# Patient Record
Sex: MALE | Race: WHITE | NOT HISPANIC OR LATINO | Employment: OTHER | URBAN - NONMETROPOLITAN AREA
[De-identification: names, ages, dates, MRNs, and addresses within clinical notes are randomized per-mention and may not be internally consistent; named-entity substitution may affect disease eponyms.]

---

## 2017-01-11 ENCOUNTER — COMMUNICATION - GICH (OUTPATIENT)
Dept: INTERNAL MEDICINE | Facility: OTHER | Age: 82
End: 2017-01-11

## 2017-01-11 DIAGNOSIS — M48.061 SPINAL STENOSIS OF LUMBAR REGION: ICD-10-CM

## 2017-03-14 ENCOUNTER — COMMUNICATION - GICH (OUTPATIENT)
Dept: INTERNAL MEDICINE | Facility: OTHER | Age: 82
End: 2017-03-14

## 2017-03-14 DIAGNOSIS — M48.061 SPINAL STENOSIS OF LUMBAR REGION: ICD-10-CM

## 2017-06-28 ENCOUNTER — OFFICE VISIT - GICH (OUTPATIENT)
Dept: INTERNAL MEDICINE | Facility: OTHER | Age: 82
End: 2017-06-28

## 2017-06-28 ENCOUNTER — HISTORY (OUTPATIENT)
Dept: INTERNAL MEDICINE | Facility: OTHER | Age: 82
End: 2017-06-28

## 2017-06-28 DIAGNOSIS — E78.5 HYPERLIPIDEMIA: ICD-10-CM

## 2017-06-28 DIAGNOSIS — M54.2 CERVICALGIA: ICD-10-CM

## 2017-06-28 DIAGNOSIS — M19.90 OSTEOARTHRITIS: ICD-10-CM

## 2017-06-28 DIAGNOSIS — N40.2 NODULAR PROSTATE WITHOUT LOWER URINARY TRACT SYMPTOMS: ICD-10-CM

## 2017-06-28 DIAGNOSIS — I10 ESSENTIAL (PRIMARY) HYPERTENSION: ICD-10-CM

## 2017-06-28 DIAGNOSIS — N18.30 CHRONIC KIDNEY DISEASE, STAGE III (MODERATE) (H): ICD-10-CM

## 2017-06-28 LAB
A/G RATIO - HISTORICAL: 2 (ref 1–2)
ALBUMIN SERPL-MCNC: 4.3 G/DL (ref 3.5–5.7)
ALP SERPL-CCNC: 49 IU/L (ref 34–104)
ALT (SGPT) - HISTORICAL: 12 IU/L (ref 7–52)
ANION GAP - HISTORICAL: 9 (ref 5–18)
AST SERPL-CCNC: 15 IU/L (ref 13–39)
BILIRUB SERPL-MCNC: 0.5 MG/DL (ref 0.3–1)
BUN SERPL-MCNC: 28 MG/DL (ref 7–25)
BUN/CREAT RATIO - HISTORICAL: 23
CALCIUM SERPL-MCNC: 9.3 MG/DL (ref 8.6–10.3)
CHLORIDE SERPLBLD-SCNC: 105 MMOL/L (ref 98–107)
CHOL/HDL RATIO - HISTORICAL: 4.58
CHOLESTEROL TOTAL: 197 MG/DL
CO2 SERPL-SCNC: 25 MMOL/L (ref 21–31)
CREAT SERPL-MCNC: 1.24 MG/DL (ref 0.7–1.3)
GFR IF NOT AFRICAN AMERICAN - HISTORICAL: 55 ML/MIN/1.73M2
GLOBULIN - HISTORICAL: 2.1 G/DL (ref 2–3.7)
GLUCOSE SERPL-MCNC: 93 MG/DL (ref 70–105)
HDLC SERPL-MCNC: 43 MG/DL (ref 23–92)
LDLC SERPL CALC-MCNC: 131 MG/DL
NON-HDL CHOLESTEROL - HISTORICAL: 154 MG/DL
PATIENT STATUS - HISTORICAL: ABNORMAL
POTASSIUM SERPL-SCNC: 4.4 MMOL/L (ref 3.5–5.1)
PROT SERPL-MCNC: 6.4 G/DL (ref 6.4–8.9)
SODIUM SERPL-SCNC: 139 MMOL/L (ref 133–143)
TRIGL SERPL-MCNC: 116 MG/DL

## 2017-07-17 ENCOUNTER — COMMUNICATION - GICH (OUTPATIENT)
Dept: INTERNAL MEDICINE | Facility: OTHER | Age: 82
End: 2017-07-17

## 2017-07-17 DIAGNOSIS — M48.061 SPINAL STENOSIS OF LUMBAR REGION: ICD-10-CM

## 2017-07-17 DIAGNOSIS — I10 ESSENTIAL (PRIMARY) HYPERTENSION: ICD-10-CM

## 2017-08-08 ENCOUNTER — OFFICE VISIT - GICH (OUTPATIENT)
Dept: INTERNAL MEDICINE | Facility: OTHER | Age: 82
End: 2017-08-08

## 2017-08-08 ENCOUNTER — HISTORY (OUTPATIENT)
Dept: INTERNAL MEDICINE | Facility: OTHER | Age: 82
End: 2017-08-08

## 2017-08-08 DIAGNOSIS — G89.29 OTHER CHRONIC PAIN: ICD-10-CM

## 2017-08-08 DIAGNOSIS — M53.3 SACROCOCCYGEAL DISORDERS, NOT ELSEWHERE CLASSIFIED: ICD-10-CM

## 2017-08-08 DIAGNOSIS — M65.331 TRIGGER MIDDLE FINGER OF RIGHT HAND: ICD-10-CM

## 2017-08-08 DIAGNOSIS — F51.04 PSYCHOPHYSIOLOGIC INSOMNIA: ICD-10-CM

## 2017-08-08 ASSESSMENT — PATIENT HEALTH QUESTIONNAIRE - PHQ9: SUM OF ALL RESPONSES TO PHQ QUESTIONS 1-9: 0

## 2017-09-17 ENCOUNTER — COMMUNICATION - GICH (OUTPATIENT)
Dept: INTERNAL MEDICINE | Facility: OTHER | Age: 82
End: 2017-09-17

## 2017-09-17 DIAGNOSIS — M48.061 SPINAL STENOSIS OF LUMBAR REGION: ICD-10-CM

## 2017-10-19 ENCOUNTER — OFFICE VISIT - GICH (OUTPATIENT)
Dept: INTERNAL MEDICINE | Facility: OTHER | Age: 82
End: 2017-10-19

## 2017-10-19 ENCOUNTER — HOSPITAL ENCOUNTER (OUTPATIENT)
Dept: RADIOLOGY | Facility: OTHER | Age: 82
End: 2017-10-19
Attending: INTERNAL MEDICINE

## 2017-10-19 ENCOUNTER — HISTORY (OUTPATIENT)
Dept: INTERNAL MEDICINE | Facility: OTHER | Age: 82
End: 2017-10-19

## 2017-10-19 DIAGNOSIS — M53.3 SACROCOCCYGEAL DISORDERS, NOT ELSEWHERE CLASSIFIED: ICD-10-CM

## 2017-10-19 DIAGNOSIS — L98.9 DISORDER OF SKIN OR SUBCUTANEOUS TISSUE: ICD-10-CM

## 2017-10-19 DIAGNOSIS — G89.29 OTHER CHRONIC PAIN: ICD-10-CM

## 2017-10-19 ASSESSMENT — PATIENT HEALTH QUESTIONNAIRE - PHQ9: SUM OF ALL RESPONSES TO PHQ QUESTIONS 1-9: 0

## 2017-11-18 ENCOUNTER — COMMUNICATION - GICH (OUTPATIENT)
Dept: INTERNAL MEDICINE | Facility: OTHER | Age: 82
End: 2017-11-18

## 2017-11-18 DIAGNOSIS — M48.061 SPINAL STENOSIS OF LUMBAR REGION WITHOUT NEUROGENIC CLAUDICATION: ICD-10-CM

## 2017-12-27 NOTE — PROGRESS NOTES
Patient Information     Patient Name MRN Sex Juan Miguel Fam [id number] Male 1932      Progress Notes by Grover Gonzalez MD at 10/19/2017  8:40 AM     Author:  Grover Gonzalez MD Service:  (none) Author Type:  Physician     Filed:  10/19/2017  9:11 AM Encounter Date:  10/19/2017 Status:  Signed     :  Grover Gonzalez MD (Physician)            SUBJECTIVE:    Juan Miguel Delaney is a 85 y.o. male who presents for back pain and skin concerns.    HPI Comments: He comes in today for follow-up. He is concerned about a couple of skin lesions on his face. 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 I reviewed his chart, 2-1/2 years ago he had a squamous cell cancer removed, not a melanoma. The other problem that he is having his pain in his back on the left side. I have previously ordered a left SI joint injection for him, for some reason he never had this done. It should be noted that the patient does seem to have some mild confusion today which seems to be a little bit above and beyond his usual. He has no other complaints or concerns.      Allergies      Allergen   Reactions     Ibuprofen  *Unknown     Lisinopril  *Unknown     Dry throat      Penicillins  Hives   ,   Current Outpatient Prescriptions     Medication  Sig     acetaminophen (TYLENOL EXTRA STRENGTH) 500 mg tablet Take 2 tablets by mouth 3 times daily.     amLODIPine (NORVASC) 5 mg tablet TAKE ONE TABLET BY MOUTH ONCE DAILY     fluorouracil 5% topical (EFUDEX) 5 % cream Apply  topically to affected area(s) 2 times daily. Use for 7-10 days, repeat as needed     gabapentin (NEURONTIN) 100 mg capsule Take 1 capsule by mouth 3 times daily.     traMADol (ULTRAM) 50 mg tablet TAKE TWO TABLETS BY MOUTH THREE TIMES DAILY     No current facility-administered medications for this visit.      Medications have been reviewed by me and are current to the best of my knowledge and ability. ,   Past Medical History:     Diagnosis  Date      "Actinic keratosis 2004    5-FU treatment on forehead      Chronic kidney disease (CKD), stage III (moderate)      Elevated sed rate 2005    weight loss, night sweats due to episode of acute inflammatory illness, etiology unclear      Family history of prostate cancer      Hyperlipidemia      Hypertension      Osteoarthritis      Pneumonia 2002    and severe NSAID allergic reaction, hospitalized      Sciatica    ,   Patient Active Problem List       Diagnosis  Date Noted     Chronic left sacroiliac pain  10/19/2017     Chronic insomnia  08/08/2017     CKD (chronic kidney disease) stage 3, GFR 30-59 ml/min  05/25/2015     Lumbar stenosis  04/24/2015     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          OSTEOARTHRITIS       NECK PAIN, CHRONIC      and   Past Surgical History:      Procedure  Laterality Date     CARPAL TUNNEL RELEASE      bilateral       COLONOSCOPY SCREENING  2008    normal       KNEE REPLACEMENT Right 2006     KNEE REPLACEMENT Left 2008     LUMBAR LAMINECTOMY  2015    [place], spinal stenosis       SCAN-STRESS TEST  2004    negative to heart rate 138       SHOULDER ARTHROSCOPY  1996    left       SHOULDER ARTHROSCOPY  2001    AC arthrosis & distal clavicle resection       SHOULDER ARTHROSCOPY Right 2011     TRIGGER FINGER RELEASE  2013    Right 5th digit       TURP         REVIEW OF SYSTEMS:  Review of Systems   All other systems reviewed and are negative.      OBJECTIVE:  /84  Pulse 68  Ht 1.702 m (5' 7\")  Wt 81.4 kg (179 lb 6.4 oz)  BMI 28.1 kg/m2    EXAM:   Physical Exam   Constitutional: He is well-developed, well-nourished, and in no distress. No distress.   HENT:   Head:       Musculoskeletal:        Back:    Skin: He is not diaphoretic.   Nursing note and vitals reviewed.      ASSESSMENT/PLAN:    " ICD-10-CM    1. Chronic left sacroiliac pain M53.3 XR INJ SACROILIAC JOINT LEFT     G89.29    2. Skin lesion L98.9 NH DESTROY PREMALIGNANT 1ST LESION        Plan:  The 2 skin lesions were treated with liquid nitrogen. He will follow up on these as needed. For his back pain, I do want him to try an injection in the left SI joint. This is ordered. He will follow-up with me depending on the results of the above treatments. No medication changes are made today.

## 2017-12-27 NOTE — PROGRESS NOTES
"Patient Information     Patient Name MRN Sex Juan Miguel Brown [id number] Male 1932      Progress Notes by Grover Gonzalez MD at 2017  4:00 PM     Author:  Grover Gonzalez MD Service:  (none) Author Type:  Physician     Filed:  2017  4:28 PM Encounter Date:  2017 Status:  Signed     :  Grover Gonzalez MD (Physician)            SUBJECTIVE:    Juan Miguel Delaney is a 85 y.o. male who presents for a few different issues.    HPI Comments: He comes in today for a few things. First of all he has a trigger finger on his right hand. He wakes up in the morning and it will not straighten out, he has to do it manually. It is somewhat uncomfortable. We talked about whether he would like to pursue this further and he would. He has had a previous trigger finger procedure done.    He is also having some pain in his low back. He has a previous history of lumbar spinal stenosis with surgery. He's had injections in the past as well. This pain seems different and is more on the left side. Does not really radiate down the leg at all and is not associated with any bowel or bladder changes. It is bothersome enough that he would certainly consider having something done if so indicated. He is already on Tylenol, tramadol and gabapentin for pain so I'm not inclined to add more medications on top of that and he seems to agree.    He also has complaints of poor sleep. He says that he doesn't sleep like \"normal\". He only sleeps a couple of hours every night. He is able to function mostly during the day. This has been lifelong. I explained to him that his sleep pattern does not follow a usual pattern but that does not mean that it's necessarily abnormal or should be interfered with her treated.      Allergies      Allergen   Reactions     Ibuprofen  *Unknown     Lisinopril  *Unknown     Dry throat      Penicillins  Hives   ,   Current Outpatient Prescriptions     Medication  Sig     acetaminophen (TYLENOL EXTRA STRENGTH) " 500 mg tablet Take 2 tablets by mouth 3 times daily.     amLODIPine (NORVASC) 5 mg tablet TAKE ONE TABLET BY MOUTH ONCE DAILY     fluorouracil 5% topical (EFUDEX) 5 % cream Apply  topically to affected area(s) 2 times daily. Use for 7-10 days, repeat as needed     gabapentin (NEURONTIN) 100 mg capsule Take 1 capsule by mouth 3 times daily.     traMADol (ULTRAM) 50 mg tablet TAKE TWO TABLETS BY MOUTH THREE TIMES DAILY     No current facility-administered medications for this visit.      Medications have been reviewed by me and are current to the best of my knowledge and ability. ,   Past Medical History:     Diagnosis  Date     Actinic keratosis 2004    5-FU treatment on forehead      Chronic kidney disease (CKD), stage III (moderate)      Elevated sed rate 2005    weight loss, night sweats due to episode of acute inflammatory illness, etiology unclear      Family history of prostate cancer      Hyperlipidemia      Hypertension      Osteoarthritis      Pneumonia 2002    and severe NSAID allergic reaction, hospitalized      Sciatica    ,   Patient Active Problem List       Diagnosis  Date Noted     Chronic insomnia  08/08/2017     CKD (chronic kidney disease) stage 3, GFR 30-59 ml/min  05/25/2015     Lumbar stenosis  04/24/2015     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          OSTEOARTHRITIS       NECK PAIN, CHRONIC      and   Past Surgical History:      Procedure  Laterality Date     CARPAL TUNNEL RELEASE      bilateral       COLONOSCOPY SCREENING  2008    normal       KNEE REPLACEMENT Right 2006     KNEE REPLACEMENT Left 2008     LUMBAR LAMINECTOMY  2015    [place], spinal stenosis       SCAN-STRESS TEST  2004    negative to heart rate 138       SHOULDER ARTHROSCOPY  1996    left       SHOULDER ARTHROSCOPY  2001    [facility]  "arthrosis & distal clavicle resection       SHOULDER ARTHROSCOPY Right 2011     TRIGGER FINGER RELEASE  2013    Right 5th digit       TURP         REVIEW OF SYSTEMS:  Review of Systems   All other systems reviewed and are negative.      OBJECTIVE:  /78  Pulse 56  Ht 1.702 m (5' 7\")  Wt 80.6 kg (177 lb 9.6 oz)  BMI 27.82 kg/m2    EXAM:   Physical Exam   Constitutional: He is well-developed, well-nourished, and in no distress. No distress.   Musculoskeletal:        Back:    His right index finger appeared slightly swollen but otherwise benign. Range of motion was full.   Skin: He is not diaphoretic.   Nursing note and vitals reviewed.      ASSESSMENT/PLAN:    ICD-10-CM    1. Trigger middle finger of right hand M65.331 AMB CONSULT TO ORTHOPEDICS (NON-SPINE)   2. Chronic left sacroiliac pain M53.3 XR INJ SACROCOCCYX JOINT LEFT     G89.29    3. Chronic insomnia F51.04         Plan:  For the trigger finger, I discussed options with him. He would like to pursue this. Orthopedic consultation is requested. He may get by with just an injection in that.    For his left presumed sacroiliac discomfort, I discussed options for treatment of that as well. I recommended that he try an injection to see if that provides any relief. I don't think that this current situation is related to his previous known lumbar disease. He is amenable to trying the injection so that is ordered.    Discussion regarding his insomnia. This has been a lifelong sleep pattern. I don't think that we are going to be to make any difference with any reasonable treatment and I recommended doing nothing further. He is in agreement.        "

## 2017-12-27 NOTE — PROGRESS NOTES
Patient Information     Patient Name MRN Sex Juan Miguel Brown [id number] Male 1932      Progress Notes by Grover Gonzalez MD at 2017  4:00 PM     Author:  Grover Gonzalez MD Service:  (none) Author Type:  Physician     Filed:  2017  4:26 PM Encounter Date:  2017 Status:  Signed     :  Grover Gonzalez MD (Physician)            SUBJECTIVE:    Juan Miguel Delaney is a 85 y.o. male who presents for comprehensive review of their multiple medical problems and review of medications, renewal of medications and update on necessary health maintenance issues.      HPI Comments: This patient comes in today for complete evaluation and a review of his chronic medical problems. He has a history of hypertension. He is on single drug therapy for this. He does have some chronic kidney disease as a result of this. He did not take his medication today. He doesn't know if it's always doing well or not.    His biggest problem is related to his chronic pain. He is currently on tramadol and Tylenol. He takes 2 of each 3 times daily. He is not convinced that they're doing much anymore and he would like to be considered for something different or additional.    There is a strong family history of prostate cancer in his father as well as in her brother. He is due for recheck on this despite his age. He has a history of hyperlipidemia and needs a recheck on that. He has a history of low back problems, an MRI a year ago is fairly stable showing some lumbar disc disease.    Other than that he feels well. Immunizations are up-to-date. Medications are reconciled. I spent time today reviewing his past medical history, past surgical history and past family history and social histories.      Allergies      Allergen   Reactions     Ibuprofen  *Unknown     Lisinopril  *Unknown     Dry throat      Penicillins  Hives   ,   Current Outpatient Prescriptions     Medication  Sig     acetaminophen (TYLENOL EXTRA STRENGTH) 500 mg  tablet Take 2 tablets by mouth 3 times daily.     amLODIPine (NORVASC) 5 mg tablet TAKE ONE TABLET BY MOUTH ONCE DAILY     fluorouracil 5% topical (EFUDEX) 5 % cream Apply  topically to affected area(s) 2 times daily. Use for 7-10 days, repeat as needed     traMADol (ULTRAM) 50 mg tablet TAKE TWO TABLETS BY MOUTH THREE TIMES DAILY     No current facility-administered medications for this visit.      Medications have been reviewed by me and are current to the best of my knowledge and ability. ,   Past Medical History:     Diagnosis  Date     Actinic keratosis 2004    5-FU treatment on forehead      Chronic kidney disease (CKD), stage III (moderate)      Elevated sed rate 2005    weight loss, night sweats due to episode of acute inflammatory illness, etiology unclear      Family history of prostate cancer      Hyperlipidemia      Hypertension      Osteoarthritis      Pneumonia 2002    and severe NSAID allergic reaction, hospitalized      Sciatica    ,   Patient Active Problem List       Diagnosis  Date Noted     CKD (chronic kidney disease) stage 3, GFR 30-59 ml/min  05/25/2015     Lumbar stenosis  04/24/2015     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          OSTEOARTHRITIS       NECK PAIN, CHRONIC     ,   Past Surgical History:      Procedure  Laterality Date     CARPAL TUNNEL RELEASE      bilateral       COLONOSCOPY SCREENING  2008    normal       KNEE REPLACEMENT Right 2006     KNEE REPLACEMENT Left 2008     LUMBAR LAMINECTOMY  2015    [place], spinal stenosis       SCAN-STRESS TEST  2004    negative to heart rate 138       SHOULDER ARTHROSCOPY  1996    left       SHOULDER ARTHROSCOPY  2001    [facility] arthrosis & distal clavicle resection       SHOULDER ARTHROSCOPY Right 2011     TRIGGER FINGER RELEASE  2013    Right 5th digit        TURP      and   Social History     Substance Use Topics       Smoking status: Never Smoker     Smokeless tobacco: Never Used     Alcohol use No     Family Status     Relation  Status     Brother Alive     Father  at age 82     Mother  at age 99     Brother Alive     Social History     Social History        Marital status:       Spouse name: N/A     Number of children:  N/A     Years of education:  N/A     Social History Main Topics       Smoking status: Never Smoker     Smokeless tobacco: Never Used     Alcohol use No     Drug use: No     Sexual activity: Not Asked     Other Topics  Concern     None      Social History Narrative     Patient lives in town.  He is , retired from [employer].  3 children, 1 .                                                               REVIEW OF SYSTEMS:  Review of Systems   Constitutional: Negative for chills, diaphoresis, fever, malaise/fatigue and weight loss.   HENT: Negative for congestion, ear pain, nosebleeds, sore throat and tinnitus.    Eyes: Negative for blurred vision, double vision, photophobia, pain, discharge and redness.   Respiratory: Negative for cough, hemoptysis, sputum production, shortness of breath and wheezing.    Cardiovascular: Negative for chest pain, palpitations, orthopnea, claudication, leg swelling and PND.   Gastrointestinal: Negative for abdominal pain, blood in stool, constipation, diarrhea, heartburn, nausea and vomiting.   Genitourinary: Negative for dysuria, flank pain and hematuria.   Musculoskeletal: Negative for back pain, joint pain, myalgias and neck pain.   Skin: Negative for itching and rash.   Neurological: Negative for dizziness, tingling, tremors, speech change, loss of consciousness, weakness and headaches.   Psychiatric/Behavioral: Negative for depression, hallucinations, memory loss, substance abuse and suicidal ideas. The patient is not nervous/anxious.        OBJECTIVE:  /86  Pulse 64  Ht  "1.715 m (5' 7.5\")  Wt 80.5 kg (177 lb 6.4 oz)  BMI 27.37 kg/m2    EXAM:   Physical Exam   Constitutional: He is oriented to person, place, and time and well-developed, well-nourished, and in no distress. No distress.   HENT:   Head: Normocephalic and atraumatic.   Right Ear: Tympanic membrane and external ear normal.   Left Ear: Tympanic membrane and external ear normal.   Nose: Nose normal.   Mouth/Throat: Oropharynx is clear and moist and mucous membranes are normal. No oropharyngeal exudate.   Eyes: Conjunctivae are normal. Pupils are equal, round, and reactive to light. No scleral icterus.   Neck: Normal range of motion. Neck supple. Normal carotid pulses, no hepatojugular reflux and no JVD present. Carotid bruit is not present. No tracheal deviation and no edema present. No thyroid mass and no thyromegaly present.   Cardiovascular: Normal rate, regular rhythm, normal heart sounds and intact distal pulses.  Exam reveals no gallop and no friction rub.    No murmur heard.  Pulmonary/Chest: Effort normal and breath sounds normal. No respiratory distress. He has no decreased breath sounds. He has no wheezes. He has no rhonchi. He has no rales. He exhibits no tenderness.   Abdominal: Soft. Bowel sounds are normal. He exhibits no distension and no mass. There is no hepatosplenomegaly. There is no tenderness. There is no rebound and no guarding. Hernia confirmed negative in the right inguinal area and confirmed negative in the left inguinal area.   Genitourinary: Rectum normal, prostate normal, testes/scrotum normal and penis normal.   Musculoskeletal: Normal range of motion. He exhibits no edema or tenderness.   Lymphadenopathy:     He has no cervical adenopathy.   Neurological: He is alert and oriented to person, place, and time. He has normal motor skills. He displays normal reflexes. No cranial nerve deficit. He exhibits normal muscle tone. Gait normal. Coordination normal.   Skin: Skin is warm and dry. No rash " noted. He is not diaphoretic. No cyanosis or erythema. No pallor. Nails show no clubbing.   Psychiatric: Mood, memory, affect and judgment normal.   Nursing note and vitals reviewed.      ASSESSMENT/PLAN:    ICD-10-CM    1. CKD (chronic kidney disease) stage 3, GFR 30-59 ml/min N18.3    2. 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 N40.2    3. HYPERTENSION I10 COMPLETE METABOLIC PANEL      COMPLETE METABOLIC PANEL   4. Hyperlipidemia, unspecified hyperlipidemia type E78.5 LIPID PANEL      LIPID PANEL   5. Osteoarthritis, unspecified osteoarthritis type, unspecified site M19.90 gabapentin (NEURONTIN) 100 mg capsule   6. NECK PAIN, CHRONIC M54.2         Plan:  His exam today is fairly unremarkable other than his blood pressure being slightly high. Of note is that he did not take his blood pressure medication today. He will have his blood pressure checked at [facility] and he will let me know if it is running high. In regards to his chronic pain, I am going to have him continue the tramadol and acetaminophen and I'm going to start him on gabapentin 100 mg 3 times a day. Warned of possible side effects. If not better in 2 weeks he will call and I will increase the dose to 300 mg 3 times daily. Everything else is up-to-date. Complete lab drawn and pending, I will send him a letter with the results and any recommendations. Otherwise appears to be healthy, his biggest problem being the pain associated with his arthritis in his low back disease.

## 2017-12-27 NOTE — PROGRESS NOTES
Patient Information     Patient Name MRN Sex Juan Miguel Brown [id number] Male 1932      Progress Notes by Millie Dunaway at 10/19/2017  2:49 PM     Author:  Millie Dunaway Service:  (none) Author Type:  (none)     Filed:  10/19/2017  2:49 PM Date of Service:  10/19/2017  2:49 PM Status:  Signed     :  Millie Dunaway            Falls Risk Criteria:    Age 65 and older or under age 4        Sensory deficits    Poor vision    Use of ambulatory aides    Impaired judgment    Unable to walk independently    Meets High Risk criteria for falls:  Yes             1.  Do you have dizziness or vertigo?    no                    2.  Do you need help standing or walking?   no                 3.  Have you fallen within the last 6 months?    no           4.  Has the patient been fasting?      no       If any risks are marked Yes, the following interventions are utilized:    Do not leave patient unattended     Assist patient in the dressing room and bathroom    Have ambulatory aides available throughout procedure    Involve patient s family if available

## 2017-12-28 NOTE — PROGRESS NOTES
Patient Information     Patient Name MRN Sex Juan Miguel Brown [id number] Male 1932      Progress Notes by Millie Dunaway at 10/19/2017  2:29 PM     Author:  Millie Dunaway Service:  (none) Author Type:  (none)     Filed:  10/19/2017  2:50 PM Date of Service:  10/19/2017  2:29 PM Status:  Signed     :  Millie Dunaway            [place] Protocol    A. Pre-procedure verification complete yes  1-relevant information / documentation available, reviewed and properly matched to the patient; 2-consent accurate and complete, 3-equipment and supplies available    B. Site marking complete Yes  Site marked if not in continuous attendance with patient    C. TIME OUT completed yes  Time Out was conducted just prior to starting procedure to verify the eight required elements: 1-patient identity, 2-consent accurate and complete, 3-position, 4-correct side/site marked (if applicable), 5-procedure, 6-relevant images / results properly labeled and displayed (if applicable), 7-antibiotics / irrigation fluids (if applicable), 8-safety precautions.

## 2017-12-28 NOTE — TELEPHONE ENCOUNTER
Patient Information     Patient Name MRN Sex Juan Miguel Brown [id number] Male 1932      Telephone Encounter by Sary Rogers RN at 2017  9:17 AM     Author:  Sary Rogers RN Service:  (none) Author Type:  NURS- Registered Nurse     Filed:  2017  9:19 AM Encounter Date:  2017 Status:  Signed     :  Sary Rogers RN (NURS- Registered Nurse)            Calcium Channel Blockers    Office visit in the past 12 months or per provider note.    Last visit with JANICE LUCIO was on: 2017 in [facility]  Next visit with JANICE LUCIO is on: No future appointment listed with this provider  Next visit with Internal Medicine is on: No future appointment listed in this department  BP Readings from Last 4 Encounters:    17 142/86   16 132/78   16 142/88   16 (!) 161/109       Review last provider visit note.  If BP reviewed and plan is noted, can refill.  Max refill for 12 months from last office visit or per provider note.  Prescription refilled per RN Medication Refill Policy.................... SARY ROGERS RN ....................  2017   9:18 AM

## 2017-12-28 NOTE — PROGRESS NOTES
Patient Information     Patient Name MRN Sex Juan Miguel Brown [id number] Male 1932      Progress Notes by Millie Dunaway at 10/19/2017  2:49 PM     Author:  Millie Dunaway Service:  (none) Author Type:  (none)     Filed:  10/19/2017  2:49 PM Date of Service:  10/19/2017  2:49 PM Status:  Signed     :  Millie Dunaway            RECOVERY TIME  You may experience numbness and/or relief of your pain for up to 4-6 hours after the injection.  Your usual symptoms may return the night of the procedure and may possible be more severe than usual a day or two following.  Please keep track of your pain over the next several days and report how long the relief lasts to the doctor who referred you for this procedure.    The beneficial effects of the steroids usually require 2 to 3 days to take effect, buy may take as long as 5 to 7 days.  If there is no change in the pain, then investigation can be focused on other possible sources of your pain.  In either case, the information is useful to the doctor who referred you for this procedure.    POSSIBLE SIDE EFFECTS  Facial flushing (redness), occasional low grade fevers of 99.5F or less, hiccups, insomnia, headaches, increased heart rate, abdominal cramping, and/or a bloating feeling are side effects of the steroid medications and will go away 3 to 4 days after the injection.    Diabetic Patients  The steroids you have received may significantly increase your blood sugar levels.  Monitor your blood sugar level closely (4-6 times per day) for a period of 4 days or until your blood sugar level normalizes.  If your blood sugar level elevates significantly or you experience confusion, dizziness, sweating, please notify our primary physician and make him/her aware that you have received steroids.

## 2017-12-28 NOTE — TELEPHONE ENCOUNTER
Patient Information     Patient Name MRN Sex Juan Miguel Brown [id number] Male 1932      Telephone Encounter by Opal Rios RN at 2017 12:52 PM     Author:  Opal Rios RN Service:  (none) Author Type:  NURS- Registered Nurse     Filed:  2017 12:53 PM Encounter Date:  2017 Status:  Signed     :  Opal Rios RN (NURS- Registered Nurse)            This is a Refill request from: [facility]  Name of Medication: traMADol (ULTRAM) 50 mg tablet  TAKE TWO TABLETS BY MOUTH THREE TIMES DAILY  Quantity requested: 180  Last fill date: 10/18/17  Due for refill: yes  Last visit with JANICE LUCIO was on: 10/19/2017 in [facility]  PCP:  Janice Lucio MD  Controlled Substance Agreement:  none   Diagnosis r/t this medication request: Lumbar stenosis     Unable to complete prescription refill per RN Medication Refill Policy.................... OPAL RIOS RN ....................  2017   12:52 PM

## 2017-12-28 NOTE — TELEPHONE ENCOUNTER
Patient Information     Patient Name MRN Sex Juan Miguel Brown [id number] Male 1932      Telephone Encounter by Sary Castro RN at 2017  9:15 AM     Author:  Sary Castro RN Service:  (none) Author Type:  NURS- Registered Nurse     Filed:  2017  9:19 AM Encounter Date:  2017 Status:  Signed     :  Sary Castro RN (NURS- Registered Nurse)            traMADol (ULTRAM) 50 mg tablet  TAKE TWO TABLETS BY MOUTH THREE TIMES DAILY       Disp: 180 tablet Refills: 0    Class: eRx Start: 2017    For: Lumbar stenosis  Documented:4 years ago  Last refill:2017  To be filled at: [facility] [address] [place], [place] [address]Phone: [phone]    Last visit with JANICE LUCIO was on: 2017 in [facility]  PCP:  Janice Lucio MD  No Narcotic contract noted on file    Unable to complete prescription refill per RN Medication Refill Policy.................... SARY CASTRO RN ....................  2017   9:16 AM

## 2017-12-30 NOTE — NURSING NOTE
Patient Information     Patient Name MRN Sex Juan Miguel Brown [id number] Male 1932      Nursing Note by Obdulia Apodaca LPN at 2017  4:00 PM     Author:  Obdulia Apodaca LPN Service:  (none) Author Type:  NURS- Licensed Practical Nurse     Filed:  2017  3:56 PM Encounter Date:  2017 Status:  Signed     :  Obdulia Apodaca LPN (NURS- Licensed Practical Nurse)            The patient is here today to have a annual check up. The patient states he has not been to the eye doctor in the last year.  Obdulia Apodaca LPN......2017  3:53 PM

## 2017-12-30 NOTE — NURSING NOTE
Patient Information     Patient Name MRN Sex Juan Miguel Brown [id number] Male 1932      Nursing Note by Obdulia Apodaca LPN at 10/19/2017  8:40 AM     Author:  Obdulia Apodaca LPN Service:  (none) Author Type:  NURS- Licensed Practical Nurse     Filed:  10/19/2017  8:52 AM Encounter Date:  10/19/2017 Status:  Signed     :  Obdulia Apodaca LPN (NURS- Licensed Practical Nurse)            The patient is here today to discuss his lower left back pain. He would also like a spot on the left side of his face looked at.  Obdulia Apodaca LPN......10/19/2017  8:39 AM

## 2017-12-30 NOTE — NURSING NOTE
Patient Information     Patient Name MRN Sex Juan Miguel Brown [id number] Male 1932      Nursing Note by Obdulia Apodaca LPN at 2017  4:00 PM     Author:  Obdulia Apodaca LPN Service:  (none) Author Type:  NURS- Licensed Practical Nurse     Filed:  2017  4:10 PM Encounter Date:  2017 Status:  Signed     :  Obdulia Apodaca LPN (NURS- Licensed Practical Nurse)            The patient is here today to discuss his lower back pain, along with his middle finger on his right hand and lack of sleep.  Obdulia Apodaca LPN......2017  3:55 PM

## 2018-01-02 NOTE — TELEPHONE ENCOUNTER
Patient Information     Patient Name MRN Sex Juan Miguel Brown [id number] Male 1932      Telephone Encounter by Sary Castro RN at 2017  9:15 AM     Author:  Sary Castro RN Service:  (none) Author Type:  NURS- Registered Nurse     Filed:  2017  9:17 AM Encounter Date:  2017 Status:  Signed     :  Sary Castro RN (NURS- Registered Nurse)            traMADol (ULTRAM) 50 mg tablet  TAKE TWO TABLETS BY MOUTH THREE TIMES DAILY       Disp: 180 tablet Refills: 0    Class: eRx Start: 2017    For: Lumbar stenosis  Documented:4 years ago  Last refill: 2016  To be filled at: [facility] [address] [place], [place] [address]Phone: [phone]    Last visit with JANICE LUCIO was on: 2016 in [facility] INTER[facility]  PCP:  Janice Lucio MD  Controlled Substance Agreement:  None noted      Unable to complete prescription refill per RN Medication Refill Policy.................... SARY CASTRO RN ....................  2017   9:15 AM

## 2018-01-03 NOTE — TELEPHONE ENCOUNTER
Patient Information     Patient Name MRN Sex Juan Miguel Brown [id number] Male 1932      Telephone Encounter by Sary Castro RN at 3/14/2017  4:29 PM     Author:  Sary Castro RN Service:  (none) Author Type:  NURS- Registered Nurse     Filed:  3/14/2017  4:30 PM Encounter Date:  3/14/2017 Status:  Signed     :  Sary Castro RN (NURS- Registered Nurse)            traMADol (ULTRAM) 50 mg tablet  TAKE TWO TABLETS BY MOUTH THREE TIMES DAILY       Disp: 180 tablet Refills: 0    Class: eRx Start: 3/14/2017    For: Lumbar stenosis  Documented:4 years ago  Last refill: 2017  To be filled at: [facility] [address] [place], [place] [address]Phone: [phone]    Last visit with JANICE LUCIO was on: 2016 in [facility]  PCP:  Janice Lucio MD  Controlled Substance Agreement:  None noted     Unable to complete prescription refill per RN Medication Refill Policy.................... SARY CASTRO RN ....................  3/14/2017   4:29 PM

## 2018-01-20 ENCOUNTER — COMMUNICATION - GICH (OUTPATIENT)
Dept: INTERNAL MEDICINE | Facility: OTHER | Age: 83
End: 2018-01-20

## 2018-01-20 DIAGNOSIS — M48.061 SPINAL STENOSIS OF LUMBAR REGION WITHOUT NEUROGENIC CLAUDICATION: ICD-10-CM

## 2018-01-26 VITALS
SYSTOLIC BLOOD PRESSURE: 138 MMHG | HEART RATE: 68 BPM | WEIGHT: 179.4 LBS | DIASTOLIC BLOOD PRESSURE: 84 MMHG | BODY MASS INDEX: 28.16 KG/M2 | HEIGHT: 67 IN

## 2018-01-26 VITALS
HEART RATE: 64 BPM | WEIGHT: 177.4 LBS | HEIGHT: 68 IN | BODY MASS INDEX: 26.89 KG/M2 | DIASTOLIC BLOOD PRESSURE: 86 MMHG | SYSTOLIC BLOOD PRESSURE: 142 MMHG

## 2018-01-26 VITALS
BODY MASS INDEX: 27.88 KG/M2 | DIASTOLIC BLOOD PRESSURE: 78 MMHG | WEIGHT: 177.6 LBS | HEART RATE: 56 BPM | SYSTOLIC BLOOD PRESSURE: 128 MMHG | HEIGHT: 67 IN

## 2018-02-01 ASSESSMENT — PATIENT HEALTH QUESTIONNAIRE - PHQ9
SUM OF ALL RESPONSES TO PHQ QUESTIONS 1-9: 0
SUM OF ALL RESPONSES TO PHQ QUESTIONS 1-9: 0

## 2018-02-08 ENCOUNTER — DOCUMENTATION ONLY (OUTPATIENT)
Dept: FAMILY MEDICINE | Facility: OTHER | Age: 83
End: 2018-02-08

## 2018-02-08 PROBLEM — M53.3 CHRONIC LEFT SACROILIAC PAIN: Status: ACTIVE | Noted: 2017-10-19

## 2018-02-08 PROBLEM — E78.5 HYPERLIPIDEMIA: Status: ACTIVE | Noted: 2018-02-08

## 2018-02-08 PROBLEM — G89.29 CHRONIC LEFT SACROILIAC PAIN: Status: ACTIVE | Noted: 2017-10-19

## 2018-02-08 PROBLEM — F51.04 CHRONIC INSOMNIA: Status: ACTIVE | Noted: 2017-08-08

## 2018-02-08 PROBLEM — M54.2 NECK PAIN, CHRONIC: Status: ACTIVE | Noted: 2018-02-08

## 2018-02-08 PROBLEM — M54.30 SCIATICA: Status: ACTIVE | Noted: 2018-02-08

## 2018-02-08 PROBLEM — G89.29 NECK PAIN, CHRONIC: Status: ACTIVE | Noted: 2018-02-08

## 2018-02-08 PROBLEM — M19.90 OSTEOARTHROSIS: Status: ACTIVE | Noted: 2018-02-08

## 2018-02-08 RX ORDER — ACETAMINOPHEN 500 MG
1000 TABLET ORAL 3 TIMES DAILY
Status: ON HOLD | COMMUNITY
Start: 2013-11-25 | End: 2019-01-22

## 2018-02-08 RX ORDER — AMLODIPINE BESYLATE 5 MG/1
5 TABLET ORAL DAILY
COMMUNITY
Start: 2017-07-19 | End: 2018-02-23

## 2018-02-08 RX ORDER — FLUOROURACIL 50 MG/G
CREAM TOPICAL 2 TIMES DAILY PRN
COMMUNITY
Start: 2016-01-14 | End: 2018-02-23

## 2018-02-08 RX ORDER — GABAPENTIN 100 MG/1
100 CAPSULE ORAL 3 TIMES DAILY
COMMUNITY
Start: 2017-06-28 | End: 2018-02-23

## 2018-02-08 RX ORDER — TRAMADOL HYDROCHLORIDE 50 MG/1
100 TABLET ORAL 3 TIMES DAILY
COMMUNITY
Start: 2017-11-20 | End: 2018-02-23

## 2018-02-13 NOTE — TELEPHONE ENCOUNTER
Patient Information     Patient Name MRN Sex Juan Miguel Brown [id number] Male 1932      Telephone Encounter by Ema Spencer RN at 2018  9:28 AM     Author:  Ema Spencer RN Service:  (none) Author Type:  NURS- Registered Nurse     Filed:  2018  9:31 AM Encounter Date:  2018 Status:  Signed     :  Ema Spencer RN (NURS- Registered Nurse)            PLEASE REVIEW, SIGN AND SEND AS APPROPRIATE: THANK YOU.    This is a Refill request from: [facility]  Name of Medication: traMADol (ULTRAM) 50 mg tablet  Quantity requested: 180 tablets  Last fill date: 2017 (#180, R-1)  Due for refill: 2018  Last visit with GROVER GONZALEZ was on: 10/19/2017 in [facility]  PCP:  Grover Gonzalez MD  Controlled Substance Agreement:  none   Diagnosis r/t this medication request: Lumbar stenosis     Unable to complete prescription refill per RN Medication Refill Policy.................... Ema Spencer RN ....................  2018   9:31 AM

## 2018-02-21 ENCOUNTER — DOCUMENTATION ONLY (OUTPATIENT)
Dept: FAMILY MEDICINE | Facility: OTHER | Age: 83
End: 2018-02-21

## 2018-02-23 ENCOUNTER — OFFICE VISIT (OUTPATIENT)
Dept: INTERNAL MEDICINE | Facility: OTHER | Age: 83
End: 2018-02-23
Attending: INTERNAL MEDICINE
Payer: COMMERCIAL

## 2018-02-23 VITALS
HEART RATE: 84 BPM | HEIGHT: 66 IN | DIASTOLIC BLOOD PRESSURE: 86 MMHG | SYSTOLIC BLOOD PRESSURE: 138 MMHG | BODY MASS INDEX: 28.21 KG/M2 | WEIGHT: 175.5 LBS

## 2018-02-23 DIAGNOSIS — M48.062 SPINAL STENOSIS OF LUMBAR REGION WITH NEUROGENIC CLAUDICATION: ICD-10-CM

## 2018-02-23 DIAGNOSIS — E78.2 MIXED HYPERLIPIDEMIA: ICD-10-CM

## 2018-02-23 DIAGNOSIS — M53.3 CHRONIC LEFT SACROILIAC PAIN: ICD-10-CM

## 2018-02-23 DIAGNOSIS — G89.29 CHRONIC LEFT SACROILIAC PAIN: ICD-10-CM

## 2018-02-23 DIAGNOSIS — M54.2 NECK PAIN, CHRONIC: ICD-10-CM

## 2018-02-23 DIAGNOSIS — I10 ESSENTIAL HYPERTENSION: Primary | ICD-10-CM

## 2018-02-23 DIAGNOSIS — M79.641 BILATERAL HAND PAIN: ICD-10-CM

## 2018-02-23 DIAGNOSIS — N18.30 CKD (CHRONIC KIDNEY DISEASE) STAGE 3, GFR 30-59 ML/MIN (H): ICD-10-CM

## 2018-02-23 DIAGNOSIS — M79.642 BILATERAL HAND PAIN: ICD-10-CM

## 2018-02-23 DIAGNOSIS — G89.29 NECK PAIN, CHRONIC: ICD-10-CM

## 2018-02-23 DIAGNOSIS — M65.342 TRIGGER FINGER, LEFT RING FINGER: ICD-10-CM

## 2018-02-23 DIAGNOSIS — F51.04 CHRONIC INSOMNIA: ICD-10-CM

## 2018-02-23 PROBLEM — M54.30 SCIATICA: Status: RESOLVED | Noted: 2018-02-08 | Resolved: 2018-02-23

## 2018-02-23 PROCEDURE — 99214 OFFICE O/P EST MOD 30 MIN: CPT | Performed by: INTERNAL MEDICINE

## 2018-02-23 PROCEDURE — G0463 HOSPITAL OUTPT CLINIC VISIT: HCPCS

## 2018-02-23 RX ORDER — AMLODIPINE BESYLATE 5 MG/1
5 TABLET ORAL DAILY
Qty: 90 TABLET | Refills: 3 | Status: SHIPPED | OUTPATIENT
Start: 2018-02-23 | End: 2018-04-19

## 2018-02-23 RX ORDER — TRAMADOL HYDROCHLORIDE 50 MG/1
50-100 TABLET ORAL 3 TIMES DAILY
Qty: 180 TABLET | Refills: 5 | Status: SHIPPED | OUTPATIENT
Start: 2018-02-23 | End: 2018-04-19

## 2018-02-23 ASSESSMENT — ENCOUNTER SYMPTOMS
COUGH: 0
CHILLS: 0
MYALGIAS: 0
EYE PAIN: 0
LIGHT-HEADEDNESS: 0
DIARRHEA: 0
WHEEZING: 0
AGITATION: 0
CONFUSION: 0
NAUSEA: 0
VOMITING: 0
ABDOMINAL PAIN: 0
BACK PAIN: 1
PALPITATIONS: 0
JOINT SWELLING: 1
DIZZINESS: 0
ARTHRALGIAS: 1
BRUISES/BLEEDS EASILY: 0
DYSURIA: 0
SHORTNESS OF BREATH: 0
HEMATURIA: 0
FATIGUE: 0
FEVER: 0

## 2018-02-23 ASSESSMENT — PATIENT HEALTH QUESTIONNAIRE - PHQ9: 5. POOR APPETITE OR OVEREATING: NOT AT ALL

## 2018-02-23 ASSESSMENT — ANXIETY QUESTIONNAIRES
GAD7 TOTAL SCORE: 0
3. WORRYING TOO MUCH ABOUT DIFFERENT THINGS: NOT AT ALL
5. BEING SO RESTLESS THAT IT IS HARD TO SIT STILL: NOT AT ALL
6. BECOMING EASILY ANNOYED OR IRRITABLE: NOT AT ALL
IF YOU CHECKED OFF ANY PROBLEMS ON THIS QUESTIONNAIRE, HOW DIFFICULT HAVE THESE PROBLEMS MADE IT FOR YOU TO DO YOUR WORK, TAKE CARE OF THINGS AT HOME, OR GET ALONG WITH OTHER PEOPLE: NOT DIFFICULT AT ALL
2. NOT BEING ABLE TO STOP OR CONTROL WORRYING: NOT AT ALL
7. FEELING AFRAID AS IF SOMETHING AWFUL MIGHT HAPPEN: NOT AT ALL
1. FEELING NERVOUS, ANXIOUS, OR ON EDGE: NOT AT ALL

## 2018-02-23 ASSESSMENT — PAIN SCALES - GENERAL: PAINLEVEL: WORST PAIN (10)

## 2018-02-23 NOTE — PATIENT INSTRUCTIONS
Sleep Difficulties:  -- Nocaffeine after 3pm   -- No screens the hour before bed (including TV, cell phone, tablets, E-readers, laptops, etc)   -- No TVs in bedroom   -- Keep the same bed time and wake time 7 days a week   -- No naps   -- Relaxing routine before bed   -- Lay down in bed when tired, and go to sleep   -- Exercise daily    -- Start Melatonin 5 mg tablet -- take 2 or 3 hours prior to bedtime for about 3 to 4 weeks, if still having sleep problems, increase to 10 mg -- for another 3 to 4 weeks.       - maintain a regular bedtime, routine and wake up time  - include a warm beverage and/or hot bath 1-2 hours before bedtime  - avoid naps  - do not watchthe clock  - avoid caffeine, alcohol and nicotine for at least 4-6 hours prior to bed  - exercise daily but avoid exercising right before bed  - no screen time within hour of bed  - keep your bedroom quiet, dark,comfortable and cool  - If you haven t been able to get to sleep after about 20 minutes or more, get upand do something calming or boring until you feel sleepy, then return to bed and try again. Avoid doing anything that is too stimulating or interesting, as this will wake you up even more.  - use bed only for sleep andsex, do not use it as a place to watch TV, eat, read, work on your laptop, pay bills, and other things    For arthritis pain....  Consider trying the Two [employer] Goats - Essential Oil Lotion (from Online or from [employer]).     Left sacroiliac joint injection and Ultrasound assisted left 4th finger - trigger finger injection ordered  - they will call with date/time of appointment.      Return in approximately 6 month(s), or sooner as needed for follow-up with Dr. Fernando.  - Med refills    Clinic : [phone]  Appointment line: [phone]

## 2018-02-23 NOTE — MR AVS SNAPSHOT
After Visit Summary   2/23/2018    Juan Miguel Delaney    MRN: [id number]           Patient Information     Date Of Birth          6/21/1932        Visit Information        Provider Department      2/23/2018 10:40 AM Jason Fernando MD [facility]        Today's Diagnoses     Essential hypertension    -  1    Spinal stenosis of lumbar region with neurogenic claudication        Neck pain, chronic        Bilateral hand pain        Chronic left sacroiliac pain        Mixed hyperlipidemia        CKD (chronic kidney disease) stage 3, GFR 30-59 ml/min        Chronic insomnia        Trigger finger, left ring finger          Care Instructions    Sleep Difficulties:  -- Nocaffeine after 3pm   -- No screens the hour before bed (including TV, cell phone, tablets, E-readers, laptops, etc)   -- No TVs in bedroom   -- Keep the same bed time and wake time 7 days a week   -- No naps   -- Relaxing routine before bed   -- Lay down in bed when tired, and go to sleep   -- Exercise daily    -- Start Melatonin 5 mg tablet -- take 2 or 3 hours prior to bedtime for about 3 to 4 weeks, if still having sleep problems, increase to 10 mg -- for another 3 to 4 weeks.       - maintain a regular bedtime, routine and wake up time  - include a warm beverage and/or hot bath 1-2 hours before bedtime  - avoid naps  - do not watchthe clock  - avoid caffeine, alcohol and nicotine for at least 4-6 hours prior to bed  - exercise daily but avoid exercising right before bed  - no screen time within hour of bed  - keep your bedroom quiet, dark,comfortable and cool  - If you haven t been able to get to sleep after about 20 minutes or more, get upand do something calming or boring until you feel sleepy, then return to bed and try again. Avoid doing anything that is too stimulating or interesting, as this will wake you up even more.  - use bed only for sleep andsex, do not use it as a place to watch TV, eat, read, work on your  "laptop, pay bills, and other things    For arthritis pain....  Consider trying the Two [employer] Goats - Essential Oil Lotion (from Online or from [employer]).     Left sacroiliac joint injection and Ultrasound assisted left 4th finger - trigger finger injection ordered  - they will call with date/time of appointment.      Return in approximately 6 month(s), or sooner as needed for follow-up with Dr. Fernando.  - Med refills    Clinic : [phone]  Appointment line: [phone]          Follow-ups after your visit        Follow-up notes from your care team     Return in about 6 months (around 8/23/2018) for - Med refills.      Future tests that were ordered for you today     Open Future Orders        Priority Expected Expires Ordered    US Injection of Tendon Sheath Routine  2/23/2019 2/23/2018    XR Sacroiliac Diagnostic Injection Left Routine 2/23/2018 2/23/2019 2/23/2018            Who to contact     If you have questions or need follow up information about today's clinic visit or your schedule please contact [facility] directly at [phone].  Normal or non-critical lab and imaging results will be communicated to you by MyChart, letter or phone within 4 business days after the clinic has received the results. If you do not hear from us within 7 days, please contact the clinic through MyChart or phone. If you have a critical or abnormal lab result, we will notify you by phone as soon as possible.  Submit refill requests through [employer] or call your pharmacy and they will forward the refill request to us. Please allow 3 business days for your refill to be completed.          Additional Information About Your Visit        Care EveryWhere ID     This is your Care EveryWhere ID. This could be used by other organizations to access your [facility] medical records  [id number]        Your Vitals Were     Pulse Height BMI (Body Mass Index)             84 1.67 m (5' 5.75\") 28.54 kg/m2          " Blood Pressure from Last 3 Encounters:   02/23/18 138/86   10/19/17 138/84   08/08/17 128/78    Weight from Last 3 Encounters:   02/23/18 79.6 kg (175 lb 8 oz)   10/19/17 81.4 kg (179 lb 6.4 oz)   08/08/17 80.6 kg (177 lb 9.6 oz)                 Today's Medication Changes          These changes are accurate as of 2/23/18 11:41 AM.  If you have any questions, ask your nurse or doctor.               These medicines have changed or have updated prescriptions.        Dose/Directions    traMADol 50 MG tablet   Commonly known as:  ULTRAM   This may have changed:    - how much to take  - additional instructions   Used for:  Spinal stenosis of lumbar region with neurogenic claudication, Neck pain, chronic, Bilateral hand pain   Changed by:  Jason Fernando MD        Dose:   mg   Take 1-2 tablets ( mg) by mouth 3 times daily AS NEEDED for Severe Pain   Quantity:  180 tablet   Refills:  5            Where to get your medicines      These medications were sent to [facility] [address] [place], [place] [address]     Phone:  [phone]     amLODIPine 5 MG tablet         Some of these will need a paper prescription and others can be bought over the counter.  Ask your nurse if you have questions.     Bring a paper prescription for each of these medications     traMADol 50 MG tablet                Primary Care Provider Office Phone # Fax #    Jason Fernando [phone]       [address] [facility] [address]        Equal Access to Services     [facility] HospitalCHINYERE AH: Hadii kaiden hayeso Soradha, waaxda luqadaha, qaybta kaalmada adeegyada, angeles collins. So [facility] [phone].    ATENCIÓN: Si habla español, tiene a soliz disposición servicios gratuitos de asistencia lingüística. Llame al [phone].    We comply with applicable federal civil rights laws and [place] laws. We do not discriminate on the basis  of race, color, national origin, age, disability, sex, sexual orientation, or gender identity.            Thank you!     Thank you for choosing [facility]  for your care. Our goal is always to provide you with excellent care. Hearing back from our patients is one way we can continue to improve our services. Please take a few minutes to complete the written survey that you may receive in the mail after your visit with us. Thank you!             Your Updated Medication List - Protect others around you: Learn how to safely use, store and throw away your medicines at www.disposemymeds.org.          This list is accurate as of 2/23/18 11:41 AM.  Always use your most recent med list.                   Brand Name Dispense Instructions for use Diagnosis    acetaminophen 500 MG tablet    TYLENOL     Take 1,000 mg by mouth 3 times daily        amLODIPine 5 MG tablet    NORVASC    90 tablet    Take 1 tablet (5 mg) by mouth daily    Essential hypertension       traMADol 50 MG tablet    ULTRAM    180 tablet    Take 1-2 tablets ( mg) by mouth 3 times daily AS NEEDED for Severe Pain    Spinal stenosis of lumbar region with neurogenic claudication, Neck pain, chronic, Bilateral hand pain

## 2018-02-23 NOTE — PROGRESS NOTES
Nursing Notes:   Selam Gallardo LPN  2/23/2018 11:13 AM  Signed  Patient presents to the clinic for establish care.  Last administration of Tramadol was about 0300 today.      Selam Gallardo LPN 2/23/2018 10:50 AM      Nursing note reviewed with patient.  Accurracy and completeness verified.   Mr. Delaney is a 85 year old male who:  Patient presents with:  Establish Care    HPI     ICD-10-CM    1. Essential hypertension I10 amLODIPine (NORVASC) 5 MG tablet   2. Spinal stenosis of lumbar region with neurogenic claudication M48.062 traMADol (ULTRAM) 50 MG tablet   3. Neck pain, chronic M54.2 traMADol (ULTRAM) 50 MG tablet    G89.29    4. Bilateral hand pain M79.641 traMADol (ULTRAM) 50 MG tablet    M79.642    5. Chronic left sacroiliac pain M53.3 XR Sacroiliac Diagnostic Injection Left    G89.29    6. Mixed hyperlipidemia E78.2    7. CKD (chronic kidney disease) stage 3, GFR 30-59 ml/min N18.3    8. Chronic insomnia F51.04    9. Trigger finger, left ring finger M65.342 US Injection of Tendon Sheath     Patient presents to establish care.    Hypertension, currently controlled with amlodipine.  Needs refills.    Lumbar stenosis noted previously.  Reports some chronic back pain.  More recently having bilateral hand pain.  He has some arthritis in his hands.  Osteoarthritis.  Discussed some OTC treatment options.  He has been using tramadol scheduled 100 mg 3 times a day for a long time.  He thinks that it helps but is not 100% certain he is not really ever gone without using them.  He does take Tylenol 1000 mg 3 times daily as well.  Advised that he start cutting down on the tramadol and see how much he actually needs.    Sacroiliac joint pain, left side --had a steroid injection back in October 2017.  Found it quite helpful.  Had 100% relief of pain for a while afterward.  He would like to get another one.  Orders placed.  He is very painful and tender in his left sacroiliac joint on exam today.  Treatment options  discussed, he would like to proceed with injection.  Orders placed.    Hyperlipidemia, currently diet controlled.    Chronic kidney disease, advised to avoid NSAIDs.  Review of his chart shows kidney function has been stable.    Chronic insomnia advised to consider trial of melatonin.    Left finger, fourth, trigger finger -has been worsening recently.  More painful.  He has to use his other hand to open the finger at times.  He would like to get an injection.  Advised that this may only be a temporary fix and he may need surgery.  He understands and would like to proceed.  Order sent.    Functional Capacity: > or about 4 METS.  Was out shoveling snow this morning early and using his snowblower.  Reports that he can climb a flight of stairs without any chest pain/heaviness or shortness of breath.   No orthopnea/paroxysmal nocturnal dyspnea  Review of Systems   Constitutional: Negative for chills, fatigue and fever.        Reports chronic insomnia.   HENT: Negative for congestion and hearing loss.    Eyes: Negative for pain and visual disturbance.   Respiratory: Negative for cough, shortness of breath and wheezing.    Cardiovascular: Negative for chest pain and palpitations.   Gastrointestinal: Negative for abdominal pain, diarrhea, nausea and vomiting.   Endocrine: Negative for cold intolerance and heat intolerance.   Genitourinary: Negative for dysuria and hematuria.   Musculoskeletal: Positive for arthralgias, back pain and joint swelling. Negative for gait problem and myalgias.        Left ring finger has been triggering and is becoming more painful.   Skin: Negative for pallor.   Allergic/Immunologic: Negative for immunocompromised state.   Neurological: Negative for dizziness and light-headedness.   Hematological: Does not bruise/bleed easily.   Psychiatric/Behavioral: Negative for agitation and confusion.      ELIZABETH:   ELIZABETH-7 SCORE 2/23/2018   Total Score 0     PHQ9:  PHQ-9 SCORE 8/8/2017 10/19/2017 2/23/2018    Total Score 0 0 0       I have personally reviewed the past medical history, past surgical history, medications, allergies, family and social history as listed below, on 2/23/2018.    Allergies   Allergen Reactions     Lisinopril Other (See Comments)     Dry throat  Dry throat     Penicillins Hives     Rofecoxib Hives     Vioxx     Celecoxib Rash     Ibuprofen Rash     All NSAIDS cause rash       Current Outpatient Prescriptions   Medication Sig Dispense Refill     traMADol (ULTRAM) 50 MG tablet Take 1-2 tablets ( mg) by mouth 3 times daily AS NEEDED for Severe Pain 180 tablet 5     amLODIPine (NORVASC) 5 MG tablet Take 1 tablet (5 mg) by mouth daily 90 tablet 3     acetaminophen (TYLENOL) 500 MG tablet Take 1,000 mg by mouth 3 times daily       [DISCONTINUED] amLODIPine (NORVASC) 5 MG tablet Take 5 mg by mouth daily          Patient Active Problem List    Diagnosis Date Noted     Trigger finger, left ring finger 02/23/2018     Priority: Medium     Mixed hyperlipidemia 02/08/2018     Priority: Medium     Neck pain, chronic 02/08/2018     Priority: Medium     Osteoarthrosis 02/08/2018     Priority: Medium     Chronic left sacroiliac pain 10/19/2017     Priority: Medium     Chronic insomnia 08/08/2017     Priority: Medium     S/P lumbar laminectomy 05/29/2015     Priority: Medium     CKD (chronic kidney disease) stage 3, GFR 30-59 ml/min 05/25/2015     Priority: Medium     Spinal stenosis of lumbar region with neurogenic claudication 04/24/2015     Priority: Medium     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 11/09/2012     Priority: Medium     Rosacea 04/03/2012     Priority: Medium     Actinic keratosis 10/24/2011     Priority: Medium     Hypertension 10/24/2011     Priority: Medium     Past Medical History:   Diagnosis Date     Actinic keratosis     2004,5-FU treatment on forehead     Chronic kidney disease, stage III (moderate)     No Comments Provided     Elevated erythrocyte sedimentation rate      2005,weight loss, night sweats due to episode of acute inflammatory illness, etiology unclear     Essential (primary) hypertension     No Comments Provided     Family history of malignant neoplasm of prostate     No Comments Provided     Hyperlipidemia     No Comments Provided     Osteoarthritis     No Comments Provided     Pneumonia     ,and severe NSAID allergic reaction, hospitalized     Sciatica     No Comments Provided     Past Surgical History:   Procedure Laterality Date     ARTHROPLASTY KNEE      2006     ARTHROPLASTY KNEE      2008     ARTHROSCOPY SHOULDER      ,left     ARTHROSCOPY SHOULDER      ,AC arthrosis & distal clavicle resection     ARTHROSCOPY SHOULDER           COLONOSCOPY      ,normal     CYSTOSCOPY, TRANSURETHRAL RESECTION (TUR) PROSTATE, COMBINED      No Comments Provided     LAMINECTOMY LUMBAR ONE LEVEL      2015,[place], spinal stenosis     OTHER SURGICAL HISTORY      ,2073,SCAN-STRESS TEST,negative to heart rate 138     OTHER SURGICAL HISTORY      ,340301,OTHER,Right 5th digit     RELEASE CARPAL TUNNEL      bilateral     Social History     Social History     Marital status:      Spouse name: N/A     Number of children: N/A     Years of education: N/A     Social History Main Topics     Smoking status: Never Smoker     Smokeless tobacco: Never Used     Alcohol use No     Drug use: No     Sexual activity: Not Asked     Other Topics Concern     None     Social History Narrative    Patient lives in town.  He is , retired from [employer].  3 children, 1 .     Family History   Problem Relation Age of Onset     Prostate Cancer Father      Cancer-prostate     Hypertension Mother      Hypertension     Other - See Comments Mother      Old age, 99     Prostate Cancer Brother      Cancer-prostate     Unknown/Adopted Brother      Unknown       EXAM:   Vitals:    18 1053   BP: 138/86   BP Location: Right arm   Patient Position: Sitting   Cuff Size:  "Adult Regular   Pulse: 84   Weight: 175 lb 8 oz (79.6 kg)   Height: 5' 5.75\" (1.67 m)       Current Pain Score: Worst Pain (10)     BP Readings from Last 3 Encounters:   02/23/18 138/86   10/19/17 138/84   08/08/17 128/78    Wt Readings from Last 3 Encounters:   02/23/18 175 lb 8 oz (79.6 kg)   10/19/17 179 lb 6.4 oz (81.4 kg)   08/08/17 177 lb 9.6 oz (80.6 kg)      Estimated body mass index is 28.54 kg/(m^2) as calculated from the following:    Height as of this encounter: 5' 5.75\" (1.67 m).    Weight as of this encounter: 175 lb 8 oz (79.6 kg).     Physical Exam   Constitutional: He appears well-developed and well-nourished. No distress.   HENT:   Head: Normocephalic and atraumatic.   Mouth/Throat: Oropharyngeal exudate present.   Eyes: Conjunctivae are normal. No scleral icterus.   Neck: No thyromegaly present.   Cardiovascular: Normal rate.    Pulmonary/Chest: Effort normal. He has no wheezes.   Abdominal: Soft. There is no tenderness.   Musculoskeletal: He exhibits tenderness. He exhibits no deformity.   Trigger finger - left 4th finger.   Left sacroiliac joint - very tender to palpation.    Lymphadenopathy:     He has no cervical adenopathy.   Neurological: He is alert. No cranial nerve deficit.   Skin: Skin is warm. No rash noted.   Psychiatric: He has a normal mood and affect.      INVESTIGATIONS:  Results for orders placed or performed during the hospital encounter of 10/19/17   XR Sacroiliac Diagnostic Injection Left    Narrative    PROCEDURE: XR INJ SACROILIAC JOINT LEFT    INDICATION: Left-sided buttock pain    COMPARISON: None.    TECHNIQUE: Informed consent was obtained and a timeout procedure was performed.    The left low back was prepped and draped in the usual sterile fashion. 5 mL1% lidocaine was used for local anesthesia.  Under fluoroscopic guidance, a 22-gauge spinal needle was advanced into the sacroiliac joint.   1 mL of Omnipaque 240 contrast was   injected to confirm location.  Then,  40 mg " Kenalog and 3 mL of 0.5 % bupivacaine was injected. The patient tolerated the procedure well.    The patient reported 100% pain relief following the procedure.    Fluoroscopy time: 6 seconds      Impression    Fluoroscopically guided left sacroiliac joint injection.    Electronically Signed By: Ema Moya M.D. on 10/19/2017 2:52 PM       ASSESSMENT AND PLAN:  Problem List Items Addressed This Visit        Nervous and Auditory    Chronic left sacroiliac pain    Relevant Medications    traMADol (ULTRAM) 50 MG tablet    Other Relevant Orders    XR Sacroiliac Diagnostic Injection Left    Neck pain, chronic    Relevant Medications    traMADol (ULTRAM) 50 MG tablet       Endocrine    Mixed hyperlipidemia       Circulatory    Hypertension - Primary    Relevant Medications    amLODIPine (NORVASC) 5 MG tablet       Musculoskeletal and Integumentary    Trigger finger, left ring finger    Relevant Medications    traMADol (ULTRAM) 50 MG tablet    Other Relevant Orders    US Injection of Tendon Sheath       Urinary    CKD (chronic kidney disease) stage 3, GFR 30-59 ml/min       Other    Chronic insomnia    Spinal stenosis of lumbar region with neurogenic claudication    Relevant Medications    traMADol (ULTRAM) 50 MG tablet      Other Visit Diagnoses     Bilateral hand pain        Relevant Medications    traMADol (ULTRAM) 50 MG tablet        reviewed diet, exercise and weight control, recommended sodium restriction  -- Expected clinical course discussed   -- Medications and their side effects discussed    Patient Instructions   Sleep Difficulties:  -- Nocaffeine after 3pm   -- No screens the hour before bed (including TV, cell phone, tablets, E-readers, laptops, etc)   -- No TVs in bedroom   -- Keep the same bed time and wake time 7 days a week   -- No naps   -- Relaxing routine before bed   -- Lay down in bed when tired, and go to sleep   -- Exercise daily    -- Start Melatonin 5 mg tablet -- take 2 or 3 hours prior to  bedtime for about 3 to 4 weeks, if still having sleep problems, increase to 10 mg -- for another 3 to 4 weeks.       - maintain a regular bedtime, routine and wake up time  - include a warm beverage and/or hot bath 1-2 hours before bedtime  - avoid naps  - do not watchthe clock  - avoid caffeine, alcohol and nicotine for at least 4-6 hours prior to bed  - exercise daily but avoid exercising right before bed  - no screen time within hour of bed  - keep your bedroom quiet, dark,comfortable and cool  - If you haven t been able to get to sleep after about 20 minutes or more, get upand do something calming or boring until you feel sleepy, then return to bed and try again. Avoid doing anything that is too stimulating or interesting, as this will wake you up even more.  - use bed only for sleep andsex, do not use it as a place to watch TV, eat, read, work on your laptop, pay bills, and other things    For arthritis pain....  Consider trying the [employer] - Essential Oil Lotion (from Online or from [employer]).     Left sacroiliac joint injection and Ultrasound assisted left 4th finger - trigger finger injection ordered  - they will call with date/time of appointment.      Return in approximately 6 month(s), or sooner as needed for follow-up with Dr. Fernando.  - Med refills    Clinic : [phone]  Appointment line: [phone]    Jason Fernando MD  Internal Medicine  [facility]

## 2018-02-23 NOTE — NURSING NOTE
Patient presents to the clinic for establish care.  Last administration of Tramadol was about 0300 today.      Selam Gallardo LPN 2/23/2018 10:50 AM

## 2018-02-24 ASSESSMENT — PATIENT HEALTH QUESTIONNAIRE - PHQ9: SUM OF ALL RESPONSES TO PHQ QUESTIONS 1-9: 0

## 2018-02-24 ASSESSMENT — ANXIETY QUESTIONNAIRES: GAD7 TOTAL SCORE: 0

## 2018-02-28 ENCOUNTER — HOSPITAL ENCOUNTER (OUTPATIENT)
Dept: ULTRASOUND IMAGING | Facility: OTHER | Age: 83
Discharge: HOME | End: 2018-02-28
Attending: INTERNAL MEDICINE | Admitting: INTERNAL MEDICINE
Payer: MEDICARE

## 2018-02-28 DIAGNOSIS — M65.342 TRIGGER FINGER, LEFT RING FINGER: ICD-10-CM

## 2018-02-28 PROCEDURE — 25000128 H RX IP 250 OP 636: Performed by: RADIOLOGY

## 2018-02-28 PROCEDURE — 25000125 ZZHC RX 250: Performed by: RADIOLOGY

## 2018-02-28 PROCEDURE — 20604 DRAIN/INJ JOINT/BURSA W/US: CPT | Mod: LT

## 2018-02-28 RX ORDER — DEXAMETHASONE SODIUM PHOSPHATE 10 MG/ML
10 INJECTION, SOLUTION INTRAMUSCULAR; INTRAVENOUS ONCE
Status: COMPLETED | OUTPATIENT
Start: 2018-02-28 | End: 2018-02-28

## 2018-02-28 RX ADMIN — DEXAMETHASONE SODIUM PHOSPHATE 10 MG: 10 INJECTION, SOLUTION INTRAMUSCULAR; INTRAVENOUS at 11:41

## 2018-02-28 RX ADMIN — LIDOCAINE HYDROCHLORIDE 5 ML: 10 INJECTION, SOLUTION INFILTRATION; PERINEURAL at 11:42

## 2018-03-07 ENCOUNTER — HOSPITAL ENCOUNTER (OUTPATIENT)
Dept: GENERAL RADIOLOGY | Facility: OTHER | Age: 83
Discharge: HOME | End: 2018-03-07
Attending: INTERNAL MEDICINE | Admitting: INTERNAL MEDICINE
Payer: MEDICARE

## 2018-03-07 ENCOUNTER — OFFICE VISIT (OUTPATIENT)
Dept: INTERNAL MEDICINE | Facility: OTHER | Age: 83
End: 2018-03-07
Attending: INTERNAL MEDICINE
Payer: MEDICARE

## 2018-03-07 VITALS
WEIGHT: 178.5 LBS | SYSTOLIC BLOOD PRESSURE: 132 MMHG | DIASTOLIC BLOOD PRESSURE: 76 MMHG | HEART RATE: 60 BPM | BODY MASS INDEX: 29.03 KG/M2

## 2018-03-07 DIAGNOSIS — G89.29 CHRONIC LEFT SI JOINT PAIN: Primary | ICD-10-CM

## 2018-03-07 DIAGNOSIS — G89.29 CHRONIC LEFT SI JOINT PAIN: ICD-10-CM

## 2018-03-07 DIAGNOSIS — M53.3 CHRONIC LEFT SI JOINT PAIN: ICD-10-CM

## 2018-03-07 DIAGNOSIS — M53.3 CHRONIC LEFT SI JOINT PAIN: Primary | ICD-10-CM

## 2018-03-07 PROCEDURE — G0463 HOSPITAL OUTPT CLINIC VISIT: HCPCS

## 2018-03-07 PROCEDURE — 99213 OFFICE O/P EST LOW 20 MIN: CPT | Performed by: INTERNAL MEDICINE

## 2018-03-07 PROCEDURE — 25500064 ZZH RX 255 OP 636: Performed by: RADIOLOGY

## 2018-03-07 PROCEDURE — 27096 INJECT SACROILIAC JOINT: CPT | Mod: LT

## 2018-03-07 PROCEDURE — 25000128 H RX IP 250 OP 636: Performed by: RADIOLOGY

## 2018-03-07 PROCEDURE — 25000125 ZZHC RX 250: Performed by: RADIOLOGY

## 2018-03-07 PROCEDURE — G0463 HOSPITAL OUTPT CLINIC VISIT: HCPCS | Mod: 25

## 2018-03-07 RX ORDER — BUPIVACAINE HYDROCHLORIDE 5 MG/ML
2 INJECTION, SOLUTION PERINEURAL ONCE
Status: COMPLETED | OUTPATIENT
Start: 2018-03-07 | End: 2018-03-07

## 2018-03-07 RX ORDER — TRIAMCINOLONE ACETONIDE 40 MG/ML
80 INJECTION, SUSPENSION INTRA-ARTICULAR; INTRAMUSCULAR ONCE
Status: COMPLETED | OUTPATIENT
Start: 2018-03-07 | End: 2018-03-07

## 2018-03-07 RX ADMIN — BUPIVACAINE HYDROCHLORIDE 10 MG: 5 INJECTION, SOLUTION EPIDURAL; INTRACAUDAL; PERINEURAL at 12:16

## 2018-03-07 RX ADMIN — LIDOCAINE HYDROCHLORIDE 2 ML: 10 INJECTION, SOLUTION INFILTRATION; PERINEURAL at 12:16

## 2018-03-07 RX ADMIN — IOHEXOL 2 ML: 240 INJECTION, SOLUTION INTRATHECAL; INTRAVASCULAR; INTRAVENOUS; ORAL at 12:15

## 2018-03-07 RX ADMIN — TRIAMCINOLONE ACETONIDE 40 MG: 40 INJECTION, SUSPENSION INTRA-ARTICULAR; INTRAMUSCULAR at 12:16

## 2018-03-07 ASSESSMENT — ENCOUNTER SYMPTOMS
HEMATURIA: 0
PALPITATIONS: 0
ABDOMINAL PAIN: 0
BACK PAIN: 1
FATIGUE: 0
DIZZINESS: 0
ARTHRALGIAS: 1
CONFUSION: 0
COUGH: 0
DYSURIA: 0
CHILLS: 0
FEVER: 0
AGITATION: 0
DIARRHEA: 0
WHEEZING: 0
SHORTNESS OF BREATH: 0
NAUSEA: 0
VOMITING: 0
MYALGIAS: 0
BRUISES/BLEEDS EASILY: 0
EYE PAIN: 0
LIGHT-HEADEDNESS: 0

## 2018-03-07 ASSESSMENT — ANXIETY QUESTIONNAIRES
2. NOT BEING ABLE TO STOP OR CONTROL WORRYING: NOT AT ALL
GAD7 TOTAL SCORE: 0
6. BECOMING EASILY ANNOYED OR IRRITABLE: NOT AT ALL
5. BEING SO RESTLESS THAT IT IS HARD TO SIT STILL: NOT AT ALL
1. FEELING NERVOUS, ANXIOUS, OR ON EDGE: NOT AT ALL
7. FEELING AFRAID AS IF SOMETHING AWFUL MIGHT HAPPEN: NOT AT ALL
IF YOU CHECKED OFF ANY PROBLEMS ON THIS QUESTIONNAIRE, HOW DIFFICULT HAVE THESE PROBLEMS MADE IT FOR YOU TO DO YOUR WORK, TAKE CARE OF THINGS AT HOME, OR GET ALONG WITH OTHER PEOPLE: NOT DIFFICULT AT ALL
3. WORRYING TOO MUCH ABOUT DIFFERENT THINGS: NOT AT ALL

## 2018-03-07 ASSESSMENT — PAIN SCALES - GENERAL: PAINLEVEL: EXTREME PAIN (8)

## 2018-03-07 ASSESSMENT — PATIENT HEALTH QUESTIONNAIRE - PHQ9: 5. POOR APPETITE OR OVEREATING: NOT AT ALL

## 2018-03-07 NOTE — MR AVS SNAPSHOT
After Visit Summary   3/7/2018    Juan Miguel Delaney    MRN: [id number]           Patient Information     Date Of Birth          6/21/1932        Visit Information        Provider Department      3/7/2018 10:40 AM Jason Fernando MD [facility]        Today's Diagnoses     Chronic left SI joint pain    -  1      Care Instructions    Chronic left sacroiliac joint pain.  Unfortunately acetaminophen/Tylenol and tramadol/Ultram combination have not made any improvement in pain.    Sacroiliac joint injection recommended.  Orders placed.  - they will call with date/time of appointment.      Return as needed for follow-up with Dr. Fernando.    Clinic : [phone]  Appointment line: [phone]            Follow-ups after your visit        Follow-up notes from your care team     Return if symptoms worsen or fail to improve.      Future tests that were ordered for you today     Open Future Orders        Priority Expected Expires Ordered    XR Sacroiliac Diagnostic Injection Left Routine 3/7/2018 3/7/2019 3/7/2018            Who to contact     If you have questions or need follow up information about today's clinic visit or your schedule please contact [facility] directly at [phone].  Normal or non-critical lab and imaging results will be communicated to you by MyChart, letter or phone within 4 business days after the clinic has received the results. If you do not hear from us within 7 days, please contact the clinic through MyChart or phone. If you have a critical or abnormal lab result, we will notify you by phone as soon as possible.  Submit refill requests through [employer] or call your pharmacy and they will forward the refill request to us. Please allow 3 business days for your refill to be completed.          Additional Information About Your Visit        Care EveryWhere ID     This is your Care EveryWhere ID. This could be used by other organizations to  access your [facility] medical records  [id number]        Your Vitals Were     Pulse BMI (Body Mass Index)                60 29.03 kg/m2           Blood Pressure from Last 3 Encounters:   03/07/18 132/76   02/23/18 138/86   10/19/17 138/84    Weight from Last 3 Encounters:   03/07/18 178 lb 8 oz (81 kg)   02/23/18 175 lb 8 oz (79.6 kg)   10/19/17 179 lb 6.4 oz (81.4 kg)               Primary Care Provider Office Phone # Fax #    Jason Fernando [phone] [address] [place] [address]        Equal Access to Services     NICKOLAS HOLLOWAY : Susan Mcgarry, oliva mon, savanah pan, [place]. So [facility] [phone].    ATENCIÓN: Si habla español, tiene a soliz disposición servicios gratuitos de asistencia lingüística. Llame al [phone].    We comply with applicable federal civil rights laws and [place] laws. We do not discriminate on the basis of race, color, national origin, age, disability, sex, sexual orientation, or gender identity.            Thank you!     Thank you for choosing [facility] AND \[facility]\""  for your care. Our goal is always to provide you with excellent care. Hearing back from our patients is one way we can continue to improve our services. Please take a few minutes to complete the written survey that you may receive in the mail after your visit with us. Thank you!             Your Updated Medication List - Protect others around you: Learn how to safely use, store and throw away your medicines at www.disposemymeds.org.          This list is accurate as of 3/7/18 10:56 AM.  Always use your most recent med list.                   Brand Name Dispense Instructions for use Diagnosis    acetaminophen 500 MG tablet    TYLENOL     Take 1,000 mg by mouth 3 times daily        amLODIPine 5 MG tablet    NORVASC    90 tablet    Take 1 tablet (5 mg) by mouth daily    Essential hypertension       traMADol  50 MG tablet    ULTRAM    180 tablet    Take 1-2 tablets ( mg) by mouth 3 times daily AS NEEDED for Severe Pain    Spinal stenosis of lumbar region with neurogenic claudication, Neck pain, chronic, Bilateral hand pain

## 2018-03-07 NOTE — NURSING NOTE
Patient presents to the clinic for left lower back pian over the past couple of months, patient denies trauma at this time.        Selam Gallardo LPN 3/7/2018 10:35 AM

## 2018-03-07 NOTE — PROGRESS NOTES
Nursing Notes:   Selam Gallardo LPN  3/7/2018 10:52 AM  Signed  Patient presents to the clinic for left lower back pian over the past couple of months, patient denies trauma at this time.        Selam Gallardo LPN 3/7/2018 10:35 AM      Nursing note reviewed with patient.  Accurracy and completeness verified.   Mr. Delaney is a 85 year old male who:  Patient presents with:  Back Pain    HPI     ICD-10-CM    1. Chronic left SI joint pain M53.3 CANCELED: XR Sacroiliac Diagnostic Injection Left    G89.29      Patient presents for follow-up of his left sacroiliac pain.  Left low back pain.  Trial of Tylenol and tramadol has done very little to help with his pain.  We tried ordering a sacroiliac joint injection previously but somehow it never got scheduled.  Patient never got notified about scheduling.  Orders were updated today and reordered.  He actually was able to get in today and will be going to get his injection shortly.    Since tramadol has not been helping at all, advised to discontinue use.    Functional Capacity: > or about 4 METS. + limited due to left sacroiliac joint pain.  Review of Systems   Constitutional: Negative for chills, fatigue and fever.   HENT: Negative for congestion and hearing loss.    Eyes: Negative for pain and visual disturbance.   Respiratory: Negative for cough, shortness of breath and wheezing.    Cardiovascular: Negative for chest pain and palpitations.   Gastrointestinal: Negative for abdominal pain, diarrhea, nausea and vomiting.   Endocrine: Negative for cold intolerance and heat intolerance.   Genitourinary: Negative for dysuria and hematuria.   Musculoskeletal: Positive for arthralgias, back pain and gait problem. Negative for myalgias.   Skin: Negative for pallor.   Allergic/Immunologic: Negative for immunocompromised state.   Neurological: Negative for dizziness and light-headedness.   Hematological: Does not bruise/bleed easily.   Psychiatric/Behavioral: Negative for agitation  and confusion.        ELIZABETH:   ELIZABETH-7 SCORE 2/23/2018 3/7/2018   Total Score 0 0     PHQ9:  PHQ-9 SCORE 10/19/2017 2/23/2018 3/7/2018   Total Score 0 0 0       I have personally reviewed the past medical history, past surgical history, medications, allergies, family and social history as listed below, on 3/7/2018.    Allergies   Allergen Reactions     Lisinopril Other (See Comments)     Dry throat  Dry throat     Penicillins Hives     Rofecoxib Hives     Vioxx     Celecoxib Rash     Ibuprofen Rash     All NSAIDS cause rash       Current Outpatient Prescriptions   Medication Sig Dispense Refill     traMADol (ULTRAM) 50 MG tablet Take 1-2 tablets ( mg) by mouth 3 times daily AS NEEDED for Severe Pain 180 tablet 5     amLODIPine (NORVASC) 5 MG tablet Take 1 tablet (5 mg) by mouth daily 90 tablet 3     acetaminophen (TYLENOL) 500 MG tablet Take 1,000 mg by mouth 3 times daily          Patient Active Problem List    Diagnosis Date Noted     Trigger finger, left ring finger 02/23/2018     Priority: Medium     Mixed hyperlipidemia 02/08/2018     Priority: Medium     Neck pain, chronic 02/08/2018     Priority: Medium     Osteoarthrosis 02/08/2018     Priority: Medium     Chronic left sacroiliac pain 10/19/2017     Priority: Medium     Chronic insomnia 08/08/2017     Priority: Medium     S/P lumbar laminectomy 05/29/2015     Priority: Medium     CKD (chronic kidney disease) stage 3, GFR 30-59 ml/min 05/25/2015     Priority: Medium     Spinal stenosis of lumbar region with neurogenic claudication 04/24/2015     Priority: Medium     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 11/09/2012     Priority: Medium     Rosacea 04/03/2012     Priority: Medium     Actinic keratosis 10/24/2011     Priority: Medium     Hypertension 10/24/2011     Priority: Medium     Past Medical History:   Diagnosis Date     Actinic keratosis     2004,5-FU treatment on forehead     Chronic kidney disease, stage III (moderate)     No Comments Provided      Elevated erythrocyte sedimentation rate     ,weight loss, night sweats due to episode of acute inflammatory illness, etiology unclear     Essential (primary) hypertension     No Comments Provided     Family history of malignant neoplasm of prostate     No Comments Provided     Hyperlipidemia     No Comments Provided     Osteoarthritis     No Comments Provided     Pneumonia     ,and severe NSAID allergic reaction, hospitalized     Sciatica     No Comments Provided     Past Surgical History:   Procedure Laterality Date     ARTHROPLASTY KNEE      2006     ARTHROPLASTY KNEE      2008     ARTHROSCOPY SHOULDER      ,left     ARTHROSCOPY SHOULDER      ,AC arthrosis & distal clavicle resection     ARTHROSCOPY SHOULDER           COLONOSCOPY      ,normal     CYSTOSCOPY, TRANSURETHRAL RESECTION (TUR) PROSTATE, COMBINED      No Comments Provided     LAMINECTOMY LUMBAR ONE LEVEL      ,[place], spinal stenosis     OTHER SURGICAL HISTORY      ,2073,SCAN-STRESS TEST,negative to heart rate 138     OTHER SURGICAL HISTORY      ,016288,OTHER,Right 5th digit     RELEASE CARPAL TUNNEL      bilateral     Social History     Social History     Marital status:      Spouse name: N/A     Number of children: N/A     Years of education: N/A     Social History Main Topics     Smoking status: Never Smoker     Smokeless tobacco: Never Used     Alcohol use No     Drug use: No     Sexual activity: Not Asked     Other Topics Concern     None     Social History Narrative    Patient lives in town.  He is , retired from [employer].  3 children, 1 .     Family History   Problem Relation Age of Onset     Prostate Cancer Father      Cancer-prostate     Hypertension Mother      Hypertension     Other - See Comments Mother      Old age, 99     Prostate Cancer Brother      Cancer-prostate     Unknown/Adopted Brother      Unknown       EXAM:   Vitals:    18 1036   BP: 132/76   BP Location: Right  "arm   Patient Position: Sitting   Cuff Size: Adult Regular   Pulse: 60   Weight: 178 lb 8 oz (81 kg)       Current Pain Score: Extreme Pain (8)     BP Readings from Last 3 Encounters:   03/07/18 132/76   02/23/18 138/86   10/19/17 138/84    Wt Readings from Last 3 Encounters:   03/07/18 178 lb 8 oz (81 kg)   02/23/18 175 lb 8 oz (79.6 kg)   10/19/17 179 lb 6.4 oz (81.4 kg)      Estimated body mass index is 29.03 kg/(m^2) as calculated from the following:    Height as of 2/23/18: 5' 5.75\" (1.67 m).    Weight as of this encounter: 178 lb 8 oz (81 kg).     Physical Exam   Constitutional: He appears well-developed and well-nourished. No distress.   Eyes: Conjunctivae are normal. No scleral icterus.   Neck: No thyromegaly present.   Cardiovascular: Normal rate and regular rhythm.    Pulmonary/Chest: Effort normal. No respiratory distress. He has no wheezes.   Abdominal: Soft. There is no tenderness.   Musculoskeletal: He exhibits tenderness. He exhibits no deformity.   + left sacroiliac joint tenderness and sacroiliac ligament tenderness laterally.    Lymphadenopathy:     He has no cervical adenopathy.   Neurological: He is alert. No cranial nerve deficit.   Skin: Skin is warm and dry.   Psychiatric: He has a normal mood and affect.       INVESTIGATIONS:  Results for orders placed or performed during the hospital encounter of 02/28/18   US Joint Injection Aspiration Small Left    Narrative    US JOINT INJECTION ASPIRATION SMALL LEFT    HISTORY: 85 yearsMale ; Trigger finger, left ring finger , third and  fourth digits    TECHNIQUE:Informed consent was obtained. A timeout was performed. The  patient was sterilely prepped and draped. Local injections of  lidocaine reason for anesthesia.     Ultrasound guidance was used to perform therapeutic injection at the  A1 pulley of the left third digit, at the level of the  metacarpophalangeal articulation. Process was repeated for injection  of the A1 pulley of the fourth digit at " the metacarpophalangeal  articulation.    A total of 20 mg of Kenalog and 0.5 cc of 1% lidocaine were injected.      Impression    IMPRESSION: Therapeutic ultrasound-guided trigger finger injections of  the left third and fourth digits.    SEAN REDDY MD       ASSESSMENT AND PLAN:  Problem List Items Addressed This Visit     None      Visit Diagnoses     Chronic left SI joint pain    -  Primary        reviewed diet, exercise and weight control  -- Expected clinical course discussed    -- Medications and their side effects discussed    15 minutes spent in face-to-face interaction with patient (separate from separately billed procedures) with greater than 50% spent in counseling and care coordination of listed medical problems.      Patient Instructions   Chronic left sacroiliac joint pain.  Unfortunately acetaminophen/Tylenol and tramadol/Ultram combination have not made any improvement in pain.    Sacroiliac joint injection recommended.  Orders placed.  - they will call with date/time of appointment.      Return as needed for follow-up with Dr. Fernando.    Clinic : [phone]  Appointment line: [phone]      Jason Fernando MD  Internal Medicine  [facility]

## 2018-03-07 NOTE — PATIENT INSTRUCTIONS
Chronic left sacroiliac joint pain.  Unfortunately acetaminophen/Tylenol and tramadol/Ultram combination have not made any improvement in pain.    Sacroiliac joint injection recommended.  Orders placed.  - they will call with date/time of appointment.      Return as needed for follow-up with Dr. Fernando.    Clinic : [phone]  Appointment line: [phone]

## 2018-03-08 ASSESSMENT — PATIENT HEALTH QUESTIONNAIRE - PHQ9: SUM OF ALL RESPONSES TO PHQ QUESTIONS 1-9: 0

## 2018-03-08 ASSESSMENT — ANXIETY QUESTIONNAIRES: GAD7 TOTAL SCORE: 0

## 2018-03-30 ENCOUNTER — TELEPHONE (OUTPATIENT)
Dept: INTERNAL MEDICINE | Facility: OTHER | Age: 83
End: 2018-03-30

## 2018-03-30 NOTE — TELEPHONE ENCOUNTER
[facility] confirming patient received SI injection on 3-7-18, order regenerated in their in basket.      Selam Gallardo LPN 3/30/2018 3:40 PM

## 2018-04-05 ENCOUNTER — HOSPITAL ENCOUNTER (OUTPATIENT)
Dept: GENERAL RADIOLOGY | Facility: OTHER | Age: 83
Discharge: HOME | End: 2018-04-05
Attending: INTERNAL MEDICINE | Admitting: INTERNAL MEDICINE
Payer: MEDICARE

## 2018-04-05 ENCOUNTER — OFFICE VISIT (OUTPATIENT)
Dept: INTERNAL MEDICINE | Facility: OTHER | Age: 83
End: 2018-04-05
Attending: INTERNAL MEDICINE
Payer: MEDICARE

## 2018-04-05 VITALS
SYSTOLIC BLOOD PRESSURE: 140 MMHG | DIASTOLIC BLOOD PRESSURE: 86 MMHG | WEIGHT: 176.25 LBS | HEART RATE: 60 BPM | BODY MASS INDEX: 28.66 KG/M2

## 2018-04-05 DIAGNOSIS — G89.29 CHRONIC LEFT FLANK PAIN: ICD-10-CM

## 2018-04-05 DIAGNOSIS — M48.062 SPINAL STENOSIS OF LUMBAR REGION WITH NEUROGENIC CLAUDICATION: ICD-10-CM

## 2018-04-05 DIAGNOSIS — R10.9 CHRONIC LEFT FLANK PAIN: ICD-10-CM

## 2018-04-05 DIAGNOSIS — R07.81 RIB PAIN ON LEFT SIDE: Primary | ICD-10-CM

## 2018-04-05 PROCEDURE — G0463 HOSPITAL OUTPT CLINIC VISIT: HCPCS

## 2018-04-05 PROCEDURE — 72114 X-RAY EXAM L-S SPINE BENDING: CPT

## 2018-04-05 PROCEDURE — 99214 OFFICE O/P EST MOD 30 MIN: CPT | Performed by: INTERNAL MEDICINE

## 2018-04-05 PROCEDURE — G0463 HOSPITAL OUTPT CLINIC VISIT: HCPCS | Mod: 25

## 2018-04-05 RX ORDER — GABAPENTIN 100 MG/1
100-300 CAPSULE ORAL 3 TIMES DAILY PRN
Qty: 90 CAPSULE | Refills: 3 | Status: SHIPPED | OUTPATIENT
Start: 2018-04-05 | End: 2018-04-19

## 2018-04-05 ASSESSMENT — ENCOUNTER SYMPTOMS
EYE PAIN: 0
MYALGIAS: 0
CONFUSION: 0
PALPITATIONS: 0
SHORTNESS OF BREATH: 0
CHILLS: 0
NAUSEA: 0
ARTHRALGIAS: 1
FEVER: 0
AGITATION: 0
DYSURIA: 0
BRUISES/BLEEDS EASILY: 0
WHEEZING: 0
VOMITING: 0
ABDOMINAL PAIN: 0
FATIGUE: 0
HEMATURIA: 0
LIGHT-HEADEDNESS: 0
DIARRHEA: 0
BACK PAIN: 1
DIZZINESS: 0
COUGH: 0

## 2018-04-05 ASSESSMENT — PATIENT HEALTH QUESTIONNAIRE - PHQ9: 5. POOR APPETITE OR OVEREATING: NOT AT ALL

## 2018-04-05 ASSESSMENT — ANXIETY QUESTIONNAIRES
3. WORRYING TOO MUCH ABOUT DIFFERENT THINGS: NOT AT ALL
7. FEELING AFRAID AS IF SOMETHING AWFUL MIGHT HAPPEN: NOT AT ALL
2. NOT BEING ABLE TO STOP OR CONTROL WORRYING: NOT AT ALL
5. BEING SO RESTLESS THAT IT IS HARD TO SIT STILL: NOT AT ALL
GAD7 TOTAL SCORE: 0
IF YOU CHECKED OFF ANY PROBLEMS ON THIS QUESTIONNAIRE, HOW DIFFICULT HAVE THESE PROBLEMS MADE IT FOR YOU TO DO YOUR WORK, TAKE CARE OF THINGS AT HOME, OR GET ALONG WITH OTHER PEOPLE: NOT DIFFICULT AT ALL
6. BECOMING EASILY ANNOYED OR IRRITABLE: NOT AT ALL
1. FEELING NERVOUS, ANXIOUS, OR ON EDGE: NOT AT ALL

## 2018-04-05 ASSESSMENT — PAIN SCALES - GENERAL: PAINLEVEL: MODERATE PAIN (5)

## 2018-04-05 NOTE — MR AVS SNAPSHOT
After Visit Summary   4/5/2018    Juan Miguel Delaney    MRN: [id number]           Patient Information     Date Of Birth          6/21/1932        Visit Information        Provider Department      4/5/2018 3:00 PM Jason Fernando MD [facility]        Today's Diagnoses     Rib pain on left side    -  1    Chronic left flank pain        Spinal stenosis of lumbar region with neurogenic claudication          Care Instructions    6/27/2016 -- MR SPINE LUMBAR WWO     HISTORY: 84 years Male     COMPARISON: None     TECHNIQUE: Multisequence and multiplanar MRI imaging of the lumbar spine  was performed with and without fat saturated sequences. In addition post alfredo sagittal T1 fat-sat and post alfredo axial T1 imaging of the lumbar spine was performed.     FINDINGS     . Signal intensity and caliber of the thoracic spinal cord is normal. The conus medullaris is at the level of T12-L1.     L5-S1: There is severe loss of disc height and signal. There is a posterior broad-based disc protrusion without significant central canal narrowing. The neuroforamen are mildly narrowed. There is no significant change.     L4-L5: There is degenerative disc space narrowing of moderate severity. A mild broad-based disc bulge is present. The central canal and neuroforamen are patent. There's been no significant interval change.     L3-L4: There is mild loss of disc height and signal. A mild broad-based disc bulge is present. The central canal and her family are patent. There is been no significant interval change.     L2-L3: There is moderate loss of disc height and signal. There is discogenic endplate edema. There is facet osteoarthritis. There is a posterior broad-based disc protrusion and mild stenosis of the central canal. This has improved over the prior study.   There is narrowing of the right lateral recess. There is right neuroforaminal narrowing of moderate severity. The left neuroforamen is mildly  narrowed.     L1-L2: There is mild loss of disc height and signal. There is a mild broad-based disc bulge. The central canal and neuroforamen are patent.     IMPRESSION:  Interval surgical change at the L2-L3 level. There is a persistent versus recurrent broad-based disc protrusion at this level which narrows the right lateral recess but central spinal canal stenosis overall has improved. There is no abnormal   enhancement to suggest an epidural abscess or osteomyelitis.     Multilevel degenerative disc disease as detailed above. There is been no significant interval change.     Electronically Signed By: Martínez Stroud M.D. on 6/27/2016 4:40 PM    1. Rib pain on left side  2. Chronic left flank pain  3. Spinal stenosis of lumbar region with neurogenic claudication  START:    - gabapentin (NEURONTIN) 100 MG capsule; Take 1-3 capsules (100-300 mg) by mouth 3 times daily as needed - for burning/shooting nerve pain  Dispense: 90 capsule; Refill: 3  - XR Lumbar Spine w Bend Comp G/E 6 Views; Standing  - XR Lumbar Spine w Bend Comp G/E 6 Views    Return in approximately 3 week(s), or sooner as needed for follow-up with Dr. Fernando.  - follow-up left rib / flank pain    Clinic : [phone]  Appointment line: [phone]            Follow-ups after your visit        Follow-up notes from your care team     Return in about 3 weeks (around 4/26/2018) for - follow-up left rib / flank pain.      Who to contact     If you have questions or need follow up information about today's clinic visit or your schedule please contact [facility] directly at [phone].  Normal or non-critical lab and imaging results will be communicated to you by MyChart, letter or phone within 4 business days after the clinic has received the results. If you do not hear from us within 7 days, please contact the clinic through MyChart or phone. If you have a critical or abnormal lab result, we will notify you by phone  as soon as possible.  Submit refill requests through [employer] or call your pharmacy and they will forward the refill request to us. Please allow 3 business days for your refill to be completed.          Additional Information About Your Visit        Care EveryWhere ID     This is your Care EveryWhere ID. This could be used by other organizations to access your [facility] medical records  [id number]        Your Vitals Were     Pulse BMI (Body Mass Index)                60 28.66 kg/m2           Blood Pressure from Last 3 Encounters:   04/05/18 140/86   03/07/18 132/76   02/23/18 138/86    Weight from Last 3 Encounters:   04/05/18 176 lb 4 oz (79.9 kg)   03/07/18 178 lb 8 oz (81 kg)   02/23/18 175 lb 8 oz (79.6 kg)              Today, you had the following     No orders found for display         Today's Medication Changes          These changes are accurate as of 4/5/18  3:34 PM.  If you have any questions, ask your nurse or doctor.               Start taking these medicines.        Dose/Directions    gabapentin 100 MG capsule   Commonly known as:  NEURONTIN   Used for:  Rib pain on left side, Chronic left flank pain, Spinal stenosis of lumbar region with neurogenic claudication   Started by:  Jason Fernando MD        Dose:  100-300 mg   Take 1-3 capsules (100-300 mg) by mouth 3 times daily as needed - for burning/shooting nerve pain   Quantity:  90 capsule   Refills:  3            Where to get your medicines      These medications were sent to [facility] [address] [address]     Phone:  [phone]     gabapentin 100 MG capsule                Primary Care Provider Office Phone # Fax #    Jason Fernando [phone]       [address] [facility] [address]        Equal Access to Services     NICKOLAS HOLLOWAY AH: Susan Mcgarry, oliva mon, ang[place] bailey  lamariaa collins. So [facility] [phone].    ATENCIÓN: Si robinla obinna, tiene a soliz disposición servicios gratuitos de asistencia lingüística. Jacqui al [phone].    We comply with applicable federal civil rights laws and [place] laws. We do not discriminate on the basis of race, color, national origin, age, disability, sex, sexual orientation, or gender identity.            Thank you!     Thank you for choosing [facility] AND [facility]  for your care. Our goal is always to provide you with excellent care. Hearing back from our patients is one way we can continue to improve our services. Please take a few minutes to complete the written survey that you may receive in the mail after your visit with us. Thank you!             Your Updated Medication List - Protect others around you: Learn how to safely use, store and throw away your medicines at www.disposemymeds.org.          This list is accurate as of 4/5/18  3:34 PM.  Always use your most recent med list.                   Brand Name Dispense Instructions for use Diagnosis    acetaminophen 500 MG tablet    TYLENOL     Take 1,000 mg by mouth 3 times daily        amLODIPine 5 MG tablet    NORVASC    90 tablet    Take 1 tablet (5 mg) by mouth daily    Essential hypertension       gabapentin 100 MG capsule    NEURONTIN    90 capsule    Take 1-3 capsules (100-300 mg) by mouth 3 times daily as needed - for burning/shooting nerve pain    Rib pain on left side, Chronic left flank pain, Spinal stenosis of lumbar region with neurogenic claudication       traMADol 50 MG tablet    ULTRAM    180 tablet    Take 1-2 tablets ( mg) by mouth 3 times daily AS NEEDED for Severe Pain    Spinal stenosis of lumbar region with neurogenic claudication, Neck pain, chronic, Bilateral hand pain

## 2018-04-05 NOTE — PROGRESS NOTES
Nursing Notes:   Selam Gallardo, LPN  4/5/2018  3:25 PM  Signed  Patient presents to the clinic for ongoing left lower back pain, no improvement since last office visit on 3-7-18.      Selam Gallardo LPN 4/5/2018 3:05 PM      Nursing note reviewed with patient.  Accurracy and completeness verified.   Mr. Delaney is a 85 year old male who:  Patient presents with:  Back Pain    HPI     ICD-10-CM    1. Rib pain on left side R07.81 gabapentin (NEURONTIN) 100 MG capsule     XR Lumbar Spine w Bend Comp G/E 6 Views     XR Lumbar Spine w Bend Comp G/E 6 Views   2. Chronic left flank pain R10.9 gabapentin (NEURONTIN) 100 MG capsule    G89.29 XR Lumbar Spine w Bend Comp G/E 6 Views     XR Lumbar Spine w Bend Comp G/E 6 Views   3. Spinal stenosis of lumbar region with neurogenic claudication M48.062 gabapentin (NEURONTIN) 100 MG capsule     XR Lumbar Spine w Bend Comp G/E 6 Views     XR Lumbar Spine w Bend Comp G/E 6 Views     Patient states he has been having left-sided low back/flank pain for a number of weeks if not months now.  Thinks a lot of this has to do with the fact that he was pretty hard on his body growing up.  He used to do a lot of heavy lifting of things.  Examination today actually shows pretty significant tenderness of the left lower ribs and iliac crest area.  No palpable fractures were appreciated but he did have a lot of tenderness.  Recommend low back x-rays today.  We will get flexion-extension views.    He had MRI of the low back in the recent past which did show some considerable arthritis.  Report noted below.    States that he has not been able to get very comfortable.  He is having a lot of pain.  Tramadol works a little bit.  Tylenol works some.  Discussed other treatment options.  He would like to try gabapentin.  He was on this in the past and found it helpful.  Prescription sent to pharmacy.    Functional Capacity: about 4 METS.   Review of Systems   Constitutional: Negative for chills, fatigue and  fever.   HENT: Negative for congestion and hearing loss.    Eyes: Negative for pain and visual disturbance.   Respiratory: Negative for cough, shortness of breath and wheezing.    Cardiovascular: Negative for chest pain and palpitations.   Gastrointestinal: Negative for abdominal pain, diarrhea, nausea and vomiting.   Endocrine: Negative for cold intolerance and heat intolerance.   Genitourinary: Negative for dysuria and hematuria.   Musculoskeletal: Positive for arthralgias and back pain. Negative for myalgias.   Skin: Negative for pallor.   Allergic/Immunologic: Negative for immunocompromised state.   Neurological: Negative for dizziness and light-headedness.   Hematological: Does not bruise/bleed easily.   Psychiatric/Behavioral: Negative for agitation and confusion.        ELIZABETH:   ELIZABETH-7 SCORE 2/23/2018 3/7/2018 4/5/2018   Total Score 0 0 0     PHQ9:  PHQ-9 SCORE 2/23/2018 3/7/2018 4/5/2018   Total Score 0 0 0       I have personally reviewed the past medical history, past surgical history, medications, allergies, family and social history as listed below, on 4/5/2018.    Allergies   Allergen Reactions     Lisinopril Other (See Comments)     Dry throat  Dry throat     Penicillins Hives     Rofecoxib Hives     Vioxx     Celecoxib Rash     Ibuprofen Rash     All NSAIDS cause rash       Current Outpatient Prescriptions   Medication Sig Dispense Refill     gabapentin (NEURONTIN) 100 MG capsule Take 1-3 capsules (100-300 mg) by mouth 3 times daily as needed - for burning/shooting nerve pain 90 capsule 3     traMADol (ULTRAM) 50 MG tablet Take 1-2 tablets ( mg) by mouth 3 times daily AS NEEDED for Severe Pain 180 tablet 5     amLODIPine (NORVASC) 5 MG tablet Take 1 tablet (5 mg) by mouth daily 90 tablet 3     acetaminophen (TYLENOL) 500 MG tablet Take 1,000 mg by mouth 3 times daily          Patient Active Problem List    Diagnosis Date Noted     Trigger finger, left ring finger 02/23/2018     Priority: Medium      Mixed hyperlipidemia 02/08/2018     Priority: Medium     Neck pain, chronic 02/08/2018     Priority: Medium     Osteoarthrosis 02/08/2018     Priority: Medium     Chronic left sacroiliac pain 10/19/2017     Priority: Medium     Chronic insomnia 08/08/2017     Priority: Medium     S/P lumbar laminectomy 05/29/2015     Priority: Medium     CKD (chronic kidney disease) stage 3, GFR 30-59 ml/min 05/25/2015     Priority: Medium     Spinal stenosis of lumbar region with neurogenic claudication 04/24/2015     Priority: Medium     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 11/09/2012     Priority: Medium     Rosacea 04/03/2012     Priority: Medium     Actinic keratosis 10/24/2011     Priority: Medium     Hypertension 10/24/2011     Priority: Medium     Past Medical History:   Diagnosis Date     Actinic keratosis     2004,5-FU treatment on forehead     Chronic kidney disease, stage III (moderate)     No Comments Provided     Elevated erythrocyte sedimentation rate     2005,weight loss, night sweats due to episode of acute inflammatory illness, etiology unclear     Essential (primary) hypertension     No Comments Provided     Family history of malignant neoplasm of prostate     No Comments Provided     Hyperlipidemia     No Comments Provided     Osteoarthritis     No Comments Provided     Pneumonia     2002,and severe NSAID allergic reaction, hospitalized     Sciatica     No Comments Provided     Past Surgical History:   Procedure Laterality Date     ARTHROPLASTY KNEE      2006     ARTHROPLASTY KNEE      2008     ARTHROSCOPY SHOULDER      1996,left     ARTHROSCOPY SHOULDER      2001,AC arthrosis & distal clavicle resection     ARTHROSCOPY SHOULDER      2011     COLONOSCOPY      2008,normal     CYSTOSCOPY, TRANSURETHRAL RESECTION (TUR) PROSTATE, COMBINED      No Comments Provided     LAMINECTOMY LUMBAR ONE LEVEL      2015,[place], spinal stenosis     OTHER SURGICAL HISTORY      2004,207384,SCAN-STRESS TEST,negative to heart  "rate 138     OTHER SURGICAL HISTORY      2013,360294,OTHER,Right 5th digit     RELEASE CARPAL TUNNEL      bilateral     Social History     Social History     Marital status:      Spouse name: N/A     Number of children: N/A     Years of education: N/A     Social History Main Topics     Smoking status: Never Smoker     Smokeless tobacco: Never Used     Alcohol use No     Drug use: No     Sexual activity: Not Asked     Other Topics Concern     None     Social History Narrative    Patient lives in town.  He is , retired from [employer].  3 children, 1 .     Family History   Problem Relation Age of Onset     Prostate Cancer Father      Cancer-prostate     Hypertension Mother      Hypertension     Other - See Comments Mother      Old age, 99     Prostate Cancer Brother      Cancer-prostate     Unknown/Adopted Brother      Unknown       EXAM:   Vitals:    18 1506   BP: 140/86   BP Location: Right arm   Patient Position: Sitting   Cuff Size: Adult Regular   Pulse: 60   Weight: 176 lb 4 oz (79.9 kg)       Current Pain Score: Moderate Pain (5)     BP Readings from Last 3 Encounters:   18 140/86   18 132/76   18 138/86    Wt Readings from Last 3 Encounters:   18 176 lb 4 oz (79.9 kg)   18 178 lb 8 oz (81 kg)   18 175 lb 8 oz (79.6 kg)      Estimated body mass index is 28.66 kg/(m^2) as calculated from the following:    Height as of 18: 5' 5.75\" (1.67 m).    Weight as of this encounter: 176 lb 4 oz (79.9 kg).     Physical Exam   Constitutional: He appears well-developed and well-nourished.   Eyes: Conjunctivae are normal. No scleral icterus.   Cardiovascular: Normal rate and regular rhythm.    Pulmonary/Chest: Effort normal.   Abdominal: Soft. There is no tenderness.   Musculoskeletal: He exhibits tenderness. He exhibits no deformity.   Left flank / lower ribs -lower ribs are very tender to palpation.  He has some tenderness of the musculature between the ribs " and his iliac crest as well.  Marginal on the upper outer left iliac crest is also quite tender.   Skin: Skin is warm and dry.   Psychiatric: He has a normal mood and affect.       INVESTIGATIONS:     4/5/2018 -lumbar x-ray -final read is pending.  Looks he has some degenerative disc disease.  May have some spondylolisthesis.  Mild scoliosis.  Did not note any obvious fracture but this computer screen is not a diagnostic machine for that purpose.  Final read pending.    Results for orders placed or performed during the hospital encounter of 03/07/18   XR Sacroiliac Therapeutic Injection Left    Narrative    XR SACROILIAC THERAPEUTIC INJECTION LEFT    HISTORY: , ; Chronic left SI joint pain; Chronic left SI joint pain.    COMPARISON: 10/19/2017.    TECHNIQUE: The risks and benefits of fluoroscopic guided sacroiliac  joint injection were discussed the patient, including infection,  bleeding and reaction to contrast agents. The patient expressed  understanding and willingness to proceed. Written informed consent was  obtained.    The patient was placed prone on the fluoroscopy table. The skin  overlying the SI joint(s) was marked prepped and draped in standard  sterile fashion. Lidocaine was used to anesthetize the skin entry  site. A 22-gauge spinal needle was advanced under fluoroscopic  guidance into the left sacroiliac joint. Injection of iodinated  contrast demonstrated mixed extra- and intra-articular flow of  contrast. A mixture of 40 mg Kenalog and 2 cc of 0.5 percent  bupivacaine were then administered into and along the targeted  joint(s). The patient tolerated the procedure well. .1 minutes of  fluoro time were used.    Prior to the procedure, the patient had pain 4-10/10 in severity.  Shortly after, 80% improved in severity.       Impression    IMPRESSION:     Successful fluoroscopic guided left sacroiliac joint steroid  injection. R1    SUE LEE MD       ASSESSMENT AND PLAN:  Problem List Items  Addressed This Visit        Other    Spinal stenosis of lumbar region with neurogenic claudication    Relevant Medications    gabapentin (NEURONTIN) 100 MG capsule    Other Relevant Orders    XR Lumbar Spine w Bend Comp G/E 6 Views      Other Visit Diagnoses     Rib pain on left side    -  Primary    Relevant Medications    gabapentin (NEURONTIN) 100 MG capsule    Other Relevant Orders    XR Lumbar Spine w Bend Comp G/E 6 Views    Chronic left flank pain        Relevant Medications    gabapentin (NEURONTIN) 100 MG capsule    Other Relevant Orders    XR Lumbar Spine w Bend Comp G/E 6 Views        reviewed diet, exercise and weight control  -- Expected clinical course discussed    -- Medications and their side effects discussed    Patient Instructions   6/27/2016 -- MR SPINE LUMBAR WWO     HISTORY: 84 years Male     COMPARISON: None     TECHNIQUE: Multisequence and multiplanar MRI imaging of the lumbar spine  was performed with and without fat saturated sequences. In addition post alfredo sagittal T1 fat-sat and post alfredo axial T1 imaging of the lumbar spine was performed.     FINDINGS     . Signal intensity and caliber of the thoracic spinal cord is normal. The conus medullaris is at the level of T12-L1.     L5-S1: There is severe loss of disc height and signal. There is a posterior broad-based disc protrusion without significant central canal narrowing. The neuroforamen are mildly narrowed. There is no significant change.     L4-L5: There is degenerative disc space narrowing of moderate severity. A mild broad-based disc bulge is present. The central canal and neuroforamen are patent. There's been no significant interval change.     L3-L4: There is mild loss of disc height and signal. A mild broad-based disc bulge is present. The central canal and her family are patent. There is been no significant interval change.     L2-L3: There is moderate loss of disc height and signal. There is discogenic endplate edema. There is  facet osteoarthritis. There is a posterior broad-based disc protrusion and mild stenosis of the central canal. This has improved over the prior study.   There is narrowing of the right lateral recess. There is right neuroforaminal narrowing of moderate severity. The left neuroforamen is mildly narrowed.     L1-L2: There is mild loss of disc height and signal. There is a mild broad-based disc bulge. The central canal and neuroforamen are patent.     IMPRESSION:  Interval surgical change at the L2-L3 level. There is a persistent versus recurrent broad-based disc protrusion at this level which narrows the right lateral recess but central spinal canal stenosis overall has improved. There is no abnormal   enhancement to suggest an epidural abscess or osteomyelitis.     Multilevel degenerative disc disease as detailed above. There is been no significant interval change.     Electronically Signed By: Martínez Stroud M.D. on 6/27/2016 4:40 PM    1. Rib pain on left side  2. Chronic left flank pain  3. Spinal stenosis of lumbar region with neurogenic claudication  START:    - gabapentin (NEURONTIN) 100 MG capsule; Take 1-3 capsules (100-300 mg) by mouth 3 times daily as needed - for burning/shooting nerve pain  Dispense: 90 capsule; Refill: 3  - XR Lumbar Spine w Bend Comp G/E 6 Views; Standing  - XR Lumbar Spine w Bend Comp G/E 6 Views    Return in approximately 3 week(s), or sooner as needed for follow-up with Dr. Fernando.  - follow-up left rib / flank pain    Clinic : [phone]  Appointment line: [phone]      Jason Fernando MD  Internal Medicine  [facility]

## 2018-04-05 NOTE — LETTER
Juan Miguel Delaney  [address]  [place] [address]    4/10/2018      Dear Juan Miguel Delaney,    The result of your recent tests are included below:    Multiple levels of back arthritis noted.  No obvious fractures noted.    Results for orders placed or performed in visit on 04/05/18   XR Lumbar Spine w Bend Comp G/E 6 Views    Narrative    PROCEDURE: XR LUMBAR SPINE W BENDING COMP G/E 6 VW 4/5/2018 4:00 PM    HISTORY: left flank / lower ribs -- onto left iliac crest area  posteriorly  -- very painful, worse with certain movements / bending.;  Rib pain on left side; Chronic left flank pain; Chronic left flank  pain; Spinal stenosis of lumbar region with neurogenic claudication    COMPARISONS: MRI lumbar spine 6/27/2016    TECHNIQUE: Lumbar spine 7 views including flexion and extension    FINDINGS: There is moderate dextroscoliosis. There is mild  anterolisthesis of L2 relative to L3, without change with flexion or  extension views. Vertebral body heights are maintained. Disc spaces  are narrowed at all levels with degenerative endplate changes and  osteophytes. There is also multilevel facet arthrosis. Bilateral  sacroiliac joint degenerative changes are also noted.         Impression    IMPRESSION: Multilevel degenerative disease. Mild anterolisthesis of  L2 relative to L3, without change with flexion or extension. Moderate  scoliosis.    LISA POZO MD       If you have any further questions or problems, please contact my office at [phone] and schedule an appointment.    Clinic : [phone]  Appointment line: [phone]     Thank you,    Jason Fernando MD    Internal Medicine  [facility]     Reviewed and electronically signed by provider.

## 2018-04-05 NOTE — NURSING NOTE
Patient presents to the clinic for ongoing left lower back pain, no improvement since last office visit on 3-7-18.      Selam Gallardo LPN 4/5/2018 3:05 PM

## 2018-04-05 NOTE — PATIENT INSTRUCTIONS
6/27/2016 -- MR SPINE LUMBAR WWO     HISTORY: 84 years Male     COMPARISON: None     TECHNIQUE: Multisequence and multiplanar MRI imaging of the lumbar spine  was performed with and without fat saturated sequences. In addition post alfredo sagittal T1 fat-sat and post alfredo axial T1 imaging of the lumbar spine was performed.     FINDINGS     . Signal intensity and caliber of the thoracic spinal cord is normal. The conus medullaris is at the level of T12-L1.     L5-S1: There is severe loss of disc height and signal. There is a posterior broad-based disc protrusion without significant central canal narrowing. The neuroforamen are mildly narrowed. There is no significant change.     L4-L5: There is degenerative disc space narrowing of moderate severity. A mild broad-based disc bulge is present. The central canal and neuroforamen are patent. There's been no significant interval change.     L3-L4: There is mild loss of disc height and signal. A mild broad-based disc bulge is present. The central canal and her family are patent. There is been no significant interval change.     L2-L3: There is moderate loss of disc height and signal. There is discogenic endplate edema. There is facet osteoarthritis. There is a posterior broad-based disc protrusion and mild stenosis of the central canal. This has improved over the prior study.   There is narrowing of the right lateral recess. There is right neuroforaminal narrowing of moderate severity. The left neuroforamen is mildly narrowed.     L1-L2: There is mild loss of disc height and signal. There is a mild broad-based disc bulge. The central canal and neuroforamen are patent.     IMPRESSION:  Interval surgical change at the L2-L3 level. There is a persistent versus recurrent broad-based disc protrusion at this level which narrows the right lateral recess but central spinal canal stenosis overall has improved. There is no abnormal   enhancement to suggest an epidural abscess or  osteomyelitis.     Multilevel degenerative disc disease as detailed above. There is been no significant interval change.     Electronically Signed By: Martínez Stroud M.D. on 6/27/2016 4:40 PM    1. Rib pain on left side  2. Chronic left flank pain  3. Spinal stenosis of lumbar region with neurogenic claudication  START:    - gabapentin (NEURONTIN) 100 MG capsule; Take 1-3 capsules (100-300 mg) by mouth 3 times daily as needed - for burning/shooting nerve pain  Dispense: 90 capsule; Refill: 3  - XR Lumbar Spine w Bend Comp G/E 6 Views; Standing  - XR Lumbar Spine w Bend Comp G/E 6 Views    Return in approximately 3 week(s), or sooner as needed for follow-up with Dr. Fernando.  - follow-up left rib / flank pain    Clinic : [phone]  Appointment line: [phone]

## 2018-04-06 ASSESSMENT — ANXIETY QUESTIONNAIRES: GAD7 TOTAL SCORE: 0

## 2018-04-06 ASSESSMENT — PATIENT HEALTH QUESTIONNAIRE - PHQ9: SUM OF ALL RESPONSES TO PHQ QUESTIONS 1-9: 0

## 2018-04-18 ENCOUNTER — TELEPHONE (OUTPATIENT)
Dept: INTERNAL MEDICINE | Facility: OTHER | Age: 83
End: 2018-04-18

## 2018-04-18 ENCOUNTER — APPOINTMENT (OUTPATIENT)
Dept: CT IMAGING | Facility: OTHER | Age: 83
End: 2018-04-18
Attending: FAMILY MEDICINE
Payer: MEDICARE

## 2018-04-18 ENCOUNTER — HOSPITAL ENCOUNTER (EMERGENCY)
Facility: OTHER | Age: 83
Discharge: HOME | End: 2018-04-18
Attending: FAMILY MEDICINE | Admitting: FAMILY MEDICINE
Payer: MEDICARE

## 2018-04-18 VITALS
HEIGHT: 69 IN | OXYGEN SATURATION: 97 % | TEMPERATURE: 97.1 F | SYSTOLIC BLOOD PRESSURE: 168 MMHG | DIASTOLIC BLOOD PRESSURE: 87 MMHG | RESPIRATION RATE: 16 BRPM

## 2018-04-18 DIAGNOSIS — R41.82 ALTERED MENTAL STATUS, UNSPECIFIED ALTERED MENTAL STATUS TYPE: ICD-10-CM

## 2018-04-18 LAB
ALBUMIN SERPL-MCNC: 4.5 G/DL (ref 3.5–5.7)
ALBUMIN UR-MCNC: ABNORMAL MG/DL
ALP SERPL-CCNC: 44 U/L (ref 34–104)
ALT SERPL W P-5'-P-CCNC: 19 U/L (ref 7–52)
AMPHETAMINES UR QL SCN: NOT DETECTED
ANION GAP SERPL CALCULATED.3IONS-SCNC: 11 MMOL/L (ref 3–14)
APPEARANCE UR: CLEAR
AST SERPL W P-5'-P-CCNC: 23 U/L (ref 13–39)
BACTERIA #/AREA URNS HPF: ABNORMAL /HPF
BARBITURATES UR QL: NOT DETECTED
BASOPHILS # BLD AUTO: 0 10E9/L (ref 0–0.2)
BASOPHILS NFR BLD AUTO: 0.5 %
BENZODIAZ UR QL: NOT DETECTED
BILIRUB SERPL-MCNC: 1 MG/DL (ref 0.3–1)
BILIRUB UR QL STRIP: ABNORMAL
BUN SERPL-MCNC: 25 MG/DL (ref 7–25)
BUPRENORPHINE UR QL: NOT DETECTED NG/ML
CALCIUM SERPL-MCNC: 10 MG/DL (ref 8.6–10.3)
CANNABINOIDS UR QL: NOT DETECTED NG/ML
CHLORIDE SERPL-SCNC: 102 MMOL/L (ref 98–107)
CO2 SERPL-SCNC: 27 MMOL/L (ref 21–31)
COCAINE UR QL: NOT DETECTED
COLOR UR AUTO: YELLOW
CREAT SERPL-MCNC: 1.4 MG/DL (ref 0.7–1.3)
D-METHAMPHET UR QL: NOT DETECTED NG/ML
DIFFERENTIAL METHOD BLD: NORMAL
EOSINOPHIL # BLD AUTO: 0.1 10E9/L (ref 0–0.7)
EOSINOPHIL NFR BLD AUTO: 0.9 %
ERYTHROCYTE [DISTWIDTH] IN BLOOD BY AUTOMATED COUNT: 12.8 % (ref 10–15)
ERYTHROCYTE [SEDIMENTATION RATE] IN BLOOD BY WESTERGREN METHOD: 3 MM/H (ref 1–10)
ETHANOL SERPL-MCNC: <0.01 %
GFR SERPL CREATININE-BSD FRML MDRD: 48 ML/MIN/1.7M2
GLUCOSE SERPL-MCNC: 109 MG/DL (ref 70–105)
GLUCOSE UR STRIP-MCNC: NEGATIVE MG/DL
GRAN CASTS #/AREA URNS LPF: ABNORMAL /LPF
HCT VFR BLD AUTO: 49.1 % (ref 40–53)
HGB BLD-MCNC: 16.8 G/DL (ref 13.3–17.7)
HGB UR QL STRIP: NEGATIVE
HYALINE CASTS #/AREA URNS LPF: ABNORMAL /LPF
IMM GRANULOCYTES # BLD: 0 10E9/L (ref 0–0.4)
IMM GRANULOCYTES NFR BLD: 0.3 %
INR PPP: 1.02 (ref 0–1.3)
KETONES UR STRIP-MCNC: 15 MG/DL
LEUKOCYTE ESTERASE UR QL STRIP: NEGATIVE
LYMPHOCYTES # BLD AUTO: 1.1 10E9/L (ref 0.8–5.3)
LYMPHOCYTES NFR BLD AUTO: 14.6 %
MCH RBC QN AUTO: 30.3 PG (ref 26.5–33)
MCHC RBC AUTO-ENTMCNC: 34.2 G/DL (ref 31.5–36.5)
MCV RBC AUTO: 89 FL (ref 78–100)
METHADONE UR QL SCN: NOT DETECTED
MONOCYTES # BLD AUTO: 0.9 10E9/L (ref 0–1.3)
MONOCYTES NFR BLD AUTO: 11.5 %
MUCOUS THREADS #/AREA URNS LPF: PRESENT /LPF
NEUTROPHILS # BLD AUTO: 5.3 10E9/L (ref 1.6–8.3)
NEUTROPHILS NFR BLD AUTO: 72.2 %
NITRATE UR QL: NEGATIVE
NON-SQ EPI CELLS #/AREA URNS LPF: ABNORMAL /LPF
OPIATES UR QL SCN: NOT DETECTED
OXYCODONE UR QL: NOT DETECTED NG/ML
PCP UR QL SCN: NOT DETECTED
PH UR STRIP: 6 PH (ref 5–7)
PLATELET # BLD AUTO: 205 10E9/L (ref 150–450)
POTASSIUM SERPL-SCNC: 4.1 MMOL/L (ref 3.5–5.1)
PROPOXYPH UR QL: NOT DETECTED NG/ML
PROT SERPL-MCNC: 6.9 G/DL (ref 6.4–8.9)
RBC # BLD AUTO: 5.54 10E12/L (ref 4.4–5.9)
RBC #/AREA URNS AUTO: ABNORMAL /HPF
SODIUM SERPL-SCNC: 140 MMOL/L (ref 134–144)
SOURCE: ABNORMAL
SP GR UR STRIP: 1.02 (ref 1–1.03)
TRICYCLICS UR QL SCN: NOT DETECTED NG/ML
UROBILINOGEN UR STRIP-ACNC: 0.2 EU/DL (ref 0.2–1)
WBC # BLD AUTO: 7.4 10E9/L (ref 4–11)
WBC #/AREA URNS AUTO: ABNORMAL /HPF

## 2018-04-18 PROCEDURE — 83605 ASSAY OF LACTIC ACID: CPT | Performed by: FAMILY MEDICINE

## 2018-04-18 PROCEDURE — 81001 URINALYSIS AUTO W/SCOPE: CPT | Mod: XU | Performed by: FAMILY MEDICINE

## 2018-04-18 PROCEDURE — 70450 CT HEAD/BRAIN W/O DYE: CPT | Mod: TC

## 2018-04-18 PROCEDURE — 99283 EMERGENCY DEPT VISIT LOW MDM: CPT | Mod: Z6 | Performed by: FAMILY MEDICINE

## 2018-04-18 PROCEDURE — 80307 DRUG TEST PRSMV CHEM ANLYZR: CPT | Performed by: FAMILY MEDICINE

## 2018-04-18 PROCEDURE — 85610 PROTHROMBIN TIME: CPT | Mod: GZ | Performed by: FAMILY MEDICINE

## 2018-04-18 PROCEDURE — 80053 COMPREHEN METABOLIC PANEL: CPT | Performed by: FAMILY MEDICINE

## 2018-04-18 PROCEDURE — 99284 EMERGENCY DEPT VISIT MOD MDM: CPT | Mod: 25 | Performed by: FAMILY MEDICINE

## 2018-04-18 PROCEDURE — 85652 RBC SED RATE AUTOMATED: CPT | Performed by: FAMILY MEDICINE

## 2018-04-18 PROCEDURE — 36415 COLL VENOUS BLD VENIPUNCTURE: CPT | Performed by: FAMILY MEDICINE

## 2018-04-18 PROCEDURE — 85025 COMPLETE CBC W/AUTO DIFF WBC: CPT | Performed by: FAMILY MEDICINE

## 2018-04-18 PROCEDURE — 80320 DRUG SCREEN QUANTALCOHOLS: CPT | Performed by: FAMILY MEDICINE

## 2018-04-18 PROCEDURE — 25000132 ZZH RX MED GY IP 250 OP 250 PS 637: Mod: GY | Performed by: FAMILY MEDICINE

## 2018-04-18 RX ORDER — LANOLIN ALCOHOL/MO/W.PET/CERES
100 CREAM (GRAM) TOPICAL ONCE
Status: COMPLETED | OUTPATIENT
Start: 2018-04-18 | End: 2018-04-18

## 2018-04-18 RX ADMIN — Medication 100 MG: at 01:15

## 2018-04-18 ASSESSMENT — ENCOUNTER SYMPTOMS
BACK PAIN: 1
RESPIRATORY NEGATIVE: 1
HEADACHES: 0
FEVER: 0
CHILLS: 0
WEAKNESS: 0
DIAPHORESIS: 0
CARDIOVASCULAR NEGATIVE: 1
GASTROINTESTINAL NEGATIVE: 1
CONFUSION: 1
EYES NEGATIVE: 1
NUMBNESS: 0

## 2018-04-18 NOTE — ED NOTES
Son and Daughter here with patient. Provider at bedside. Patient more clear, still unsure of events that got him here.

## 2018-04-18 NOTE — TELEPHONE ENCOUNTER
Son confirmed new appointment date and time for tomorrow at 3 PM.  Family was okay with this change.        Selam Gallardo LPN 4/18/2018 1:55 PM

## 2018-04-18 NOTE — ED AVS SNAPSHOT
[facility]    [address]    [place] [address]    Phone:  [phone]    Fax:  [phone]                                       Juan Miguel Delaney   MRN: [id number]    Department:  [facility]   Date of Visit:  4/18/2018           Patient Information     Date Of Birth          6/21/1932        Your diagnoses for this visit were:     Altered mental status, unspecified altered mental status type suspect Alzheimer's type dementia.       You were seen by Breezy Blount MD.      Follow-up Information     Follow up with Jason Fernando MD. Schedule an appointment as soon as possible for a visit in 1 week.    Specialty:  Internal Medicine    Contact information:    [address]  [place] [address]  [phone]          Discharge Instructions       Dear Rhett ,  It was nice to meet Juan Miguel.  As we talked, I'm not finding a specific cause for his confusion.  If this is a long standing slowly progressive problem it might be that he is suffering from Alzheimer's dementia.  If this is a more recent faster progressing problem it could be caused by something else.  I don't find obvious infection or trauma to account for confusion on his exam or labs today.  It would be good to have clinic follow up with Dr. Fernando to check on other causes.      Until his diagnosis is made I would like Juan Miguel to stop driving.    It would also be a good time for a family meeting to talk about safety at home and any caregiving needs.    Dr. Rogers Blount        Discharge References/Attachments     CONFUSION (ENGLISH)    ALZHEIMER DISEASE (ENGLISH)      24 Hour Appointment Hotline       To make an appointment at any [facility], call [phone] ([phone]). If you don't have a family doctor or clinic, we will help you find one. [facility] clinics are conveniently located to serve the needs of you and your family.             Review of your medicines      Our  records show that you are taking the medicines listed below. If these are incorrect, please call your family doctor or clinic.        Dose / Directions Last dose taken    acetaminophen 500 MG tablet   Commonly known as:  TYLENOL   Dose:  1000 mg        Take 1,000 mg by mouth 3 times daily   Refills:  0        amLODIPine 5 MG tablet   Commonly known as:  NORVASC   Dose:  5 mg   Quantity:  90 tablet        Take 1 tablet (5 mg) by mouth daily   Refills:  3        gabapentin 100 MG capsule   Commonly known as:  NEURONTIN   Dose:  100-300 mg   Quantity:  90 capsule        Take 1-3 capsules (100-300 mg) by mouth 3 times daily as needed - for burning/shooting nerve pain   Refills:  3        traMADol 50 MG tablet   Commonly known as:  ULTRAM   Dose:   mg   Quantity:  180 tablet        Take 1-2 tablets ( mg) by mouth 3 times daily AS NEEDED for Severe Pain   Refills:  5                Procedures and tests performed during your visit     *UA reflex to Microscopic    CBC with platelets differential    CT Head w/o Contrast    Comprehensive metabolic panel    Drug of Abuse Screen Urine GH    Erythrocyte sedimentation rate auto    Ethanol GH    INR    Urine Microscopic      Orders Needing Specimen Collection     None      Pending Results     No orders found from 4/16/2018 to 4/19/2018.            Pending Culture Results     No orders found from 4/16/2018 to 4/19/2018.            Pending Results Instructions     If you had any lab results that were not finalized at the time of your Discharge, you can call the ED Lab Result RN at [phone]. You will be contacted by this team for any positive Lab results or changes in treatment. The nurses are available 7 days a week from 10A to 6:30P.  You can leave a message 24 hours per day and they will return your call.        Thank you for choosing [facility]       Thank you for choosing [facility] for your care. Our goal is always to provide you with excellent care. Hearing back  from our patients is one way we can continue to improve our services. Please take a few minutes to complete the written survey that you may receive in the mail after you visit with us. Thank you!        Care EveryWhere ID     This is your Care EveryWhere ID. This could be used by other organizations to access your [place] medical records  [id number]        Equal Access to Services     NICKOLAS HOLLOWAY : Susan Mcgarry, oliva mon, [place] bailey . So [facility] [phone].    ATENCIÓN: Si habla español, tiene a soliz disposición servicios gratuitos de asistencia lingüística. Jacqui al [phone].    We comply with applicable federal civil rights laws and [place] laws. We do not discriminate on the basis of race, color, national origin, age, disability, sex, sexual orientation, or gender identity.            After Visit Summary       This is your record. Keep this with you and show to your community pharmacist(s) and doctor(s) at your next visit.

## 2018-04-18 NOTE — DISCHARGE INSTRUCTIONS
Dear [facility],  It was nice to meet Juan Miguel.  As we talked, I'm not finding a specific cause for his confusion.  If this is a long standing slowly progressive problem it might be that he is suffering from Alzheimer's dementia.  If this is a more recent faster progressing problem it could be caused by something else.  I don't find obvious infection or trauma to account for confusion on his exam or labs today.  It would be good to have clinic follow up with Dr. Fernando to check on other causes.      Until his diagnosis is made I would like Juan Miguel to stop driving.    It would also be a good time for a family meeting to talk about safety at home and any caregiving needs.    Dr. Rogers Blount

## 2018-04-18 NOTE — ED PROVIDER NOTES
History     Chief Complaint   Patient presents with     Altered Mental Status     The history is provided by the patient, the police and a relative (Initial history by police and later additional history by son and daughter.).     Juan Miguel Delaney is a 85 year old male who was found outside his house after knocking on the neighbor's door and confused as to his whereabouts.  Police were called and he had been trying to get back into his home without success.  He stated his 85-year-old wife was in the home but he was not able to wake her to get back in.  Please were not able to get anyone to answer at the home and brought him into the emergency department for confusion.  There contacting his children on his emergency contact list.  He denies fevers chills sweats or recent falls or trauma.    Problem List:    Patient Active Problem List    Diagnosis Date Noted     Trigger finger, left ring finger 02/23/2018     Priority: Medium     Mixed hyperlipidemia 02/08/2018     Priority: Medium     Neck pain, chronic 02/08/2018     Priority: Medium     Osteoarthrosis 02/08/2018     Priority: Medium     Chronic left sacroiliac pain 10/19/2017     Priority: Medium     Chronic insomnia 08/08/2017     Priority: Medium     S/P lumbar laminectomy 05/29/2015     Priority: Medium     CKD (chronic kidney disease) stage 3, GFR 30-59 ml/min 05/25/2015     Priority: Medium     Spinal stenosis of lumbar region with neurogenic claudication 04/24/2015     Priority: Medium     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 11/09/2012     Priority: Medium     Rosacea 04/03/2012     Priority: Medium     Actinic keratosis 10/24/2011     Priority: Medium     Hypertension 10/24/2011     Priority: Medium        Past Medical History:    Past Medical History:   Diagnosis Date     Actinic keratosis      Chronic kidney disease, stage III (moderate)      Elevated erythrocyte sedimentation rate      Essential (primary) hypertension      Family history of  malignant neoplasm of prostate      Hyperlipidemia      Osteoarthritis      Pneumonia      Sciatica        Past Surgical History:    Past Surgical History:   Procedure Laterality Date     ARTHROPLASTY KNEE      2006     ARTHROPLASTY KNEE      2008     ARTHROSCOPY SHOULDER      1996,left     ARTHROSCOPY SHOULDER      2001,AC arthrosis & distal clavicle resection     ARTHROSCOPY SHOULDER      2011     COLONOSCOPY      2008,normal     CYSTOSCOPY, TRANSURETHRAL RESECTION (TUR) PROSTATE, COMBINED      No Comments Provided     LAMINECTOMY LUMBAR ONE LEVEL      2015,[facility], spinal stenosis     OTHER SURGICAL HISTORY      2004,[id number],SCAN-STRESS TEST,negative to heart rate 138     OTHER SURGICAL HISTORY      2013,[id number],OTHER,Right 5th digit     RELEASE CARPAL TUNNEL      bilateral       Family History:    Family History   Problem Relation Age of Onset     Prostate Cancer Father      Cancer-prostate     Hypertension Mother      Hypertension     Other - See Comments Mother      Old age, 99     Prostate Cancer Brother      Cancer-prostate     Unknown/Adopted Brother      Unknown       Social History:  Marital Status:   [2]  Social History   Substance Use Topics     Smoking status: Never Smoker     Smokeless tobacco: Never Used     Alcohol use No        Medications:      gabapentin (NEURONTIN) 100 MG capsule   traMADol (ULTRAM) 50 MG tablet   amLODIPine (NORVASC) 5 MG tablet   acetaminophen (TYLENOL) 500 MG tablet         Review of Systems   Unable to perform ROS: Dementia   Constitutional: Negative for chills, diaphoresis and fever.   HENT: Negative.    Eyes: Negative.    Respiratory: Negative.    Cardiovascular: Negative.    Gastrointestinal: Negative.    Genitourinary: Negative.    Musculoskeletal: Positive for back pain.   Skin: Negative.    Neurological: Negative for weakness, numbness and headaches.   Psychiatric/Behavioral: Positive for confusion.       Physical Exam   BP: 169/88  Heart Rate: 118  Temp: 97.3  F  "(36.3  C)  Resp: 16  Height: 175.3 cm (5' 9\")  SpO2: 97 %      Physical Exam   Constitutional: He appears well-developed and well-nourished. No distress.   85-year-old male who cannot remember the date or year or president and also cannot remember why he was locked out of his house or what was going on prior to these events.   HENT:   Head: Normocephalic and atraumatic.   Right Ear: External ear normal.   Left Ear: External ear normal.   Nose: Nose normal.   Mouth/Throat: Oropharynx is clear and moist.   Eyes: Conjunctivae and EOM are normal. Pupils are equal, round, and reactive to light. Right eye exhibits no discharge. Left eye exhibits no discharge. No scleral icterus.   Neck: Normal range of motion. Neck supple. No tracheal deviation present. No thyromegaly present.   Cardiovascular: Normal rate, regular rhythm and normal heart sounds.    No murmur heard.  Pulmonary/Chest: Effort normal and breath sounds normal. No respiratory distress.   Abdominal: Soft. Bowel sounds are normal. He exhibits no distension. There is no tenderness.   Musculoskeletal: Normal range of motion. He exhibits no edema or tenderness.   Lymphadenopathy:     He has no cervical adenopathy.   Neurological: He is alert. He exhibits normal muscle tone. Coordination normal.   Patient is confused with poor memory of recent events, remembering past events well.   Skin: Skin is warm and dry. No rash noted. He is not diaphoretic.   Psychiatric: He has a normal mood and affect.   Nursing note and vitals reviewed.      ED Course     ED Course     Procedures               Critical Care time:  none               Results for orders placed or performed during the hospital encounter of 04/18/18 (from the past 24 hour(s))   CBC with platelets differential   Result Value Ref Range    WBC 7.4 4.0 - 11.0 10e9/L    RBC Count 5.54 4.4 - 5.9 10e12/L    Hemoglobin 16.8 13.3 - 17.7 g/dL    Hematocrit 49.1 40.0 - 53.0 %    MCV 89 78 - 100 fl    MCH 30.3 26.5 - 33.0 " pg    MCHC 34.2 31.5 - 36.5 g/dL    RDW 12.8 10.0 - 15.0 %    Platelet Count 205 150 - 450 10e9/L    Diff Method Automated Method     % Neutrophils 72.2 %    % Lymphocytes 14.6 %    % Monocytes 11.5 %    % Eosinophils 0.9 %    % Basophils 0.5 %    % Immature Granulocytes 0.3 %    Absolute Neutrophil 5.3 1.6 - 8.3 10e9/L    Absolute Lymphocytes 1.1 0.8 - 5.3 10e9/L    Absolute Monocytes 0.9 0.0 - 1.3 10e9/L    Absolute Eosinophils 0.1 0.0 - 0.7 10e9/L    Absolute Basophils 0.0 0.0 - 0.2 10e9/L    Abs Immature Granulocytes 0.0 0 - 0.4 10e9/L   Ethanol GH   Result Value Ref Range    Ethanol g/dL <0.01 <0.01 %   INR   Result Value Ref Range    INR 1.02 0 - 1.3   Erythrocyte sedimentation rate auto   Result Value Ref Range    Sed Rate 3 1 - 10 mm/h   Comprehensive metabolic panel   Result Value Ref Range    Sodium 140 134 - 144 mmol/L    Potassium 4.1 3.5 - 5.1 mmol/L    Chloride 102 98 - 107 mmol/L    Carbon Dioxide 27 21 - 31 mmol/L    Anion Gap 11 3 - 14 mmol/L    Glucose 109 (H) 70 - 105 mg/dL    Urea Nitrogen 25 7 - 25 mg/dL    Creatinine 1.40 (H) 0.70 - 1.30 mg/dL    GFR Estimate 48 (L) >60 mL/min/1.7m2    GFR Estimate If Black 58 (L) >60 mL/min/1.7m2    Calcium 10.0 8.6 - 10.3 mg/dL    Bilirubin Total 1.0 0.3 - 1.0 mg/dL    Albumin 4.5 3.5 - 5.7 g/dL    Protein Total 6.9 6.4 - 8.9 g/dL    Alkaline Phosphatase 44 34 - 104 U/L    ALT 19 7 - 52 U/L    AST 23 13 - 39 U/L   *UA reflex to Microscopic   Result Value Ref Range    Color Urine Yellow     Appearance Urine Clear     Glucose Urine Negative NEG^Negative mg/dL    Bilirubin Urine Small (A) NEG^Negative    Ketones Urine 15 (A) NEG^Negative mg/dL    Specific Gravity Urine 1.025 1.003 - 1.035    Blood Urine Negative NEG^Negative    pH Urine 6.0 5.0 - 7.0 pH    Protein Albumin Urine Trace (A) NEG^Negative mg/dL    Urobilinogen Urine 0.2 0.2 - 1.0 EU/dL    Nitrite Urine Negative NEG^Negative    Leukocyte Esterase Urine Negative NEG^Negative    Source Midstream Urine     Drug of Abuse Screen Urine GH   Result Value Ref Range    Amphetamine Qual Urine Not Detected NDET^Not Detected    Benzodiazepine Qual Urine Not Detected NDET^Not Detected    Cocaine Qual Urine Not Detected NDET^Not Detected    Methadone Qual Urine Not Detected NDET^Not Detected    PCP Qual Urine Not Detected NDET^Not Detected    Opiates Qualitative Urine Not Detected NDET^Not Detected    Oxycodone Qualitative Urine Not Detected NDET^Not Detected ng/mL    Propoxyphene Qualitative Urine Not Detected NDET^Not Detected ng/mL    Tricyclic Antidepressants Qual Urine Not Detected NDET^Not Detected ng/mL    Methamphetamine Qualitative Urine Not Detected NDET^Not Detected ng/mL    Barbiturates Qual Urine Not Detected NDET^Not Detected    Cannabinoids Qualitative Urine Not Detected NDET^Not Detected ng/mL    Buprenorphine Qualitative Urine Not Detected NDET^Not Detected ng/mL   Urine Microscopic   Result Value Ref Range    WBC Urine 0 - 5 OTO5^0 - 5 /HPF    RBC Urine O - 2 OTO2^O - 2 /HPF    Hyaline Casts 5-10 (A) OTO2^O - 2 /LPF    Granular Casts 0-2 (A) NEG^Negative /LPF    Squamous Epithelial /LPF Urine Few FEW^Few /LPF    Bacteria Urine Many (A) NEG^Negative /HPF    Mucous Urine Present (A) NEG^Negative /LPF   CT Head w/o Contrast    Narrative    EXAM:    CT Head Without Intravenous  Contrast    CLINICAL HISTORY:    85 years old, male; Signs and symptoms; Altered mental status/memory loss;   Confusion or disorientation; Additional info: Confusion, appears acute; With   o/w non-focal neuro exam.    TECHNIQUE:    Axial computed tomography images of the head/brain without intravenous   contrast.  All CT scans at this facility use one or more dose reduction   techniques, viz.: automated exposure control; ma/kV adjustment per patient size   (including targeted exams where dose is matched to indication; i.e. head); or   iterative reconstruction technique.    Coronal and sagittal reformatted images were created and  reviewed.    COMPARISON:    No relevant prior studies available.    FINDINGS:    Mild bilateral periventricular lucencies without intraparenchymal hemorrhage   and no obvious acute ischemic stroke. No intra-or extra-axial fluid collection,   no supra-or infratentorial mass, no mass effect or midline shift.       Mild symmetric hyperdensity of the cerebellar tentorium is likely within   normal limits.  Incidental note of a small RIGHT paramedian anterior falx   lipoma measuring 7 x 5 mm.  Mildly prominent ventricles, sulci and basal   cisterns without evidence of hydrocephalus. Skull bones are normal.        No significant mucoperiosteal thickening in the visualized paranasal sinuses.   No mastoid effusion.       Impression    IMPRESSION:       Generalized age appropriate atrophy without evidence of an acute intracranial   hemorrhage, mass lesion and no obvious acute ischemic infarction.      THIS DOCUMENT HAS BEEN ELECTRONICALLY SIGNED BY GOLDIE MACEDO MD       Medications   thiamine tablet 100 mg (100 mg Oral Given 4/18/18 0115)     2:07 AM son and daughter here and report that their Dad has been having some intermittent trouble in the past but they weren't aware of wandering.  Although, they are now hearing from Police/neighbors of some behaviors like driving around the block and right back c/w confusion.  Discussed awaiting remainder of labs and scans but right now it doesn't appear other cause present.    Assessments & Plan (with Medical Decision Making)   85 year old male found outside confused by neighbors who called Police.  Wife home sleeping inside and patient was unable to get in or wake her.  Brought to the ED. evaluation here shows no obvious traumatic injury or infectious or metabolic process prompting his symptoms.  Once his son and daughter are here to provide more hx it appears that he has been having a more gradual progression of symptoms consistent with dementia.  There have been some new  medications while these may contribute to his symptoms it seems that his cognitive decline preceded these medications.  Reviewed importance of continued follow-up and evaluation to establish correct diagnosis.  At this time recommending no driving and recommending care conference to evaluate safety concerns as well as need for additional help at home versus change in living venue.    I have reviewed the nursing notes.    I have reviewed the findings, diagnosis, plan and need for follow up with the patient.       New Prescriptions    No medications on file       Final diagnoses:   Altered mental status, unspecified altered mental status type - suspect Alzheimer's type dementia.       4/18/2018   [facility]     Breezy Blount MD  04/18/18 0663

## 2018-04-18 NOTE — ED AVS SNAPSHOT
[facility]    [address]    [place] [address]    Phone:  [phone]    Fax:  [phone]                                       Juan Miguel Delaney   MRN: [id number]    Department:  [facility]   Date of Visit:  4/18/2018           After Visit Summary Signature Page     I have received my discharge instructions, and my questions have been answered. I have discussed any challenges I see with this plan with the nurse or doctor.    ..........................................................................................................................................  Patient/Patient Representative Signature      ..........................................................................................................................................  Patient Representative Print Name and Relationship to Patient    ..................................................               ................................................  Date                                            Time    ..........................................................................................................................................  Reviewed by Signature/Title    ...................................................              ..............................................  Date                                                            Time

## 2018-04-18 NOTE — ED NOTES
Patient arrived via [employer]. Neighbors called police on patient as he showed up at their house. Patient was unable to get into his house, was confused. Patient cooperative but confused on arrival. After talking with patient he was seen recently and started on some new medications. When reviewing chart patient has recently been started on gabapentin and tramadol.

## 2018-04-19 ENCOUNTER — OFFICE VISIT (OUTPATIENT)
Dept: INTERNAL MEDICINE | Facility: OTHER | Age: 83
End: 2018-04-19
Attending: INTERNAL MEDICINE
Payer: COMMERCIAL

## 2018-04-19 VITALS
TEMPERATURE: 98.2 F | HEART RATE: 72 BPM | BODY MASS INDEX: 25.1 KG/M2 | SYSTOLIC BLOOD PRESSURE: 134 MMHG | DIASTOLIC BLOOD PRESSURE: 80 MMHG | WEIGHT: 170 LBS

## 2018-04-19 DIAGNOSIS — Z09 HOSPITAL DISCHARGE FOLLOW-UP: Primary | ICD-10-CM

## 2018-04-19 DIAGNOSIS — G30.1 LATE ONSET ALZHEIMER'S DISEASE WITHOUT BEHAVIORAL DISTURBANCE (H): ICD-10-CM

## 2018-04-19 DIAGNOSIS — F02.80 LATE ONSET ALZHEIMER'S DISEASE WITHOUT BEHAVIORAL DISTURBANCE (H): ICD-10-CM

## 2018-04-19 DIAGNOSIS — I10 ESSENTIAL (PRIMARY) HYPERTENSION: ICD-10-CM

## 2018-04-19 DIAGNOSIS — M48.062 SPINAL STENOSIS OF LUMBAR REGION WITH NEUROGENIC CLAUDICATION: ICD-10-CM

## 2018-04-19 DIAGNOSIS — R53.83 FATIGUE, UNSPECIFIED TYPE: ICD-10-CM

## 2018-04-19 PROCEDURE — 99215 OFFICE O/P EST HI 40 MIN: CPT | Performed by: INTERNAL MEDICINE

## 2018-04-19 PROCEDURE — 96372 THER/PROPH/DIAG INJ SC/IM: CPT

## 2018-04-19 PROCEDURE — G0463 HOSPITAL OUTPT CLINIC VISIT: HCPCS

## 2018-04-19 PROCEDURE — 25000128 H RX IP 250 OP 636: Performed by: INTERNAL MEDICINE

## 2018-04-19 PROCEDURE — G0463 HOSPITAL OUTPT CLINIC VISIT: HCPCS | Mod: 25

## 2018-04-19 RX ORDER — CYANOCOBALAMIN 1000 UG/ML
1000 INJECTION, SOLUTION INTRAMUSCULAR; SUBCUTANEOUS ONCE
Status: COMPLETED | OUTPATIENT
Start: 2018-04-19 | End: 2018-04-19

## 2018-04-19 RX ADMIN — CYANOCOBALAMIN 1000 MCG: 1000 INJECTION, SOLUTION INTRAMUSCULAR at 16:12

## 2018-04-19 ASSESSMENT — ENCOUNTER SYMPTOMS
BRUISES/BLEEDS EASILY: 0
ABDOMINAL PAIN: 0
JOINT SWELLING: 0
DIZZINESS: 0
BACK PAIN: 1
SHORTNESS OF BREATH: 0
FATIGUE: 1
CONFUSION: 0
FEVER: 0
HEMATURIA: 0
PALPITATIONS: 0
LIGHT-HEADEDNESS: 0
ADENOPATHY: 0
AGITATION: 0

## 2018-04-19 ASSESSMENT — ANXIETY QUESTIONNAIRES
1. FEELING NERVOUS, ANXIOUS, OR ON EDGE: SEVERAL DAYS
7. FEELING AFRAID AS IF SOMETHING AWFUL MIGHT HAPPEN: SEVERAL DAYS
3. WORRYING TOO MUCH ABOUT DIFFERENT THINGS: NOT AT ALL
GAD7 TOTAL SCORE: 4
2. NOT BEING ABLE TO STOP OR CONTROL WORRYING: NOT AT ALL
6. BECOMING EASILY ANNOYED OR IRRITABLE: NOT AT ALL
IF YOU CHECKED OFF ANY PROBLEMS ON THIS QUESTIONNAIRE, HOW DIFFICULT HAVE THESE PROBLEMS MADE IT FOR YOU TO DO YOUR WORK, TAKE CARE OF THINGS AT HOME, OR GET ALONG WITH OTHER PEOPLE: NOT DIFFICULT AT ALL
5. BEING SO RESTLESS THAT IT IS HARD TO SIT STILL: SEVERAL DAYS

## 2018-04-19 ASSESSMENT — PATIENT HEALTH QUESTIONNAIRE - PHQ9: 5. POOR APPETITE OR OVEREATING: SEVERAL DAYS

## 2018-04-19 ASSESSMENT — PAIN SCALES - GENERAL: PAINLEVEL: NO PAIN (0)

## 2018-04-19 NOTE — PROGRESS NOTES
Nursing Notes:   Chloe Mead, RN  4/19/2018  3:10 PM  Signed  Patient comes in for ER follow up after wondering outside without warm clothes on. Chloe Mead LPN ....................4/19/2018   3:05 PM      Selam Gallardo LPN  4/19/2018  3:50 PM  Signed        Selam Gallardo LPN  4/19/2018  3:50 PM  Signed  Mini-Mental Status Exam    Six Item Cognitive Impairment Test   (6CIT):      What year is it?                               Incorrect-4 points    What month is it?                               Correct - 0 points      Give the patient an address to remember with five components:   Reji Pretty ( first and last name - 2 components)   [address]  (number and name of street - 2 components)   [place] ( city - 1 component)      About what time is it (within the hour)? Incorrect - 3 points    Count backwards from 20 to 1:   Correct - 0 points    Say the months of the year in reverse: More than one error - 4 points    Repeat the address phrase:   All wrong - 10 points    Total 6CIT Score:      21/28    Interpretation: The 6CIT uses an inverse score and questions are weighted to produce a total out of 28. Scores of 0-7 are considered normal and 8 or more significant.    Advantages The test has high sensitivity without compromising specificity even in mild dementia. It is easy to translate linguistically and culturally.  Disadvantages The main disadvantage is in the scoring and weighting of the test, which is initially confusing, however computer models have simplified this greatly.    Probability Statistics: At the 7/8 cut off: Overall figures sensitivity 90% specificity 100%, in mild dementia sensitivity = 78% , specificity = 100%    Copyright 2000 [facility]. Courtesy of Dr. Tylor Canales          Nursing note reviewed with patient.  Accurracy and completeness verified.   Mr. Delaney is a 85 year old male who:  Patient presents with:  Follow Up For: ER    HPI      ICD-10-CM    1. Hospital discharge follow-up Z09    2. Late onset Alzheimer's disease without behavioral disturbance G30.1 cyanocobalamin (VITAMIN B12) injection 1,000 mcg    F02.80    3. Spinal stenosis of lumbar region with neurogenic claudication M48.062    4. Essential (primary) hypertension I10    5. Fatigue, unspecified type R53.83 cyanocobalamin (VITAMIN B12) injection 1,000 mcg     Patient comes in with his family for evaluation.  He was just in the emergency room on 4/18/2018.  He was found by the police wandering, knocking on neighbor's door and confused as to his whereabouts.  He was trying to get into his home or back to his home without success.  He is 85-year-old wife was at home but he was not able to wake her up to get back inside the home.  States that he locked up the house when he left.    Memory testing was performed today, he failed miserably.  Appears that he has Alzheimer's disease.  We did talk about potential treatable causes of memory loss and he would like to try B12 shot today.  He has had some fatigue issues.  Recommend trial of over-the-counter B complex tablets.    Has some chronic back issues and mobility problems with lumbar spinal stenosis.    Hypertension is controlled.    Family states that 1 of his sons has been staying with him the last couple days because of some of these issues.  He evidently he wakes up almost once every hour and wanders around the house.  Family states he is very restless.  Denies visual hallucinations.    We talked about ways to try improving his safety, he is willing to consider assisted living his lungs he can live with his wife, this way they could get rid of the responsibilities of living in his own house and still enjoy life.  He is actually quite open to this idea.    Trial of B12 shot today.    Functional Capacity: less than or about 4 METS.   Review of Systems   Constitutional: Positive for fatigue. Negative for fever.   HENT: Negative for  congestion.    Respiratory: Negative for shortness of breath.    Cardiovascular: Negative for chest pain, palpitations and leg swelling.   Gastrointestinal: Negative for abdominal pain.   Genitourinary: Negative for hematuria.   Musculoskeletal: Positive for back pain and gait problem. Negative for joint swelling.   Neurological: Negative for dizziness and light-headedness.   Hematological: Negative for adenopathy. Does not bruise/bleed easily.   Psychiatric/Behavioral: Negative for agitation and confusion.        + short term memory problems.         ELIZABETH:   ELIZABETH-7 SCORE 3/7/2018 4/5/2018 4/19/2018   Total Score 0 0 4     PHQ9:  PHQ-9 SCORE 3/7/2018 4/5/2018 4/19/2018   Total Score 0 0 13       I have personally reviewed the past medical history, past surgical history, medications, allergies, family and social history as listed below, on 4/19/2018.    Allergies   Allergen Reactions     Lisinopril Other (See Comments)     Dry throat  Dry throat     Penicillins Hives     Rofecoxib Hives     Vioxx     Celecoxib Rash     Ibuprofen Rash     All NSAIDS cause rash       Current Outpatient Prescriptions   Medication Sig Dispense Refill     acetaminophen (TYLENOL) 500 MG tablet Take 1,000 mg by mouth 3 times daily          Patient Active Problem List    Diagnosis Date Noted     Late onset Alzheimer's disease without behavioral disturbance 04/19/2018     Priority: Medium     Trigger finger, left ring finger 02/23/2018     Priority: Medium     Mixed hyperlipidemia 02/08/2018     Priority: Medium     Neck pain, chronic 02/08/2018     Priority: Medium     Osteoarthrosis 02/08/2018     Priority: Medium     Chronic left sacroiliac pain 10/19/2017     Priority: Medium     Chronic insomnia 08/08/2017     Priority: Medium     S/P lumbar laminectomy 05/29/2015     Priority: Medium     CKD (chronic kidney disease) stage 3, GFR 30-59 ml/min 05/25/2015     Priority: Medium     Spinal stenosis of lumbar region with neurogenic claudication  04/24/2015     Priority: Medium     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 11/09/2012     Priority: Medium     Rosacea 04/03/2012     Priority: Medium     Actinic keratosis 10/24/2011     Priority: Medium     Essential (primary) hypertension 10/24/2011     Priority: Medium     Past Medical History:   Diagnosis Date     Actinic keratosis     2004,5-FU treatment on forehead     Chronic kidney disease, stage III (moderate)     No Comments Provided     Elevated erythrocyte sedimentation rate     2005,weight loss, night sweats due to episode of acute inflammatory illness, etiology unclear     Essential (primary) hypertension     No Comments Provided     Family history of malignant neoplasm of prostate     No Comments Provided     Hyperlipidemia     No Comments Provided     Osteoarthritis     No Comments Provided     Pneumonia     2002,and severe NSAID allergic reaction, hospitalized     Sciatica     No Comments Provided     Past Surgical History:   Procedure Laterality Date     ARTHROPLASTY KNEE      2006     ARTHROPLASTY KNEE      2008     ARTHROSCOPY SHOULDER      1996,left     ARTHROSCOPY SHOULDER      2001,AC arthrosis & distal clavicle resection     ARTHROSCOPY SHOULDER      2011     COLONOSCOPY      2008,normal     CYSTOSCOPY, TRANSURETHRAL RESECTION (TUR) PROSTATE, COMBINED      No Comments Provided     LAMINECTOMY LUMBAR ONE LEVEL      2015,[place], spinal stenosis     OTHER SURGICAL HISTORY      2004,[id number],SCAN-STRESS TEST,negative to heart rate 138     OTHER SURGICAL HISTORY      2013,[id number],OTHER,Right 5th digit     RELEASE CARPAL TUNNEL      bilateral     Social History     Social History     Marital status:      Spouse name: N/A     Number of children: N/A     Years of education: N/A     Social History Main Topics     Smoking status: Never Smoker     Smokeless tobacco: Never Used     Alcohol use No     Drug use: No     Sexual activity: Not Asked     Other Topics Concern     None     Social  "History Narrative    Patient lives in town.  He is , retired from [employer].  3 children, 1 .     Family History   Problem Relation Age of Onset     Prostate Cancer Father      Cancer-prostate     Hypertension Mother      Hypertension     Other - See Comments Mother      Old age, 99     Prostate Cancer Brother      Cancer-prostate     Unknown/Adopted Brother      Unknown       EXAM:   Vitals:    18 1506   BP: 134/80   BP Location: Right arm   Patient Position: Sitting   Cuff Size: Adult Regular   Pulse: 72   Temp: 98.2  F (36.8  C)   TempSrc: Tympanic   Weight: 170 lb (77.1 kg)       Current Pain Score: No Pain (0)     BP Readings from Last 3 Encounters:   18 134/80   18 168/87   18 140/86    Wt Readings from Last 3 Encounters:   18 170 lb (77.1 kg)   18 176 lb 4 oz (79.9 kg)   18 178 lb 8 oz (81 kg)      Estimated body mass index is 25.1 kg/(m^2) as calculated from the following:    Height as of 18: 5' 9\" (1.753 m).    Weight as of this encounter: 170 lb (77.1 kg).     Physical Exam   Constitutional: He appears well-developed and well-nourished.   Eyes: Conjunctivae are normal. No scleral icterus.   Cardiovascular: Normal rate.    Pulmonary/Chest: Effort normal.   Abdominal: Soft.   Musculoskeletal: He exhibits deformity.   Walks with slight limp, favors low back. Walks somewhat hunched over.    Neurological: He is alert.   + short term memory recall is very poor.    Skin: Skin is warm and dry.   Psychiatric: He has a normal mood and affect.       INVESTIGATIONS:  Results for orders placed or performed during the hospital encounter of 18   CT Head w/o Contrast    Narrative    EXAM:    CT Head Without Intravenous  Contrast    CLINICAL HISTORY:    85 years old, male; Signs and symptoms; Altered mental status/memory loss;   Confusion or disorientation; Additional info: Confusion, appears acute; With   o/w non-focal neuro exam.    TECHNIQUE:    Axial " computed tomography images of the head/brain without intravenous   contrast.  All CT scans at this facility use one or more dose reduction   techniques, viz.: automated exposure control; ma/kV adjustment per patient size   (including targeted exams where dose is matched to indication; i.e. head); or   iterative reconstruction technique.    Coronal and sagittal reformatted images were created and reviewed.    COMPARISON:    No relevant prior studies available.    FINDINGS:    Mild bilateral periventricular lucencies without intraparenchymal hemorrhage   and no obvious acute ischemic stroke. No intra-or extra-axial fluid collection,   no supra-or infratentorial mass, no mass effect or midline shift.       Mild symmetric hyperdensity of the cerebellar tentorium is likely within   normal limits.  Incidental note of a small RIGHT paramedian anterior falx   lipoma measuring 7 x 5 mm.  Mildly prominent ventricles, sulci and basal   cisterns without evidence of hydrocephalus. Skull bones are normal.        No significant mucoperiosteal thickening in the visualized paranasal sinuses.   No mastoid effusion.       Impression    IMPRESSION:       Generalized age appropriate atrophy without evidence of an acute intracranial   hemorrhage, mass lesion and no obvious acute ischemic infarction.      THIS DOCUMENT HAS BEEN ELECTRONICALLY SIGNED BY GOLDIE MACEDO MD   CBC with platelets differential   Result Value Ref Range    WBC 7.4 4.0 - 11.0 10e9/L    RBC Count 5.54 4.4 - 5.9 10e12/L    Hemoglobin 16.8 13.3 - 17.7 g/dL    Hematocrit 49.1 40.0 - 53.0 %    MCV 89 78 - 100 fl    MCH 30.3 26.5 - 33.0 pg    MCHC 34.2 31.5 - 36.5 g/dL    RDW 12.8 10.0 - 15.0 %    Platelet Count 205 150 - 450 10e9/L    Diff Method Automated Method     % Neutrophils 72.2 %    % Lymphocytes 14.6 %    % Monocytes 11.5 %    % Eosinophils 0.9 %    % Basophils 0.5 %    % Immature Granulocytes 0.3 %    Absolute Neutrophil 5.3 1.6 - 8.3 10e9/L    Absolute  Lymphocytes 1.1 0.8 - 5.3 10e9/L    Absolute Monocytes 0.9 0.0 - 1.3 10e9/L    Absolute Eosinophils 0.1 0.0 - 0.7 10e9/L    Absolute Basophils 0.0 0.0 - 0.2 10e9/L    Abs Immature Granulocytes 0.0 0 - 0.4 10e9/L   Ethanol GH   Result Value Ref Range    Ethanol g/dL <0.01 <0.01 %   *UA reflex to Microscopic   Result Value Ref Range    Color Urine Yellow     Appearance Urine Clear     Glucose Urine Negative NEG^Negative mg/dL    Bilirubin Urine Small (A) NEG^Negative    Ketones Urine 15 (A) NEG^Negative mg/dL    Specific Gravity Urine 1.025 1.003 - 1.035    Blood Urine Negative NEG^Negative    pH Urine 6.0 5.0 - 7.0 pH    Protein Albumin Urine Trace (A) NEG^Negative mg/dL    Urobilinogen Urine 0.2 0.2 - 1.0 EU/dL    Nitrite Urine Negative NEG^Negative    Leukocyte Esterase Urine Negative NEG^Negative    Source Midstream Urine    Drug of Abuse Screen Urine GH   Result Value Ref Range    Amphetamine Qual Urine Not Detected NDET^Not Detected    Benzodiazepine Qual Urine Not Detected NDET^Not Detected    Cocaine Qual Urine Not Detected NDET^Not Detected    Methadone Qual Urine Not Detected NDET^Not Detected    PCP Qual Urine Not Detected NDET^Not Detected    Opiates Qualitative Urine Not Detected NDET^Not Detected    Oxycodone Qualitative Urine Not Detected NDET^Not Detected ng/mL    Propoxyphene Qualitative Urine Not Detected NDET^Not Detected ng/mL    Tricyclic Antidepressants Qual Urine Not Detected NDET^Not Detected ng/mL    Methamphetamine Qualitative Urine Not Detected NDET^Not Detected ng/mL    Barbiturates Qual Urine Not Detected NDET^Not Detected    Cannabinoids Qualitative Urine Not Detected NDET^Not Detected ng/mL    Buprenorphine Qualitative Urine Not Detected NDET^Not Detected ng/mL   INR   Result Value Ref Range    INR 1.02 0 - 1.3   Erythrocyte sedimentation rate auto   Result Value Ref Range    Sed Rate 3 1 - 10 mm/h   Comprehensive metabolic panel   Result Value Ref Range    Sodium 140 134 - 144 mmol/L     Potassium 4.1 3.5 - 5.1 mmol/L    Chloride 102 98 - 107 mmol/L    Carbon Dioxide 27 21 - 31 mmol/L    Anion Gap 11 3 - 14 mmol/L    Glucose 109 (H) 70 - 105 mg/dL    Urea Nitrogen 25 7 - 25 mg/dL    Creatinine 1.40 (H) 0.70 - 1.30 mg/dL    GFR Estimate 48 (L) >60 mL/min/1.7m2    GFR Estimate If Black 58 (L) >60 mL/min/1.7m2    Calcium 10.0 8.6 - 10.3 mg/dL    Bilirubin Total 1.0 0.3 - 1.0 mg/dL    Albumin 4.5 3.5 - 5.7 g/dL    Protein Total 6.9 6.4 - 8.9 g/dL    Alkaline Phosphatase 44 34 - 104 U/L    ALT 19 7 - 52 U/L    AST 23 13 - 39 U/L   Urine Microscopic   Result Value Ref Range    WBC Urine 0 - 5 OTO5^0 - 5 /HPF    RBC Urine O - 2 OTO2^O - 2 /HPF    Hyaline Casts 5-10 (A) OTO2^O - 2 /LPF    Granular Casts 0-2 (A) NEG^Negative /LPF    Squamous Epithelial /LPF Urine Few FEW^Few /LPF    Bacteria Urine Many (A) NEG^Negative /HPF    Mucous Urine Present (A) NEG^Negative /LPF       ASSESSMENT AND PLAN:  Problem List Items Addressed This Visit        Nervous and Auditory    Late onset Alzheimer's disease without behavioral disturbance    Relevant Medications    cyanocobalamin (VITAMIN B12) injection 1,000 mcg (Completed)       Circulatory    Essential (primary) hypertension       Other    Spinal stenosis of lumbar region with neurogenic claudication      Other Visit Diagnoses     Hospital discharge follow-up    -  Primary    Fatigue, unspecified type        Relevant Medications    cyanocobalamin (VITAMIN B12) injection 1,000 mcg (Completed)        recommended sodium restriction  -- Expected clinical course discussed    -- Medications and their side effects discussed    40 minutes spent in face-to-face interaction with patient and family -  with greater than 50% spent in counseling and care coordination of listed medical problems.      Patient Instructions   Dementia is recognized at this time. Probably of Moderate to Severe severity.     Would recommend looking into assisted living / memory care.       B12 shot  today.   Consider taking B-complex tablet once daily.     Blood pressures are fine today.   May not need the blood pressure medications at this time.       Six Item Cognitive Impairment Test   (6CIT):      What year is it?                               Incorrect-4 points    What month is it?                               Correct - 0 points      Give the patient an address to remember with five components:   Reji Pretty ( first and last name - 2 components)   [address]  (number and name of street - 2 components)   [place] ( city - 1 component)      About what time is it (within the hour)? Incorrect - 3 points    Count backwards from 20 to 1:   Correct - 0 points    Say the months of the year in reverse: More than one error - 4 points    Repeat the address phrase:   All wrong - 10 points    Total 6CIT Score:      21/28    Interpretation: The 6CIT uses an inverse score and questions are weighted to produce a total out of 28. Scores of 0-7 are considered normal and 8 or more significant.    Advantages The test has high sensitivity without compromising specificity even in mild dementia. It is easy to translate linguistically and culturally.  Disadvantages The main disadvantage is in the scoring and weighting of the test, which is initially confusing, however computer models have simplified this greatly.    Probability Statistics: At the 7/8 cut off: Overall figures sensitivity 90% specificity 100%, in mild dementia sensitivity = 78% , specificity = 100%    Copyright 2000 The [facility] [place]. Courtesy of Dr. Tylor Canales        Return as needed for follow-up with Dr. Fernando.    Clinic : [phone]  Appointment line: [phone]      Jason Fernando MD  Internal Medicine  [facility]

## 2018-04-19 NOTE — NURSING NOTE
Patient comes in for ER follow up after wondering outside without warm clothes on. Chloe Mead LPN ....................4/19/2018   3:05 PM

## 2018-04-19 NOTE — PATIENT INSTRUCTIONS
Dementia is recognized at this time. Probably of Moderate to Severe severity.     Would recommend looking into assisted living / memory care.       B12 shot today.   Consider taking B-complex tablet once daily.     Blood pressures are fine today.   May not need the blood pressure medications at this time.       Six Item Cognitive Impairment Test   (6CIT):      What year is it?                               Incorrect-4 points    What month is it?                               Correct - 0 points      Give the patient an address to remember with five components:   Reji Pretty ( first and last name - 2 components)   [address]  (number and name of street - 2 components)   [place] ( city - 1 component)      About what time is it (within the hour)? Incorrect - 3 points    Count backwards from 20 to 1:   Correct - 0 points    Say the months of the year in reverse: More than one error - 4 points    Repeat the address phrase:   All wrong - 10 points    Total 6CIT Score:      21/28    Interpretation: The 6CIT uses an inverse score and questions are weighted to produce a total out of 28. Scores of 0-7 are considered normal and 8 or more significant.    Advantages The test has high sensitivity without compromising specificity even in mild dementia. It is easy to translate linguistically and culturally.  Disadvantages The main disadvantage is in the scoring and weighting of the test, which is initially confusing, however computer models have simplified this greatly.    Probability Statistics: At the 7/8 cut off: Overall figures sensitivity 90% specificity 100%, in mild dementia sensitivity = 78% , specificity = 100%    Copyright 2000 The [facility], [facility]. Courtesy of Dr. Tylor Canales        Return as needed for follow-up with Dr. Fernando.    Clinic : [phone]  Appointment line: [phone]

## 2018-04-19 NOTE — MR AVS SNAPSHOT
After Visit Summary   4/19/2018    Juan Miguel Delaney    MRN: [id number]           Patient Information     Date Of Birth          6/21/1932        Visit Information        Provider Department      4/19/2018 3:00 PM Jason Fernando MD [facility]        Today's Diagnoses     Hospital discharge follow-up    -  1    Late onset Alzheimer's disease without behavioral disturbance        Spinal stenosis of lumbar region with neurogenic claudication        Essential (primary) hypertension          Care Instructions    Dementia is recognized at this time. Probably of Moderate to Severe severity.     Would recommend looking into assisted living / memory care.       B12 shot today.   Consider taking B-complex tablet once daily.     Blood pressures are fine today.   May not need the blood pressure medications at this time.       Six Item Cognitive Impairment Test   (6CIT):      What year is it?                               Incorrect-4 points    What month is it?                               Correct - 0 points      Give the patient an address to remember with five components:   Reji Pretty ( first and last name - 2 components)   [address]  (number and name of street - 2 components)   [place] ( city - 1 component)      About what time is it (within the hour)? Incorrect - 3 points    Count backwards from 20 to 1:   Correct - 0 points    Say the months of the year in reverse: More than one error - 4 points    Repeat the address phrase:   All wrong - 10 points    Total 6CIT Score:      21/28    Interpretation: The 6CIT uses an inverse score and questions are weighted to produce a total out of 28. Scores of 0-7 are considered normal and 8 or more significant.    Advantages The test has high sensitivity without compromising specificity even in mild dementia. It is easy to translate linguistically and culturally.  Disadvantages The main disadvantage is in the scoring and weighting of the test,  which is initially confusing, however computer models have simplified this greatly.    Probability Statistics: At the 7/8 cut off: Overall figures sensitivity 90% specificity 100%, in mild dementia sensitivity = 78% , specificity = 100%    Copyright 2000 [facility], [facility], [place]. Courtesy of Dr. Tylor Canales        Return as needed for follow-up with Dr. Fernando.    Clinic : [phone]  Appointment line: [phone]            Follow-ups after your visit        Follow-up notes from your care team     Return if symptoms worsen or fail to improve.      Who to contact     If you have questions or need follow up information about today's clinic visit or your schedule please contact [facility] directly at [phone].  Normal or non-critical lab and imaging results will be communicated to you by MyChart, letter or phone within 4 business days after the clinic has received the results. If you do not hear from us within 7 days, please contact the clinic through MyChart or phone. If you have a critical or abnormal lab result, we will notify you by phone as soon as possible.  Submit refill requests through [employer] or call your pharmacy and they will forward the refill request to us. Please allow 3 business days for your refill to be completed.          Additional Information About Your Visit        Care EveryWhere ID     This is your Care EveryWhere ID. This could be used by other organizations to access your [place] medical records  [id number]        Your Vitals Were     Pulse Temperature BMI (Body Mass Index)             72 98.2  F (36.8  C) (Tympanic) 25.1 kg/m2          Blood Pressure from Last 3 Encounters:   04/19/18 134/80   04/18/18 168/87   04/05/18 140/86    Weight from Last 3 Encounters:   04/19/18 170 lb (77.1 kg)   04/05/18 176 lb 4 oz (79.9 kg)   03/07/18 178 lb 8 oz (81 kg)              Today, you had the following     No orders found for display          Today's Medication Changes          These changes are accurate as of 4/19/18  4:19 PM.  If you have any questions, ask your nurse or doctor.               Stop taking these medicines if you haven't already. Please contact your care team if you have questions.     amLODIPine 5 MG tablet   Commonly known as:  NORVASC   Stopped by:  Jason Fernando MD           gabapentin 100 MG capsule   Commonly known as:  NEURONTIN   Stopped by:  Jason Fernando MD           traMADol 50 MG tablet   Commonly known as:  ULTRAM   Stopped by:  Jason Fernando MD                    Primary Care Provider Office Phone # Fax #    Jason Fernando [phone] [address] [facility] [address]        Equal Access to Services     [facility] : Susan hayeso Soradha, waaxda luqadaha, qaybta kaalmada ademargotyada, angeles kaba . So [facility] [phone].    ATENCIÓN: Si habla español, tiene a soliz disposición servicios gratuitos de asistencia lingüística. Llame al [phone].    We comply with applicable federal civil rights laws and [place] laws. We do not discriminate on the basis of race, color, national origin, age, disability, sex, sexual orientation, or gender identity.            Thank you!     Thank you for choosing [facility]  for your care. Our goal is always to provide you with excellent care. Hearing back from our patients is one way we can continue to improve our services. Please take a few minutes to complete the written survey that you may receive in the mail after your visit with us. Thank you!             Your Updated Medication List - Protect others around you: Learn how to safely use, store and throw away your medicines at www.disposemymeds.org.          This list is accurate as of 4/19/18  4:19 PM.  Always use your most recent med list.                   Brand Name Dispense Instructions for use Diagnosis    acetaminophen 500 MG tablet     TYLENOL     Take 1,000 mg by mouth 3 times daily

## 2018-04-19 NOTE — NURSING NOTE
Mini-Mental Status Exam    Six Item Cognitive Impairment Test   (6CIT):      What year is it?                               Incorrect-4 points    What month is it?                               Correct - 0 points      Give the patient an address to remember with five components:   eRji Pretty ( first and last name - 2 components)   [address]  (number and name of street - 2 components)   [place] ( city - 1 component)      About what time is it (within the hour)? Incorrect - 3 points    Count backwards from 20 to 1:   Correct - 0 points    Say the months of the year in reverse: More than one error - 4 points    Repeat the address phrase:   All wrong - 10 points    Total 6CIT Score:      21/28    Interpretation: The 6CIT uses an inverse score and questions are weighted to produce a total out of 28. Scores of 0-7 are considered normal and 8 or more significant.    Advantages The test has high sensitivity without compromising specificity even in mild dementia. It is easy to translate linguistically and culturally.  Disadvantages The main disadvantage is in the scoring and weighting of the test, which is initially confusing, however computer models have simplified this greatly.    Probability Statistics: At the 7/8 cut off: Overall figures sensitivity 90% specificity 100%, in mild dementia sensitivity = 78% , specificity = 100%    Copyright 2000 [facility], [facility], [place]. Courtesy of Dr. Tylor Canales

## 2018-04-20 ASSESSMENT — PATIENT HEALTH QUESTIONNAIRE - PHQ9: SUM OF ALL RESPONSES TO PHQ QUESTIONS 1-9: 13

## 2018-04-20 ASSESSMENT — ANXIETY QUESTIONNAIRES: GAD7 TOTAL SCORE: 4

## 2018-06-19 ENCOUNTER — OFFICE VISIT (OUTPATIENT)
Dept: FAMILY MEDICINE | Facility: OTHER | Age: 83
End: 2018-06-19
Attending: FAMILY MEDICINE
Payer: COMMERCIAL

## 2018-06-19 VITALS
SYSTOLIC BLOOD PRESSURE: 126 MMHG | RESPIRATION RATE: 16 BRPM | BODY MASS INDEX: 26.79 KG/M2 | WEIGHT: 181.4 LBS | DIASTOLIC BLOOD PRESSURE: 68 MMHG

## 2018-06-19 DIAGNOSIS — M54.89 LEFT PARASPINAL BACK PAIN: Primary | ICD-10-CM

## 2018-06-19 DIAGNOSIS — F02.80 LATE ONSET ALZHEIMER'S DISEASE WITHOUT BEHAVIORAL DISTURBANCE (H): ICD-10-CM

## 2018-06-19 DIAGNOSIS — G30.1 LATE ONSET ALZHEIMER'S DISEASE WITHOUT BEHAVIORAL DISTURBANCE (H): ICD-10-CM

## 2018-06-19 PROCEDURE — G0463 HOSPITAL OUTPT CLINIC VISIT: HCPCS

## 2018-06-19 PROCEDURE — 99213 OFFICE O/P EST LOW 20 MIN: CPT | Performed by: FAMILY MEDICINE

## 2018-06-19 RX ORDER — GABAPENTIN 100 MG/1
1 CAPSULE ORAL 3 TIMES DAILY
Refills: 0 | COMMUNITY
Start: 2018-06-06 | End: 2019-01-21

## 2018-06-19 ASSESSMENT — ENCOUNTER SYMPTOMS
PARESTHESIAS: 0
DIFFICULTY URINATING: 0
MYALGIAS: 1
WEAKNESS: 0
APPETITE CHANGE: 0
JOINT SWELLING: 0
FEVER: 0
ACTIVITY CHANGE: 0
NUMBNESS: 0
BACK PAIN: 1

## 2018-06-19 ASSESSMENT — ANXIETY QUESTIONNAIRES
3. WORRYING TOO MUCH ABOUT DIFFERENT THINGS: NOT AT ALL
1. FEELING NERVOUS, ANXIOUS, OR ON EDGE: NOT AT ALL
6. BECOMING EASILY ANNOYED OR IRRITABLE: NOT AT ALL
IF YOU CHECKED OFF ANY PROBLEMS ON THIS QUESTIONNAIRE, HOW DIFFICULT HAVE THESE PROBLEMS MADE IT FOR YOU TO DO YOUR WORK, TAKE CARE OF THINGS AT HOME, OR GET ALONG WITH OTHER PEOPLE: NOT DIFFICULT AT ALL
GAD7 TOTAL SCORE: 0
2. NOT BEING ABLE TO STOP OR CONTROL WORRYING: NOT AT ALL
7. FEELING AFRAID AS IF SOMETHING AWFUL MIGHT HAPPEN: NOT AT ALL
5. BEING SO RESTLESS THAT IT IS HARD TO SIT STILL: NOT AT ALL

## 2018-06-19 ASSESSMENT — PAIN SCALES - GENERAL: PAINLEVEL: EXTREME PAIN (8)

## 2018-06-19 ASSESSMENT — PATIENT HEALTH QUESTIONNAIRE - PHQ9: 5. POOR APPETITE OR OVEREATING: NOT AT ALL

## 2018-06-19 NOTE — MR AVS SNAPSHOT
"              After Visit Summary   6/19/2018    Juan Miguel Delaney    MRN: [id number]           Patient Information     Date Of Birth          6/21/1932        Visit Information        Provider Department      6/19/2018 9:30 AM Robert Triplett MD [facility]        Today's Diagnoses     Left paraspinal back pain    -  1    Late onset Alzheimer's disease without behavioral disturbance          Care Instructions    Try Colby Soto, Johnny Biswas, Robyn.          Follow-ups after your visit        Additional Services     PHYSICAL THERAPY REFERRAL       *This therapy referral will be filtered to a centralized scheduling office at [facility] and the patient will receive a call to schedule an appointment at a [facility] location most convenient for them. *     [facility] provides Physical Therapy evaluation and treatment and many specialty services across the [facility] system.  If requesting a specialty program, please choose from the list below.    If you have not heard from the scheduling office within 2 business days, please call [phone] for all locations, with the exception of [place], please call [phone] and [facility], please call [phone]  Treatment: Evaluation & Treatment  Special Instructions/Modalities:   Special Programs: None    Please be aware that coverage of these services is subject to the terms and limitations of your health insurance plan.  Call member services at your health plan with any benefit or coverage questions.      **Note to Provider:  If you are referring outside of [facility] for the therapy appointment, please list the name of the location in the \"special instructions\" above, print the referral and give to the patient to schedule the appointment.                  Follow-up notes from your care team     Return if symptoms worsen or fail to improve.      Your next 10 appointments already scheduled     Jul 03, 2018  1:20 PM CDT   SHORT " with Jason Fernando MD   [facility] ([facility])    [address]   [place] [address] [phone]              Who to contact     If you have questions or need follow up information about today's clinic visit or your schedule please contact [facility] directly at [phone].  Normal or non-critical lab and imaging results will be communicated to you by MyChart, letter or phone within 4 business days after the clinic has received the results. If you do not hear from us within 7 days, please contact the clinic through MyChart or phone. If you have a critical or abnormal lab result, we will notify you by phone as soon as possible.  Submit refill requests through [employer] or call your pharmacy and they will forward the refill request to us. Please allow 3 business days for your refill to be completed.          Additional Information About Your Visit        Care EveryWhere ID     This is your Care EveryWhere ID. This could be used by other organizations to access your [place] medical records  [id number]        Your Vitals Were     Respirations BMI (Body Mass Index)                16 26.79 kg/m2           Blood Pressure from Last 3 Encounters:   06/19/18 126/68   04/19/18 134/80   04/18/18 168/87    Weight from Last 3 Encounters:   06/19/18 181 lb 6.4 oz (82.3 kg)   04/19/18 170 lb (77.1 kg)   04/05/18 176 lb 4 oz (79.9 kg)              We Performed the Following     PHYSICAL THERAPY REFERRAL        Primary Care Provider Office Phone # Fax #    Jason Fernando [phone]       [address]   [place] [address]        Equal Access to Services     [facility] : Hadii kaiden Mcgarry, oliva mon, qaybta angeles boucher. So [facility] [phone].    ATENCIÓN: Si habla español, tiene a soliz disposición servicios gratuitos de asistencia lingüística. Llame al  [phone].    We comply with applicable federal civil rights laws and [place] laws. We do not discriminate on the basis of race, color, national origin, age, disability, sex, sexual orientation, or gender identity.            Thank you!     Thank you for choosing [facility]  for your care. Our goal is always to provide you with excellent care. Hearing back from our patients is one way we can continue to improve our services. Please take a few minutes to complete the written survey that you may receive in the mail after your visit with us. Thank you!             Your Updated Medication List - Protect others around you: Learn how to safely use, store and throw away your medicines at www.disposemymeds.org.          This list is accurate as of 6/19/18 10:10 AM.  Always use your most recent med list.                   Brand Name Dispense Instructions for use Diagnosis    acetaminophen 500 MG tablet    TYLENOL     Take 1,000 mg by mouth 3 times daily        gabapentin 100 MG capsule    NEURONTIN     Take 1 capsule by mouth 3 times daily

## 2018-06-19 NOTE — PROGRESS NOTES
SUBJECTIVE:   Juan Miguel Delaney is a 85 year old male who presents to clinic today for the following health issues: hip pain    HPI  Juan Miguel Delaney is a pleasant 85 year old male who presents today for left hip pain. Here with son. The pain has been present about 4 months and is worse when walking up and down stairs. The pain does not radiate and feels like a muscle problem. Has tried gabapentin and ibuprofen which have not helped with the pain. Has also tried seeing a chiropractor which he did not enjoy. No fevers, bowel or bladder changes. He is very active as a rule and does not like to sit for very long.     Patient Active Problem List    Diagnosis Date Noted     Late onset Alzheimer's disease without behavioral disturbance 04/19/2018     Priority: Medium     Trigger finger, left ring finger 02/23/2018     Priority: Medium     Mixed hyperlipidemia 02/08/2018     Priority: Medium     Neck pain, chronic 02/08/2018     Priority: Medium     Osteoarthrosis 02/08/2018     Priority: Medium     Chronic left sacroiliac pain 10/19/2017     Priority: Medium     Chronic insomnia 08/08/2017     Priority: Medium     S/P lumbar laminectomy 05/29/2015     Priority: Medium     CKD (chronic kidney disease) stage 3, GFR 30-59 ml/min 05/25/2015     Priority: Medium     Spinal stenosis of lumbar region with neurogenic claudication 04/24/2015     Priority: Medium     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 11/09/2012     Priority: Medium     Rosacea 04/03/2012     Priority: Medium     Actinic keratosis 10/24/2011     Priority: Medium     Essential (primary) hypertension 10/24/2011     Priority: Medium     Past Surgical History:   Procedure Laterality Date     ARTHROPLASTY KNEE      2006     ARTHROPLASTY KNEE      2008     ARTHROSCOPY SHOULDER      1996,left     ARTHROSCOPY SHOULDER      2001,AC arthrosis & distal clavicle resection     ARTHROSCOPY SHOULDER      2011     COLONOSCOPY      2008,normal     CYSTOSCOPY, TRANSURETHRAL  RESECTION (TUR) PROSTATE, COMBINED      No Comments Provided     LAMINECTOMY LUMBAR ONE LEVEL      2015,[place], spinal stenosis     OTHER SURGICAL HISTORY      2004,207384,SCAN-STRESS TEST,negative to heart rate 138     OTHER SURGICAL HISTORY      2013,600000,OTHER,Right 5th digit     RELEASE CARPAL TUNNEL      bilateral     Social History   Substance Use Topics     Smoking status: Never Smoker     Smokeless tobacco: Never Used     Alcohol use No     Current Outpatient Prescriptions   Medication Sig Dispense Refill     acetaminophen (TYLENOL) 500 MG tablet Take 1,000 mg by mouth 3 times daily       gabapentin (NEURONTIN) 100 MG capsule Take 1 capsule by mouth 3 times daily  0     Allergies   Allergen Reactions     Lisinopril Other (See Comments)     Dry throat  Dry throat     Penicillins Hives     Rofecoxib Hives     Vioxx     Celecoxib Rash     Ibuprofen Rash     All NSAIDS cause rash       Review of Systems   Constitutional: Negative for activity change, appetite change and fever.   Genitourinary: Negative for difficulty urinating.   Musculoskeletal: Positive for back pain and myalgias. Negative for gait problem and joint swelling.   Neurological: Negative for weakness, numbness and paresthesias.      OBJECTIVE:     /68 (BP Location: Right arm, Patient Position: Sitting, Cuff Size: Adult Regular)  Resp 16  Wt 181 lb 6.4 oz (82.3 kg)  BMI 26.79 kg/m2  Body mass index is 26.79 kg/(m^2).  Physical Exam   Constitutional: He is oriented to person, place, and time. He appears well-developed and well-nourished. No distress.   HENT:   Head: Normocephalic and atraumatic.   Musculoskeletal:   Gait is normal. Flexion without pain but extension causes pain along the left paraspinal muscles. No point tenderness along the spine or sciatic notch. Pain was obvious when stepping up to the exam table.    Neurological: He is alert and oriented to person, place, and time.   Skin: He is not diaphoretic.     Diagnostic Test  Results:  none     ASSESSMENT/PLAN:     (M54.89) Left paraspinal back pain  (primary encounter diagnosis)  Comment:   Plan: PHYSICAL THERAPY REFERRAL        This is most likely a muscular problem and will hopefully improve with some physical therapy. We also discussed using Icy Hot cream or similar creams to help with the pain as we would like to stay away from other pain medicines.     (G30.1,  F02.80) Late onset Alzheimer's disease without behavioral disturbance  Comment:   Plan: His son is with him today and he and his wife will be moving in to [facility] assisted living at the end of the month, which they are both excited for. They will have medications set up by nursing. This is a step in the right direction.     Forwarded to Dr. Triplett for review.   Tamanna Mayer on 6/19/2018 at 10:25 AM    Pt was seen and examined by me as well as Tamanna Mayer, MS 3.  See her note for details.    Robert Triplett MD on 6/19/2018 at 4:38 PM

## 2018-06-20 ENCOUNTER — HOSPITAL ENCOUNTER (OUTPATIENT)
Dept: PHYSICAL THERAPY | Facility: OTHER | Age: 83
Setting detail: THERAPIES SERIES
End: 2018-06-20
Attending: FAMILY MEDICINE
Payer: MEDICARE

## 2018-06-20 PROCEDURE — 97110 THERAPEUTIC EXERCISES: CPT | Mod: GP

## 2018-06-20 PROCEDURE — G8978 MOBILITY CURRENT STATUS: HCPCS | Mod: GP,CK

## 2018-06-20 PROCEDURE — 40000185 ZZHC STATISTIC PT OUTPT VISIT

## 2018-06-20 PROCEDURE — 97140 MANUAL THERAPY 1/> REGIONS: CPT | Mod: GP

## 2018-06-20 PROCEDURE — 97161 PT EVAL LOW COMPLEX 20 MIN: CPT | Mod: GP

## 2018-06-20 PROCEDURE — G8979 MOBILITY GOAL STATUS: HCPCS | Mod: GP,CI

## 2018-06-20 ASSESSMENT — ANXIETY QUESTIONNAIRES: GAD7 TOTAL SCORE: 0

## 2018-06-21 NOTE — PROGRESS NOTES
[facility]          OUTPATIENT PHYSICAL THERAPY ORTHOPEDIC EVALUATION  PLAN OF TREATMENT FOR OUTPATIENT REHABILITATION  (COMPLETE FOR INITIAL CLAIMS ONLY)  Patient's Last Name, First Name, M.I.  YOB: 1932  Juan Miguel Delaney    Provider s Name:  [facility]   Medical Record No.  [id number]   Start of Care Date:  06/20/18   Onset Date:  06/19/18   Type:     _X__PT   ___OT   ___SLP Medical Diagnosis:  Left paraspinal back pain M54.89      PT Diagnosis:  Impaired mobility, decreased strength and endurance, low back/hip pain   Visits from SOC:  1      _________________________________________________________________________________  Plan of Treatment/Functional Goals:  joint mobilization, manual therapy, neuromuscular re-education, ROM, strengthening, stretching     Cryotherapy, Electrical stimulation, Hot packs, Ultrasound     Goals  Goal Identifier: HEP  Goal Description: Patient will demonstrate compliance and independence with his HEP in order to promote return to prior level of function.   Target Date: 07/18/18    Goal Identifier: Mobility  Goal Description: Patient will be able to sit and reach to the left with minimal pain in order to return to prior level of function  Target Date: 07/18/18    Goal Identifier: Endurance  Goal Description: Patient will be able to ascend a flight of stairs with minimal to no pain in order to improve efficiency and safety around his home.   Target Date: 08/15/18    Goal Identifier: Strength/enduranc  Goal Description: Patient will be able to ambulate longer than 10 minutes with minimal to no pain in order to improve efficiency and safety with community mobility  Target Date: 08/15/18       Therapy Frequency:  other (see comments) (1-2 times per week)  Predicted Duration of Therapy Intervention:  8 weeks    Abiodun Mcintyre PT                 I CERTIFY THE NEED FOR THESE SERVICES FURNISHED UNDER        THIS PLAN OF TREATMENT AND  WHILE UNDER MY CARE     (Physician co-signature of this document indicates review and certification of the therapy plan).                       Certification Date From:  06/20/18   Certification Date To:  08/15/18    Referring Provider:  Dr. Triplett    Initial Assessment        See Epic Evaluation Start of Care Date: 06/20/18                                                                            Robert Triplett MD on 6/21/2018 at 9:27 AM

## 2018-06-21 NOTE — PROGRESS NOTES
06/20/18 1400   General Information   Type of Visit Initial OP Ortho PT Evaluation   Start of Care Date 06/20/18   Referring Physician Dr. Triplett   Patient/Family Goals Statement Patient would like to decrease his pain and walk longer   Orders Evaluate and Treat   Date of Order 06/19/18   Insurance Type Medicare;[employer]   Medical Diagnosis Left paraspinal back pain M54.89    Surgical/Medical history reviewed Yes   General Information Comments Patient is an 85 year old male referred to physical therapy with low back pain. States that he has been dealing with the pain for quite some time but cannot pin point an injury that caused it. Denies any falls. He states that the pain is mostly in his left low back and he believes it is in the muscle. Denies pain down the left LE and numbness and tingling. Has pain mostly when he sits and reaches to the left and walking for long periods of time. Is not taking any pain medication. Has used Icy hot Patches that help while they are on. Presents with his daughter and she remembers her father having a back surgery at one point but the patient does not remember.        Present No   Body Part(s)   Body Part(s) Lumbar Spine/SI   Presentation and Etiology   Pertinent history of current problem (include personal factors and/or comorbidities that impact the POC) Alzheimers, lumbar laminectomy   Impairments A. Pain;D. Decreased ROM;E. Decreased flexibility;H. Impaired gait;J. Burning   Functional Limitations perform activities of daily living;perform required work activities;perform desired leisure / sports activities   Symptom Location Left low back/hip   How/Where did it occur From insidious onset   Onset date of current episode/exacerbation 06/19/18   Chronicity Chronic   Pain rating (0-10 point scale) Best (/10);Worst (/10)   Best (/10) 0   Worst (/10) 10   Pain quality A. Sharp;E. Shooting;F. Stabbing   Frequency of pain/symptoms B. Intermittent   Pain/symptoms  are: Other   Pain symptoms comment Worse with activity   Pain/symptoms exacerbated by C. Lifting;G. Certain positions;H. Overhead reach;I. Bending   Pain/symptoms eased by E. Changing positions;F. Certain positions   Progression of symptoms since onset: Worsened   Prior Level of Function   Prior Level of Function-Mobility Independent   Prior Level of Function-ADLs Independent   Current Level of Function   Living environment House/townhome   Current equipment-Gait/Locomotion None   Current equipment-ADL None   Fall Risk Screen   Fall screen completed by PT   Have you fallen 2 or more times in the past year? No   Have you fallen and had an injury in the past year? No   Is patient a fall risk? No   Lumbar Spine/SI Objective Findings   Observation Antalgic gait pattern when initially ambulating, leans to right side when sitting   Integumentary No significant findngs   Posture Protracted shoulders   Gait/Locomotion Initial antalgic gait pattern after standing that decreased with movement   Flexion ROM WNL   Extension ROM Min limited with pain   Right Side Bending ROM WNL   Left Side Bending ROM Mod limited with pain   Lumbar ROM Comment Rotation: min limited and painful to left   Hip Screen Scour; negative, FADIR/ADDIS: negative   Hip Flexion (L2) Strength 4/5 on left   Hip Abduction Strength 5/5   Hip Adduction Strength 5/5   Knee Flexion Strength 5/5   Knee Extension (L3) Strength 5/5   Ankle Dorsiflexion (L4) Strength 5/5   Hamstring Flexibility Min limited bilaterally    Piriformis Flexibility Min limited bilaterally    Lumbar/SI Flexibility Comments Min limited with hip IR   Segmental Mobility unable to tolerate prone   Sensation Testing intact to light touch   Palpation Discomfort noted in left lumbar paraspinals, QL, piriformis, and gluteus medius muscle.    Planned Therapy Interventions   Planned Therapy Interventions joint mobilization;manual therapy;neuromuscular re-education;ROM;strengthening;stretching    Planned Modality Interventions   Planned Modality Interventions Cryotherapy;Electrical stimulation;Hot packs;Ultrasound   Clinical Impression   Criteria for Skilled Therapeutic Interventions Met yes, treatment indicated   PT Diagnosis Impaired mobility, decreased strength and endurance, low back/hip pain   Influenced by the following impairments pain, stiffness   Functional limitations due to impairments stairs, prolonged ambulation   Clinical Presentation Stable/Uncomplicated   Clinical Presentation Rationale minimal comorbidities effecting plan of care   Clinical Decision Making (Complexity) Low complexity   Therapy Frequency other (see comments)  (1-2 times per week)   Predicted Duration of Therapy Intervention (days/wks) 8 weeks   Risk & Benefits of therapy have been explained Yes   Patient, Family & other staff in agreement with plan of care Yes   Clinical Impression Comments Patient is an 86 year old male referred to physical therapy with low back pain. He has most discomfort with sidebending to the left and with palpation to left paraspinals, QL and gluteus medius. He would benefit from physical therapy in order to reduce his pain and improve his functioal mobility, strength, and endurance   ORTHO GOALS   PT Ortho Eval Goals 1;2;3;4   Ortho Goal 1   Goal Identifier HEP   Goal Description Patient will demonstrate compliance and independence with his HEP in order to promote return to prior level of function.    Target Date 07/18/18   Ortho Goal 2   Goal Identifier Mobility   Goal Description Patient will be able to sit and reach to the left with minimal pain in order to return to prior level of function   Target Date 07/18/18   Ortho Goal 3   Goal Identifier Endurance   Goal Description Patient will be able to ascend a flight of stairs with minimal to no pain in order to improve efficiency and safety around his home.    Target Date 08/15/18   Ortho Goal 4   Goal Identifier Strength/endurance   Goal  Description Patient will be able to ambulate longer than 10 minutes with minimal to no pain in order to improve efficiency and safety with community mobility   Total Evaluation Time   Total Evaluation Time 30   Therapy Certification   Certification date from 06/20/18   Certification date to 08/15/18   Medical Diagnosis Left paraspinal back pain M54.89

## 2018-07-03 ENCOUNTER — OFFICE VISIT (OUTPATIENT)
Dept: INTERNAL MEDICINE | Facility: OTHER | Age: 83
End: 2018-07-03
Attending: INTERNAL MEDICINE
Payer: COMMERCIAL

## 2018-07-03 VITALS
DIASTOLIC BLOOD PRESSURE: 94 MMHG | HEART RATE: 68 BPM | HEIGHT: 67 IN | BODY MASS INDEX: 27.78 KG/M2 | WEIGHT: 177 LBS | SYSTOLIC BLOOD PRESSURE: 154 MMHG

## 2018-07-03 DIAGNOSIS — M53.3 CHRONIC LEFT SACROILIAC PAIN: Primary | ICD-10-CM

## 2018-07-03 DIAGNOSIS — G89.29 CHRONIC LEFT SACROILIAC PAIN: Primary | ICD-10-CM

## 2018-07-03 PROCEDURE — 99213 OFFICE O/P EST LOW 20 MIN: CPT | Performed by: INTERNAL MEDICINE

## 2018-07-03 PROCEDURE — G0463 HOSPITAL OUTPT CLINIC VISIT: HCPCS

## 2018-07-03 ASSESSMENT — ENCOUNTER SYMPTOMS
ARTHRALGIAS: 1
BACK PAIN: 1

## 2018-07-03 ASSESSMENT — PAIN SCALES - GENERAL: PAINLEVEL: MODERATE PAIN (5)

## 2018-07-03 NOTE — MR AVS SNAPSHOT
"              After Visit Summary   7/3/2018    Juan Miguel Delaney    MRN: [id number]           Patient Information     Date Of Birth          6/21/1932        Visit Information        Provider Department      7/3/2018 1:20 PM Jason Fernando MD [facility]        Today's Diagnoses     Chronic left sacroiliac pain    -  1      Care Instructions    1. Chronic left sacroiliac pain  - XR Sacroiliac Therapeutic Injection Left  - they will call with date/time of appointment.      Return as needed for follow-up with Dr. Fernando.    Clinic : [phone]  Appointment line: [phone]              Follow-ups after your visit        Future tests that were ordered for you today     Open Future Orders        Priority Expected Expires Ordered    XR Sacroiliac Therapeutic Injection Left Routine 7/3/2018 7/3/2019 7/3/2018            Who to contact     If you have questions or need follow up information about today's clinic visit or your schedule please contact [facility] [place] [facility] directly at [phone].  Normal or non-critical lab and imaging results will be communicated to you by MyChart, letter or phone within 4 business days after the clinic has received the results. If you do not hear from us within 7 days, please contact the clinic through MyChart or phone. If you have a critical or abnormal lab result, we will notify you by phone as soon as possible.  Submit refill requests through [employer] [facility] [facility] or call your pharmacy and they will forward the refill request to us. Please allow 3 business days for your refill to be completed.          Additional Information About Your Visit        Care EveryWhere ID     This is your Care EveryWhere ID. This could be used by other organizations to access your [place] medical records  [id number]        Your Vitals Were     Pulse Height BMI (Body Mass Index)             68 5' 6.5\" (1.689 m) 28.14 kg/m2          Blood Pressure from Last 3 Encounters: "   07/03/18 (!) 154/94   06/19/18 126/68   04/19/18 134/80    Weight from Last 3 Encounters:   07/03/18 177 lb (80.3 kg)   06/19/18 181 lb 6.4 oz (82.3 kg)   04/19/18 170 lb (77.1 kg)               Primary Care Provider Office Phone # Fax #    Jason Fernando [phone]       [address] [facility] [address]        Equal Access to Services     [facility] Hospital[facility] : Hadii aad ku hadasho Soomaali, waaxda luqadaha, qaybta kaalmada adeegyada, angeles kaba . So [facility] [phone].    ATENCIÓN: Si habla español, tiene a soliz disposición servicios gratuitos de asistencia lingüística. Llame al [phone].    We comply with applicable federal civil rights laws and [place] laws. We do not discriminate on the basis of race, color, national origin, age, disability, sex, sexual orientation, or gender identity.            Thank you!     Thank you for choosing [facility]  for your care. Our goal is always to provide you with excellent care. Hearing back from our patients is one way we can continue to improve our services. Please take a few minutes to complete the written survey that you may receive in the mail after your visit with us. Thank you!             Your Updated Medication List - Protect others around you: Learn how to safely use, store and throw away your medicines at www.disposemymeds.org.          This list is accurate as of 7/3/18  1:51 PM.  Always use your most recent med list.                   Brand Name Dispense Instructions for use Diagnosis    acetaminophen 500 MG tablet    TYLENOL     Take 1,000 mg by mouth 3 times daily        gabapentin 100 MG capsule    NEURONTIN     Take 1 capsule by mouth 3 times daily

## 2018-07-03 NOTE — PATIENT INSTRUCTIONS
1. Chronic left sacroiliac pain  - XR Sacroiliac Therapeutic Injection Left  - they will call with date/time of appointment.      Return as needed for follow-up with Dr. Fernando.    Clinic : [phone]  Appointment line: [phone]

## 2018-07-03 NOTE — PROGRESS NOTES
Nursing Notes:   Gosselin, Norma J., LPN  7/3/2018  1:33 PM  Signed  Left hip pain.  Nursing note reviewed with patient.  Accurracy and completeness verified.   Mr. Delaney is a 86 year old male who:  Patient presents with:  Hip Pain: Left Hip    HPI     ICD-10-CM    1. Chronic left sacroiliac pain M53.3 XR Sacroiliac Therapeutic Injection Left    G89.29      Patient presents for follow-up of left-sided pelvic/low back pain.  States that this is identical to the symptoms he had a number of months ago.  He had a previous injection back in March 2018.  Did quite well with this.  It was almost 4 months ago.  States that the last month or so he has been having a lot more pain again.  Nothing really makes it better.  It is much worse when he is up and around and active, trying to mow his lawn or even just routine daily activities.  States that the pain will become so severe it keeps him up at night.  He has tried some topical arthritis rubs which only help a little bit.  Has taken a few OTC pain medications at times but tries to avoid them.  Pain is localized does not radiate.    Functional Capacity: about 4 METS. + Limited due to left sacroiliac joint / hip pain.     Review of Systems   Musculoskeletal: Positive for arthralgias, back pain and gait problem.        Left sacroiliac joint pain.       ELIZABETH:   ELIZABETH-7 SCORE 4/5/2018 4/19/2018 6/19/2018   Total Score 0 4 0     PHQ9:  PHQ-9 SCORE 4/5/2018 4/19/2018 7/3/2018   Total Score 0 13 0       I have personally reviewed the past medical history, past surgical history, medications, allergies, family and social history as listed below, on 7/3/2018.    Allergies   Allergen Reactions     Lisinopril Other (See Comments)     Dry throat  Dry throat     Penicillins Hives     Rofecoxib Hives     Vioxx     Celecoxib Rash     Ibuprofen Rash     All NSAIDS cause rash       Current Outpatient Prescriptions   Medication Sig Dispense Refill     acetaminophen (TYLENOL) 500 MG tablet Take  1,000 mg by mouth 3 times daily       gabapentin (NEURONTIN) 100 MG capsule Take 1 capsule by mouth 3 times daily  0        Patient Active Problem List    Diagnosis Date Noted     Late onset Alzheimer's disease without behavioral disturbance 04/19/2018     Priority: Medium     Trigger finger, left ring finger 02/23/2018     Priority: Medium     Mixed hyperlipidemia 02/08/2018     Priority: Medium     Neck pain, chronic 02/08/2018     Priority: Medium     Osteoarthrosis 02/08/2018     Priority: Medium     Chronic left sacroiliac pain 10/19/2017     Priority: Medium     Chronic insomnia 08/08/2017     Priority: Medium     S/P lumbar laminectomy 05/29/2015     Priority: Medium     CKD (chronic kidney disease) stage 3, GFR 30-59 ml/min 05/25/2015     Priority: Medium     Spinal stenosis of lumbar region with neurogenic claudication 04/24/2015     Priority: Medium     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 11/09/2012     Priority: Medium     Rosacea 04/03/2012     Priority: Medium     Actinic keratosis 10/24/2011     Priority: Medium     Essential (primary) hypertension 10/24/2011     Priority: Medium     Past Medical History:   Diagnosis Date     Actinic keratosis     2004,5-FU treatment on forehead     Chronic kidney disease, stage III (moderate)     No Comments Provided     Elevated erythrocyte sedimentation rate     2005,weight loss, night sweats due to episode of acute inflammatory illness, etiology unclear     Essential (primary) hypertension     No Comments Provided     Family history of malignant neoplasm of prostate     No Comments Provided     Hyperlipidemia     No Comments Provided     Osteoarthritis     No Comments Provided     Pneumonia     2002,and severe NSAID allergic reaction, hospitalized     Sciatica     No Comments Provided     Past Surgical History:   Procedure Laterality Date     ARTHROPLASTY KNEE      2006     ARTHROPLASTY KNEE      2008     ARTHROSCOPY SHOULDER      1996,left     ARTHROSCOPY  "SHOULDER      2001,AC arthrosis & distal clavicle resection     ARTHROSCOPY SHOULDER      2011     COLONOSCOPY      2008,normal     CYSTOSCOPY, TRANSURETHRAL RESECTION (TUR) PROSTATE, COMBINED      No Comments Provided     LAMINECTOMY LUMBAR ONE LEVEL      2015,[place], spinal stenosis     OTHER SURGICAL HISTORY      ,2073,SCAN-STRESS TEST,negative to heart rate 138     OTHER SURGICAL HISTORY      ,837802,OTHER,Right 5th digit     RELEASE CARPAL TUNNEL      bilateral     Social History     Social History     Marital status:      Spouse name: N/A     Number of children: N/A     Years of education: N/A     Social History Main Topics     Smoking status: Never Smoker     Smokeless tobacco: Never Used     Alcohol use No     Drug use: No     Sexual activity: Not Asked     Other Topics Concern     None     Social History Narrative    Patient lives in town.  He is , retired from [employer].  3 children, 1 .     Family History   Problem Relation Age of Onset     Prostate Cancer Father      Cancer-prostate     Hypertension Mother      Hypertension     Other - See Comments Mother      Old age, 99     Prostate Cancer Brother      Cancer-prostate     Unknown/Adopted Brother      Unknown       EXAM:   Vitals:    18 1326 18 1331   BP: 160/90 (!) 154/94   Pulse: 68    Weight: 177 lb (80.3 kg)    Height: 5' 6.5\" (1.689 m)        Current Pain Score: Moderate Pain (5)     BP Readings from Last 3 Encounters:   18 (!) 154/94   18 126/68   18 134/80    Wt Readings from Last 3 Encounters:   18 177 lb (80.3 kg)   18 181 lb 6.4 oz (82.3 kg)   18 170 lb (77.1 kg)      Estimated body mass index is 28.14 kg/(m^2) as calculated from the following:    Height as of this encounter: 5' 6.5\" (1.689 m).    Weight as of this encounter: 177 lb (80.3 kg).     Physical Exam   Constitutional: He appears well-developed and well-nourished.   Cardiovascular: Normal rate.  "   Pulmonary/Chest: Effort normal.   Musculoskeletal: Normal range of motion. He exhibits tenderness.   + very tender to palpation over left sacroiliac joint.    Neurological: He is alert.   Skin: Skin is warm and dry. No rash noted.   Psychiatric: He has a normal mood and affect.        INVESTIGATIONS:  Results for orders placed or performed during the hospital encounter of 04/18/18   CT Head w/o Contrast    Narrative    EXAM:    CT Head Without Intravenous  Contrast    CLINICAL HISTORY:    85 years old, male; Signs and symptoms; Altered mental status/memory loss;   Confusion or disorientation; Additional info: Confusion, appears acute; With   o/w non-focal neuro exam.    TECHNIQUE:    Axial computed tomography images of the head/brain without intravenous   contrast.  All CT scans at this facility use one or more dose reduction   techniques, viz.: automated exposure control; ma/kV adjustment per patient size   (including targeted exams where dose is matched to indication; i.e. head); or   iterative reconstruction technique.    Coronal and sagittal reformatted images were created and reviewed.    COMPARISON:    No relevant prior studies available.    FINDINGS:    Mild bilateral periventricular lucencies without intraparenchymal hemorrhage   and no obvious acute ischemic stroke. No intra-or extra-axial fluid collection,   no supra-or infratentorial mass, no mass effect or midline shift.       Mild symmetric hyperdensity of the cerebellar tentorium is likely within   normal limits.  Incidental note of a small RIGHT paramedian anterior falx   lipoma measuring 7 x 5 mm.  Mildly prominent ventricles, sulci and basal   cisterns without evidence of hydrocephalus. Skull bones are normal.        No significant mucoperiosteal thickening in the visualized paranasal sinuses.   No mastoid effusion.       Impression    IMPRESSION:       Generalized age appropriate atrophy without evidence of an acute intracranial   hemorrhage,  mass lesion and no obvious acute ischemic infarction.      THIS DOCUMENT HAS BEEN ELECTRONICALLY SIGNED BY GOLDIE MACEDO MD   CBC with platelets differential   Result Value Ref Range    WBC 7.4 4.0 - 11.0 10e9/L    RBC Count 5.54 4.4 - 5.9 10e12/L    Hemoglobin 16.8 13.3 - 17.7 g/dL    Hematocrit 49.1 40.0 - 53.0 %    MCV 89 78 - 100 fl    MCH 30.3 26.5 - 33.0 pg    MCHC 34.2 31.5 - 36.5 g/dL    RDW 12.8 10.0 - 15.0 %    Platelet Count 205 150 - 450 10e9/L    Diff Method Automated Method     % Neutrophils 72.2 %    % Lymphocytes 14.6 %    % Monocytes 11.5 %    % Eosinophils 0.9 %    % Basophils 0.5 %    % Immature Granulocytes 0.3 %    Absolute Neutrophil 5.3 1.6 - 8.3 10e9/L    Absolute Lymphocytes 1.1 0.8 - 5.3 10e9/L    Absolute Monocytes 0.9 0.0 - 1.3 10e9/L    Absolute Eosinophils 0.1 0.0 - 0.7 10e9/L    Absolute Basophils 0.0 0.0 - 0.2 10e9/L    Abs Immature Granulocytes 0.0 0 - 0.4 10e9/L   Ethanol GH   Result Value Ref Range    Ethanol g/dL <0.01 <0.01 %   *UA reflex to Microscopic   Result Value Ref Range    Color Urine Yellow     Appearance Urine Clear     Glucose Urine Negative NEG^Negative mg/dL    Bilirubin Urine Small (A) NEG^Negative    Ketones Urine 15 (A) NEG^Negative mg/dL    Specific Gravity Urine 1.025 1.003 - 1.035    Blood Urine Negative NEG^Negative    pH Urine 6.0 5.0 - 7.0 pH    Protein Albumin Urine Trace (A) NEG^Negative mg/dL    Urobilinogen Urine 0.2 0.2 - 1.0 EU/dL    Nitrite Urine Negative NEG^Negative    Leukocyte Esterase Urine Negative NEG^Negative    Source Midstream Urine    Drug of Abuse Screen Urine GH   Result Value Ref Range    Amphetamine Qual Urine Not Detected NDET^Not Detected    Benzodiazepine Qual Urine Not Detected NDET^Not Detected    Cocaine Qual Urine Not Detected NDET^Not Detected    Methadone Qual Urine Not Detected NDET^Not Detected    PCP Qual Urine Not Detected NDET^Not Detected    Opiates Qualitative Urine Not Detected NDET^Not Detected    Oxycodone Qualitative  Urine Not Detected NDET^Not Detected ng/mL    Propoxyphene Qualitative Urine Not Detected NDET^Not Detected ng/mL    Tricyclic Antidepressants Qual Urine Not Detected NDET^Not Detected ng/mL    Methamphetamine Qualitative Urine Not Detected NDET^Not Detected ng/mL    Barbiturates Qual Urine Not Detected NDET^Not Detected    Cannabinoids Qualitative Urine Not Detected NDET^Not Detected ng/mL    Buprenorphine Qualitative Urine Not Detected NDET^Not Detected ng/mL   INR   Result Value Ref Range    INR 1.02 0 - 1.3   Erythrocyte sedimentation rate auto   Result Value Ref Range    Sed Rate 3 1 - 10 mm/h   Comprehensive metabolic panel   Result Value Ref Range    Sodium 140 134 - 144 mmol/L    Potassium 4.1 3.5 - 5.1 mmol/L    Chloride 102 98 - 107 mmol/L    Carbon Dioxide 27 21 - 31 mmol/L    Anion Gap 11 3 - 14 mmol/L    Glucose 109 (H) 70 - 105 mg/dL    Urea Nitrogen 25 7 - 25 mg/dL    Creatinine 1.40 (H) 0.70 - 1.30 mg/dL    GFR Estimate 48 (L) >60 mL/min/1.7m2    GFR Estimate If Black 58 (L) >60 mL/min/1.7m2    Calcium 10.0 8.6 - 10.3 mg/dL    Bilirubin Total 1.0 0.3 - 1.0 mg/dL    Albumin 4.5 3.5 - 5.7 g/dL    Protein Total 6.9 6.4 - 8.9 g/dL    Alkaline Phosphatase 44 34 - 104 U/L    ALT 19 7 - 52 U/L    AST 23 13 - 39 U/L   Urine Microscopic   Result Value Ref Range    WBC Urine 0 - 5 OTO5^0 - 5 /HPF    RBC Urine O - 2 OTO2^O - 2 /HPF    Hyaline Casts 5-10 (A) OTO2^O - 2 /LPF    Granular Casts 0-2 (A) NEG^Negative /LPF    Squamous Epithelial /LPF Urine Few FEW^Few /LPF    Bacteria Urine Many (A) NEG^Negative /HPF    Mucous Urine Present (A) NEG^Negative /LPF       ASSESSMENT AND PLAN:  Problem List Items Addressed This Visit        Nervous and Auditory    Chronic left sacroiliac pain - Primary    Relevant Orders    XR Sacroiliac Therapeutic Injection Left        reviewed diet, exercise and weight control  -- Expected clinical course discussed    -- Medications and their side effects discussed    Patient  Instructions   1. Chronic left sacroiliac pain  - XR Sacroiliac Therapeutic Injection Left  - they will call with date/time of appointment.      Return as needed for follow-up with Dr. Fernando.    Clinic : [phone]  Appointment line: [phone]        Jason Fernando MD  Internal Medicine  [facility]

## 2018-07-04 ASSESSMENT — PATIENT HEALTH QUESTIONNAIRE - PHQ9: SUM OF ALL RESPONSES TO PHQ QUESTIONS 1-9: 0

## 2018-07-11 PROBLEM — Z66 DNR (DO NOT RESUSCITATE): Chronic | Status: ACTIVE | Noted: 2018-07-11

## 2018-07-13 ENCOUNTER — DOCUMENTATION ONLY (OUTPATIENT)
Dept: OTHER | Facility: CLINIC | Age: 83
End: 2018-07-13

## 2018-07-13 ENCOUNTER — TELEPHONE (OUTPATIENT)
Dept: INTERNAL MEDICINE | Facility: OTHER | Age: 83
End: 2018-07-13

## 2018-07-13 DIAGNOSIS — Z71.89 ACP (ADVANCE CARE PLANNING): Chronic | ICD-10-CM

## 2018-07-13 NOTE — TELEPHONE ENCOUNTER
Nitish Veloz sent a fax indicating that daughter wants to know when the steroid injection is going to be scheduled.    [facility] indicates that try to contact patient but phone was disconnected.  Representative informed that patient now resides at [facility] and gave them the telephone number.  They will call to help set it up with [facility].      Selam Gallardo LPN 7/13/2018 8:39 AM

## 2018-07-16 ENCOUNTER — TELEPHONE (OUTPATIENT)
Dept: INTERNAL MEDICINE | Facility: OTHER | Age: 83
End: 2018-07-16

## 2018-07-16 DIAGNOSIS — G89.29 CHRONIC LEFT SACROILIAC PAIN: ICD-10-CM

## 2018-07-16 DIAGNOSIS — M53.3 CHRONIC LEFT SACROILIAC PAIN: ICD-10-CM

## 2018-07-16 NOTE — TELEPHONE ENCOUNTER
Having lots of pain in the left hip again.  Son would like to know if he could get another injection.  Have Selam call son Driss knight.  Kelsea Cuevas LPN..........7/16/2018  10:07 AM

## 2018-07-16 NOTE — TELEPHONE ENCOUNTER
Noted.   1. Chronic left sacroiliac pain  Injection ordered.   - XR Sacroiliac Therapeutic Injection Left;  - they will call with date/time of appointment.      Jason Fernando MD

## 2018-07-23 ENCOUNTER — HOSPITAL ENCOUNTER (OUTPATIENT)
Dept: GENERAL RADIOLOGY | Facility: OTHER | Age: 83
Discharge: HOME | End: 2018-07-23
Attending: INTERNAL MEDICINE | Admitting: INTERNAL MEDICINE
Payer: MEDICARE

## 2018-07-23 DIAGNOSIS — G89.29 CHRONIC LEFT SACROILIAC PAIN: ICD-10-CM

## 2018-07-23 DIAGNOSIS — M53.3 CHRONIC LEFT SACROILIAC PAIN: ICD-10-CM

## 2018-07-23 PROCEDURE — 27096 INJECT SACROILIAC JOINT: CPT | Mod: LT

## 2018-07-23 PROCEDURE — 25000128 H RX IP 250 OP 636: Performed by: RADIOLOGY

## 2018-07-23 PROCEDURE — 25000125 ZZHC RX 250: Performed by: RADIOLOGY

## 2018-07-23 PROCEDURE — 25500064 ZZH RX 255 OP 636: Performed by: RADIOLOGY

## 2018-07-23 RX ORDER — TRIAMCINOLONE ACETONIDE 40 MG/ML
80 INJECTION, SUSPENSION INTRA-ARTICULAR; INTRAMUSCULAR ONCE
Status: COMPLETED | OUTPATIENT
Start: 2018-07-23 | End: 2018-07-23

## 2018-07-23 RX ADMIN — LIDOCAINE HYDROCHLORIDE 6 ML: 10 INJECTION, SOLUTION INFILTRATION; PERINEURAL at 14:54

## 2018-07-23 RX ADMIN — IOHEXOL 2 ML: 240 INJECTION, SOLUTION INTRATHECAL; INTRAVASCULAR; INTRAVENOUS; ORAL at 14:53

## 2018-07-23 RX ADMIN — TRIAMCINOLONE ACETONIDE 40 MG: 40 INJECTION, SUSPENSION INTRA-ARTICULAR; INTRAMUSCULAR at 14:53

## 2018-07-24 NOTE — PROGRESS NOTES
Patient Information     Patient Name  Juan Miguel Delaney MRN  [id number] Sex  Male   1932      Letter by Grover Gonzalez MD at      Author:  Grover Gonzalez MD Service:  (none) Author Type:  (none)    Filed:   Encounter Date:  2017 Status:  (Other)           Juan Miguel Delaney  [address] [facility] [address]          2017    Dear Mr. Delaney:    Following are the tests completed during your last clinic visit:    Results for orders placed or performed in visit on 17      COMPLETE METABOLIC PANEL      Result  Value Ref Range    SODIUM 139 133 - 143 mmol/L    POTASSIUM 4.4 3.5 - 5.1 mmol/L    CHLORIDE 105 98 - 107 mmol/L    CO2,TOTAL 25 21 - 31 mmol/L    ANION GAP 9 5 - 18                    GLUCOSE 93 70 - 105 mg/dL    CALCIUM 9.3 8.6 - 10.3 mg/dL    BUN 28 (H) 7 - 25 mg/dL    CREATININE 1.24 0.70 - 1.30 mg/dL    BUN/CREAT RATIO           23                    GFR if African American >60 >60 ml/min/1.73m2    GFR if not African American 55 (L) >60 ml/min/1.73m2    ALBUMIN 4.3 3.5 - 5.7 g/dL    PROTEIN,TOTAL 6.4 6.4 - 8.9 g/dL    GLOBULIN                  2.1 2.0 - 3.7 g/dL    A/G RATIO 2.0 1.0 - 2.0                    BILIRUBIN,TOTAL 0.5 0.3 - 1.0 mg/dL    ALK PHOSPHATASE 49 34 - 104 IU/L    ALT (SGPT) 12 7 - 52 IU/L    AST (SGOT) 15 13 - 39 IU/L   LIPID PANEL      Result  Value Ref Range    CHOLESTEROL,TOTAL 197 <200 mg/dL    TRIGLYCERIDES 116 <150 mg/dL    HDL CHOLESTEROL 43 23 - 92 mg/dL    NON-HDL CHOLESTEROL 154 (H) <145 mg/dl    CHOL/HDL RATIO            4.58 (H) <4.50                    LDL CHOLESTEROL 131 (H) <100 mg/dL    PATIENT STATUS            FASTING                         Your blood tests look fine. Congratulations on this report and keep up the good work. If you have any questions about your results, feel free to contact me.    Sincerely,      Grover Gonzalez MD  Internal Medicine  [facility]

## 2018-08-16 NOTE — PROGRESS NOTES
Outpatient Physical Therapy Discharge Note     Patient: Juan Miguel Delaney  : 1932    Beginning/End Dates of Reporting Period:  2018 to 2018    Referring Provider: Dr. Triplett    Therapy Diagnosis: Impaired mobility, decreased strength and endurance, low back and hip pain     Client Self Report: see evaluation    Goals:  Goal Identifier HEP   Goal Description Patient will demonstrate compliance and independence with his HEP in order to promote return to prior level of function.    Target Date 18   Date Met      Progress:     Goal Identifier Mobility   Goal Description Patient will be able to sit and reach to the left with minimal pain in order to return to prior level of function   Target Date 18   Date Met      Progress:     Goal Identifier Endurance   Goal Description Patient will be able to ascend a flight of stairs with minimal to no pain in order to improve efficiency and safety around his home.    Target Date 08/15/18   Date Met      Progress:     Goal Identifier Strength/enduranc   Goal Description Patient will be able to ambulate longer than 10 minutes with minimal to no pain in order to improve efficiency and safety with community mobility   Target Date 08/15/18   Date Met      Progress:       Progress Toward Goals:   Patient only completed his evaluation. Cancelled follow up appointments due to schedule being too full.     Plan:  Discharge from therapy.    Discharge:    Reason for Discharge: Patient chooses to discontinue therapy.  Medicare G-code: Patient did not attend a final scheduled session prior to discharge. Unable to determine discharge functional status.  Patient only arrived to his evaluation and did not want to complete Follow up sessions at this time.     Equipment Issued: none    Discharge Plan: Follow up with primary care physician as needed

## 2018-08-16 NOTE — ADDENDUM NOTE
Encounter addended by: Abiodun Mcintyre PT on: 8/16/2018 11:38 AM<BR>     Actions taken: Sign clinical note, Episode resolved

## 2019-01-21 ENCOUNTER — HOSPITAL ENCOUNTER (INPATIENT)
Facility: OTHER | Age: 84
LOS: 1 days | Discharge: HOME | DRG: 305 | End: 2019-01-22
Attending: STUDENT IN AN ORGANIZED HEALTH CARE EDUCATION/TRAINING PROGRAM | Admitting: INTERNAL MEDICINE
Payer: MEDICARE

## 2019-01-21 DIAGNOSIS — F02.80 LATE ONSET ALZHEIMER'S DISEASE WITHOUT BEHAVIORAL DISTURBANCE (H): ICD-10-CM

## 2019-01-21 DIAGNOSIS — G30.1 LATE ONSET ALZHEIMER'S DISEASE WITHOUT BEHAVIORAL DISTURBANCE (H): ICD-10-CM

## 2019-01-21 DIAGNOSIS — R79.89 ELEVATED TROPONIN: ICD-10-CM

## 2019-01-21 DIAGNOSIS — I12.9 MALIGNANT HYPERTENSIVE KIDNEY DISEASE WITH CHRONIC KIDNEY DISEASE STAGE I THROUGH STAGE IV, OR UNSPECIFIED(403.00): ICD-10-CM

## 2019-01-21 DIAGNOSIS — R74.8 ACID PHOSPHATASE ELEVATED: ICD-10-CM

## 2019-01-21 DIAGNOSIS — F05 DEMENTIA OF THE ALZHEIMER'S TYPE, WITH LATE ONSET, WITH DELIRIUM (H): ICD-10-CM

## 2019-01-21 DIAGNOSIS — I16.1 HYPERTENSIVE EMERGENCY: ICD-10-CM

## 2019-01-21 DIAGNOSIS — F02.80 DEMENTIA OF THE ALZHEIMER'S TYPE, WITH LATE ONSET, WITH DELIRIUM (H): ICD-10-CM

## 2019-01-21 DIAGNOSIS — I10 ESSENTIAL (PRIMARY) HYPERTENSION: Primary | ICD-10-CM

## 2019-01-21 DIAGNOSIS — E78.2 MIXED HYPERLIPIDEMIA: ICD-10-CM

## 2019-01-21 DIAGNOSIS — N18.30 CHRONIC KIDNEY DISEASE, STAGE III (MODERATE) (H): ICD-10-CM

## 2019-01-21 DIAGNOSIS — G30.1 DEMENTIA OF THE ALZHEIMER'S TYPE, WITH LATE ONSET, WITH DELIRIUM (H): ICD-10-CM

## 2019-01-21 DIAGNOSIS — R07.9 CHEST PAIN, UNSPECIFIED TYPE: ICD-10-CM

## 2019-01-21 LAB
ALBUMIN SERPL-MCNC: 4.1 G/DL (ref 3.5–5.7)
ALP SERPL-CCNC: 46 U/L (ref 34–104)
ALT SERPL W P-5'-P-CCNC: 9 U/L (ref 7–52)
ANION GAP SERPL CALCULATED.3IONS-SCNC: 6 MMOL/L (ref 3–14)
AST SERPL W P-5'-P-CCNC: 13 U/L (ref 13–39)
BASOPHILS # BLD AUTO: 0.1 10E9/L (ref 0–0.2)
BASOPHILS NFR BLD AUTO: 0.8 %
BILIRUB SERPL-MCNC: 0.6 MG/DL (ref 0.3–1)
BUN SERPL-MCNC: 21 MG/DL (ref 7–25)
CALCIUM SERPL-MCNC: 9.2 MG/DL (ref 8.6–10.3)
CHLORIDE SERPL-SCNC: 105 MMOL/L (ref 98–107)
CO2 SERPL-SCNC: 29 MMOL/L (ref 21–31)
CREAT SERPL-MCNC: 1.44 MG/DL (ref 0.7–1.3)
DIFFERENTIAL METHOD BLD: NORMAL
EOSINOPHIL # BLD AUTO: 0.2 10E9/L (ref 0–0.7)
EOSINOPHIL NFR BLD AUTO: 2.5 %
ERYTHROCYTE [DISTWIDTH] IN BLOOD BY AUTOMATED COUNT: 12.9 % (ref 10–15)
GFR SERPL CREATININE-BSD FRML MDRD: 47 ML/MIN/{1.73_M2}
GLUCOSE SERPL-MCNC: 102 MG/DL (ref 70–105)
HCT VFR BLD AUTO: 46.8 % (ref 40–53)
HGB BLD-MCNC: 16 G/DL (ref 13.3–17.7)
IMM GRANULOCYTES # BLD: 0 10E9/L (ref 0–0.4)
IMM GRANULOCYTES NFR BLD: 0.3 %
LYMPHOCYTES # BLD AUTO: 1 10E9/L (ref 0.8–5.3)
LYMPHOCYTES NFR BLD AUTO: 16.2 %
MAGNESIUM SERPL-MCNC: 2.2 MG/DL (ref 1.9–2.7)
MCH RBC QN AUTO: 29.7 PG (ref 26.5–33)
MCHC RBC AUTO-ENTMCNC: 34.2 G/DL (ref 31.5–36.5)
MCV RBC AUTO: 87 FL (ref 78–100)
MONOCYTES # BLD AUTO: 0.6 10E9/L (ref 0–1.3)
MONOCYTES NFR BLD AUTO: 9.8 %
NEUTROPHILS # BLD AUTO: 4.3 10E9/L (ref 1.6–8.3)
NEUTROPHILS NFR BLD AUTO: 70.4 %
PLATELET # BLD AUTO: 171 10E9/L (ref 150–450)
POTASSIUM SERPL-SCNC: 4.6 MMOL/L (ref 3.5–5.1)
PROT SERPL-MCNC: 6.2 G/DL (ref 6.4–8.9)
RBC # BLD AUTO: 5.39 10E12/L (ref 4.4–5.9)
SODIUM SERPL-SCNC: 140 MMOL/L (ref 134–144)
TROPONIN I SERPL-MCNC: 0.04 UG/L (ref 0–0.03)
TSH SERPL DL<=0.05 MIU/L-ACNC: 3.58 IU/ML (ref 0.34–5.6)
WBC # BLD AUTO: 6.1 10E9/L (ref 4–11)

## 2019-01-21 PROCEDURE — 84443 ASSAY THYROID STIM HORMONE: CPT | Performed by: STUDENT IN AN ORGANIZED HEALTH CARE EDUCATION/TRAINING PROGRAM

## 2019-01-21 PROCEDURE — 84484 ASSAY OF TROPONIN QUANT: CPT | Performed by: STUDENT IN AN ORGANIZED HEALTH CARE EDUCATION/TRAINING PROGRAM

## 2019-01-21 PROCEDURE — 36415 COLL VENOUS BLD VENIPUNCTURE: CPT | Performed by: STUDENT IN AN ORGANIZED HEALTH CARE EDUCATION/TRAINING PROGRAM

## 2019-01-21 PROCEDURE — A9270 NON-COVERED ITEM OR SERVICE: HCPCS | Mod: GY | Performed by: STUDENT IN AN ORGANIZED HEALTH CARE EDUCATION/TRAINING PROGRAM

## 2019-01-21 PROCEDURE — 99285 EMERGENCY DEPT VISIT HI MDM: CPT | Mod: Z6 | Performed by: STUDENT IN AN ORGANIZED HEALTH CARE EDUCATION/TRAINING PROGRAM

## 2019-01-21 PROCEDURE — 85025 COMPLETE CBC W/AUTO DIFF WBC: CPT | Performed by: STUDENT IN AN ORGANIZED HEALTH CARE EDUCATION/TRAINING PROGRAM

## 2019-01-21 PROCEDURE — 25000132 ZZH RX MED GY IP 250 OP 250 PS 637: Mod: GY | Performed by: STUDENT IN AN ORGANIZED HEALTH CARE EDUCATION/TRAINING PROGRAM

## 2019-01-21 PROCEDURE — 80053 COMPREHEN METABOLIC PANEL: CPT | Performed by: STUDENT IN AN ORGANIZED HEALTH CARE EDUCATION/TRAINING PROGRAM

## 2019-01-21 PROCEDURE — 99285 EMERGENCY DEPT VISIT HI MDM: CPT | Mod: 25 | Performed by: STUDENT IN AN ORGANIZED HEALTH CARE EDUCATION/TRAINING PROGRAM

## 2019-01-21 PROCEDURE — 93005 ELECTROCARDIOGRAM TRACING: CPT | Performed by: STUDENT IN AN ORGANIZED HEALTH CARE EDUCATION/TRAINING PROGRAM

## 2019-01-21 PROCEDURE — 36415 COLL VENOUS BLD VENIPUNCTURE: CPT | Mod: 91 | Performed by: STUDENT IN AN ORGANIZED HEALTH CARE EDUCATION/TRAINING PROGRAM

## 2019-01-21 PROCEDURE — 93010 ELECTROCARDIOGRAM REPORT: CPT | Performed by: INTERNAL MEDICINE

## 2019-01-21 PROCEDURE — 83735 ASSAY OF MAGNESIUM: CPT | Performed by: STUDENT IN AN ORGANIZED HEALTH CARE EDUCATION/TRAINING PROGRAM

## 2019-01-21 PROCEDURE — 84484 ASSAY OF TROPONIN QUANT: CPT | Mod: 91 | Performed by: STUDENT IN AN ORGANIZED HEALTH CARE EDUCATION/TRAINING PROGRAM

## 2019-01-21 RX ORDER — HYDRALAZINE HYDROCHLORIDE 25 MG/1
25 TABLET, FILM COATED ORAL ONCE
Status: COMPLETED | OUTPATIENT
Start: 2019-01-21 | End: 2019-01-21

## 2019-01-21 RX ORDER — ATORVASTATIN CALCIUM 40 MG/1
40 TABLET, FILM COATED ORAL ONCE
Status: COMPLETED | OUTPATIENT
Start: 2019-01-21 | End: 2019-01-21

## 2019-01-21 RX ORDER — ASPIRIN 325 MG
325 TABLET ORAL ONCE
Status: COMPLETED | OUTPATIENT
Start: 2019-01-21 | End: 2019-01-21

## 2019-01-21 RX ADMIN — ATORVASTATIN CALCIUM 40 MG: 40 TABLET, FILM COATED ORAL at 22:04

## 2019-01-21 RX ADMIN — HYDRALAZINE HYDROCHLORIDE 25 MG: 25 TABLET, FILM COATED ORAL at 22:03

## 2019-01-21 RX ADMIN — ASPIRIN 325 MG ORAL TABLET 325 MG: 325 PILL ORAL at 22:04

## 2019-01-21 ASSESSMENT — ENCOUNTER SYMPTOMS
ARTHRALGIAS: 0
PALPITATIONS: 0
SHORTNESS OF BREATH: 0
FEVER: 0
SPEECH DIFFICULTY: 0
HEADACHES: 0
CHILLS: 0
DIZZINESS: 0
MYALGIAS: 0
WEAKNESS: 0
LIGHT-HEADEDNESS: 0
ABDOMINAL PAIN: 0

## 2019-01-21 ASSESSMENT — MIFFLIN-ST. JEOR: SCORE: 1470.97

## 2019-01-22 ENCOUNTER — APPOINTMENT (OUTPATIENT)
Dept: GENERAL RADIOLOGY | Facility: OTHER | Age: 84
DRG: 305 | End: 2019-01-22
Payer: MEDICARE

## 2019-01-22 ENCOUNTER — APPOINTMENT (OUTPATIENT)
Dept: CARDIOLOGY | Facility: OTHER | Age: 84
DRG: 305 | End: 2019-01-22
Payer: MEDICARE

## 2019-01-22 VITALS
BODY MASS INDEX: 27.5 KG/M2 | HEART RATE: 60 BPM | SYSTOLIC BLOOD PRESSURE: 115 MMHG | HEIGHT: 68 IN | DIASTOLIC BLOOD PRESSURE: 59 MMHG | WEIGHT: 181.44 LBS | OXYGEN SATURATION: 96 % | TEMPERATURE: 97.5 F | RESPIRATION RATE: 12 BRPM

## 2019-01-22 PROBLEM — R07.89 ATYPICAL CHEST PAIN: Status: ACTIVE | Noted: 2019-01-22

## 2019-01-22 PROBLEM — F02.80 LATE ONSET ALZHEIMER'S DISEASE WITHOUT BEHAVIORAL DISTURBANCE (H): Status: ACTIVE | Noted: 2018-04-19

## 2019-01-22 PROBLEM — G30.1 LATE ONSET ALZHEIMER'S DISEASE WITHOUT BEHAVIORAL DISTURBANCE (H): Status: ACTIVE | Noted: 2018-04-19

## 2019-01-22 PROBLEM — I16.1 HYPERTENSIVE EMERGENCY: Status: ACTIVE | Noted: 2019-01-22

## 2019-01-22 LAB
CHOLEST SERPL-MCNC: 187 MG/DL
HDLC SERPL-MCNC: 39 MG/DL (ref 23–92)
LDLC SERPL CALC-MCNC: 116 MG/DL
NONHDLC SERPL-MCNC: 148 MG/DL
TRIGL SERPL-MCNC: 161 MG/DL
TROPONIN I SERPL-MCNC: 0.04 UG/L (ref 0–0.03)
TROPONIN I SERPL-MCNC: 0.05 UG/L (ref 0–0.03)

## 2019-01-22 PROCEDURE — 99236 HOSP IP/OBS SAME DATE HI 85: CPT | Performed by: INTERNAL MEDICINE

## 2019-01-22 PROCEDURE — 80061 LIPID PANEL: CPT | Performed by: INTERNAL MEDICINE

## 2019-01-22 PROCEDURE — 93306 TTE W/DOPPLER COMPLETE: CPT

## 2019-01-22 PROCEDURE — 84484 ASSAY OF TROPONIN QUANT: CPT | Performed by: STUDENT IN AN ORGANIZED HEALTH CARE EDUCATION/TRAINING PROGRAM

## 2019-01-22 PROCEDURE — 25000128 H RX IP 250 OP 636: Performed by: STUDENT IN AN ORGANIZED HEALTH CARE EDUCATION/TRAINING PROGRAM

## 2019-01-22 PROCEDURE — 25000132 ZZH RX MED GY IP 250 OP 250 PS 637: Mod: GY | Performed by: INTERNAL MEDICINE

## 2019-01-22 PROCEDURE — 25000128 H RX IP 250 OP 636: Performed by: INTERNAL MEDICINE

## 2019-01-22 PROCEDURE — A9270 NON-COVERED ITEM OR SERVICE: HCPCS | Mod: GY | Performed by: INTERNAL MEDICINE

## 2019-01-22 PROCEDURE — 12000000 ZZH R&B MED SURG/OB

## 2019-01-22 PROCEDURE — 93005 ELECTROCARDIOGRAM TRACING: CPT

## 2019-01-22 PROCEDURE — 40000275 ZZH STATISTIC RCP TIME EA 10 MIN

## 2019-01-22 PROCEDURE — 93010 ELECTROCARDIOGRAM REPORT: CPT | Mod: 76 | Performed by: INTERNAL MEDICINE

## 2019-01-22 PROCEDURE — 71046 X-RAY EXAM CHEST 2 VIEWS: CPT | Mod: TC

## 2019-01-22 PROCEDURE — 84484 ASSAY OF TROPONIN QUANT: CPT | Performed by: INTERNAL MEDICINE

## 2019-01-22 PROCEDURE — 36415 COLL VENOUS BLD VENIPUNCTURE: CPT | Performed by: STUDENT IN AN ORGANIZED HEALTH CARE EDUCATION/TRAINING PROGRAM

## 2019-01-22 PROCEDURE — 36415 COLL VENOUS BLD VENIPUNCTURE: CPT | Performed by: INTERNAL MEDICINE

## 2019-01-22 PROCEDURE — 93306 TTE W/DOPPLER COMPLETE: CPT | Mod: 26 | Performed by: INTERNAL MEDICINE

## 2019-01-22 RX ORDER — ATORVASTATIN CALCIUM 10 MG/1
10 TABLET, FILM COATED ORAL DAILY
Qty: 30 TABLET | Refills: 3 | Status: SHIPPED | OUTPATIENT
Start: 2019-01-22 | End: 2019-02-05

## 2019-01-22 RX ORDER — LORAZEPAM 2 MG/ML
0.5 INJECTION INTRAMUSCULAR EVERY 4 HOURS PRN
Status: DISCONTINUED | OUTPATIENT
Start: 2019-01-22 | End: 2019-01-22

## 2019-01-22 RX ORDER — ACETAMINOPHEN 325 MG/1
650 TABLET ORAL EVERY 4 HOURS PRN
Status: DISCONTINUED | OUTPATIENT
Start: 2019-01-22 | End: 2019-01-22 | Stop reason: HOSPADM

## 2019-01-22 RX ORDER — NALOXONE HYDROCHLORIDE 0.4 MG/ML
.1-.4 INJECTION, SOLUTION INTRAMUSCULAR; INTRAVENOUS; SUBCUTANEOUS
Status: DISCONTINUED | OUTPATIENT
Start: 2019-01-22 | End: 2019-01-22 | Stop reason: HOSPADM

## 2019-01-22 RX ORDER — AMOXICILLIN 250 MG
1 CAPSULE ORAL 2 TIMES DAILY PRN
Status: DISCONTINUED | OUTPATIENT
Start: 2019-01-22 | End: 2019-01-22 | Stop reason: HOSPADM

## 2019-01-22 RX ORDER — NITROGLYCERIN 0.4 MG/1
0.4 TABLET SUBLINGUAL EVERY 5 MIN PRN
Status: DISCONTINUED | OUTPATIENT
Start: 2019-01-22 | End: 2019-01-22 | Stop reason: HOSPADM

## 2019-01-22 RX ORDER — NITROGLYCERIN 0.4 MG/1
0.4 TABLET SUBLINGUAL ONCE
Status: COMPLETED | OUTPATIENT
Start: 2019-01-22 | End: 2019-01-22

## 2019-01-22 RX ORDER — ASPIRIN 325 MG
325 TABLET ORAL
Status: DISCONTINUED | OUTPATIENT
Start: 2019-01-22 | End: 2019-01-22 | Stop reason: HOSPADM

## 2019-01-22 RX ORDER — AMLODIPINE BESYLATE 5 MG/1
5 TABLET ORAL DAILY
Status: DISCONTINUED | OUTPATIENT
Start: 2019-01-23 | End: 2019-01-22 | Stop reason: HOSPADM

## 2019-01-22 RX ORDER — ONDANSETRON 4 MG/1
4 TABLET, ORALLY DISINTEGRATING ORAL EVERY 6 HOURS PRN
Status: DISCONTINUED | OUTPATIENT
Start: 2019-01-22 | End: 2019-01-22 | Stop reason: HOSPADM

## 2019-01-22 RX ORDER — AMLODIPINE BESYLATE 5 MG/1
5 TABLET ORAL DAILY
Qty: 30 TABLET | Refills: 3 | Status: SHIPPED | OUTPATIENT
Start: 2019-01-23 | End: 2019-02-05

## 2019-01-22 RX ORDER — SODIUM CHLORIDE 9 MG/ML
INJECTION, SOLUTION INTRAVENOUS CONTINUOUS
Status: DISCONTINUED | OUTPATIENT
Start: 2019-01-22 | End: 2019-01-22

## 2019-01-22 RX ORDER — HYDROMORPHONE HYDROCHLORIDE 1 MG/ML
0.2 INJECTION, SOLUTION INTRAMUSCULAR; INTRAVENOUS; SUBCUTANEOUS
Status: DISCONTINUED | OUTPATIENT
Start: 2019-01-22 | End: 2019-01-22 | Stop reason: HOSPADM

## 2019-01-22 RX ORDER — HEPARIN SODIUM 5000 [USP'U]/.5ML
INJECTION, SOLUTION INTRAVENOUS; SUBCUTANEOUS EVERY 6 HOURS PRN
Status: DISCONTINUED | OUTPATIENT
Start: 2019-01-22 | End: 2019-01-22 | Stop reason: CLARIF

## 2019-01-22 RX ORDER — NALOXONE HYDROCHLORIDE 0.4 MG/ML
.1-.4 INJECTION, SOLUTION INTRAMUSCULAR; INTRAVENOUS; SUBCUTANEOUS
Status: DISCONTINUED | OUTPATIENT
Start: 2019-01-22 | End: 2019-01-22

## 2019-01-22 RX ORDER — DOCUSATE SODIUM 100 MG/1
100 CAPSULE, LIQUID FILLED ORAL 2 TIMES DAILY
Status: DISCONTINUED | OUTPATIENT
Start: 2019-01-22 | End: 2019-01-22 | Stop reason: HOSPADM

## 2019-01-22 RX ORDER — AMLODIPINE BESYLATE 10 MG/1
10 TABLET ORAL DAILY
Status: DISCONTINUED | OUTPATIENT
Start: 2019-01-22 | End: 2019-01-22

## 2019-01-22 RX ORDER — HEPARIN SODIUM 10000 [USP'U]/100ML
0-3500 INJECTION, SOLUTION INTRAVENOUS CONTINUOUS
Status: DISCONTINUED | OUTPATIENT
Start: 2019-01-22 | End: 2019-01-22 | Stop reason: CLARIF

## 2019-01-22 RX ORDER — ASPIRIN 81 MG/1
81 TABLET, CHEWABLE ORAL DAILY
Qty: 30 TABLET | Refills: 3 | Status: SHIPPED | OUTPATIENT
Start: 2019-01-22 | End: 2019-02-05

## 2019-01-22 RX ORDER — HYDRALAZINE HYDROCHLORIDE 20 MG/ML
20 INJECTION INTRAMUSCULAR; INTRAVENOUS EVERY 4 HOURS PRN
Status: DISCONTINUED | OUTPATIENT
Start: 2019-01-22 | End: 2019-01-22 | Stop reason: HOSPADM

## 2019-01-22 RX ORDER — LORAZEPAM 0.5 MG/1
0.5 TABLET ORAL EVERY 4 HOURS PRN
Status: DISCONTINUED | OUTPATIENT
Start: 2019-01-22 | End: 2019-01-22 | Stop reason: HOSPADM

## 2019-01-22 RX ORDER — AMOXICILLIN 250 MG
2 CAPSULE ORAL 2 TIMES DAILY PRN
Status: DISCONTINUED | OUTPATIENT
Start: 2019-01-22 | End: 2019-01-22 | Stop reason: HOSPADM

## 2019-01-22 RX ORDER — HEPARIN SODIUM 5000 [USP'U]/.5ML
60 INJECTION, SOLUTION INTRAVENOUS; SUBCUTANEOUS ONCE
Status: DISCONTINUED | OUTPATIENT
Start: 2019-01-22 | End: 2019-01-22 | Stop reason: CLARIF

## 2019-01-22 RX ORDER — NITROGLYCERIN 10 MG/100ML
.07-2 INJECTION INTRAVENOUS CONTINUOUS
Status: DISCONTINUED | OUTPATIENT
Start: 2019-01-22 | End: 2019-01-22 | Stop reason: CLARIF

## 2019-01-22 RX ORDER — ONDANSETRON 2 MG/ML
4 INJECTION INTRAMUSCULAR; INTRAVENOUS EVERY 6 HOURS PRN
Status: DISCONTINUED | OUTPATIENT
Start: 2019-01-22 | End: 2019-01-22 | Stop reason: HOSPADM

## 2019-01-22 RX ADMIN — HYDROMORPHONE HYDROCHLORIDE 0.2 MG: 1 INJECTION, SOLUTION INTRAMUSCULAR; INTRAVENOUS; SUBCUTANEOUS at 07:50

## 2019-01-22 RX ADMIN — AMLODIPINE BESYLATE 10 MG: 10 TABLET ORAL at 09:48

## 2019-01-22 RX ADMIN — NITROGLYCERIN 0.4 MG: 0.4 TABLET SUBLINGUAL at 06:43

## 2019-01-22 RX ADMIN — ASPIRIN 325 MG ORAL TABLET 325 MG: 325 PILL ORAL at 09:48

## 2019-01-22 RX ADMIN — HYDRALAZINE HYDROCHLORIDE 20 MG: 20 INJECTION INTRAMUSCULAR; INTRAVENOUS at 06:52

## 2019-01-22 RX ADMIN — DOCUSATE SODIUM 100 MG: 100 CAPSULE, LIQUID FILLED ORAL at 09:48

## 2019-01-22 ASSESSMENT — ACTIVITIES OF DAILY LIVING (ADL)
FALL_HISTORY_WITHIN_LAST_SIX_MONTHS: NO
RETIRED_COMMUNICATION: 0-->UNDERSTANDS/COMMUNICATES WITHOUT DIFFICULTY
SWALLOWING: 0-->SWALLOWS FOODS/LIQUIDS WITHOUT DIFFICULTY
DRESS: 0-->INDEPENDENT
ADLS_ACUITY_SCORE: 12
TRANSFERRING: 0-->INDEPENDENT
COGNITION: 0 - NO COGNITION ISSUES REPORTED
RETIRED_EATING: 0-->INDEPENDENT
ADLS_ACUITY_SCORE: 13
TOILETING: 0-->INDEPENDENT
ADLS_ACUITY_SCORE: 12
BATHING: 0-->INDEPENDENT
AMBULATION: 0-->INDEPENDENT

## 2019-01-22 ASSESSMENT — MIFFLIN-ST. JEOR: SCORE: 1477.5

## 2019-01-22 NOTE — ED NOTES
"Pt is c/o back pain in between his shoulder blades, which he claims is from laying on the cot and is a normal back pain for him, \"My back is messed up\". Pt states he doesn't take anything at home for his pain, he just lives with it.   Will notify provider.   Pt denies arm or chest pain.    "

## 2019-01-22 NOTE — H&P
[facility] And Discharge Summary    History and Physical  Hospitalist       Date of Admission:  1/21/2019    Assessment & Plan   Juan Miguel Delaney is a 86 year old male who presents with atypical chest pain    Atypical chest pain  Assessment: No cardiopulmonary limitations with prolonged exertion, left arm pain that was non-reproducible, occurring at rest and no significant changes with nitroglycerin.  Mild fluctuating troponin elevations without EKG changes and more likely due to poorly controlled and severe hypertension in the setting of CKD on admission.  TTE without focal all motion abnormalities.  Patient remained otherwise asymptomatic overnight, was agreeable to starting a aspirin and statin as he previously had been on, but preferred to discharge to home today rather than continue to be observed with ongoing troponin checks.  He was agreeable to a stress test after discharge for more definitive risk stratification after improvement of his overall blood pressures.  Exercise nuclear stress test was ordered, he will continue with a baby aspirin daily and start 10 mg of Lipitor given his lipid profile with close follow-up with PCP after stress test is completed.       Hypertensive emergency    Assessment: Blood pressure was initially 160s 170s in the ER, he tolerated hydralazine as needed very well, is a resting sinus rhythm in the 40s-60s and will not tolerate addition of beta-blocker, given CKD ACE inhibitor was avoided at this time but could be considered if has objective evidence of CAD.  He will start Norvasc 5 mg daily, blood pressures were well controlled throughout the morning prior to discharge he will need close blood pressure recheck with PCP as scheduled.      Code Status: No Order    Meek Hicks    Primary Care Physician   Jason Fernando    Chief Complaint   Left arm pain    History is obtained from the patient and chart review.    History of Present Illness   Juan Miguel Delaney is a 86  year old male who presents with left arm pain and severe hypertension.  Patient has been his normal state of health until yesterday, he is currently living at [facility] and told the nurse that he was having some left arm pain while watching TV he was encouraged to come to the ER.  Son brought him in and there he was noted to be hypertensive to 170 systolic, EKG was unchanged to prior but he had a slight elevation of troponin. Given the concern that this could possibly represent atypical anginal symptoms he was subsequently admitted for further management.    Past Medical History    I have reviewed this patient's medical history and updated it with pertinent information if needed.   Past Medical History:   Diagnosis Date     Actinic keratosis     2004,5-FU treatment on forehead     Chronic kidney disease, stage III (moderate) (H)     No Comments Provided     Elevated erythrocyte sedimentation rate     2005,weight loss, night sweats due to episode of acute inflammatory illness, etiology unclear     Essential (primary) hypertension     No Comments Provided     Family history of malignant neoplasm of prostate     No Comments Provided     Hyperlipidemia     No Comments Provided     Osteoarthritis     No Comments Provided     Pneumonia     2002,and severe NSAID allergic reaction, hospitalized     Sciatica     No Comments Provided       Past Surgical History   I have reviewed this patient's surgical history and updated it with pertinent information if needed.  Past Surgical History:   Procedure Laterality Date     ARTHROPLASTY KNEE      2006     ARTHROPLASTY KNEE      2008     ARTHROSCOPY SHOULDER      1996,left     ARTHROSCOPY SHOULDER      2001,AC arthrosis & distal clavicle resection     ARTHROSCOPY SHOULDER      2011     COLONOSCOPY      2008,normal     CYSTOSCOPY, TRANSURETHRAL RESECTION (TUR) PROSTATE, COMBINED      No Comments Provided     LAMINECTOMY LUMBAR ONE LEVEL      2015,[place], spinal stenosis     OTHER  SURGICAL HISTORY      2004,[id number],SCAN-STRESS TEST,negative to heart rate 138     OTHER SURGICAL HISTORY      2013,[id number],OTHER,Right 5th digit     RELEASE CARPAL TUNNEL      bilateral       Prior to Admission Medications   None     Allergies   Allergies   Allergen Reactions     Lisinopril Other (See Comments)     Dry throat  Dry throat     Penicillins Hives     Rofecoxib Hives     Vioxx     Celecoxib Rash     Ibuprofen Rash     All NSAIDS cause rash       Social History   I have reviewed this patient's social history and updated it with pertinent information if needed. Juan Miguel Delaney  reports that  has never smoked. he has never used smokeless tobacco. He reports that he does not drink alcohol or use drugs.    Family History   I have reviewed this patient's family history and updated it with pertinent information if needed.   Family History   Problem Relation Age of Onset     Prostate Cancer Father         Cancer-prostate     Hypertension Mother         Hypertension     Other - See Comments Mother         Old age, 99     Prostate Cancer Brother         Cancer-prostate     Unknown/Adopted Brother         Unknown       Review of Systems     REVIEW OF SYSTEMS:    Constitutional: normal energy and appetite, no recent sick contacts  Eyes: no changes in vision  Ears, nose, mouth, throat, and face: no mouth sores, dysphagia, or odynophagia  Respiratory: no shortness of breath, cough, or wheezing. No aspiration symptoms.   Cardiovascular: Able to walk miles in the woods at a time without left arm pain or any other anginal equivalent or limitations from dyspnea or claudication symptoms.  No palpitations, orthopnea increased lower extremity edema or syncope.    Gastrointestinal: no constipation, diarrhea, nausea, vomiting or abdominal pain.  Genitourinary: no dysuria, hematuria, urgency or frequency.   Hematologic/lymphatic: no unintentional weight loss or night sweats.  Musculoskeletal: no pain to extremities or falls.    Neurological: no new weakness, tingling, numbness.  He knows that he is become more forgetful and he can no longer drive he thinks that his memory is his only real health problem.  Endocrine: not a known diabetic.     Physical Exam   Temp: 97.5  F (36.4  C) Temp src: Tympanic BP: 115/59 Pulse: 60 Heart Rate: 70 Resp: 12 SpO2: 96 % O2 Device: None (Room air)    Vital Signs with Ranges  Temp:  [96.9  F (36.1  C)-98.6  F (37  C)] 97.5  F (36.4  C)  Pulse:  [44-63] 60  Heart Rate:  [47-70] 70  Resp:  [6-57] 12  BP: (115-210)/() 115/59  SpO2:  [92 %-99 %] 96 %  181 lbs 7.02 oz    Exam:  GENERAL: Talkative, in no apparent distress.  Head: normocephalic and atraumatic  Eyes: anicteric and non-injected sclera  Nose: no rhinorrhea or epistaxis.   Throat: moist mucous membranes with no active oral lesions.  NECK: Supple, jugular venous distension not present.  CARDIOVASCULAR: regular rate and rhythm, no murmurs, rubs, or gallops. Normal S1/S2. No lower extremity edema.   RESPIRATORY: clear to auscultation bilaterally, no wheezes, no crackles.  No accessory muscle use or evidence of respiratory distress.   GI: soft, non-tender, non-distended, normoactive bowel sounds.  MUSCULOSKELETAL: warm and well perfused, 2+ dorsalis pedis pulses.    SKIN: no pallor, jaundice or rashes.  NEUROLOGY: AAOx3, follows commands, speech and language without focal deficits.  Intermittently forgetful of short-term memory but able to recognize his lack of memory.    Data   Data reviewed today:  I personally reviewed the EKG tracing showing Sinus rhythm, normal axis, no consistent ST-T wave inversions, elevations or depressions and the chest x-ray image(s) showing No acute infiltrate.  Recent Labs   Lab 01/22/19  1212 01/22/19  0705 01/22/19  0300  01/21/19  2030   WBC  --   --   --   --  6.1   HGB  --   --   --   --  16.0   MCV  --   --   --   --  87   PLT  --   --   --   --  171   NA  --   --   --   --  140   POTASSIUM  --   --   --   --   4.6   CHLORIDE  --   --   --   --  105   CO2  --   --   --   --  29   BUN  --   --   --   --  21   CR  --   --   --   --  1.44*   ANIONGAP  --   --   --   --  6   LORENZO  --   --   --   --  9.2   GLC  --   --   --   --  102   ALBUMIN  --   --   --   --  4.1   PROTTOTAL  --   --   --   --  6.2*   BILITOTAL  --   --   --   --  0.6   ALKPHOS  --   --   --   --  46   ALT  --   --   --   --  9   AST  --   --   --   --  13   TROPI 0.054* 0.044* 0.052*   < > 0.040*    < > = values in this interval not displayed.       Recent Results (from the past 24 hour(s))   XR Chest 2 Views    Narrative    EXAM:    XR Chest, 2 Views     EXAM DATE/TIME:    1/22/2019 12:56 AM     CLINICAL HISTORY:    86 years old, male; Abnormal findings; Abnormal diagnostic tests; Other:   Elevated troponin; Additional info: Elevated troponin and hypertension. R/O   widing of the mediasternum.     TECHNIQUE:    XR of the chest, 2 views.     COMPARISON:    CR XR CHEST 2 VIEWS PA AND LATERAL 4/19/2016 1:43 AM     FINDINGS:    Lungs: Unremarkable. No consolidation.    Pleural space: Unremarkable. No pleural effusion. No pneumothorax.    Heart/Mediastinum:  No cardiomegaly. Unremarkable mediastinum.   Bones/joints: Unremarkable.       Impression    IMPRESSION:   No acute lung pathology.     THIS DOCUMENT HAS BEEN ELECTRONICALLY SIGNED BY AUTUMN TEE MD

## 2019-01-22 NOTE — ED NOTES
"Pt reports he is pain free now. Left arm started to throb when he was talking to his wife tonight. This was an hour ago and it lasted 45 min.   Reports no injury to the arm.  Denies chest pain, nausea, sweating.  \"They only symptom I had was unbearable pain in the left arm.\"  The nurse at [facility] told me to come in  "

## 2019-01-22 NOTE — PROGRESS NOTES
"[facility] ADMISSION NOTE    Patient admitted to room 331 at approximately 0147 via wheel chair from emergency room. Patient was accompanied by nurse.     Verbal SBAR report received from SEBASTIAN Narayan prior to patient arrival.     Patient trasferred to bed via self. Patient alert and oriented X 3. The patient is not having any pain. 0-10 Pain Scale: 5. Admission vital signs: Blood pressure 177/90, pulse (!) 49, temperature 98.6  F (37  C), temperature source Tympanic, resp. rate 16, height 1.727 m (5' 8\"), weight 82.3 kg (181 lb 7 oz), SpO2 98 %. Patient usual bradycardic per report.  Patient was oriented to plan of care, call light, bed controls, tv, telephone, bathroom and visiting hours.     Valarie Gimenez RN on 1/22/2019 at 2:08 AM    "

## 2019-01-22 NOTE — PROGRESS NOTES
Poised question of medical admit to Dr. Luciano.  Elevated trop related to ischemic HTN episodes.  Will recheck trop at 0300.  Patient also wishes to be a Full Code.

## 2019-01-22 NOTE — ED TRIAGE NOTES
Left arm started hurting this evening.  Is able to move it well, but the arm just throbs, denies any tingling or numbness.  Denies any injuries.  Denies any other symptoms with this.

## 2019-01-22 NOTE — PROGRESS NOTES
Discharge Note      Data:  Juan Miguel Delaney discharged to assisted living at 1325 via wheel chair. Accompanied by son and staff.    Action:  Written discharge/follow-up instructions were provided to patient. Prescriptions sent to patients preferred pharmacy. All belongings sent with patient.    Response:  Patient verbalized understanding of discharge instructions, reason for discharge, and necessary follow-up appointments.    Esau Nugent RN on 1/22/2019 at 2:29 PM

## 2019-01-22 NOTE — PROGRESS NOTES
0600 received call from Dr Luciano.  He stated no change in EKG.  Ordered nitroglycerin once for pain in L arm.  BP 1950.  nitroglycerin given.  BP increased to 163/102.  20mg Hydralazine PRN given.  BP now 149/73 .  Troponin being drawn.  States pain is worse in L arm.  Valarie Gimenez RN on 1/22/2019 at 7:08 AM

## 2019-01-22 NOTE — PROGRESS NOTES
0300 troponin level increased from previous result of 0.047.  Notified Dr Hicks.  Orders placed for EKG and troponin level redraw at 0700. VSS.  Will continue to monitor. Valarie Gimenez RN on 1/22/2019 at 4:08 AM

## 2019-01-22 NOTE — PHARMACY - DISCHARGE MEDICATION RECONCILIATION
Pharmacy:  Discharge Counseling and Medication Reconciliation    Juan Miguel Delaney  [address]   [place], [place] [facility] [address] [phone] (home)   86 year old male  PCP: Jason Fernando    Allergies: Lisinopril; Penicillins; Rofecoxib; Celecoxib; and Ibuprofen    Discharge Counseling:    Pharmacist met with patient (and/or family) today to review the medication portion of the After Visit Summary (with an emphasis on NEW medications) and to address patient's questions/concerns.    Summary of Education: Patient discharged prior to pharmacy counseling.    Materials Provided:  MedCounselor sheets printed from Clinical Pharmacology on: none    Discharge Medication Reconciliation:    Marisela Montero RPH has reviewed the patient's discharge medication orders and has compared them to the inpatient medication administration record and to what the patient was taking prior to admission - any discrepancies have been resolved.    Thank you for the consult.    Marisela Montero RPH........January 22, 2019 2:23 PM

## 2019-01-22 NOTE — PHARMACY-ADMISSION MEDICATION HISTORY
Pharmacy -- Admission Medication Reconciliation    Prior to admission (PTA) medications were reviewed and the patient's PTA medication list was updated.    Sources Consulted: patient    The reliability of this Medication Reconciliation is: Reliability: Reliable    The following significant changes were made:  Patient does not take any medications.    In addition, the patient's allergies were reviewed with the patient and updated as follows:   Allergies: Lisinopril; Penicillins; Rofecoxib; Celecoxib; and Ibuprofen    The pharmacist has reviewed with the patient that all personal medications should be removed from the building or locked in the belongings safe.  Patient shall only take medications ordered by the physician and administered by the nursing staff.       Medication barriers identified: None - patient lives at [facility] with his wife.   Medication adherence concerns: n/a   Understanding of emergency medications: n/a    Lucy iMtchell [facility], 1/22/2019,  8:56 AM

## 2019-01-22 NOTE — PROGRESS NOTES
":    Met with patient to discuss discharge plans. Patient plans to discharge back to [facility] today. He stated that one of his children can be contacted to provide transportation.     Called patient's son, Ankit. Ankit is able to transport patient back to [facility] today. Scheduled time for 1330.     Called Kassidy at [facility] and provided discharge update.     Notified nursing and patient of discharge time.     While meeting with patient, offered a health care directive. Patient was not interested, stating that he can make his own decisions for himself. Explained that an appointed health care agent would only make medical decisions in the event that he is unable to. Patient replied, \"I'm only 86 years old! Good grief!\". Patient then politely declined the health care directive.     No further needs.     BERKLEY Pelayo on 1/22/2019 at 12:07 PM    "

## 2019-01-22 NOTE — ED PROVIDER NOTES
History   No chief complaint on file.    HPI  Juan Miguel Delaney is a 86 year old male with a history of late onset Alzheimer's disease on no chronic medication presents to the emergency room from [place] with complaints of left arm pain involving the entire left arm which she describes as cramping and lasted for about 2 hours.  Patient reports that he was laying down on his recliner with his arm rested on the sides when he suddenly had this left arm cramping pain which she rated as 10/10 but this gradually subsided over 2 hours.  He denied any associated chest pain or shortness of breath at that time.  He has never had similar pain in the past.  He reports that he does not drink a lot of water but lives a pretty physically active life exercising daily without any difficulty.  He denies any headache or lightheadedness.  At about 6:50 PM this evening the nurse at the assisted living checked his blood pressure and he was recorded at 189/96 hence he was advised to come into the emergency room for further evaluation.  Patient has not been on any blood pressure medications in the past.  Denies leg swellings or weakness of any extremities.  Reports that her left arm pain is completely resolved however he wanted to come in to get checked out.    Allergies:  Allergies   Allergen Reactions     Lisinopril Other (See Comments)     Dry throat  Dry throat     Penicillins Hives     Rofecoxib Hives     Vioxx     Celecoxib Rash     Ibuprofen Rash     All NSAIDS cause rash       Problem List:    Patient Active Problem List    Diagnosis Date Noted     ACP (advance care planning) 07/11/2018     Priority: Medium     DNR (do not resuscitate) -- Comfort Measure Approach       Late onset Alzheimer's disease without behavioral disturbance 04/19/2018     Priority: Medium     Trigger finger, left ring finger 02/23/2018     Priority: Medium     Mixed hyperlipidemia 02/08/2018     Priority: Medium     Neck pain, chronic 02/08/2018      Priority: Medium     Osteoarthrosis 02/08/2018     Priority: Medium     Chronic left sacroiliac pain 10/19/2017     Priority: Medium     Chronic insomnia 08/08/2017     Priority: Medium     S/P lumbar laminectomy 05/29/2015     Priority: Medium     CKD (chronic kidney disease) stage 3, GFR 30-59 ml/min (H) 05/25/2015     Priority: Medium     Spinal stenosis of lumbar region with neurogenic claudication 04/24/2015     Priority: Medium     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 11/09/2012     Priority: Medium     Rosacea 04/03/2012     Priority: Medium     Actinic keratosis 10/24/2011     Priority: Medium     Essential (primary) hypertension 10/24/2011     Priority: Medium        Past Medical History:    Past Medical History:   Diagnosis Date     Actinic keratosis      Chronic kidney disease, stage III (moderate)      Elevated erythrocyte sedimentation rate      Essential (primary) hypertension      Family history of malignant neoplasm of prostate      Hyperlipidemia      Osteoarthritis      Pneumonia      Sciatica        Past Surgical History:    Past Surgical History:   Procedure Laterality Date     ARTHROPLASTY KNEE      2006     ARTHROPLASTY KNEE      2008     ARTHROSCOPY SHOULDER      1996,left     ARTHROSCOPY SHOULDER      2001,AC arthrosis & distal clavicle resection     ARTHROSCOPY SHOULDER      2011     COLONOSCOPY      2008,normal     CYSTOSCOPY, TRANSURETHRAL RESECTION (TUR) PROSTATE, COMBINED      No Comments Provided     LAMINECTOMY LUMBAR ONE LEVEL      2015,[facility], spinal stenosis     OTHER SURGICAL HISTORY      2004,[id number],SCAN-STRESS TEST,negative to heart rate 138     OTHER SURGICAL HISTORY      2013,[id number],OTHER,Right 5th digit     RELEASE CARPAL TUNNEL      bilateral       Family History:    Family History   Problem Relation Age of Onset     Prostate Cancer Father         Cancer-prostate     Hypertension Mother         Hypertension     Other - See Comments Mother         Old age, 99      "Prostate Cancer Brother         Cancer-prostate     Unknown/Adopted Brother         Unknown       Social History:  Marital Status:   [2]  Social History     Tobacco Use     Smoking status: Never Smoker     Smokeless tobacco: Never Used   Substance Use Topics     Alcohol use: No     Alcohol/week: 0.0 oz     Drug use: No        Medications:      acetaminophen (TYLENOL) 500 MG tablet         Review of Systems   Constitutional: Negative for chills and fever.   Eyes: Negative for visual disturbance.   Respiratory: Negative for shortness of breath.    Cardiovascular: Negative for chest pain, palpitations and leg swelling.   Gastrointestinal: Negative for abdominal pain.   Musculoskeletal: Negative for arthralgias and myalgias.   Neurological: Negative for dizziness, speech difficulty, weakness, light-headedness and headaches.       Physical Exam   BP: (!) 210/94  Pulse: 60  Temp: 97  F (36.1  C)  Resp: 20  Height: 172.7 cm (5' 8\")  Weight: 81.6 kg (180 lb)  SpO2: 96 %      Physical Exam   Constitutional: He is oriented to person, place, and time. He appears well-nourished. No distress.   HENT:   Head: Normocephalic and atraumatic.   Eyes: Pupils are equal, round, and reactive to light.   Neck: Normal range of motion.   Cardiovascular: Normal rate, regular rhythm and normal heart sounds.   Pulmonary/Chest: Effort normal and breath sounds normal.   Musculoskeletal: Normal range of motion. He exhibits no edema, tenderness or deformity.   Neurological: He is alert and oriented to person, place, and time. No cranial nerve deficit.   Skin: Skin is warm. Capillary refill takes less than 2 seconds.       ED Course        Procedures         EKG Interpretation:      Interpreted by Eb Luciano  Time reviewed: 19:41  Symptoms at time of EKG: None   Rhythm: normal sinus   Rate: 63  Axis: Normal  Ectopy: none  Conduction: normal  ST Segments/ T Waves: Non-specific ST-T wave changes  Q Waves: none  Comparison to prior: " Unchanged    Clinical Impression: no acute changes     Critical Care time:  none  Results for orders placed or performed during the hospital encounter of 01/21/19 (from the past 24 hour(s))   CBC with platelets differential   Result Value Ref Range    WBC 6.1 4.0 - 11.0 10e9/L    RBC Count 5.39 4.4 - 5.9 10e12/L    Hemoglobin 16.0 13.3 - 17.7 g/dL    Hematocrit 46.8 40.0 - 53.0 %    MCV 87 78 - 100 fl    MCH 29.7 26.5 - 33.0 pg    MCHC 34.2 31.5 - 36.5 g/dL    RDW 12.9 10.0 - 15.0 %    Platelet Count 171 150 - 450 10e9/L    Diff Method Automated Method     % Neutrophils 70.4 %    % Lymphocytes 16.2 %    % Monocytes 9.8 %    % Eosinophils 2.5 %    % Basophils 0.8 %    % Immature Granulocytes 0.3 %    Absolute Neutrophil 4.3 1.6 - 8.3 10e9/L    Absolute Lymphocytes 1.0 0.8 - 5.3 10e9/L    Absolute Monocytes 0.6 0.0 - 1.3 10e9/L    Absolute Eosinophils 0.2 0.0 - 0.7 10e9/L    Absolute Basophils 0.1 0.0 - 0.2 10e9/L    Abs Immature Granulocytes 0.0 0 - 0.4 10e9/L   Comprehensive metabolic panel   Result Value Ref Range    Sodium 140 134 - 144 mmol/L    Potassium 4.6 3.5 - 5.1 mmol/L    Chloride 105 98 - 107 mmol/L    Carbon Dioxide 29 21 - 31 mmol/L    Anion Gap 6 3 - 14 mmol/L    Glucose 102 70 - 105 mg/dL    Urea Nitrogen 21 7 - 25 mg/dL    Creatinine 1.44 (H) 0.70 - 1.30 mg/dL    GFR Estimate 47 (L) >60 mL/min/[1.73_m2]    GFR Estimate If Black 56 (L) >60 mL/min/[1.73_m2]    Calcium 9.2 8.6 - 10.3 mg/dL    Bilirubin Total 0.6 0.3 - 1.0 mg/dL    Albumin 4.1 3.5 - 5.7 g/dL    Protein Total 6.2 (L) 6.4 - 8.9 g/dL    Alkaline Phosphatase 46 34 - 104 U/L    ALT 9 7 - 52 U/L    AST 13 13 - 39 U/L   Magnesium   Result Value Ref Range    Magnesium 2.2 1.9 - 2.7 mg/dL   Troponin I   Result Value Ref Range    Troponin I ES 0.040 (H) 0.000 - 0.034 ug/L   Thyrotropin GH   Result Value Ref Range    Thyrotropin 3.58 0.34 - 5.60 IU/mL   Troponin I (now)   Result Value Ref Range    Troponin I ES 0.047 (H) 0.000 - 0.034 ug/L        Medications   hydrALAZINE (APRESOLINE) tablet 25 mg (25 mg Oral Given 1/21/19 2203)   atorvastatin (LIPITOR) tablet 40 mg (40 mg Oral Given 1/21/19 2204)   aspirin (ASA) tablet 325 mg (325 mg Oral Given 1/21/19 2204)       9:30 PM  EKG done with nonspecific ST segment changes however unchanged from previous EKG from 3 years ago.  Patient however has persistently elevated high blood pressure and elevated troponin's but not complaining about any chest pain.  Patient will receive aspirin, hydralazine and Lipitor at this time.  Will monitor blood pressure and recheck troponin in 3 hours from the first drop.  Troponin persistently continues to increase patient will be started on a heparin drip.    12:39 AM  Repeat troponin appears to have trended up to 0.047 from 0.040 however patient is chest pain-free and blood pressure appears to have reduced status post oral hydralazine 25 mg.  At this point patient will be admitted for a repeat troponin in 3 hours and if continues to elevate he will be started on heparin drip.  PRN hydralazine IV.  Discussed with the hospitalist Dr. izaguirre who is in agreement with this patient to be admitted to the medical floor.  She will require a stress test and an echocardiogram prior to discharge.    Assessments & Plan (with Medical Decision Making)   This 86-year-old male but without any significant cardiac history presents to the emergency room with complaints of left arm pain which completely resolved before presenting.  EKG done at the emergency room without any acute ischemic changes in comparison to his previous EKG however blood work reveals elevated troponin and mildly elevated creatinine suggestive of hypertensive emergency.  A repeat troponin continues to trend up however blood pressure improved after oral hydralazine.  Patient will be admitted for a repeat troponin, optimization of blood pressure, stress test and echocardiogram in the morning.    I have reviewed the nursing  notes.    I have reviewed the findings, diagnosis, plan and need for follow up with the patient.       HEART Score  Background  Calculates the overall risk of adverse event in patient's presenting with chest pain.  Based on 5 criteria (each assigned 0-2 points) including suspiciousness of history, EKG, age, risk factors and troponin.    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   reports that  has never smoked. he has never used smokeless tobacco.  family history includes Hypertension in his mother; Other - See Comments in his mother; Prostate Cancer in his brother and father; Unknown/Adopted in his brother.  Lab Results   Component Value Date    TROPI 0.047 01/21/2019     Criteria   0-2 points for each of 5 items (maximum of 10 points):  Score 1- History moderately suspicious for coronary syndrome  Score 0- EKG Normal  Score 2- Age 65 years or older  Score 1- One to 2 risk factors for atherosclerotic disease  Score 1- One to 2 times the normal troponin upper limit  Interpretation  Risk of adverse outcome  Heart Score: 5  Total Score 4-6- Adverse Outcome Risk 20.3% - Supports admission with standard rule-out management -serial troponins and stress testing    CODE: Full       Medication List      There are no discharge medications for this visit.         Final diagnoses:   Hypertensive emergency   Elevated troponin       1/21/2019   [facility]    Eb Luciano MD  01/22/19 0047

## 2019-01-23 ENCOUNTER — TELEPHONE (OUTPATIENT)
Dept: CARDIOLOGY | Facility: OTHER | Age: 84
End: 2019-01-23

## 2019-01-23 PROBLEM — Z71.89 ACP (ADVANCE CARE PLANNING): Chronic | Status: RESOLVED | Noted: 2018-07-11 | Resolved: 2019-01-23

## 2019-01-23 NOTE — TELEPHONE ENCOUNTER
Patient verified .  Reminder call for stress test with instructions given.  Patient and wife verbalized understanding. Son Driss  for the patient also called and informed of test instructions and appt time

## 2019-01-25 ENCOUNTER — HOSPITAL ENCOUNTER (OUTPATIENT)
Dept: NUCLEAR MEDICINE | Facility: OTHER | Age: 84
Discharge: HOME | End: 2019-01-25
Attending: INTERNAL MEDICINE | Admitting: INTERNAL MEDICINE
Payer: MEDICARE

## 2019-01-25 ENCOUNTER — HOSPITAL ENCOUNTER (OUTPATIENT)
Dept: NUCLEAR MEDICINE | Facility: OTHER | Age: 84
End: 2019-01-25
Attending: INTERNAL MEDICINE
Payer: MEDICARE

## 2019-01-25 VITALS — HEIGHT: 65 IN | WEIGHT: 177 LBS | BODY MASS INDEX: 29.49 KG/M2

## 2019-01-25 DIAGNOSIS — R07.9 CHEST PAIN, UNSPECIFIED TYPE: ICD-10-CM

## 2019-01-25 DIAGNOSIS — R79.89 ELEVATED TROPONIN: ICD-10-CM

## 2019-01-25 PROCEDURE — 93017 CV STRESS TEST TRACING ONLY: CPT

## 2019-01-25 PROCEDURE — 93018 CV STRESS TEST I&R ONLY: CPT | Performed by: INTERNAL MEDICINE

## 2019-01-25 PROCEDURE — 34300033 ZZH RX 343: Performed by: INTERNAL MEDICINE

## 2019-01-25 PROCEDURE — 25000128 H RX IP 250 OP 636: Performed by: INTERNAL MEDICINE

## 2019-01-25 PROCEDURE — A9500 TC99M SESTAMIBI: HCPCS | Performed by: INTERNAL MEDICINE

## 2019-01-25 PROCEDURE — 78452 HT MUSCLE IMAGE SPECT MULT: CPT

## 2019-01-25 PROCEDURE — 93016 CV STRESS TEST SUPVJ ONLY: CPT | Performed by: INTERNAL MEDICINE

## 2019-01-25 RX ORDER — REGADENOSON 0.08 MG/ML
0.4 INJECTION, SOLUTION INTRAVENOUS ONCE
Status: COMPLETED | OUTPATIENT
Start: 2019-01-25 | End: 2019-01-25

## 2019-01-25 RX ADMIN — KIT FOR THE PREPARATION OF TECHNETIUM TC99M SESTAMIBI 8.51 MILLICURIE: 1 INJECTION, POWDER, LYOPHILIZED, FOR SOLUTION PARENTERAL at 12:10

## 2019-01-25 RX ADMIN — KIT FOR THE PREPARATION OF TECHNETIUM TC99M SESTAMIBI 34.5 MILLICURIE: 1 INJECTION, POWDER, LYOPHILIZED, FOR SOLUTION PARENTERAL at 13:55

## 2019-01-25 RX ADMIN — REGADENOSON 0.4 MG: 0.08 INJECTION, SOLUTION INTRAVENOUS at 13:56

## 2019-01-25 ASSESSMENT — MIFFLIN-ST. JEOR: SCORE: 1409.75

## 2019-01-25 NOTE — PROGRESS NOTES
1155The patient arrived for a Lexiscan Cardiolite stress test.  The procedure, risks, and benefits were discussed with the patient and son,and the consent was signed.  A saline lock was started,and the Cardiolite was injected by x-ray.  The patient was taken to the waiting area, to await resting images at 1215*.  1340 The patient returned from x-ray and was prepped for the stress test.   Dr. Gonzalez arrived, and the patient was administered the Lexiscan per procedure.  The patient tolerated the procedure well.  He was given a snack and taken to x-ray in stable condition for stress images.  The saline lock will be removed by x-ray for proper disposal.  Please see the chart for the complete test results.

## 2019-02-05 ENCOUNTER — OFFICE VISIT (OUTPATIENT)
Dept: INTERNAL MEDICINE | Facility: OTHER | Age: 84
End: 2019-02-05
Attending: INTERNAL MEDICINE
Payer: COMMERCIAL

## 2019-02-05 VITALS
BODY MASS INDEX: 31.45 KG/M2 | HEART RATE: 60 BPM | SYSTOLIC BLOOD PRESSURE: 138 MMHG | TEMPERATURE: 96 F | DIASTOLIC BLOOD PRESSURE: 72 MMHG | WEIGHT: 189 LBS

## 2019-02-05 DIAGNOSIS — Z09 HOSPITAL DISCHARGE FOLLOW-UP: Primary | ICD-10-CM

## 2019-02-05 DIAGNOSIS — N18.30 CKD (CHRONIC KIDNEY DISEASE) STAGE 3, GFR 30-59 ML/MIN (H): ICD-10-CM

## 2019-02-05 DIAGNOSIS — R07.89 ATYPICAL CHEST PAIN: ICD-10-CM

## 2019-02-05 DIAGNOSIS — I10 ESSENTIAL (PRIMARY) HYPERTENSION: ICD-10-CM

## 2019-02-05 DIAGNOSIS — G30.1 LATE ONSET ALZHEIMER'S DISEASE WITHOUT BEHAVIORAL DISTURBANCE (H): ICD-10-CM

## 2019-02-05 DIAGNOSIS — E78.2 MIXED HYPERLIPIDEMIA: ICD-10-CM

## 2019-02-05 DIAGNOSIS — M48.062 SPINAL STENOSIS OF LUMBAR REGION WITH NEUROGENIC CLAUDICATION: ICD-10-CM

## 2019-02-05 DIAGNOSIS — F02.80 LATE ONSET ALZHEIMER'S DISEASE WITHOUT BEHAVIORAL DISTURBANCE (H): ICD-10-CM

## 2019-02-05 DIAGNOSIS — I16.1 HYPERTENSIVE EMERGENCY: ICD-10-CM

## 2019-02-05 PROCEDURE — G0463 HOSPITAL OUTPT CLINIC VISIT: HCPCS

## 2019-02-05 PROCEDURE — 99214 OFFICE O/P EST MOD 30 MIN: CPT | Performed by: INTERNAL MEDICINE

## 2019-02-05 RX ORDER — AMLODIPINE BESYLATE 5 MG/1
5 TABLET ORAL DAILY
Qty: 90 TABLET | Refills: 3 | Status: SHIPPED | OUTPATIENT
Start: 2019-02-05 | End: 2020-01-30

## 2019-02-05 RX ORDER — ASPIRIN 81 MG/1
81 TABLET, CHEWABLE ORAL DAILY
Qty: 90 TABLET | Refills: 3 | Status: SHIPPED | OUTPATIENT
Start: 2019-02-05 | End: 2019-02-19

## 2019-02-05 RX ORDER — ATORVASTATIN CALCIUM 10 MG/1
10 TABLET, FILM COATED ORAL DAILY
Qty: 90 TABLET | Refills: 3 | Status: SHIPPED | OUTPATIENT
Start: 2019-02-05 | End: 2019-02-19

## 2019-02-05 ASSESSMENT — ENCOUNTER SYMPTOMS
BRUISES/BLEEDS EASILY: 0
ADENOPATHY: 0
HEMATURIA: 0
CHILLS: 0
WOUND: 0
FEVER: 0
BACK PAIN: 0
ABDOMINAL PAIN: 0
CHEST TIGHTNESS: 0
SHORTNESS OF BREATH: 0
CONFUSION: 1

## 2019-02-05 ASSESSMENT — PAIN SCALES - GENERAL: PAINLEVEL: NO PAIN (0)

## 2019-02-05 NOTE — NURSING NOTE
"Patient comes in for hospital follow up.  Chloe Mead LPN ....................2/5/2019   3:32 PM  Chief Complaint   Patient presents with     Hospital F/U     arm pain       Initial /82 (BP Location: Right arm, Patient Position: Sitting, Cuff Size: Adult Regular)   Pulse 60   Temp 96  F (35.6  C)   Wt 85.7 kg (189 lb)   BMI 31.45 kg/m   Estimated body mass index is 31.45 kg/m  as calculated from the following:    Height as of 1/25/19: 1.651 m (5' 5\").    Weight as of this encounter: 85.7 kg (189 lb).  Meds Reconciled: complete  Pt is on Aspirin  Pt is on a Statin  PHQ and/or ELIZABETH reviewed. Pt referred to PCP/MH Provider as appropriate.    Chloe Mead LPN        "

## 2019-02-05 NOTE — PROGRESS NOTES
"Nursing Notes:   Chloe Mead LPN  2/5/2019  3:34 PM  Signed  Patient comes in for hospital follow up.  Chloe Mead LPN ....................2/5/2019   3:32 PM  Chief Complaint   Patient presents with     Hospital F/U     arm pain       Initial /82 (BP Location: Right arm, Patient Position: Sitting, Cuff Size: Adult Regular)   Pulse 60   Temp 96  F (35.6  C)   Wt 85.7 kg (189 lb)   BMI 31.45 kg/m    Estimated body mass index is 31.45 kg/m  as calculated from the following:    Height as of 1/25/19: 1.651 m (5' 5\").    Weight as of this encounter: 85.7 kg (189 lb).  Meds Reconciled: complete  Pt is on Aspirin  Pt is on a Statin  PHQ and/or ELIZABETH reviewed. Pt referred to PCP/MH Provider as appropriate.    Chloe Mead LPN        Nursing note reviewed with patient.  Accuracy and completeness verified.   Mr. Delaney is a 86 year old male who:  Patient presents with:  Hospital F/U: arm pain      ICD-10-CM    1. Hospital discharge follow-up Z09    2. Atypical chest pain R07.89    3. Hypertensive emergency I16.1    4. Late onset Alzheimer's disease without behavioral disturbance G30.1     F02.80    5. CKD (chronic kidney disease) stage 3, GFR 30-59 ml/min (H) N18.3    6. Spinal stenosis of lumbar region with neurogenic claudication M48.062    7. Essential (primary) hypertension I10 amLODIPine (NORVASC) 5 MG tablet   8. Mixed hyperlipidemia E78.2 aspirin (ASA) 81 MG chewable tablet     atorvastatin (LIPITOR) 10 MG tablet     HPI  Patient was hospitalized 1/21/2019 at [facility] - dx with hypertensive emergency, atypical chest pain, elevated troponin - stress testing was completed 1/25/2019 and returned normal.     States he is feeling very good at this time.     Walks multiple laps in his assisted living daily.   Does 100 sit-ups daily.     Overall doing well.  His son is with today and helps provide additional history.    He was started on amlodipine, since the initial event and " hospitalization, at that time he was having some chest pressure and left arm pain.  This has resolved and he has not had any issues since hospital discharge.  Doing well with amlodipine.  He will continue on 5 mg daily.  Refill sent to pharmacy.    Started on aspirin and Lipitor.  Tolerating these well.  We will refill today.    Has lumbar spinal stenosis, states his low back pain is nonexistent at this time.  He walks in the sit ups regularly.    Hypertension, currently well controlled.  Tolerate medication.  Refills of amlodipine sent to pharmacy.    Mixed hyperlipidemia, doing well with Lipitor.  Refill sent to pharmacy.    Alzheimer's disease, appears stable.  He has some hearing difficulties, short-term memory is not the best at times.    Stage III chronic kidney disease, advised to avoid NSAIDs.  He is not really having any pain so this has not been a problem recently.    Functional Capacity: about 4 METS.   Reports that he can climb a flight of stairs without any chest pain/heaviness or shortness of breath.   No orthopnea/paroxysmal nocturnal dyspnea  Review of Systems   Constitutional: Negative for chills and fever.   HENT: Positive for hearing loss. Negative for congestion.    Eyes: Negative for visual disturbance.   Respiratory: Negative for chest tightness and shortness of breath.    Cardiovascular: Negative for chest pain and leg swelling.   Gastrointestinal: Negative for abdominal pain.   Genitourinary: Negative for hematuria.   Musculoskeletal: Negative for back pain.   Skin: Negative for rash and wound.   Neurological: Negative for syncope.   Hematological: Negative for adenopathy. Does not bruise/bleed easily.   Psychiatric/Behavioral: Positive for confusion.        ELIZABETH:   ELIZABETH-7 SCORE 4/5/2018 4/19/2018 6/19/2018   Total Score 0 4 0     PHQ9:  PHQ-9 SCORE 4/5/2018 4/19/2018 7/3/2018   PHQ-9 Total Score 0 13 0       I have personally reviewed the past medical history, past surgical history,  medications, allergies, family and social history as listed below.     Allergies   Allergen Reactions     Lisinopril Other (See Comments)     Dry throat  Dry throat     Penicillins Hives     Rofecoxib Hives     Vioxx     Celecoxib Rash     Ibuprofen Rash     All NSAIDS cause rash       Current Outpatient Medications   Medication Sig Dispense Refill     amLODIPine (NORVASC) 5 MG tablet Take 1 tablet (5 mg) by mouth daily 90 tablet 3     aspirin (ASA) 81 MG chewable tablet Take 1 tablet (81 mg) by mouth daily 90 tablet 3     atorvastatin (LIPITOR) 10 MG tablet Take 1 tablet (10 mg) by mouth daily 90 tablet 3        Patient Active Problem List    Diagnosis Date Noted     Hypertensive emergency 01/22/2019     Priority: Medium     Atypical chest pain 01/22/2019     Priority: Medium     Late onset Alzheimer's disease without behavioral disturbance 04/19/2018     Priority: Medium     Trigger finger, left ring finger 02/23/2018     Priority: Medium     Mixed hyperlipidemia 02/08/2018     Priority: Medium     Neck pain, chronic 02/08/2018     Priority: Medium     Osteoarthrosis 02/08/2018     Priority: Medium     Chronic left sacroiliac pain 10/19/2017     Priority: Medium     Chronic insomnia 08/08/2017     Priority: Medium     S/P lumbar laminectomy 05/29/2015     Priority: Medium     CKD (chronic kidney disease) stage 3, GFR 30-59 ml/min (H) 05/25/2015     Priority: Medium     Spinal stenosis of lumbar region with neurogenic claudication 04/24/2015     Priority: Medium     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 11/09/2012     Priority: Medium     Rosacea 04/03/2012     Priority: Medium     Actinic keratosis 10/24/2011     Priority: Medium     Essential (primary) hypertension 10/24/2011     Priority: Medium     Past Medical History:   Diagnosis Date     Actinic keratosis     2004,5-FU treatment on forehead     Chronic kidney disease, stage III (moderate) (H)     No Comments Provided     Elevated erythrocyte  sedimentation rate     2005,weight loss, night sweats due to episode of acute inflammatory illness, etiology unclear     Essential (primary) hypertension     No Comments Provided     Family history of malignant neoplasm of prostate     No Comments Provided     Hyperlipidemia     No Comments Provided     Osteoarthritis     No Comments Provided     Pneumonia     2002,and severe NSAID allergic reaction, hospitalized     Sciatica     No Comments Provided     Past Surgical History:   Procedure Laterality Date     ARTHROPLASTY KNEE      2006     ARTHROPLASTY KNEE      2008     ARTHROSCOPY SHOULDER      1996,left     ARTHROSCOPY SHOULDER      2001,AC arthrosis & distal clavicle resection     ARTHROSCOPY SHOULDER      2011     COLONOSCOPY      2008,normal     CYSTOSCOPY, TRANSURETHRAL RESECTION (TUR) PROSTATE, COMBINED      No Comments Provided     LAMINECTOMY LUMBAR ONE LEVEL      2015,Ray, spinal stenosis     OTHER SURGICAL HISTORY      2004,[id number],SCAN-STRESS TEST,negative to heart rate 138     OTHER SURGICAL HISTORY      2013,264146,OTHER,Right 5th digit     RELEASE CARPAL TUNNEL      bilateral     Social History     Socioeconomic History     Marital status:      Spouse name: None     Number of children: None     Years of education: None     Highest education level: None   Social Needs     Financial resource strain: None     Food insecurity - worry: None     Food insecurity - inability: None     Transportation needs - medical: None     Transportation needs - non-medical: None   Occupational History     None   Tobacco Use     Smoking status: Never Smoker     Smokeless tobacco: Never Used   Substance and Sexual Activity     Alcohol use: No     Alcohol/week: 0.0 oz     Drug use: No     Sexual activity: None   Other Topics Concern     Parent/sibling w/ CABG, MI or angioplasty before 65F 55M? Not Asked   Social History Narrative    Patient lives in town.  He is , retired from [employer].  3 children, 1  ".     Family History   Problem Relation Age of Onset     Prostate Cancer Father         Cancer-prostate     Hypertension Mother         Hypertension     Other - See Comments Mother         Old age, 99     Prostate Cancer Brother         Cancer-prostate     Unknown/Adopted Brother         Unknown       EXAM:   Vitals:    19 1529 19 1548   BP: 148/82 138/72   BP Location: Right arm Right arm   Patient Position: Sitting Sitting   Cuff Size: Adult Regular Adult Regular   Pulse: 60    Temp: 96  F (35.6  C)    Weight: 85.7 kg (189 lb)        Current Pain Score: No Pain (0)     BP Readings from Last 3 Encounters:   19 138/72   19 115/59   18 (!) 154/94      Wt Readings from Last 3 Encounters:   19 85.7 kg (189 lb)   19 80.3 kg (177 lb)   19 82.3 kg (181 lb 7 oz)      Estimated body mass index is 31.45 kg/m  as calculated from the following:    Height as of 19: 1.651 m (5' 5\").    Weight as of this encounter: 85.7 kg (189 lb).     Physical Exam   Constitutional: He appears well-developed and well-nourished. No distress.   HENT:   Head: Normocephalic and atraumatic.   Eyes: Conjunctivae are normal. No scleral icterus.   Neck: Neck supple.   Cardiovascular: Normal rate and regular rhythm.   Pulmonary/Chest: Effort normal.   Abdominal: Soft. There is no tenderness.   Musculoskeletal: He exhibits no edema or deformity.   Lymphadenopathy:     He has no cervical adenopathy.   Neurological: He is alert.   Skin: Skin is warm and dry. No rash noted. He is not diaphoretic.   Psychiatric: He has a normal mood and affect.      Procedures   INVESTIGATIONS:  Results for orders placed or performed during the hospital encounter of 19   NM Lexiscan stress test    Narrative    NM MPI WITH LEXISCAN    HISTORY: . CAD eval, symptomatic, norm prior imaging; Elevated  troponin; Chest pain, unspecified type.    COMPARISON: Chest radiographs 2019    TECHNIQUE: Gated SPECT with " wall motion and ejection fraction  calculation. Stress was performed via Lexiscan pharmacologic  technique. Please see the accompanying internal medicine report for  additional details.  DOSE: 8.51mCi 99mTc Cardiolite for rest @ 1210;  34.5mCi 99mTc  Cardiolite for stress @ 1355    FINDINGS: There is good uptake of activity by the left ventricle. The  left ventricle is normal in size.    There are no areas of reversible myocardial perfusion deficit to  suggest ischemia. There are no areas of irreversible perfusion deficit  to suggest completed infarct.    Myocardial motility is preserved. The ejection fraction is 64-68%.      Impression    IMPRESSION: No evidence of abnormal myocardial perfusion. Preserved  EDV and ejection fraction.    SUE LEE MD   Stress EKG Interpretation    Narrative    Procedure: The patient underwent a Lexiscan stress test.    FINDINGS: Resting EKG abnormal showing nonspecific ST and T wave  abnormalities. Lexiscan and Cardiolite were injected without  difficulty. The patient's EKG remained unchanged. His vital signs  remained stable.      Impression    IMPRESSION: Completed Lexiscan stress test. Images for ischemia  pending.    JANICE LUCIO MD       ASSESSMENT AND PLAN:  Problem List Items Addressed This Visit        Nervous and Auditory    Spinal stenosis of lumbar region with neurogenic claudication    Relevant Medications    aspirin (ASA) 81 MG chewable tablet    Late onset Alzheimer's disease without behavioral disturbance    Atypical chest pain       Endocrine    Mixed hyperlipidemia    Relevant Medications    aspirin (ASA) 81 MG chewable tablet    atorvastatin (LIPITOR) 10 MG tablet       Circulatory    Essential (primary) hypertension    Relevant Medications    amLODIPine (NORVASC) 5 MG tablet    Hypertensive emergency       Urinary    CKD (chronic kidney disease) stage 3, GFR 30-59 ml/min (H)      Other Visit Diagnoses     Hospital discharge follow-up    -  Primary         reviewed diet, exercise and weight control, recommended sodium restriction, cardiovascular risk and specific lipid/LDL goals reviewed, use of aspirin to prevent MI and TIA's discussed  -- Expected clinical course discussed    -- Medications and their side effects discussed    25 minutes spent in face-to-face interaction with patient and son (separate from separately billed procedures) with greater than 50% spent in counseling and care coordination of listed medical problems.      Patient Instructions     Glad you are doing well!!    Heart testing returned normal.     Results for orders placed or performed during the hospital encounter of 01/25/19   NM Lexiscan stress test    Narrative    NM MPI WITH LEXISCAN    HISTORY: . CAD eval, symptomatic, norm prior imaging; Elevated  troponin; Chest pain, unspecified type.    COMPARISON: Chest radiographs 1/22/2019    TECHNIQUE: Gated SPECT with wall motion and ejection fraction  calculation. Stress was performed via Lexiscan pharmacologic  technique. Please see the accompanying internal medicine report for  additional details.  DOSE: 8.51mCi 99mTc Cardiolite for rest @ 1210;  34.5mCi 99mTc  Cardiolite for stress @ 1355    FINDINGS: There is good uptake of activity by the left ventricle. The  left ventricle is normal in size.    There are no areas of reversible myocardial perfusion deficit to  suggest ischemia. There are no areas of irreversible perfusion deficit  to suggest completed infarct.    Myocardial motility is preserved. The ejection fraction is 64-68%.      Impression    IMPRESSION: No evidence of abnormal myocardial perfusion. Preserved  EDV and ejection fraction.    SUE LEE MD   Stress EKG Interpretation    Narrative    Procedure: The patient underwent a Lexiscan stress test.    FINDINGS: Resting EKG abnormal showing nonspecific ST and T wave  abnormalities. Lexiscan and Cardiolite were injected without  difficulty. The patient's EKG remained  unchanged. His vital signs  remained stable.      Impression    IMPRESSION: Completed Lexiscan stress test. Images for ischemia  pending.    JANICE LUCIO MD      Medications refilled.     Return in approximately 1 year, or sooner as needed for follow-up with Dr. Fernando.  - Annual Follow-up / Physical    Clinic : [phone]  Appointment line: [phone]      Jason Fernando MD  Internal Medicine  [facility]     Portions of this note were dictated using speech recognition software. The note has been proofread but errors in the text may have been overlooked. Please contact me if there are any concerns regarding the accuracy of the dictation.

## 2019-02-05 NOTE — PATIENT INSTRUCTIONS
Glad you are doing well!!    Heart testing returned normal.     Results for orders placed or performed during the hospital encounter of 01/25/19   NM Lexiscan stress test    Narrative    NM MPI WITH LEXISCAN    HISTORY: . CAD eval, symptomatic, norm prior imaging; Elevated  troponin; Chest pain, unspecified type.    COMPARISON: Chest radiographs 1/22/2019    TECHNIQUE: Gated SPECT with wall motion and ejection fraction  calculation. Stress was performed via Lexiscan pharmacologic  technique. Please see the accompanying internal medicine report for  additional details.  DOSE: 8.51mCi 99mTc Cardiolite for rest @ 1210;  34.5mCi 99mTc  Cardiolite for stress @ 1355    FINDINGS: There is good uptake of activity by the left ventricle. The  left ventricle is normal in size.    There are no areas of reversible myocardial perfusion deficit to  suggest ischemia. There are no areas of irreversible perfusion deficit  to suggest completed infarct.    Myocardial motility is preserved. The ejection fraction is 64-68%.      Impression    IMPRESSION: No evidence of abnormal myocardial perfusion. Preserved  EDV and ejection fraction.    SUE LEE MD   Stress EKG Interpretation    Narrative    Procedure: The patient underwent a Lexiscan stress test.    FINDINGS: Resting EKG abnormal showing nonspecific ST and T wave  abnormalities. Lexiscan and Cardiolite were injected without  difficulty. The patient's EKG remained unchanged. His vital signs  remained stable.      Impression    IMPRESSION: Completed Lexiscan stress test. Images for ischemia  pending.    JANICE LUCIO MD      Medications refilled.     Return in approximately 1 year, or sooner as needed for follow-up with Dr. Fernando.  - Annual Follow-up / Physical    Clinic : [phone]  Appointment line: [phone]

## 2019-02-19 ENCOUNTER — NURSING HOME VISIT (OUTPATIENT)
Dept: GERIATRICS | Facility: OTHER | Age: 84
End: 2019-02-19
Attending: NURSE PRACTITIONER
Payer: COMMERCIAL

## 2019-02-19 VITALS
RESPIRATION RATE: 16 BRPM | SYSTOLIC BLOOD PRESSURE: 130 MMHG | HEART RATE: 53 BPM | DIASTOLIC BLOOD PRESSURE: 75 MMHG | TEMPERATURE: 97.4 F

## 2019-02-19 DIAGNOSIS — G30.1 LATE ONSET ALZHEIMER'S DISEASE WITHOUT BEHAVIORAL DISTURBANCE (H): Primary | ICD-10-CM

## 2019-02-19 DIAGNOSIS — I10 ESSENTIAL HYPERTENSION: ICD-10-CM

## 2019-02-19 DIAGNOSIS — Z71.89 COUNSELING REGARDING ADVANCED CARE PLANNING AND GOALS OF CARE: ICD-10-CM

## 2019-02-19 DIAGNOSIS — F02.80 LATE ONSET ALZHEIMER'S DISEASE WITHOUT BEHAVIORAL DISTURBANCE (H): Primary | ICD-10-CM

## 2019-02-19 RX ORDER — DONEPEZIL HYDROCHLORIDE 5 MG/1
5 TABLET, ORALLY DISINTEGRATING ORAL AT BEDTIME
Qty: 30 TABLET | Refills: 11 | Status: SHIPPED | OUTPATIENT
Start: 2019-02-19 | End: 2019-04-09

## 2019-02-19 ASSESSMENT — ACTIVITIES OF DAILY LIVING (ADL)
TRANSFERRING: 0-->INDEPENDENT
BATHING: 0-->INDEPENDENT
AMBULATION: 0-->INDEPENDENT
SWALLOWING: 0-->SWALLOWS FOODS/LIQUIDS WITHOUT DIFFICULTY
FALL_HISTORY_WITHIN_LAST_SIX_MONTHS: NO
COGNITION: 1 - ATTENTION OR MEMORY DEFICITS
RETIRED_COMMUNICATION: 0-->UNDERSTANDS/COMMUNICATES WITHOUT DIFFICULTY
WHICH_OF_THE_ABOVE_FUNCTIONAL_RISKS_HAD_A_RECENT_ONSET_OR_CHANGE?: COGNITION
DRESS: 0-->INDEPENDENT
RETIRED_EATING: 0-->INDEPENDENT
TOILETING: 0-->INDEPENDENT

## 2019-02-19 NOTE — PROGRESS NOTES
[facility] CARE    Juan Miguel Delaney  : 1932  MRN: [id number]  Primary Care Provider: Teri Mari  [facility]       HPI: Juan Miguel Delaney is a 86-year old  male with a past medical history of essential hypertension, hyperlipidemia, chronic kidney disease stage IIIa, and late onset alzheimer's disease, who is seen today at [facility] to establish care.  The patient was accompanied by his wife and son for much of the visit.  He endorses some concerns about his failing memory.  He states that he and his wife moved to [facility] because of safety concerns related to his impaired memory.  Last year in early spring, he was found in the early hours of the morning, to have entered the home of his neighbor.  He does not have any recollection of leaving his home, but does remember being confused and lost.  He was subsequently evaluated at the ED for the altered mental status and was found to have no traumatic, infectious, or metabolic pathology to explain his acute confusion.  The altered mental status was attributed to his Alzheimer's dementia.        On 19, the patient was evaluated at the ED for a hypertensive emergency.  He was evaluated with an echocardiogram and stress test, which revealed a normal LV, an EF of 65% and no reversible ischemic deficits.  He was discharged on amlodipine, atorvastatin, and baby aspirin.  His medications are managed and administered by [facility] staff and has been taking his medication each day.  He feels well overall and endorses walking the halls of the assisted living 4 times per day and does 100 sit-ups each day.      He is aware of his memory issues, and does report being worried about continued memory loss.  He was asked about long-term goals of care, including a discussion about advanced directives (DNR/DNI), progression of Alzheimer's Disease, and the treatment modalities of memory loss and behavioral disturbances. His son was brought  into the discussion about medication safety, including the use of statins and aspirin in the elderly, and the use of high-risk medications to manage behavioral disturbances.      Medication reconciliation: completed.     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     S/P lumbar laminectomy     Trigger finger, left ring finger     Late onset Alzheimer's disease without behavioral disturbance     Atypical chest pain     Past Medical History:   Diagnosis Date     Actinic keratosis     2004,5-FU treatment on forehead     Chronic kidney disease, stage III (moderate) (H)     No Comments Provided     Elevated erythrocyte sedimentation rate     2005,weight loss, night sweats due to episode of acute inflammatory illness, etiology unclear     Essential (primary) hypertension     No Comments Provided     Family history of malignant neoplasm of prostate     No Comments Provided     Hyperlipidemia     No Comments Provided     Osteoarthritis     No Comments Provided     Pneumonia     2002,and severe NSAID allergic reaction, hospitalized     Sciatica     No Comments Provided     Social History     Socioeconomic History     Marital status:      Spouse name: Not on file     Number of children: Not on file     Years of education: Not on file     Highest education level: Not on file   Social Needs     Financial resource strain: Not on file     Food insecurity - worry: Not on file     Food insecurity - inability: Not on file     Transportation needs - medical: Not on file     Transportation needs - non-medical: Not on file   Occupational History     Not  on file   Tobacco Use     Smoking status: Never Smoker     Smokeless tobacco: Never Used   Substance and Sexual Activity     Alcohol use: No     Alcohol/week: 0.0 oz     Drug use: No     Sexual activity: Not on file   Other Topics Concern     Parent/sibling w/ CABG, MI or angioplasty before 65F 55M? Not Asked   Social History Narrative    Patient lives in town.  He is , retired from [employer]  3 children, 1 .     Family History   Problem Relation Age of Onset     Prostate Cancer Father         Cancer-prostate     Hypertension Mother         Hypertension     Other - See Comments Mother         Old age, 99     Prostate Cancer Brother         Cancer-prostate     Unknown/Adopted Brother         Unknown     Current Outpatient Medications on File Prior to Visit:  amLODIPine (NORVASC) 5 MG tablet Take 1 tablet (5 mg) by mouth daily   aspirin 81 mg - 1 tablet daily  Atorvastatin 10 mg - 1 tablet daily     Allergies   Allergen Reactions     Lisinopril Other (See Comments)     Dry throat  Dry throat     Penicillins Hives     Rofecoxib Hives     Vioxx     Celecoxib Rash     Ibuprofen Rash     All NSAIDS cause rash     Review of systems:  Constitutional: no weight loss, fever, night sweats. Sleeping well.  Function: no ADL functional limitations, walks independently without assist, requires IADL assist with medications, shopping/meals, transportation, finances  Nutrition: good appetite, adequate water intake  Skin: no rashes or sores  Head/Eyes/Ears/Nose/Throat: no report of headache, no new vision problems, no nasal discharge or sore throat.  Cardiovascular: no chest pains or palpitations  Respiratory: no cough or shortness of breath  Endocrine: no polyuria, polydipsia, skin or hair changes, heat or cold intolerance  Gastrointestinal: no dysphagia, abdominal pain, nausea, vomiting, or change in bowel habits; is continent of bowel  Genitourinary: no change in urination, no frequency, urgency, dysuria, or blood in  urine; is continent of urine  Neurologic: no reports of neurologic symptoms, numbness, tingling, or new onset weakness  Psychiatric: no reports of anxiety, depression, or panic, does endorse some worry about his memory loss      PHYSICAL EXAM:  Vitals per Nursing staff: /75   Pulse 53   Temp 97.4  F (36.3  C)   Resp 16  GENERAL APPEARANCE: Well developed elderly white male in no acute distress. Alert, oriented x 2.  SKIN: Warm and dry. There is no diaphoresis, cyanosis, rashes, or lesions.   HEENT: Head normocephalic, atraumatic. Eyes without redness, drainage, or discharge. Nose without drainage, lips and mouth moist. Neck is supple.  LUNGS: Normal respirations, lung fields are clear to auscultation, no wheezes or rales.   HEART: Regular rhythm and rate; S1 and S2, no murmur, gallop or rub, distal pulses intact  ABDOMEN: Bowel sounds normal; Abdomen soft, no tenderness or guarding  NEUROLOGIC: Smooth and symmetric gait and stride, balance steady, Motor strength intact and symmetrical.  Cranial nerves are grossly normal. No focal neurologic findings are noted.  EXTREMITIES: + 1 bilateral lower extremity edema. No gross deformity of the extremities. Dry skin of bilateral feet, Sensory, motor, and vascular systems are intact distally.   PSYCHIATRIC/MENTAL STATUS: pleasant and communicative, responds to simple questions, impaired memory with loss of immediate and recent memory with retention of remote memory, SLUMS Examination Score 21/30       ASSESSMENT / PLAN:    G30.1 - Alzheimer's Dementia without behavioral disturbances - stable   - SLUMS Exam score 21/30 - indicating a mild neurocognitive disorder, developing dementia.  - Will complete clinical diagnostic evaluation with serum B12, folate, and vitamin D, prior studies completed, including hemogram, metabolic panel, thyrotropin, and head CT (no acute pathology, age appropriate atrophy).  - Will start Aricept 5 mg by mouth at bedtime and will reevaluate  response to therapy in 4 weeks.    I10 - Essential Hypertension - well controlled  - Continue amlodipine 5 mg by mouth once daily     IZ71.89 - Counseling regarding Advanced Care Planning and Goals of Care  - Discussed with patient and his son the pathophysiology of the Alzheimer's dementia, the variable disease pattern, and current modalities of treatment.   - Discontinue atorvastatin. Discussed the risks vs benefits, and side effects of statin therapy beyond age 75, discussed current guidelines for discontinuing statin therapy in the setting of advanced age and no prior established CVD.  - Discontinue aspirin. Discussed risks vs benefits of aspirin therapy beyond the age 69 with current guidelines recommended against the use of aspirin in older adults.     Follow-up:  Will schedule a follow-up visit in 4 weeks at [facility].     Zach Mei NP Student    I, Teri Mari, reviewed this note and independently examined this patient and verify history and physical and agree with encounter assessment and plan.

## 2019-02-26 ENCOUNTER — MEDICAL CORRESPONDENCE (OUTPATIENT)
Dept: HEALTH INFORMATION MANAGEMENT | Facility: OTHER | Age: 84
End: 2019-02-26

## 2019-02-26 ENCOUNTER — RESULTS ONLY (OUTPATIENT)
Dept: LAB | Age: 84
End: 2019-02-26

## 2019-02-26 LAB
DEPRECATED CALCIDIOL+CALCIFEROL SERPL-MC: 32 NG/ML
FOLATE SERPL-MCNC: >24.8 NG/ML
VIT B12 SERPL-MCNC: 308 PG/ML (ref 180–914)

## 2019-03-26 DIAGNOSIS — M53.3 CHRONIC LEFT SACROILIAC PAIN: ICD-10-CM

## 2019-03-26 DIAGNOSIS — E53.8 VITAMIN B12 DEFICIENCY (NON ANEMIC): ICD-10-CM

## 2019-03-26 DIAGNOSIS — G30.1 LATE ONSET ALZHEIMER'S DISEASE WITHOUT BEHAVIORAL DISTURBANCE (H): Primary | ICD-10-CM

## 2019-03-26 DIAGNOSIS — F02.80 LATE ONSET ALZHEIMER'S DISEASE WITHOUT BEHAVIORAL DISTURBANCE (H): Primary | ICD-10-CM

## 2019-03-26 DIAGNOSIS — G89.29 CHRONIC LEFT SACROILIAC PAIN: ICD-10-CM

## 2019-03-26 RX ORDER — ACETAMINOPHEN 325 MG/1
650 TABLET ORAL EVERY 4 HOURS PRN
Qty: 200 TABLET | Refills: 3 | Status: SHIPPED | OUTPATIENT
Start: 2019-03-26 | End: 2019-08-27

## 2019-03-26 RX ORDER — UREA 10 %
500 LOTION (ML) TOPICAL DAILY
Qty: 90 TABLET | Refills: 3 | Status: SHIPPED | OUTPATIENT
Start: 2019-03-26 | End: 2020-04-28

## 2019-04-09 ENCOUNTER — NURSING HOME VISIT (OUTPATIENT)
Dept: GERIATRICS | Facility: OTHER | Age: 84
End: 2019-04-09
Attending: NURSE PRACTITIONER
Payer: COMMERCIAL

## 2019-04-09 VITALS — SYSTOLIC BLOOD PRESSURE: 133 MMHG | HEART RATE: 54 BPM | RESPIRATION RATE: 16 BRPM | DIASTOLIC BLOOD PRESSURE: 76 MMHG

## 2019-04-09 DIAGNOSIS — G30.1 LATE ONSET ALZHEIMER'S DISEASE WITHOUT BEHAVIORAL DISTURBANCE (H): Primary | ICD-10-CM

## 2019-04-09 DIAGNOSIS — I10 ESSENTIAL (PRIMARY) HYPERTENSION: ICD-10-CM

## 2019-04-09 DIAGNOSIS — F02.80 LATE ONSET ALZHEIMER'S DISEASE WITHOUT BEHAVIORAL DISTURBANCE (H): Primary | ICD-10-CM

## 2019-04-09 RX ORDER — DONEPEZIL HYDROCHLORIDE 5 MG/1
10 TABLET, ORALLY DISINTEGRATING ORAL DAILY
Qty: 180 TABLET | Refills: 3 | Status: SHIPPED | OUTPATIENT
Start: 2019-04-09 | End: 2020-03-17

## 2019-04-09 NOTE — PROGRESS NOTES
"[facility] & [facility] - ELDER CARE    Juan Miguel Delaney  : 1932  MRN: [id number]  Primary Care Provider: Teri Mari  [facility]      HPI: Juan Miguel Delaney is a 86-year old  male with a past medical history of essential hypertension, hyperlipidemia, chronic kidney disease stage IIIa, and late onset alzheimer's disease, who is seen today at [facility] for follow up. Juan Miguel and his wife moved to [facility] due to concerns of his failing memory after he was found to have entered his neighbor's home one morning in the early hours.      Since coming to [facility] he has done well.   He was started on Aricept 6 weeks ago. Tolerating well. He feels his memory is \"ok.\" His wife states he is still repeating the same stories multiple times.     Juan Miguel feels well. He is anxious for the facility work-out room to be put back together so he can resume his sit-ups which do help his lower back pain.     Medication reconciliation: completed.     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     S/P lumbar laminectomy     Trigger finger, left ring finger     Late onset Alzheimer's disease without behavioral disturbance     Atypical chest pain     Past Medical History:   Diagnosis Date     Actinic keratosis     ,5-FU treatment on forehead     Chronic kidney disease, stage III (moderate) (H)     No Comments Provided     Elevated erythrocyte sedimentation rate     ,weight loss, night sweats due to episode of acute inflammatory illness, etiology unclear     Essential (primary) hypertension     No Comments Provided     " Family history of malignant neoplasm of prostate     No Comments Provided     Hyperlipidemia     No Comments Provided     Osteoarthritis     No Comments Provided     Pneumonia     2002,and severe NSAID allergic reaction, hospitalized     Sciatica     No Comments Provided     Social History     Socioeconomic History     Marital status:      Spouse name: Not on file     Number of children: Not on file     Years of education: Not on file     Highest education level: Not on file   Occupational History     Not on file   Social Needs     Financial resource strain: Not on file     Food insecurity:     Worry: Not on file     Inability: Not on file     Transportation needs:     Medical: Not on file     Non-medical: Not on file   Tobacco Use     Smoking status: Never Smoker     Smokeless tobacco: Never Used   Substance and Sexual Activity     Alcohol use: No     Alcohol/week: 0.0 oz     Drug use: No     Sexual activity: Not on file   Lifestyle     Physical activity:     Days per week: Not on file     Minutes per session: Not on file     Stress: Not on file   Relationships     Social connections:     Talks on phone: Not on file     Gets together: Not on file     Attends Jew service: Not on file     Active member of club or organization: Not on file     Attends meetings of clubs or organizations: Not on file     Relationship status: Not on file     Intimate partner violence:     Fear of current or ex partner: Not on file     Emotionally abused: Not on file     Physically abused: Not on file     Forced sexual activity: Not on file   Other Topics Concern     Parent/sibling w/ CABG, MI or angioplasty before 65F 55M? Not Asked   Social History Narrative    Patient lives in town.  He is , retired from [employer].  3 children, 1 .     Family History   Problem Relation Age of Onset     Prostate Cancer Father         Cancer-prostate     Hypertension Mother         Hypertension     Other - See Comments Mother          Old age, 99     Prostate Cancer Brother         Cancer-prostate     Unknown/Adopted Brother         Unknown     Current Outpatient Medications   Medication     acetaminophen (TYLENOL) 325 MG tablet     amLODIPine (NORVASC) 5 MG tablet     cyanocobalamin (VITAMIN B-12) 500 MCG tablet     donepezil (ARICEPT) 5 MG ODT     No current facility-administered medications for this visit.      Allergies   Allergen Reactions     Lisinopril Other (See Comments)     Dry throat  Dry throat     Penicillins Hives     Rofecoxib Hives     Vioxx     Celecoxib Rash     Ibuprofen Rash     All NSAIDS cause rash       Review of systems:  Constitutional: no weight loss, fever, night sweats. Sleeping well. Denies pain of any type.  Function: no ADL functional limitations, walks independently without assist, requires IADL assist with medications, shopping/meals, transportation, finances  Nutrition: good appetite, adequate water intake  Skin: no rashes or sores  Head/Eyes/Ears/Nose/Throat: no report of headache, no new vision problems, no nasal discharge or sore throat.  Cardiovascular: no chest pains or palpitations  Respiratory: no cough or shortness of breath  Endocrine: no polyuria, polydipsia, skin or hair changes, heat or cold intolerance  Gastrointestinal: no dysphagia, abdominal pain, nausea, vomiting, or change in bowel habits; is continent of bowel  Genitourinary: no change in urination, no frequency, urgency, dysuria, or blood in urine; is continent of urine  Neurologic: no reports of neurologic symptoms, numbness, tingling, or new onset weakness  Psychiatric: no reports of anxiety, depression, or panic      PHYSICAL EXAM:  Vitals per Nursing staff: /76   Pulse 54   Resp 16    GENERAL APPEARANCE: Well developed elderly white male in no acute distress. Alert, oriented x 3  SKIN: Warm and dry. There is no diaphoresis, cyanosis, rashes, or lesions.   HEENT: Head normocephalic, atraumatic. Eyes without redness, drainage,  or discharge. Nose without drainage, lips and mouth moist. Neck is supple.  LUNGS: Normal respirations, lung fields are clear to auscultation, no wheezes or rales.   HEART: Regular rhythm and rate; S1 and S2, no murmur, gallop or rub  ABDOMEN: Bowel sounds normal; Abdomen soft, no tenderness or guarding  NEUROLOGIC: Smooth and symmetric gait and stride, balance steady, Motor strength intact and symmetrical.  Cranial nerves are grossly normal. No focal neurologic findings are noted.  EXTREMITIES: Trace bilateral lower extremity edema.   PSYCHIATRIC: Pleasant and talkative, mood and affect normal      Labs:  Component      Latest Ref Rng & Units 1/21/2019 1/22/2019 2/26/2019   WBC      4.0 - 11.0 10e9/L 6.1     RBC Count      4.4 - 5.9 10e12/L 5.39     Hemoglobin      13.3 - 17.7 g/dL 16.0     Hematocrit      40.0 - 53.0 % 46.8     MCV      78 - 100 fl 87     MCH      26.5 - 33.0 pg 29.7     MCHC      31.5 - 36.5 g/dL 34.2     RDW      10.0 - 15.0 % 12.9     Platelet Count      150 - 450 10e9/L 171     Diff Method       Automated Method     % Neutrophils      % 70.4     % Lymphocytes      % 16.2     % Monocytes      % 9.8     % Eosinophils      % 2.5     % Basophils      % 0.8     % Immature Granulocytes      % 0.3     Absolute Neutrophil      1.6 - 8.3 10e9/L 4.3     Absolute Lymphocytes      0.8 - 5.3 10e9/L 1.0     Absolute Monocytes      0.0 - 1.3 10e9/L 0.6     Absolute Eosinophils      0.0 - 0.7 10e9/L 0.2     Absolute Basophils      0.0 - 0.2 10e9/L 0.1     Abs Immature Granulocytes      0 - 0.4 10e9/L 0.0     Sodium      134 - 144 mmol/L 140     Potassium      3.5 - 5.1 mmol/L 4.6     Chloride      98 - 107 mmol/L 105     Carbon Dioxide      21 - 31 mmol/L 29     Anion Gap      3 - 14 mmol/L 6     Glucose      70 - 105 mg/dL 102     Urea Nitrogen      7 - 25 mg/dL 21     Creatinine      0.70 - 1.30 mg/dL 1.44 (H)     GFR Estimate      >60 mL/min/1.73:m2 47 (L)     GFR Estimate If Black      >60 mL/min/1.73:m2  56 (L)     Calcium      8.6 - 10.3 mg/dL 9.2     Bilirubin Total      0.3 - 1.0 mg/dL 0.6     Albumin      3.5 - 5.7 g/dL 4.1     Protein Total      6.4 - 8.9 g/dL 6.2 (L)     Alkaline Phosphatase      34 - 104 U/L 46     ALT      7 - 52 U/L 9     AST      13 - 39 U/L 13     Cholesterol      <200 mg/dL  187    Triglycerides      <150 mg/dL  161 (H)    HDL Cholesterol      23 - 92 mg/dL  39    LDL Cholesterol Calculated      <100 mg/dL  116 (H)    Non HDL Cholesterol      <130 mg/dL  148 (H)    Thyrotropin      0.34 - 5.60 IU/mL 3.58     Folate      >5.21 ng/mL   >24.8   Vitamin D Total      ng/mL   32.0   Vitamin B12      180 - 914 pg/mL   308       ASSESSMENT / PLAN:  1. Alzheimer's Dementia without behavioral disturbances - stable   - patient is tolerating Aricept without adverse effects. Will increase dose to 10 mg daily.   - patient's serum b12 was low normal, he was started on b12 supplement.     2. Essential hypertension  - bp well controlled on present medication, continue.     Teri Mari

## 2019-08-27 DIAGNOSIS — M53.3 CHRONIC LEFT SACROILIAC PAIN: ICD-10-CM

## 2019-08-27 DIAGNOSIS — G89.29 CHRONIC LEFT SACROILIAC PAIN: ICD-10-CM

## 2019-08-27 RX ORDER — ACETAMINOPHEN 325 MG/1
TABLET ORAL
Qty: 200 TABLET | Refills: 3 | Status: SHIPPED | OUTPATIENT
Start: 2019-08-27 | End: 2019-09-03

## 2019-09-03 DIAGNOSIS — G89.29 CHRONIC LEFT SACROILIAC PAIN: ICD-10-CM

## 2019-09-03 DIAGNOSIS — M53.3 CHRONIC LEFT SACROILIAC PAIN: ICD-10-CM

## 2019-09-03 RX ORDER — ACETAMINOPHEN 325 MG/1
TABLET ORAL
Qty: 200 TABLET | Refills: 11 | Status: SHIPPED | OUTPATIENT
Start: 2019-09-03 | End: 2020-10-13 | Stop reason: DRUGHIGH

## 2019-10-08 NOTE — TELEPHONE ENCOUNTER
Patient was placed with Jason Fernando MD today for 20 minute ER follow up.  Patient needs to have a care conference with family in regards to progression of Dementia.  20 minutes is not enough time, patient moved to tomorrow at 3 pm for 40 minutes.  Spouse notified and gave this writer the son Driss's number to contact because son will be with patient at the time of the office visit.    Left message for son to call this writer back to inform him of the new appointment date and time.      Selam Gallardo LPN 4/18/2018 10:26 AM       Yes

## 2019-12-23 NOTE — TELEPHONE ENCOUNTER
Gastroenterology Patient Information     Patient Name MRN Sex Juan Miguel Brown [id number] Male 1932      Telephone Encounter by Ema Kramer RN at 2017  3:24 PM     Author:  Ema Kramer RN Service:  (none) Author Type:  NURS- Registered Nurse     Filed:  2017  3:26 PM Encounter Date:  2017 Status:  Signed     :  Ema Kramer RN (NURS- Registered Nurse)            This is a Refill request from: [facility]  Name of Medication: tramadol  Quantity requested: 180 and 1 refill  Last fill date: 17  Due for refill: yes  Last visit with GROVER GONZALEZ was on: 2017 in [facility]  PCP:  Grover Gonzalez MD  Controlled Substance Agreement:  None found   Diagnosis r/t this medication request: Lumbar stenosis     Unable to complete prescription refill per RN Medication Refill Policy.................... Ema Kramer RN ....................  2017   3:25 PM

## 2020-01-30 DIAGNOSIS — I10 ESSENTIAL (PRIMARY) HYPERTENSION: ICD-10-CM

## 2020-01-30 NOTE — TELEPHONE ENCOUNTER
Routing refill request to provider for review/approval because:  Labs out of range: Serum Creatinine  Labs not current:  Serum Creatinine    Last refill:  2/5/2019 90# 3 Refills    LOV 2/5/2019  Future: 2/5/2020  Drea Zapata RN on 1/30/2020 at 4:55 PM

## 2020-01-31 RX ORDER — AMLODIPINE BESYLATE 5 MG/1
5 TABLET ORAL DAILY
Qty: 90 TABLET | Refills: 3 | Status: SHIPPED | OUTPATIENT
Start: 2020-01-31 | End: 2020-07-28

## 2020-02-05 PROBLEM — R07.89 ATYPICAL CHEST PAIN: Status: RESOLVED | Noted: 2019-01-22 | Resolved: 2020-02-05

## 2020-03-16 DIAGNOSIS — G30.1 LATE ONSET ALZHEIMER'S DISEASE WITHOUT BEHAVIORAL DISTURBANCE (H): ICD-10-CM

## 2020-03-16 DIAGNOSIS — F02.80 LATE ONSET ALZHEIMER'S DISEASE WITHOUT BEHAVIORAL DISTURBANCE (H): ICD-10-CM

## 2020-03-17 RX ORDER — DONEPEZIL HYDROCHLORIDE 5 MG/1
10 TABLET, ORALLY DISINTEGRATING ORAL DAILY
Qty: 180 TABLET | Refills: 3 | Status: ON HOLD | OUTPATIENT
Start: 2020-03-17 | End: 2021-05-03

## 2020-03-17 NOTE — TELEPHONE ENCOUNTER
"Requested Prescriptions   Pending Prescriptions Disp Refills     donepezil (ARICEPT) 5 MG  tablet 3     Sig: Take 2 tablets (10 mg) by mouth daily       Miscellaneous Dementia Agents Failed - 3/16/2020  2:41 PM        Failed - Recent (12 mo) or future (30 days) visit within the authorizing provider's specialty     Patient has had an office visit with the authorizing provider or a provider within the authorizing providers department within the previous 12 mos or has a future within next 30 days. See \"Patient Info\" tab in inbasket, or \"Choose Columns\" in Meds & Orders section of the refill encounter.              Passed - Medication is active on med list        Passed - Patient is 18 years of age or older           Last Written Prescription Date:  04/09/2019  Last Fill Quantity: 180,   # refills: 3  Last Office Visit: 02/05/2019 Jason Fernando MD  Future Office visit:   None noted  Nursing home patient.  Unable to complete prescription refill per RN medication refill policy. Will route to provider for review and consideration.  Isidra Arteaga RN on 3/17/2020 at 1:55 PM            "

## 2020-04-28 DIAGNOSIS — E53.8 VITAMIN B12 DEFICIENCY (NON ANEMIC): ICD-10-CM

## 2020-04-28 RX ORDER — UREA 10 %
500 LOTION (ML) TOPICAL DAILY
Qty: 90 TABLET | Refills: 0 | Status: SHIPPED | OUTPATIENT
Start: 2020-04-28 | End: 2020-07-27

## 2020-04-28 NOTE — LETTER
April 28, 2020      Juan Miguel Delaney  [address]   [facility] [facility] [place] [facility] [id number]        Dear Juan Miguel,     A refill of Vitamin B 12 has been requested by your pharmacy.  We noticed that it has been greater than 12 months since your last comprehensive visit and labs with Dr. Fernando.  A limited 90 day supply has been sent to your pharmacy at this time.    Additional refills require a medication management appointment.  Your health is very important to us.  Please call the clinic at [phone] to schedule your appointment.      Thank you,    The Refill Nurse  [facility]

## 2020-04-28 NOTE — TELEPHONE ENCOUNTER
A LIMITED 90 day refill will be provided to patient; patient contacted via letter to set up appointment with PCP. Prescription refilled per RN Medication Refill Policy.................... Drea Zapata RN ....................  4/28/2020   2:14 PM    Last refill  Vitamin B 12  3/26/2019  90# 3 Refills    LOV:2/5/2020  No future appts Drea Zapata RN on 4/28/2020 at 2:13 PM

## 2020-07-14 ENCOUNTER — NURSING HOME VISIT (OUTPATIENT)
Dept: GERIATRICS | Facility: OTHER | Age: 85
End: 2020-07-14
Attending: NURSE PRACTITIONER
Payer: COMMERCIAL

## 2020-07-14 DIAGNOSIS — Z53.9 ERRONEOUS ENCOUNTER--DISREGARD: Primary | ICD-10-CM

## 2020-07-21 ENCOUNTER — NURSING HOME VISIT (OUTPATIENT)
Dept: GERIATRICS | Facility: OTHER | Age: 85
End: 2020-07-21
Attending: NURSE PRACTITIONER
Payer: COMMERCIAL

## 2020-07-21 DIAGNOSIS — G30.1 LATE ONSET ALZHEIMER'S DISEASE WITHOUT BEHAVIORAL DISTURBANCE (H): ICD-10-CM

## 2020-07-21 DIAGNOSIS — I10 BENIGN ESSENTIAL HYPERTENSION: ICD-10-CM

## 2020-07-21 DIAGNOSIS — R00.1 BRADYCARDIA: Primary | ICD-10-CM

## 2020-07-21 DIAGNOSIS — Z79.899 ENCOUNTER FOR MEDICATION REVIEW: ICD-10-CM

## 2020-07-21 DIAGNOSIS — I51.89 GRADE II DIASTOLIC DYSFUNCTION: Chronic | ICD-10-CM

## 2020-07-21 DIAGNOSIS — R55 POSTURAL DIZZINESS WITH PRESYNCOPE: ICD-10-CM

## 2020-07-21 DIAGNOSIS — F02.80 LATE ONSET ALZHEIMER'S DISEASE WITHOUT BEHAVIORAL DISTURBANCE (H): ICD-10-CM

## 2020-07-21 DIAGNOSIS — R42 POSTURAL DIZZINESS WITH PRESYNCOPE: ICD-10-CM

## 2020-07-21 DIAGNOSIS — N18.30 CKD (CHRONIC KIDNEY DISEASE) STAGE 3, GFR 30-59 ML/MIN (H): ICD-10-CM

## 2020-07-24 DIAGNOSIS — E53.8 VITAMIN B12 DEFICIENCY (NON ANEMIC): ICD-10-CM

## 2020-07-26 VITALS
DIASTOLIC BLOOD PRESSURE: 68 MMHG | SYSTOLIC BLOOD PRESSURE: 130 MMHG | OXYGEN SATURATION: 96 % | RESPIRATION RATE: 16 BRPM | HEART RATE: 48 BPM

## 2020-07-27 DIAGNOSIS — R55 PRE-SYNCOPE: ICD-10-CM

## 2020-07-27 DIAGNOSIS — R00.1 BRADYCARDIA: Primary | ICD-10-CM

## 2020-07-27 RX ORDER — UREA 10 %
500 LOTION (ML) TOPICAL DAILY
Qty: 90 TABLET | Refills: 3 | Status: SHIPPED | OUTPATIENT
Start: 2020-07-27 | End: 2021-07-27

## 2020-07-27 NOTE — TELEPHONE ENCOUNTER
Routing refill request to provider for review/approval because:  OV 7/21/2020 NH with Lilly MILES; 2/5/2019    Sary Rogers RN on 7/27/2020 at 9:05 AM

## 2020-07-28 ENCOUNTER — RESULTS ONLY (OUTPATIENT)
Dept: LAB | Age: 85
End: 2020-07-28

## 2020-07-28 ENCOUNTER — MEDICAL CORRESPONDENCE (OUTPATIENT)
Dept: HEALTH INFORMATION MANAGEMENT | Facility: OTHER | Age: 85
End: 2020-07-28

## 2020-07-28 ENCOUNTER — NURSING HOME VISIT (OUTPATIENT)
Dept: GERIATRICS | Facility: OTHER | Age: 85
End: 2020-07-28
Attending: NURSE PRACTITIONER
Payer: MEDICARE

## 2020-07-28 ENCOUNTER — HOSPITAL ENCOUNTER (OUTPATIENT)
Dept: CARDIOLOGY | Facility: OTHER | Age: 85
End: 2020-07-28
Attending: NURSE PRACTITIONER
Payer: MEDICARE

## 2020-07-28 VITALS — OXYGEN SATURATION: 95 % | SYSTOLIC BLOOD PRESSURE: 144 MMHG | DIASTOLIC BLOOD PRESSURE: 62 MMHG | HEART RATE: 53 BPM

## 2020-07-28 DIAGNOSIS — Z79.899 ENCOUNTER FOR MEDICATION REVIEW: ICD-10-CM

## 2020-07-28 DIAGNOSIS — R00.1 BRADYCARDIA: ICD-10-CM

## 2020-07-28 DIAGNOSIS — R55 PRE-SYNCOPE: ICD-10-CM

## 2020-07-28 DIAGNOSIS — G30.1 LATE ONSET ALZHEIMER'S DISEASE WITHOUT BEHAVIORAL DISTURBANCE (H): ICD-10-CM

## 2020-07-28 DIAGNOSIS — N18.30 CKD (CHRONIC KIDNEY DISEASE) STAGE 3, GFR 30-59 ML/MIN (H): ICD-10-CM

## 2020-07-28 DIAGNOSIS — I10 ESSENTIAL (PRIMARY) HYPERTENSION: Primary | ICD-10-CM

## 2020-07-28 DIAGNOSIS — F02.80 LATE ONSET ALZHEIMER'S DISEASE WITHOUT BEHAVIORAL DISTURBANCE (H): ICD-10-CM

## 2020-07-28 LAB
ALBUMIN SERPL-MCNC: 4.1 G/DL (ref 3.5–5.7)
ALP SERPL-CCNC: 38 U/L (ref 34–104)
ALT SERPL W P-5'-P-CCNC: 13 U/L (ref 7–52)
ANION GAP SERPL CALCULATED.3IONS-SCNC: 6 MMOL/L (ref 3–14)
AST SERPL W P-5'-P-CCNC: 15 U/L (ref 13–39)
BASOPHILS # BLD AUTO: 0.1 10E9/L (ref 0–0.2)
BASOPHILS NFR BLD AUTO: 1 %
BILIRUB SERPL-MCNC: 0.8 MG/DL (ref 0.3–1)
BUN SERPL-MCNC: 20 MG/DL (ref 7–25)
CALCIUM SERPL-MCNC: 9.4 MG/DL (ref 8.6–10.3)
CHLORIDE SERPL-SCNC: 106 MMOL/L (ref 98–107)
CO2 SERPL-SCNC: 29 MMOL/L (ref 21–31)
CREAT SERPL-MCNC: 1.36 MG/DL (ref 0.7–1.3)
DIFFERENTIAL METHOD BLD: NORMAL
EOSINOPHIL # BLD AUTO: 0.1 10E9/L (ref 0–0.7)
EOSINOPHIL NFR BLD AUTO: 1.8 %
ERYTHROCYTE [DISTWIDTH] IN BLOOD BY AUTOMATED COUNT: 12.9 % (ref 10–15)
GFR SERPL CREATININE-BSD FRML MDRD: 49 ML/MIN/{1.73_M2}
GLUCOSE SERPL-MCNC: 78 MG/DL (ref 70–105)
HCT VFR BLD AUTO: 47.6 % (ref 40–53)
HGB BLD-MCNC: 15.9 G/DL (ref 13.3–17.7)
IMM GRANULOCYTES # BLD: 0 10E9/L (ref 0–0.4)
IMM GRANULOCYTES NFR BLD: 0.3 %
LYMPHOCYTES # BLD AUTO: 1.1 10E9/L (ref 0.8–5.3)
LYMPHOCYTES NFR BLD AUTO: 17.6 %
MCH RBC QN AUTO: 29.8 PG (ref 26.5–33)
MCHC RBC AUTO-ENTMCNC: 33.4 G/DL (ref 31.5–36.5)
MCV RBC AUTO: 89 FL (ref 78–100)
MONOCYTES # BLD AUTO: 0.6 10E9/L (ref 0–1.3)
MONOCYTES NFR BLD AUTO: 10.7 %
NEUTROPHILS # BLD AUTO: 4.1 10E9/L (ref 1.6–8.3)
NEUTROPHILS NFR BLD AUTO: 68.6 %
PLATELET # BLD AUTO: 184 10E9/L (ref 150–450)
POTASSIUM SERPL-SCNC: 5.5 MMOL/L (ref 3.5–5.1)
PROT SERPL-MCNC: 5.8 G/DL (ref 6.4–8.9)
RBC # BLD AUTO: 5.33 10E12/L (ref 4.4–5.9)
SODIUM SERPL-SCNC: 141 MMOL/L (ref 134–144)
TSH SERPL DL<=0.05 MIU/L-ACNC: 2.99 IU/ML (ref 0.34–5.6)
VIT B12 SERPL-MCNC: 603 PG/ML (ref 180–914)
WBC # BLD AUTO: 6 10E9/L (ref 4–11)

## 2020-07-28 PROCEDURE — 0296T LEADLESS EKG MONITOR 3 TO 14 DAYS: CPT

## 2020-07-28 PROCEDURE — 0298T LEADLESS EKG MONITOR 3 TO 14 DAYS: CPT | Performed by: INTERNAL MEDICINE

## 2020-07-28 RX ORDER — AMLODIPINE BESYLATE 5 MG/1
5 TABLET ORAL DAILY
Qty: 90 TABLET | Refills: 3 | Status: ON HOLD | OUTPATIENT
Start: 2020-07-28 | End: 2021-05-11

## 2020-07-28 NOTE — PATIENT INSTRUCTIONS
Patient instructed not to:   -take baths, swim, sauna   -remove device prior to 7 days   -use electric blankets   -shower or sweat excessively within first 24 hours of device application  Patient instructed to:   -shower as needed   -be carefull when toweling off and dressing   -press button when cardiac symptoms occur   -document symptoms in log book   -remove and return device (send via mail to Jelli)   -Call [employer] with any questions

## 2020-08-01 NOTE — PROGRESS NOTES
Juan Miguel Delaney is a 88 year old male being seen today for follow up visit visit at [facility].    Code Status: DNR / DNI.   Health Care Power of : Extended Emergency Contact Information  Primary Emergency Contact: Criss Delaney   [facility]  Home Phone: [phone]  Relation: Spouse  Secondary Emergency Contact: Kavita Hassan   [facility]  Home Phone: [phone]  Mobile Phone: [phone]  Relation: Daughter     Allergies: Lisinopril; Penicillins; Rofecoxib; Celecoxib; and Ibuprofen     Chief Complaint / HPI: Follow-up with resident from last week to review labs that were completed  Showing mild renal insufficiency stage III kidney disease unchanged from previous labs, mild elevation in potassium  Is not on any medications that would contribute may be due to hydration or random lab result  Not symptomatic  The other issue is a chronic history of bradycardia--incidentally had noticed this while doing yearly med review for facility yearly medication refills and had noticed several bradycardic heart rates documented in the 40s to 50s  Clinic heart rates 50s  Chart review had an admission year and a half ago for atypical chest pain with hypertension  Has been on amlodipine 5 mg which has been effective--had outpatient Marcy scan stress testing which did not show any evidence of ischemia--did not have any event monitoring that I am able to see in [facility] staff report an episode at the dinner table a few weeks ago where the resident became presyncopal--was given a couple of glasses of water and elevation of legs and return to baseline after an hour or so blood pressure was stable heart rate bradycardic within baseline range    I did talked with the resident and his wife last week about event monitor for 1 week--resident was very agreeable and so was his wife    Brought ZIO Patch and placed for 1 week--gave resident directions however with memory loss wife is main  caregiver and overseas care      Past Medical, Surgical, Family and Social History reviewed: YES.     Medications: reviewed and reconciled  Medications - recent changes: none    Review of Systems:  General: negative  Skin: negative  Resp: negative  CV: positive for bradycardia  : negative  Musculoskeletal: negative  Psychiatric: positive for memory loss    Toileting:    Continent of Bowel: Yes   Continent of Bladder: Yes  Mobility: ambulatory    Recent Labs: reviewed    Current Therapies: none    Exam:  Vital signs reviewed.   GENERAL APPEARANCE:  alert and no distress  EYES: Eyes grossly normal to inspection  RESP: lungs clear   CV: regular bradycardic rate and rhythm, normal S1 S2, no peripheral edema and peripheral pulses strong  MS: no musculoskeletal defects are noted and gait is age appropriate without ataxia  SKIN: no suspicious lesions or rashes  PSYCH: Alert, social, memory loss and affect normal/bright    Assessment and Plan:    We will review 1 week ZIO patch results with resident when available--wondering if this is due to sleep apnea,  Chronic baseline, compensated block ?  Nevertheless this is been a chronic issue--the resident and his wife do not want any invasive therapies but if there is some conservative measure they would be very interested  There are no contributory medications    Reviewed lab results with patient and wife  Will recheck renal function in 3 months to monitor and will recheck potassium level next facility day      (I10) Essential (primary) hypertension  (primary encounter diagnosis)    Plan: amLODIPine (NORVASC) 5 MG tablet            (R00.1) Bradycardia    (Z79.899) Encounter for medication review         (N18.3) CKD (chronic kidney disease) stage 3, GFR 30-59 ml/min (H)

## 2020-08-11 ENCOUNTER — APPOINTMENT (OUTPATIENT)
Dept: LAB | Facility: OTHER | Age: 85
End: 2020-08-11
Attending: NURSE PRACTITIONER
Payer: MEDICARE

## 2020-08-11 ENCOUNTER — MEDICAL CORRESPONDENCE (OUTPATIENT)
Dept: HEALTH INFORMATION MANAGEMENT | Facility: OTHER | Age: 85
End: 2020-08-11

## 2020-08-11 ENCOUNTER — RESULTS ONLY (OUTPATIENT)
Dept: LAB | Age: 85
End: 2020-08-11

## 2020-08-11 LAB — POTASSIUM SERPL-SCNC: 4.2 MMOL/L (ref 3.5–5.1)

## 2020-09-01 ENCOUNTER — NURSING HOME VISIT (OUTPATIENT)
Dept: GERIATRICS | Facility: OTHER | Age: 85
End: 2020-09-01
Attending: NURSE PRACTITIONER
Payer: COMMERCIAL

## 2020-09-01 DIAGNOSIS — Z53.9 ERRONEOUS ENCOUNTER--DISREGARD: Primary | ICD-10-CM

## 2020-09-08 ENCOUNTER — NURSING HOME VISIT (OUTPATIENT)
Dept: GERIATRICS | Facility: OTHER | Age: 85
End: 2020-09-08
Attending: NURSE PRACTITIONER
Payer: MEDICARE

## 2020-09-08 DIAGNOSIS — Z53.9 ERRONEOUS ENCOUNTER--DISREGARD: Primary | ICD-10-CM

## 2020-09-22 ENCOUNTER — NURSING HOME VISIT (OUTPATIENT)
Dept: GERIATRICS | Facility: OTHER | Age: 85
End: 2020-09-22
Attending: NURSE PRACTITIONER
Payer: COMMERCIAL

## 2020-09-22 DIAGNOSIS — Z53.9 ERRONEOUS ENCOUNTER--DISREGARD: Primary | ICD-10-CM

## 2020-10-13 ENCOUNTER — NURSING HOME VISIT (OUTPATIENT)
Dept: GERIATRICS | Facility: OTHER | Age: 85
End: 2020-10-13
Attending: NURSE PRACTITIONER
Payer: COMMERCIAL

## 2020-10-13 DIAGNOSIS — M25.552 HIP PAIN, LEFT: Primary | ICD-10-CM

## 2020-10-13 DIAGNOSIS — M53.3 CHRONIC LEFT SI JOINT PAIN: ICD-10-CM

## 2020-10-13 DIAGNOSIS — G30.1 LATE ONSET ALZHEIMER'S DISEASE WITHOUT BEHAVIORAL DISTURBANCE (H): ICD-10-CM

## 2020-10-13 DIAGNOSIS — G89.29 CHRONIC LEFT SI JOINT PAIN: ICD-10-CM

## 2020-10-13 DIAGNOSIS — F02.80 LATE ONSET ALZHEIMER'S DISEASE WITHOUT BEHAVIORAL DISTURBANCE (H): ICD-10-CM

## 2020-10-13 RX ORDER — ACETAMINOPHEN 500 MG
1000 TABLET ORAL 2 TIMES DAILY
Qty: 120 TABLET | Refills: 3 | Status: SHIPPED | OUTPATIENT
Start: 2020-10-13 | End: 2021-03-12

## 2020-10-17 NOTE — PROGRESS NOTES
Juan Miguel Delaney is a 88 year old male being seen today for comprehensive and follow up visit visit at [facility].    Code Status: DNR / DNI.   Health Care Power of : Extended Emergency Contact Information  Primary Emergency Contact: Criss Delaney   [facility]  Home Phone: [phone]  Relation: Spouse  Secondary Emergency Contact: Kavita Hassan   [facility]  Home Phone: [phone]  Mobile Phone: [phone]  Relation: Daughter     Allergies: Lisinopril, Penicillins, Rofecoxib, Celecoxib, and Ibuprofen     Chief Complaint / HPI: Nursing staff request visit for observed left hip pain when walking--- resident usually walks the hallways for exercise several times a day but favoring left hip--no fall history    Chart review history significant for spinal stenosis, lumbar laminectomy 2015, SI joint arthritis has had a couple of injections left side 2017--resident does not recall if they have been effective  Wife is very helpful with providing history--there have been no falls or injury    History of chronic bradycardia--had attempted ZIO patch--however resident forgot purpose and took it off in 24 hours  Was read as generally Sinus Rhythm. There have been no presyncopal or syncopal episodes--may have ELZBIETA  Does not want any further evaluation.        Past Medical, Surgical, Family and Social History reviewed: YES.     Medications: reviewed  Medications - recent changes: none    Review of Systems:  General: negative  Skin: negative  Resp: negative  CV: positive for Bradycardia  GI: negative  : negative  Musculoskeletal: positive for arthritis and joint pain  Psychiatric: positive for Memory loss    Toileting:    Continent of Bowel: Yes   Continent of Bladder: Yes  Mobility: independent    Recent Labs: reviewed      Current Therapies: none    Exam:  Vital signs reviewed.   GENERAL APPEARANCE: healthy, alert and no distress  EYES: Eyes grossly normal to inspection, PERRL and conjunctivae and  sclerae normal  RESP: lungs clear to auscultation - no rales, rhonchi or wheezes  CV: regular rates and rhythm, normal S1 S2, no S3 or S4, no murmur, click or rub, no peripheral edema and peripheral pulses strong  ABDOMEN: soft, nontender, no hepatosplenomegaly, no masses and bowel sounds normal  MS: No reproducible palpable tenderness lower back, hip and groin--no focal erythema or warmth  Ambulates independently--however apparent favoring left hip  No lower extremity weakness, foot drop, ROM, CMS  DTRs equal and brisk  No radiating lower extremity pain--- points to anterior left trochanteric area however no reproducible tenderness in or around that area  SKIN: no suspicious lesions or rashes  PSYCH: mentation appears normal and affect normal/bright    Assessment and Plan:    Differentials include osteoarthritis, trochanteric bursitis--- significant history of left SI joint arthritis  However pain distribution area anterior hip--denies any back pain or SI joint related pain  No tenderness with SI joint palpation    He is ambulating independently without any difficulty bearing weight    Resident is allergic to NSAIDs  Will trial scheduled Tylenol, ice or heat as comfortable--discussed trying topicals  Would like to try Tylenol initially at this time    Able to secure an appointment with Dr. Ngo sports orthopedics for further evaluation Monday 10/19/2020  Spoke with Daughter Chandrika who will arrange for family transportation--would be beneficial to have family member accompany  With memory loss       Will order outpatient PT at facility and evaluation for walker and walker training    (M25.742) Hip pain, left  (primary encounter diagnosis)    Plan: acetaminophen (TYLENOL) 500 MG tablet            (M53.3,  G89.29) Chronic left SI joint pain  Past history      (G30.1,  F02.80) Late onset Alzheimer's disease without behavioral disturbance (H)

## 2020-10-19 ENCOUNTER — OFFICE VISIT (OUTPATIENT)
Dept: FAMILY MEDICINE | Facility: OTHER | Age: 85
End: 2020-10-19
Attending: FAMILY MEDICINE
Payer: MEDICARE

## 2020-10-19 ENCOUNTER — HOSPITAL ENCOUNTER (OUTPATIENT)
Dept: GENERAL RADIOLOGY | Facility: OTHER | Age: 85
End: 2020-10-19
Attending: FAMILY MEDICINE
Payer: MEDICARE

## 2020-10-19 VITALS
SYSTOLIC BLOOD PRESSURE: 128 MMHG | DIASTOLIC BLOOD PRESSURE: 70 MMHG | TEMPERATURE: 97.3 F | WEIGHT: 186.6 LBS | HEART RATE: 56 BPM | OXYGEN SATURATION: 99 % | BODY MASS INDEX: 31.09 KG/M2 | RESPIRATION RATE: 20 BRPM | HEIGHT: 65 IN

## 2020-10-19 DIAGNOSIS — M25.552 HIP PAIN, LEFT: Primary | ICD-10-CM

## 2020-10-19 DIAGNOSIS — M25.552 HIP PAIN, LEFT: ICD-10-CM

## 2020-10-19 PROCEDURE — G0463 HOSPITAL OUTPT CLINIC VISIT: HCPCS

## 2020-10-19 PROCEDURE — 99213 OFFICE O/P EST LOW 20 MIN: CPT | Performed by: FAMILY MEDICINE

## 2020-10-19 PROCEDURE — 73502 X-RAY EXAM HIP UNI 2-3 VIEWS: CPT

## 2020-10-19 RX ORDER — NAPROXEN 500 MG/1
500 TABLET ORAL 2 TIMES DAILY WITH MEALS
Qty: 60 TABLET | Refills: 3 | Status: SHIPPED | OUTPATIENT
Start: 2020-10-19 | End: 2020-10-20

## 2020-10-19 ASSESSMENT — MIFFLIN-ST. JEOR: SCORE: 1443.29

## 2020-10-19 ASSESSMENT — PAIN SCALES - GENERAL: PAINLEVEL: WORST PAIN (10)

## 2020-10-19 NOTE — NURSING NOTE
Patient presents today for left hip pain. Patient complains his pain has worsened over the last few weeks.    Medication Reconciliation Complete    Ashley Cornelius LPN  10/19/2020 2:53 PM

## 2020-10-19 NOTE — PROGRESS NOTES
"SUBJECTIVE:  Juan Miguel Delaney is a 88 year old male here for left hip pain.  He reports that he has had left hip pain over the last 4 to 6 weeks.  He does not recall any particular trauma or injury that brought this on.  He describes the pain as being posterior.  It seems to bother him most when he is going up and down stairs.  He tries to be quite active at his assisted living facility.  He has not training for this at home.    ROS:    As above otherwise ROS is unremarkable.    OBJECTIVE:  /70   Pulse 56   Temp 97.3  F (36.3  C)   Resp 20   Ht 1.651 m (5' 5\")   Wt 84.6 kg (186 lb 9.6 oz)   SpO2 99%   BMI 31.05 kg/m      EXAM:  General Appearance: Pleasant, alert, appropriate appearance for age. No acute distress  Musculoskeletal: Left hip shows flexion of 90 degrees internal rotation 10 degrees and external rotation 45 degrees all of which is not painful.  His tenderness is posterior to his hip.  No tenderness over his greater trochanter.  No tenderness over his SI joint.  No pain with logrolling.  Neurologic Exam: Antalgic gait due to pain.    ASSESSEMENT AND PLAN:    1. Hip pain, left      X-ray of his left hip shows no acute bony abnormality.  Very minimal degenerative changes.  Discussed that I suspect his symptoms are due to bursitis.  We will have him use naproxen twice daily with meals for the next 7 to 10 days in addition to setting him up with home care for physical therapy.    Tahir Ngo MD    This document was prepared using voice generated software.  While every attempt was made for accuracy, grammatical errors may exist.                Documentation of a Face-to-Face Physician Encounter October 19, 2020    Juan Miguel Delaney  6/21/1932  [id number]    I certify that this patient is under my care and that I, or a nurse practitioner or physician's assistant working with me, had a face-to-face encounter that meets the physician face-to-face encounter requirements with this patient on: " 10/19/2020.    The encounter with the patient was in whole, or in part, for the following medical condition, which is the primary reason for home health care:  Encounter Diagnosis   Name Primary?     Hip pain, left Yes       I certify that, based on my findings, the following services are medically necessary home health services: Physical Therapy    My clinical findings support the need for the above services because: Bursitis, left hip pain    Further, I certify that my clinical findings support that this patient is homebound (i.e. absences from home require considerable and taxing effort and are for medical reasons or [facility] services or infrequently or of short duration when for other reasons) because: Does not drive, dementia.      Physician signature ___________________________________   October 19, 2020  Physician name: Chano Ngo    Fax ([phone]) or scan/email ([email or web address]) this completed document to [facility] within 24 hours of the referral date.  Questions: [phone].

## 2020-10-19 NOTE — PATIENT INSTRUCTIONS
Take naproxen 500mg twice daily with meals for 10 days, then twice daily as needed.    Home care (PT) has been set up and they will contact you to help set this up.

## 2020-10-20 ENCOUNTER — NURSING HOME VISIT (OUTPATIENT)
Dept: GERIATRICS | Facility: OTHER | Age: 85
End: 2020-10-20
Attending: NURSE PRACTITIONER
Payer: COMMERCIAL

## 2020-10-20 DIAGNOSIS — M53.3 CHRONIC LEFT SACROILIAC PAIN: ICD-10-CM

## 2020-10-20 DIAGNOSIS — F02.80 LATE ONSET ALZHEIMER'S DISEASE WITHOUT BEHAVIORAL DISTURBANCE (H): Primary | ICD-10-CM

## 2020-10-20 DIAGNOSIS — Z78.9 LIVING IN ASSISTED LIVING: ICD-10-CM

## 2020-10-20 DIAGNOSIS — G30.1 LATE ONSET ALZHEIMER'S DISEASE WITHOUT BEHAVIORAL DISTURBANCE (H): Primary | ICD-10-CM

## 2020-10-20 DIAGNOSIS — Z98.890 S/P LUMBAR LAMINECTOMY: ICD-10-CM

## 2020-10-20 DIAGNOSIS — G89.29 CHRONIC LEFT SACROILIAC PAIN: ICD-10-CM

## 2020-10-20 DIAGNOSIS — M25.552 HIP PAIN, LEFT: ICD-10-CM

## 2020-10-20 RX ORDER — NAPROXEN 500 MG/1
TABLET ORAL
Qty: 60 TABLET | Refills: 1 | Status: SHIPPED | OUTPATIENT
Start: 2020-10-20 | End: 2020-10-27

## 2020-10-23 ENCOUNTER — TELEPHONE (OUTPATIENT)
Dept: INTERNAL MEDICINE | Facility: OTHER | Age: 85
End: 2020-10-23

## 2020-10-23 NOTE — TELEPHONE ENCOUNTER
Dr Fernando,     Just wanted to give you and update that client declined home care services for PT due to left hip pain/bursitis today 10/23. I encouraged him, to call and reach out to you if he changes his mind.    Thanks for your time,   Kassidy Gaona RN

## 2020-10-25 NOTE — PROGRESS NOTES
Juan Miguel Delaney is a 88 year old male being seen today for comprehensive and follow up visit visit at [facility].    Code Status: DNR / DNI.   Health Care Power of : Extended Emergency Contact Information  Primary Emergency Contact: Criss Delaney   [facility]  Home Phone: [phone]  Relation: Spouse  Secondary Emergency Contact: Kavita Hassan   [facility]  Home Phone: [phone]  Mobile Phone: [phone]  Relation: Daughter     Allergies: Lisinopril, Penicillins, Rofecoxib, Celecoxib, and Ibuprofen     Chief Complaint / HPI: Follow-up with resident who lives in assisted living facility with his wife  Has memory loss, wife is helpful with filling in gaps in history  Staff are concerned has been antalgic favoring left hip when seen walking in hallway--resident, wife  And staff do not report any fall or injury.    I had arranged an appointment which Juan Miguel and his children had attended with Dr. Ngo sports Ortho for evaluation yesterday, x-rays were negative  Had suggested starting Naprosyn scheduled and as needed  NSAIDs allergies documented in epic--however Juan Miguel is unable to remember details due to memory loss--- wife remembers rash--uncertain if hives to ibuprofen  Home care PT ordered for evaluation and walker training--orthopedic sports medicine note reviewed  [place] due to bursitis/arthritis follow-up if not improving with PT and responding to Naprosyn      Does not remember any history of left hip pain--- chart review positive for left SI joint arthritis and pain has had image guided injections in the past--- resident uncertain if these have been effective  Also has a positive history of a lumbar laminectomy 2015  Reviewing history appears has had bilateral knee arthroscopy--possibly arthroplasty 15+ years ago ?    Resident is very pleasant, socially interactive enjoys reminiscing and telling stories.    Past Medical, Surgical, Family and Social History reviewed: YES.     Medications:  reviewed and reconciled  Medications - recent changes: none    Review of Systems:  General: positive for weakness  Skin: negative  Eyes: negative  Resp: negative  CV: positive for bradycardia  GI: negative  : negative  Musculoskeletal: positive for arthritis, joint pain and muscular weakness  Psychiatric: positive for memory loss    Toileting:    Continent of Bowel: Yes   Continent of Bladder: Yes  Mobility: independent    Recent Labs: reviewed      Current Therapies: none    Exam:  Vital signs reviewed.   GENERAL APPEARANCE: alert and no distress  EYES: Eyes grossly normal to inspection  RESP: lungs clear   CV: regular rate and rhythm, normal S1 S2, no S3 or S4, no murmur, click or rub, no peripheral edema   MS: no musculoskeletal defects are noted and gait is antalgic favoring left hip  I am unable to detect any reproducible pain left hip, SI joint and lower back  There is no focal erythema warmth or edema  CMS intact  No discomfort sitting position    SKIN: warm and dry  PSYCH: Alert,appears normal and affect normal/brighty, aware of memory gaps    Assessment and Plan:    His wife is uncertain about his NSAIDs reaction details  Is willing to trial a dose with food and observe    Called [facility] home care plan to admit to therapy services Friday  Would like walker recommendations    Staff will monitor and provide feedback PRN--if not improving  Will communicate with Dr Ngo on next plan--- would consider injection if no response with physical therapy services    Would benefit from walker and training for stability-feel he is at increased risk of falling    (G30.1,  F02.80) Late onset Alzheimer's disease without behavioral disturbance (H)  (primary encounter diagnosis)    (M25.552) Hip pain, left    Plan: naproxen (NAPROSYN) 500 MG tablet            (Z98.890) S/P lumbar laminectomy    (M53.3,  G89.29) Chronic left sacroiliac pain      (Z59.3) Living in assisted living

## 2020-10-27 ENCOUNTER — NURSING HOME VISIT (OUTPATIENT)
Dept: GERIATRICS | Facility: OTHER | Age: 85
End: 2020-10-27
Attending: NURSE PRACTITIONER
Payer: COMMERCIAL

## 2020-10-27 DIAGNOSIS — M25.552 HIP PAIN, LEFT: ICD-10-CM

## 2020-10-27 DIAGNOSIS — Z79.899 ENCOUNTER FOR MEDICATION REVIEW: Primary | ICD-10-CM

## 2020-10-27 DIAGNOSIS — F02.80 LATE ONSET ALZHEIMER'S DISEASE WITHOUT BEHAVIORAL DISTURBANCE (H): ICD-10-CM

## 2020-10-27 DIAGNOSIS — G30.1 LATE ONSET ALZHEIMER'S DISEASE WITHOUT BEHAVIORAL DISTURBANCE (H): ICD-10-CM

## 2020-10-27 RX ORDER — NAPROXEN 500 MG/1
TABLET ORAL
Qty: 60 TABLET | Refills: 1 | Status: SHIPPED | OUTPATIENT
Start: 2020-10-27 | End: 2020-11-03

## 2020-11-01 NOTE — PROGRESS NOTES
Juan Miguel Delaney is a 88 year old male being seen today for follow up visit visit at [facility].    Code Status: DNR / DNI.   Health Care Power of : Extended Emergency Contact Information  Primary Emergency Contact: Criss Delaney   [facility]  Home Phone: [phone]  Relation: Spouse  Secondary Emergency Contact: Kavita Hassan   [facility]  Home Phone: [phone]  Mobile Phone: [phone]  Relation: Daughter     Allergies: Lisinopril, Penicillins, Rofecoxib, Celecoxib, and Ibuprofen     Chief Complaint / HPI: Follow-up on toleration of Naprosyn has been taking as needed with food tolerating well denies any GI pain or upset and no rash or evidence of allergy  Feels has been helpful for left hip pain    Has not started physical therapy yet--[facility] therapy had approached resident to start services and resident declined, however resident does not remember the conversation and does not remember the office visit when this was recommended.  His wife would like him to try the therapy--- he does not want to use a walker  There have not been any reported falls    Past Medical, Surgical, Family and Social History reviewed: YES.     Medications: reviewed and reconciled  Medications - recent changes: naproxen    Review of Systems:  General: negative  Skin: negative  Resp: negative  Musculoskeletal: positive for arthritis  Psychiatric: positive for memory loss    Toileting:    Continent of Bowel: Yes   Continent of Bladder: Yes  Mobility: ambulatory    Recent Labs: reviewed      Current Therapies: none    Exam:  Vital signs reviewed.   GENERAL APPEARANCE: alert and no distress  EYES: Eyes grossly normal to inspection  RESP: lungs clear   MS: no musculoskeletal defects are noted and gait is age appropriate  SKIN: warm and dry  PSYCH: Alert, memory loss, pleasant    Assessment and Plan:    We will try scheduling Naprosyn twice daily with food and follow-up on progress next  week    [facility] will approach resident for home care or outpatient physical therapy services  With wife present--to be determined.    (Z79.780) Encounter for medication review  (primary encounter diagnosis)    (M25.872) Hip pain, left  Plan: naproxen (NAPROSYN) 500 MG tablet            (G30.1,  F02.80) Late onset Alzheimer's disease without behavioral disturbance (H)

## 2020-11-03 ENCOUNTER — NURSING HOME VISIT (OUTPATIENT)
Dept: GERIATRICS | Facility: OTHER | Age: 85
End: 2020-11-03
Attending: NURSE PRACTITIONER
Payer: COMMERCIAL

## 2020-11-03 DIAGNOSIS — G30.1 LATE ONSET ALZHEIMER'S DISEASE WITHOUT BEHAVIORAL DISTURBANCE (H): ICD-10-CM

## 2020-11-03 DIAGNOSIS — M25.552 HIP PAIN, LEFT: Primary | ICD-10-CM

## 2020-11-03 DIAGNOSIS — F02.80 LATE ONSET ALZHEIMER'S DISEASE WITHOUT BEHAVIORAL DISTURBANCE (H): ICD-10-CM

## 2020-11-03 RX ORDER — NAPROXEN 500 MG/1
TABLET ORAL
Qty: 60 TABLET | Refills: 1 | Status: SHIPPED | OUTPATIENT
Start: 2020-11-03 | End: 2020-11-16

## 2020-11-08 NOTE — PROGRESS NOTES
Juan Miguel Delaney is a 88 year old male being seen today for follow up visit visit at [facility].    Code Status: DNR / DNI.   Health Care Power of : Extended Emergency Contact Information  Primary Emergency Contact: Criss Delaney   [facility]  Home Phone: [phone]  Relation: Spouse  Secondary Emergency Contact: Kavita Hassan   [facility]  Home Phone: [phone]  Mobile Phone: [phone]  Relation: Daughter     Allergies: Lisinopril, Penicillins, Rofecoxib, Celecoxib, and Ibuprofen     Chief Complaint / HPI: Followup on Left hip pain--has been using naproxen BID with food--wife feels its working well  Patient denies hip pain--has memory loss and limited history  Home care PT was ordered--however patient declines service when PT called  His wife feels he would benefit from therapy and feels he forgot about the treatment plan when he was seen by sports ortho  She requests PT and will talk with therapy with juan miguel present--Juan Miguel had forgotten about PT---Outpatient is an option  Juan Miguel does not want to use a walker--there is a walker in the apartment--parked against the wall  Juan Miguel is walking without antalgia--wife reports gait improved  No falls      Past Medical, Surgical, Family and Social History reviewed: YES.     Medications: reviewed  Medications - recent changes: naproxen BID with food for Hip Pain    Review of Systems:  General: negative  Resp: negative  Musculoskeletal: positive for arthritis and joint pain  Psychiatric: positive for memory loss    Toileting:    Continent of Bowel: Yes   Continent of Bladder: Yes  Mobility: ambulatory    Recent Labs: reviewed      Current Therapies: none    Exam:  Vital signs reviewed.   GENERAL APPEARANCE: alert and no distress  EYES: Eyes grossly normal to inspection  RESP: lungs clear   MS: no musculoskeletal defects are noted and gait is age appropriate without ataxia  PSYCH: Alert, pleasantly confused    Assessment and Plan:    Naproxen has been  effective--  Will continue once daily with food and additional dose daily PRN with food  Wife and resident agree to outpatient therapy which can be provided on site by progressive care    Will follow up in couple weeks on progress--if resolved--would change naproxen to PRN daily with food        (M25.552) Hip pain, left  (primary encounter diagnosis)    Plan: naproxen (NAPROSYN) 500 MG tablet            (G30.1,  F02.80) Late onset Alzheimer's disease without behavioral disturbance (H)

## 2020-11-10 ENCOUNTER — NURSING HOME VISIT (OUTPATIENT)
Dept: GERIATRICS | Facility: OTHER | Age: 85
End: 2020-11-10
Attending: NURSE PRACTITIONER
Payer: COMMERCIAL

## 2020-11-10 DIAGNOSIS — Z87.898 HISTORY OF EPISTAXIS: ICD-10-CM

## 2020-11-10 DIAGNOSIS — F02.80 LATE ONSET ALZHEIMER'S DISEASE WITHOUT BEHAVIORAL DISTURBANCE (H): ICD-10-CM

## 2020-11-10 DIAGNOSIS — G30.1 LATE ONSET ALZHEIMER'S DISEASE WITHOUT BEHAVIORAL DISTURBANCE (H): ICD-10-CM

## 2020-11-10 DIAGNOSIS — M25.552 HIP PAIN, LEFT: ICD-10-CM

## 2020-11-10 DIAGNOSIS — Z79.899 ENCOUNTER FOR MEDICATION REVIEW: Primary | ICD-10-CM

## 2020-11-10 NOTE — PROGRESS NOTES
Juan Miguel Delaney is a 88 year old male being seen today for follow up visit visit at [facility].    Code Status: DNR / DNI.   Health Care Power of : Extended Emergency Contact Information  Primary Emergency Contact: Criss Delaney   [facility]  Home Phone: [phone]  Relation: Spouse  Secondary Emergency Contact: Kavita Hassan   [facility]  Home Phone: [phone]  Mobile Phone: [phone]  Relation: Daughter     Allergies: Lisinopril, Penicillins, Rofecoxib, Celecoxib, and Ibuprofen     Chief Complaint / HPI: Followup requested for nose bleeds--did not have epistaxis during visit but nurse reports has had several since starting naproxen for hip pain--denies any GI adverse effects--was effective in reducing pain  Continues schedule tylenol  Very pleasant however short term memory loss has impeded getting established with PT as initially agreed with myself and Dr Ngo in clinic  Was unable to remember and would decline service. Resident and his wife are very agreeable with trying PT  And his wife plans to assist with getting established with therapy services.  Juan Miguel has not wanted to use his walker as he feels he does not need this--however he reports his gait and balance trigger his hip pain  Past history of SI joint arthritis pain--however no pain or distribution pattern over lower back or SI joint  No other health concerns raised today    Past Medical, Surgical, Family and Social History reviewed: YES.     Medications: reviewed  Medications - recent changes: Trial Naproxen--caused frequent nose bleeds--resolved when DC'd    Review of Systems:  General: negative  Skin: negative  Eyes: negative  Resp: negative  GI: negative  : negative  Musculoskeletal: positive for joint pain, joint stiffness and muscular weakness  Psychiatric: positive for memory loss    Toileting:    Continent of Bowel: Yes   Continent of Bladder: Yes  Mobility: ambulatory--does not feel he needs walker    Recent  Labs: reviewed      Current Therapies: none    Exam:  Vital signs reviewed.   GENERAL APPEARANCE:  alert and no distress  EYES: Eyes grossly normal to inspection  RESP: lungs clear   MS: no musculoskeletal defects are noted and gait is mildly antalgic favoring left hip  SKIN: warm and dry  PSYCH: Alert, pleasant, memory loss    Assessment and Plan:    Juan Miguel and his wife want to try PT  Plan PT will come tomorrow and nurse will accompany therapist to apartment to cue treatment plan  Recommend heating pad--as this has been helpful but difficulty remembering   Continue scheduled tylenol  discontinue naproxen for nose bleeds  Could try as an occasional PRN in the future    Ideally PT priority of initial treatment plan  I do have concerns with fall risk--Juan Miguel is adamant at this time he does not need his walker  Walker is parked against wall in apartment    Discussed and reviewed with Dr Miner Orthopedic medicine--if therapy is not effective  Would offer office visit to review and possibly consider image guided injection in office          (K86.881) Encounter for medication review  (primary encounter diagnosis)    (Z65.787) History of epistaxis      (G30.1,  F02.80) Late onset Alzheimer's disease without behavioral disturbance (H)      (M25.552) Hip pain, left

## 2020-11-11 ENCOUNTER — TRANSFERRED RECORDS (OUTPATIENT)
Dept: HEALTH INFORMATION MANAGEMENT | Facility: OTHER | Age: 85
End: 2020-11-11

## 2020-11-16 RX ORDER — NAPROXEN 500 MG/1
TABLET ORAL
Qty: 60 TABLET | Refills: 1 | Status: ON HOLD | COMMUNITY
Start: 2020-11-16 | End: 2021-05-03

## 2020-11-17 ENCOUNTER — NURSING HOME VISIT (OUTPATIENT)
Dept: GERIATRICS | Facility: OTHER | Age: 85
End: 2020-11-17
Attending: NURSE PRACTITIONER
Payer: MEDICARE

## 2020-11-17 DIAGNOSIS — Z53.9 ERRONEOUS ENCOUNTER--DISREGARD: Primary | ICD-10-CM

## 2020-12-15 ENCOUNTER — NURSING HOME VISIT (OUTPATIENT)
Dept: GERIATRICS | Facility: OTHER | Age: 85
End: 2020-12-15
Attending: NURSE PRACTITIONER
Payer: MEDICARE

## 2020-12-15 DIAGNOSIS — Z53.9 ERRONEOUS ENCOUNTER--DISREGARD: Primary | ICD-10-CM

## 2021-01-05 ENCOUNTER — NURSING HOME VISIT (OUTPATIENT)
Dept: GERIATRICS | Facility: OTHER | Age: 86
End: 2021-01-05
Attending: NURSE PRACTITIONER
Payer: COMMERCIAL

## 2021-01-05 DIAGNOSIS — G30.1 LATE ONSET ALZHEIMER'S DISEASE WITHOUT BEHAVIORAL DISTURBANCE (H): ICD-10-CM

## 2021-01-05 DIAGNOSIS — M25.552 HIP PAIN, LEFT: Primary | ICD-10-CM

## 2021-01-05 DIAGNOSIS — F02.80 LATE ONSET ALZHEIMER'S DISEASE WITHOUT BEHAVIORAL DISTURBANCE (H): ICD-10-CM

## 2021-01-05 NOTE — PROGRESS NOTES
Juan Miguel Delaney is a 88 year old male being seen today for follow up visit visit at [facility].    Code Status: DNR / DNI.   Health Care Power of : Extended Emergency Contact Information  Primary Emergency Contact: Criss Delaney   [facility]  Home Phone: [phone]  Relation: Spouse  Secondary Emergency Contact: Kavita Hassan   [facility]  Home Phone: [phone]  Mobile Phone: [phone]  Relation: Daughter     Allergies: Lisinopril, Naprosyn [naproxen], Penicillins, Rofecoxib, Celecoxib, and Ibuprofen     Chief Complaint / HPI: Persistent left hip pain that has not resolved with conservative therapies including NSAIDS, scheduled tylenol and PT.  Resident has severe memory loss which has complicated assessment of efficacy with conservative therapies.  Juan Miguel had forgotten that he was seen in the office initially in which diagnostics were completed and trial of NSAIDS and PT recommended.  When PT had contacted to start therapy Juan Miguel denied the services as he had forgotten what was discussed and agreed in the office prior.   I had a discussion again with Juan Miguel and his wife, who is able to help with reminders about trial of PT and possibly joint injection  As his pain was effecting daily mobility. [facility] therapy and nursing staff met with Juan Miguel and he was then agreeable with trial of Therapy.  Juan Miguel did work with PT for several weeks.    I did review options with medical orthopedics for an office visit and possibly image guided injection. The impression from nursing staff was  That minimizing office visits was best for Juan Miguel and his family that transports him. Juan Miguel has had imaged guided injections in the past couple of years for SI joint pain both sides and has done well with the procedure.    Physical therapy contacted me that progress was limited and had reached  plateau and recommended trial of joint injection for treatment of pain.    Juan Miguel continues to favor his left hip--sometimes uses walker  other times forgets. He was reluctant to use the walker as he felt it was a sign of weakness  We have discussed several times that proper walker use and posture helps alleviate unilateral pain. Naprosyn provides only limited effectiveness  And not ideal for long term with adverse effects risks.    I called Chandrika his daughter to review above and she is very agreeable with trying the image guided hip injection.          Past Medical, Surgical, Family and Social History reviewed: YES.     Medications: reviewed  Medications - recent changes: none    Review of Systems:  General: negative  Resp: negative  Musculoskeletal: positive for joint pain  Psychiatric: positive for memory loss  Toileting:    Continent of Bowel: Yes   Continent of Bladder: Yes  Mobility: walker    Recent Labs: None        Current Therapies: physical therapy completed    Exam:  Vital signs reviewed.   GENERAL APPEARANCE: alert and no distress  RESP: lungs clear   MS: no musculoskeletal defects are noted   PSYCH: Pleasantly confused    Assessment and Plan:    Discussion with Chandrika and she is agreeable with trying joint injection for hip pain  Order placed and Chandrika will transport to appointment    Followup PRN    (M25.552) Hip pain, left  (primary encounter diagnosis)    Plan: XR Joint Injection Major Left            (G30.1,  F02.80) Late onset Alzheimer's disease without behavioral disturbance (H)    Plan: XR Joint Injection Major Left

## 2021-01-11 ENCOUNTER — HOSPITAL ENCOUNTER (OUTPATIENT)
Dept: GENERAL RADIOLOGY | Facility: OTHER | Age: 86
Discharge: HOME | End: 2021-01-11
Attending: NURSE PRACTITIONER | Admitting: NURSE PRACTITIONER
Payer: MEDICARE

## 2021-01-11 DIAGNOSIS — F02.80 LATE ONSET ALZHEIMER'S DISEASE WITHOUT BEHAVIORAL DISTURBANCE (H): ICD-10-CM

## 2021-01-11 DIAGNOSIS — M25.552 HIP PAIN, LEFT: ICD-10-CM

## 2021-01-11 DIAGNOSIS — G30.1 LATE ONSET ALZHEIMER'S DISEASE WITHOUT BEHAVIORAL DISTURBANCE (H): ICD-10-CM

## 2021-01-11 PROCEDURE — 250N000011 HC RX IP 250 OP 636: Performed by: RADIOLOGY

## 2021-01-11 PROCEDURE — 20610 DRAIN/INJ JOINT/BURSA W/O US: CPT | Mod: LT

## 2021-01-11 PROCEDURE — 250N000009 HC RX 250: Performed by: RADIOLOGY

## 2021-01-11 PROCEDURE — 255N000002 HC RX 255 OP 636: Performed by: RADIOLOGY

## 2021-01-11 RX ORDER — LIDOCAINE HYDROCHLORIDE 10 MG/ML
2 INJECTION, SOLUTION INFILTRATION; PERINEURAL ONCE
Status: COMPLETED | OUTPATIENT
Start: 2021-01-11 | End: 2021-01-11

## 2021-01-11 RX ORDER — TRIAMCINOLONE ACETONIDE 40 MG/ML
40 INJECTION, SUSPENSION INTRA-ARTICULAR; INTRAMUSCULAR ONCE
Status: COMPLETED | OUTPATIENT
Start: 2021-01-11 | End: 2021-01-11

## 2021-01-11 RX ORDER — BUPIVACAINE HYDROCHLORIDE 5 MG/ML
3 INJECTION, SOLUTION EPIDURAL; INTRACAUDAL ONCE
Status: COMPLETED | OUTPATIENT
Start: 2021-01-11 | End: 2021-01-11

## 2021-01-11 RX ADMIN — BUPIVACAINE HYDROCHLORIDE 3 ML: 5 INJECTION, SOLUTION EPIDURAL; INTRACAUDAL at 10:13

## 2021-01-11 RX ADMIN — IOHEXOL 2 ML: 240 INJECTION, SOLUTION INTRATHECAL; INTRAVASCULAR; INTRAVENOUS; ORAL at 10:13

## 2021-01-11 RX ADMIN — TRIAMCINOLONE ACETONIDE 40 MG: 40 INJECTION, SUSPENSION INTRA-ARTICULAR; INTRAMUSCULAR at 10:13

## 2021-01-11 RX ADMIN — LIDOCAINE HYDROCHLORIDE 2 ML: 10 INJECTION, SOLUTION INFILTRATION; PERINEURAL at 10:13

## 2021-03-12 DIAGNOSIS — M25.552 HIP PAIN, LEFT: ICD-10-CM

## 2021-03-12 RX ORDER — ACETAMINOPHEN 500 MG/1
TABLET ORAL
Qty: 120 TABLET | Refills: 3 | Status: ON HOLD | OUTPATIENT
Start: 2021-03-12 | End: 2021-05-03

## 2021-03-12 NOTE — TELEPHONE ENCOUNTER
"Requested Prescriptions   Pending Prescriptions Disp Refills     SM PAIN RELIEVER EX  MG tablet [Pharmacy Med Name: PAIN RELIEVER ES 500MG TABLET] 120 tablet 3     Sig: TAKE 2 TABLETS (1,000 MG) BY MOUTH 2 TIMES DAILY       Analgesics (Non-Narcotic Tylenol and ASA Only) Passed - 3/12/2021  9:32 AM        Passed - Recent (12 mo) or future (30 days) visit within the authorizing provider's specialty     Patient has had an office visit with the authorizing provider or a provider within the authorizing providers department within the previous 12 mos or has a future within next 30 days. See \"Patient Info\" tab in inbasket, or \"Choose Columns\" in Meds & Orders section of the refill encounter.              Passed - Patient is 7 months old or older     If patient is a peds patient of the age 7 mos -12 years, ok to refill using weight-based dosing.     If >3g daily and/or sig is not \"prn\", check for liver enzymes. If normal in the last year, ok to refill.  If not, refer to the provider.          Passed - Medication is active on med list           LOV nursing home visit [facility]  Prescription approved per [facility] Refill Protocol.  Brenda J. Goodell, RN on 3/12/2021 at 9:51 AM    "

## 2021-04-23 ENCOUNTER — TELEPHONE (OUTPATIENT)
Dept: FAMILY MEDICINE | Facility: OTHER | Age: 86
End: 2021-04-23

## 2021-04-23 DIAGNOSIS — R45.1 AGITATION: ICD-10-CM

## 2021-04-23 DIAGNOSIS — R45.4 DIFFICULTY CONTROLLING ANGER: ICD-10-CM

## 2021-04-23 DIAGNOSIS — G30.9 ALZHEIMER'S DEMENTIA WITH BEHAVIORAL DISTURBANCE, UNSPECIFIED TIMING OF DEMENTIA ONSET: Primary | ICD-10-CM

## 2021-04-23 DIAGNOSIS — F02.81 ALZHEIMER'S DEMENTIA WITH BEHAVIORAL DISTURBANCE, UNSPECIFIED TIMING OF DEMENTIA ONSET: Primary | ICD-10-CM

## 2021-04-23 RX ORDER — QUETIAPINE FUMARATE 25 MG/1
TABLET, FILM COATED ORAL
Qty: 20 TABLET | Refills: 0 | Status: SHIPPED | OUTPATIENT
Start: 2021-04-23 | End: 2021-04-27

## 2021-04-23 NOTE — TELEPHONE ENCOUNTER
Nitish Veloz [facility] DON called to report patients readmitted to hospital--will probably go to SNF on discharge and patient who has dementia and very limited short term memory problems has been agitated, angry and showing aggression with frustration by throwing walker and staff concerned with further escalation.    Called Chandrika, daughter as she has requested medication for above behaviors    We discussed trying medication--risks and possible benefits--all off label uses that work on CNS  And may precipitate falls and CV adverse effects    Chandrika understands and would like to try anything  At this point that might be helpful in moderating  Problematic behaviors interfering with daily function  And coping with current situation    I also recommended referral to [facility]  For resources and support with parents and other family members--    Lilly Shepard, APRN CNP   April 23, 2021

## 2021-04-27 ENCOUNTER — TELEPHONE (OUTPATIENT)
Dept: FAMILY MEDICINE | Facility: OTHER | Age: 86
End: 2021-04-27

## 2021-04-27 ENCOUNTER — NURSING HOME VISIT (OUTPATIENT)
Dept: GERIATRICS | Facility: OTHER | Age: 86
End: 2021-04-27
Attending: NURSE PRACTITIONER
Payer: COMMERCIAL

## 2021-04-27 DIAGNOSIS — R45.4 DIFFICULTY CONTROLLING ANGER: ICD-10-CM

## 2021-04-27 DIAGNOSIS — F02.81 ALZHEIMER'S DEMENTIA WITH BEHAVIORAL DISTURBANCE, UNSPECIFIED TIMING OF DEMENTIA ONSET: Primary | ICD-10-CM

## 2021-04-27 DIAGNOSIS — G30.9 ALZHEIMER'S DEMENTIA WITH BEHAVIORAL DISTURBANCE, UNSPECIFIED TIMING OF DEMENTIA ONSET: Primary | ICD-10-CM

## 2021-04-27 DIAGNOSIS — Z79.899 ENCOUNTER FOR MEDICATION REVIEW: ICD-10-CM

## 2021-04-27 DIAGNOSIS — R45.1 AGITATION: ICD-10-CM

## 2021-04-27 RX ORDER — QUETIAPINE FUMARATE 25 MG/1
TABLET, FILM COATED ORAL
Qty: 45 TABLET | Refills: 0 | Status: SHIPPED | OUTPATIENT
Start: 2021-04-27 | End: 2021-04-29

## 2021-04-27 NOTE — TELEPHONE ENCOUNTER
Chandrika called to report her father has been very agitated and she is having difficulty getting him to calm down--Her mother was discharged from the hospital  Yesterday to [facility]  Stool lab positive for norovirus  Chandrika had vomiting and diarrhea over weekend  Juan Miguel has loose stools today--no vomiting  Taking fluids and meals normally    Staff report seroquel PRN tried  Once day x last 2 days  And has been effective but more difficulkt to accept medication when agitation escalating.  Staff feel flaring pattern afternoon into early evening.    Chandrika requesting medication to moderate anxiety and agitation    We discussed risks and benefits of trying scheduled  Medication early afternoon and evening targeting sundowning     Will trial 12.5 Seroquel BID 1 PM and 7 PM  With close monitoring    Chandrika very agreeable with above    Lilly Shepard, APRN CNP   April 27, 2021

## 2021-04-29 ENCOUNTER — TELEPHONE (OUTPATIENT)
Dept: FAMILY MEDICINE | Facility: OTHER | Age: 86
End: 2021-04-29

## 2021-04-29 DIAGNOSIS — G30.9 ALZHEIMER'S DEMENTIA WITH BEHAVIORAL DISTURBANCE, UNSPECIFIED TIMING OF DEMENTIA ONSET: ICD-10-CM

## 2021-04-29 DIAGNOSIS — R45.4 DIFFICULTY CONTROLLING ANGER: ICD-10-CM

## 2021-04-29 DIAGNOSIS — F02.81 ALZHEIMER'S DEMENTIA WITH BEHAVIORAL DISTURBANCE, UNSPECIFIED TIMING OF DEMENTIA ONSET: ICD-10-CM

## 2021-04-29 DIAGNOSIS — R45.1 AGITATION: ICD-10-CM

## 2021-04-29 DIAGNOSIS — F03.918 AGGRESSIVE BEHAVIOR DUE TO DEMENTIA (H): Primary | ICD-10-CM

## 2021-04-29 RX ORDER — QUETIAPINE FUMARATE 25 MG/1
TABLET, FILM COATED ORAL
Qty: 45 TABLET | Refills: 0 | Status: SHIPPED | OUTPATIENT
Start: 2021-04-29 | End: 2021-05-02

## 2021-04-29 NOTE — PROGRESS NOTES
Juan Miguel Delaney is a 88 year old male being seen today for comprehensive and follow up visit visit at [facility].    Code Status: DNR / DNI.   Health Care Power of : Extended Emergency Contact Information  Primary Emergency Contact: Crsis Delaney   [facility]  Home Phone: [phone]  Relation: Spouse  Secondary Emergency Contact: Kavita Hassan   [facility]  Home Phone: [phone]  Mobile Phone: [phone]  Relation: Daughter     Allergies: Lisinopril, Naprosyn [naproxen], Penicillins, Rofecoxib, Celecoxib, and Ibuprofen     Chief Complaint / HPI: Follow-up with patient per request of Chandrika daughter--- she reports was with her father yesterday however he became angry irritable and frustrated and asked her to leave she was not able to redirect him.  Currently Juan Miguel's wife was discharged from the hospital to a skilled nursing facility for rehab--- has been challenging for Juan Miguel  Due to his short-term memory issues, limited coping skills for frustration due to his cognitive decline.  Physically he has been independent, can manage his own personal cares.    His daughter is requesting medication and attempt to moderate anxiety agitation and anger.  Nursing staff reported that Juan Miguel became frustrated last week and threw his walker across the room--- there have not been any reports of out-of-control behavior and Juan Miguel has been redirectable however there have many recurrent episodes of frustration due to his short-term memory  Since his wife was hospitalized about 5 days ago--has been very difficult for him to adjust.  Chandrika had requested trial of medication last week agreed to low-dose Seroquel as needed which the staff reported they used it twice over the weekend and felt that it was effective however when his anger and agitation escalate it is difficult for him to accept taking medication.  Chandrika would like to try something scheduled to moderate and stabilize his mood throughout the day--- staff  deny any problems at night there is not been any wandering or elopement risk behaviors.    Juan Miguel reports currently he has diarrhea--- and Chandrika had diarrhea and vomiting a couple of days ago  Criss had diarrhea during hospitalization that was found to be positive for norovirus.      Past Medical, Surgical, Family and Social History reviewed: YES.     Medications: reviewed and rerconciled  Medications - recent changes:     Review of Systems:  Psychiatric: positive for anxiety, agitation and memory loss  All other systems negative    Toileting:    Continent of Bowel: Yes   Continent of Bladder: Yes  Mobility: walker    Recent Labs: None      Current Therapies: none    Exam:  Vital signs reviewed.   GENERAL APPEARANCE: alert and no distress  EYES: Eyes grossly normal to inspection, conjunctivae and sclerae normal  RESP: lungs clear   MS: no musculoskeletal defects are noted and gait is age appropriate without ataxia  SKIN: warm and dry  PSYCH: Alert, pleasant, cooperative    Assessment and Plan:    Did discuss possible risks and adverse effects of medication versus benefits--and Chandrika wishes to proceed  We will trial low-dose Seroquel 12.5 mg twice daily for overall control with moderation of mood and anxiety  Staff will monitor closely for any tolerance or adverse effects and will call as indicated    I did visit with Juan Miguel who is very pleasant, and appears very calm and reasonable during our visit--and actually said he did not want his wife to come home for a couple of days until his diarrhea had subsided.  Chandrika is in the process of trying to arrange a visit at the rehab facility for Juan Miguel.    We will follow-up next week on rounding day on Juan Miguel's progress staff will notify sooner as needed      (G30.9,  F02.81) Alzheimer's dementia with behavioral disturbance, unspecified timing of dementia onset (H)  (primary encounter diagnosis)  Plan: QUEtiapine (SEROQUEL) 25 MG tablet            (R45.1) Agitation    Plan:  QUEtiapine (SEROQUEL) 25 MG tablet           (R45.4) Difficulty controlling anger    Plan: QUEtiapine (SEROQUEL) 25 MG tablet            (Z79.899) Encounter for medication review

## 2021-04-30 ENCOUNTER — PATIENT OUTREACH (OUTPATIENT)
Dept: CARE COORDINATION | Facility: CLINIC | Age: 86
End: 2021-04-30

## 2021-04-30 ENCOUNTER — APPOINTMENT (OUTPATIENT)
Dept: CT IMAGING | Facility: OTHER | Age: 86
End: 2021-04-30
Attending: PHYSICIAN ASSISTANT
Payer: MEDICARE

## 2021-04-30 ENCOUNTER — HOSPITAL ENCOUNTER (EMERGENCY)
Facility: OTHER | Age: 86
Discharge: HOME | End: 2021-04-30
Attending: PHYSICIAN ASSISTANT | Admitting: PHYSICIAN ASSISTANT
Payer: MEDICARE

## 2021-04-30 VITALS
RESPIRATION RATE: 18 BRPM | OXYGEN SATURATION: 95 % | BODY MASS INDEX: 26.68 KG/M2 | HEART RATE: 75 BPM | SYSTOLIC BLOOD PRESSURE: 119 MMHG | TEMPERATURE: 97.6 F | HEIGHT: 67 IN | DIASTOLIC BLOOD PRESSURE: 67 MMHG | WEIGHT: 170 LBS

## 2021-04-30 DIAGNOSIS — R46.89 AGGRESSIVE BEHAVIOR: ICD-10-CM

## 2021-04-30 DIAGNOSIS — F03.918 SENILE DEMENTIA, WITH BEHAVIORAL DISTURBANCE (H): ICD-10-CM

## 2021-04-30 LAB
ALBUMIN UR-MCNC: 10 MG/DL
AMPHETAMINES UR QL SCN: NOT DETECTED
APPEARANCE UR: CLEAR
BARBITURATES UR QL: NOT DETECTED
BENZODIAZ UR QL: NOT DETECTED
BILIRUB UR QL STRIP: NEGATIVE
BUPRENORPHINE UR QL: NOT DETECTED NG/ML
CANNABINOIDS UR QL: NOT DETECTED NG/ML
COCAINE UR QL: NOT DETECTED
COLOR UR AUTO: YELLOW
D-METHAMPHET UR QL: NOT DETECTED NG/ML
GLUCOSE UR STRIP-MCNC: NEGATIVE MG/DL
HGB UR QL STRIP: NEGATIVE
KETONES UR STRIP-MCNC: NEGATIVE MG/DL
LEUKOCYTE ESTERASE UR QL STRIP: NEGATIVE
METHADONE UR QL SCN: NOT DETECTED
MUCOUS THREADS #/AREA URNS LPF: PRESENT /LPF
NITRATE UR QL: NEGATIVE
OPIATES UR QL SCN: NOT DETECTED
OXYCODONE UR QL: NOT DETECTED NG/ML
PCP UR QL SCN: NOT DETECTED
PH UR STRIP: 5.5 PH (ref 5–7)
PROPOXYPH UR QL: NOT DETECTED NG/ML
RBC #/AREA URNS AUTO: <1 /HPF (ref 0–2)
SOURCE: ABNORMAL
SP GR UR STRIP: 1.02 (ref 1–1.03)
TRICYCLICS UR QL SCN: NOT DETECTED NG/ML
UROBILINOGEN UR STRIP-MCNC: NORMAL MG/DL (ref 0–2)
WBC #/AREA URNS AUTO: 1 /HPF (ref 0–5)

## 2021-04-30 PROCEDURE — 99284 EMERGENCY DEPT VISIT MOD MDM: CPT | Mod: 25 | Performed by: PHYSICIAN ASSISTANT

## 2021-04-30 PROCEDURE — 250N000013 HC RX MED GY IP 250 OP 250 PS 637: Performed by: PHYSICIAN ASSISTANT

## 2021-04-30 PROCEDURE — 81001 URINALYSIS AUTO W/SCOPE: CPT | Mod: XU | Performed by: PHYSICIAN ASSISTANT

## 2021-04-30 PROCEDURE — 70450 CT HEAD/BRAIN W/O DYE: CPT

## 2021-04-30 PROCEDURE — 99283 EMERGENCY DEPT VISIT LOW MDM: CPT | Performed by: PHYSICIAN ASSISTANT

## 2021-04-30 PROCEDURE — 80307 DRUG TEST PRSMV CHEM ANLYZR: CPT | Performed by: PHYSICIAN ASSISTANT

## 2021-04-30 PROCEDURE — 99284 EMERGENCY DEPT VISIT MOD MDM: CPT | Performed by: PHYSICIAN ASSISTANT

## 2021-04-30 RX ORDER — LORAZEPAM 1 MG/1
1 TABLET ORAL ONCE
Status: COMPLETED | OUTPATIENT
Start: 2021-04-30 | End: 2021-04-30

## 2021-04-30 RX ORDER — DONEPEZIL HYDROCHLORIDE 5 MG/1
10 TABLET, FILM COATED ORAL DAILY
Status: DISCONTINUED | OUTPATIENT
Start: 2021-04-30 | End: 2021-04-30 | Stop reason: HOSPADM

## 2021-04-30 RX ORDER — LORAZEPAM 0.5 MG/1
0.5 TABLET ORAL ONCE
Status: DISCONTINUED | OUTPATIENT
Start: 2021-04-30 | End: 2021-04-30 | Stop reason: ALTCHOICE

## 2021-04-30 RX ORDER — QUETIAPINE FUMARATE 50 MG/1
50 TABLET, FILM COATED ORAL 3 TIMES DAILY
Qty: 90 TABLET | Refills: 0 | Status: ON HOLD | OUTPATIENT
Start: 2021-04-30 | End: 2021-05-11

## 2021-04-30 RX ORDER — UREA 10 %
500 LOTION (ML) TOPICAL DAILY
Status: DISCONTINUED | OUTPATIENT
Start: 2021-04-30 | End: 2021-04-30 | Stop reason: HOSPADM

## 2021-04-30 RX ORDER — AMLODIPINE BESYLATE 5 MG/1
5 TABLET ORAL DAILY
Status: DISCONTINUED | OUTPATIENT
Start: 2021-04-30 | End: 2021-04-30 | Stop reason: HOSPADM

## 2021-04-30 RX ORDER — LORAZEPAM 1 MG/1
1 TABLET ORAL EVERY 4 HOURS PRN
Qty: 40 TABLET | Refills: 0 | Status: SHIPPED | OUTPATIENT
Start: 2021-04-30 | End: 2021-05-25

## 2021-04-30 RX ORDER — ACETAMINOPHEN 325 MG/1
1000 TABLET ORAL 2 TIMES DAILY
Status: ON HOLD | COMMUNITY
Start: 2020-12-28 | End: 2021-05-03

## 2021-04-30 RX ORDER — LORAZEPAM 1 MG/1
1 TABLET ORAL EVERY 4 HOURS PRN
Status: DISCONTINUED | OUTPATIENT
Start: 2021-04-30 | End: 2021-04-30 | Stop reason: HOSPADM

## 2021-04-30 RX ADMIN — QUETIAPINE 50 MG: 100 TABLET, FILM COATED ORAL at 15:03

## 2021-04-30 RX ADMIN — LORAZEPAM 1 MG: 0.5 TABLET ORAL at 15:49

## 2021-04-30 ASSESSMENT — ENCOUNTER SYMPTOMS
FREQUENCY: 0
ADENOPATHY: 0
FEVER: 0
DYSURIA: 0
SORE THROAT: 0
WEAKNESS: 0
WOUND: 0
CONFUSION: 0
VOMITING: 0
EYE PAIN: 0
SINUS PRESSURE: 0
SEIZURES: 0
BACK PAIN: 0
TROUBLE SWALLOWING: 0
DIZZINESS: 0
COUGH: 0
ABDOMINAL PAIN: 0
ACTIVITY CHANGE: 0
APPETITE CHANGE: 0
NAUSEA: 0
FACIAL SWELLING: 0
BRUISES/BLEEDS EASILY: 0
VOICE CHANGE: 0
HEMATURIA: 0
DIARRHEA: 0
LIGHT-HEADEDNESS: 0
NECK PAIN: 0
AGITATION: 1
SHORTNESS OF BREATH: 0
FATIGUE: 0
CHEST TIGHTNESS: 0
HEADACHES: 0
CHILLS: 0

## 2021-04-30 ASSESSMENT — MIFFLIN-ST. JEOR: SCORE: 1399.74

## 2021-04-30 NOTE — ED NOTES
Per Jeromy DISLA meds faxed to [facility], Pt to go back to [facility] with family to transport

## 2021-04-30 NOTE — PROGRESS NOTES
Daughter, Kavita, called again to confirm whether placement had been found. Let her know that referrals were placed as she had discussed with Devorah.  For now he will be monitored in ED until a bed opens in one of these facilities. She is relieved to hear this. Let her know she can call over weekend if it would help to check on him.   Ema Jain RN on 4/30/2021 at 4:41 PM

## 2021-04-30 NOTE — PROGRESS NOTES
"Pt spoke with wife on the phone and became upset yelling, \"what did they do to her and why can't I see her\". Pt was informed of wife's condition and was able to be calmed down and redirected.   "
:    Received a call from ED for potential consult.  Patient is from [facility] assisted living on the assisted living side with his wife.  Currently his wife is at [facility] for short term care and will most likely be there until another week.  I called SEBASTIAN Patrick at [facility] and she confirmed they cannot accept patient back as he is at risk of hurting the other residents as he has had multiple outbursts. Nitish Veloz has been in touch with his daughter and they are in support of Senior behavioral placement.  [facility] stated they called [facility] in [place] yesterday and they are currently full.  I made referral to [facility] in  [place] ([phone]) and also [facility] in [place] ([phone]).    CASSANDRA Angulo on 4/30/2021 at 1:40 PM      :    Received a call form [facility] care coordinator stating her daughter called her at the clinic and would like patient placed.  I have already been working with patient and family today.  I will call daughter and give her discharge update and continue to work with patient and family.   will continue to follow.    CASSANDRA Angulo on 4/30/2021 at 2:02 PM    :    I placed call to patients daughter Kavita and gave her discharge update and she was in agreement with Senior Behavioral placement. She does not want her mother to come down from [facility] to see him in the ED as she would be scared what he would do to her when he saw her.  She stated she is fearful of him and his outbursts.     CASSANDRA Angulo on 4/30/2021 at 2:37 PM    :    Left message for SEBASTIAN Patrick at [facility] that [facility] ([facility]) has an opening in the memory care unit if this may be a possibility at some point.    CASSANDRA Angulo on 4/30/2021 at 3:23 PM      
Gave report to LPN at [facility]  
Pt got agitated and started yelling and slamming his walker on the ground. Sitter in place for pt safety and elopement risk.   
Spoke with daughter Kavita, Daughter is unable care for pt on her own, due to his violent outbursts.     Call placed to both sons, but neither of them answered. No message was left due to generic voicemail.  
no

## 2021-04-30 NOTE — ED PROVIDER NOTES
History     Chief Complaint   Patient presents with     Mental Health Problem     HPI  Juan Miguel Delaney is a 88 year old male who is brought in for evaluation via an ambulance.  He normally resides at [facility].  Patient has been having some violent outbursts in which she has been swinging his walk around and threatening other residents.  The patient does have dementia and it appears to be somewhat worsening.  He reports that his wife had abdominal surgery and ever since then he has not been able to be in touch with her to understand what has happened to her.  However staff report that he has seen her multiple times since surgery which is he is very forgetful.  There is concerned that maybe he is having worsening Alzheimer's versus mental health issues.    Allergies:  Allergies   Allergen Reactions     Lisinopril Other (See Comments)     Dry throat  Dry throat     Naprosyn [Naproxen] Other (See Comments)     Epistaxis--resolved when DC'd     Penicillins Hives     Rofecoxib Hives     Vioxx     Celecoxib Rash     Ibuprofen Rash     All NSAIDS cause rash       Problem List:    Patient Active Problem List    Diagnosis Date Noted     Late onset Alzheimer's disease without behavioral disturbance (H) 04/19/2018     Priority: Medium     Trigger finger, left ring finger 02/23/2018     Priority: Medium     Mixed hyperlipidemia 02/08/2018     Priority: Medium     Neck pain, chronic 02/08/2018     Priority: Medium     Osteoarthrosis 02/08/2018     Priority: Medium     Chronic left sacroiliac pain 10/19/2017     Priority: Medium     Chronic insomnia 08/08/2017     Priority: Medium     S/P lumbar laminectomy 05/29/2015     Priority: Medium     CKD (chronic kidney disease) stage 3, GFR 30-59 ml/min (H) 05/25/2015     Priority: Medium     Spinal stenosis of lumbar region with neurogenic claudication 04/24/2015     Priority: Medium     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 11/09/2012     Priority: Medium      Benign essential hypertension 10/24/2011     Priority: Medium        Past Medical History:    Past Medical History:   Diagnosis Date     Actinic keratosis      Chronic kidney disease, stage III (moderate)      Elevated erythrocyte sedimentation rate      Essential (primary) hypertension      Family history of malignant neoplasm of prostate      Hyperlipidemia      Osteoarthritis      Pneumonia      Sciatica        Past Surgical History:    Past Surgical History:   Procedure Laterality Date     ARTHROPLASTY KNEE      2006     ARTHROPLASTY KNEE      2008     ARTHROSCOPY SHOULDER      1996,left     ARTHROSCOPY SHOULDER      2001,AC arthrosis & distal clavicle resection     ARTHROSCOPY SHOULDER      2011     COLONOSCOPY      2008,normal     CYSTOSCOPY, TRANSURETHRAL RESECTION (TUR) PROSTATE, COMBINED      No Comments Provided     LAMINECTOMY LUMBAR ONE LEVEL      2015,[place], spinal stenosis     OTHER SURGICAL HISTORY      2004,[id number],SCAN-STRESS TEST,negative to heart rate 138     OTHER SURGICAL HISTORY      2013,[id number],OTHER,Right 5th digit     RELEASE CARPAL TUNNEL      bilateral       Family History:    Family History   Problem Relation Age of Onset     Prostate Cancer Father         Cancer-prostate     Hypertension Mother         Hypertension     Other - See Comments Mother         Old age, 99     Prostate Cancer Brother         Cancer-prostate     Unknown/Adopted Brother         Unknown       Social History:  Marital Status:   [2]  Social History     Tobacco Use     Smoking status: Never Smoker     Smokeless tobacco: Never Used   Substance Use Topics     Alcohol use: No     Alcohol/week: 0.0 standard drinks     Drug use: No        Medications:    acetaminophen (TYLENOL) 325 MG tablet  amLODIPine (NORVASC) 5 MG tablet  cyanocobalamin (VITAMIN B-12) 500 MCG tablet  donepezil (ARICEPT) 5 MG ODT  QUEtiapine (SEROQUEL) 25 MG tablet  naproxen (NAPROSYN) 500 MG tablet  SM PAIN RELIEVER EX  MG  "tablet          Review of Systems   Constitutional: Negative for activity change, appetite change, chills, fatigue and fever.   HENT: Negative for congestion, facial swelling, sinus pressure, sore throat, trouble swallowing and voice change.    Eyes: Negative for pain and visual disturbance.   Respiratory: Negative for cough, chest tightness and shortness of breath.    Cardiovascular: Negative for chest pain.   Gastrointestinal: Negative for abdominal pain, diarrhea, nausea and vomiting.   Genitourinary: Negative for dysuria, frequency, hematuria and urgency.   Musculoskeletal: Negative for back pain and neck pain.   Skin: Negative for rash and wound.   Neurological: Negative for dizziness, seizures, syncope, weakness, light-headedness and headaches.   Hematological: Negative for adenopathy. Does not bruise/bleed easily.   Psychiatric/Behavioral: Positive for agitation and behavioral problems. Negative for confusion.       Physical Exam   BP: (!) 181/92  Pulse: 75  Temp: 97.5  F (36.4  C)  Resp: 18  Height: 170.2 cm (5' 7\")  Weight: 77.1 kg (170 lb)  SpO2: 93 %      Physical Exam  Vitals signs and nursing note reviewed.   Constitutional:       General: He is not in acute distress.     Appearance: He is not ill-appearing, toxic-appearing or diaphoretic.   HENT:      Head: No raccoon eyes or Garza's sign.      Jaw: No trismus.      Right Ear: No drainage or tenderness.      Left Ear: No drainage or tenderness.      Nose: Nose normal.   Eyes:      General: Lids are normal. Gaze aligned appropriately. No scleral icterus.     Extraocular Movements: Extraocular movements intact.      Right eye: Normal extraocular motion and no nystagmus.      Left eye: Normal extraocular motion and no nystagmus.      Pupils: Pupils are equal, round, and reactive to light.      Right eye: Pupil is reactive and not sluggish.      Left eye: Pupil is reactive and not sluggish.   Neck:      Musculoskeletal: Normal range of motion. Normal " range of motion. No neck rigidity, pain with movement, spinous process tenderness or muscular tenderness.      Vascular: No JVD.      Trachea: No tracheal deviation.   Cardiovascular:      Rate and Rhythm: Normal rate and regular rhythm.   Pulmonary:      Effort: Pulmonary effort is normal. No respiratory distress.      Breath sounds: Normal breath sounds. No stridor. No wheezing.   Abdominal:      General: Bowel sounds are normal. There is no distension.      Palpations: There is no mass.      Tenderness: There is no abdominal tenderness. There is no right CVA tenderness, left CVA tenderness, guarding or rebound.   Musculoskeletal: Normal range of motion.         General: No tenderness or deformity.   Lymphadenopathy:      Cervical: No cervical adenopathy.   Skin:     General: Skin is warm and dry.      Capillary Refill: Capillary refill takes less than 2 seconds.   Neurological:      General: No focal deficit present.      Mental Status: He is alert and oriented to person, place, and time.      GCS: GCS eye subscore is 4. GCS verbal subscore is 5. GCS motor subscore is 6.      Motor: No tremor or seizure activity.      Coordination: Coordination normal.      Gait: Gait normal.   Psychiatric:      Comments: Is cooperative thus far         ED Course     Results for orders placed or performed during the hospital encounter of 04/30/21 (from the past 24 hour(s))   UA reflex to Microscopic   Result Value Ref Range    Color Urine Yellow     Appearance Urine Clear     Glucose Urine Negative NEG^Negative mg/dL    Bilirubin Urine Negative NEG^Negative    Ketones Urine Negative NEG^Negative mg/dL    Specific Gravity Urine 1.025 1.003 - 1.035    Blood Urine Negative NEG^Negative    pH Urine 5.5 5.0 - 7.0 pH    Protein Albumin Urine 10 (A) NEG^Negative mg/dL    Urobilinogen mg/dL Normal 0.0 - 2.0 mg/dL    Nitrite Urine Negative NEG^Negative    Leukocyte Esterase Urine Negative NEG^Negative    Source Midstream Urine     RBC  Urine <1 0 - 2 /HPF    WBC Urine 1 0 - 5 /HPF    Mucous Urine Present (A) NEG^Negative /LPF   Drug of Abuse Screen Urine GH   Result Value Ref Range    Amphetamine Qual Urine Not Detected NDET^Not Detected    Benzodiazepine Qual Urine Not Detected NDET^Not Detected    Cocaine Qual Urine Not Detected NDET^Not Detected    Methadone Qual Urine Not Detected NDET^Not Detected    PCP Qual Urine Not Detected NDET^Not Detected    Opiates Qualitative Urine Not Detected NDET^Not Detected    Oxycodone Qualitative Urine Not Detected NDET^Not Detected ng/mL    Propoxyphene Qualitative Urine Not Detected NDET^Not Detected ng/mL    Tricyclic Antidepressants Qual Urine Not Detected NDET^Not Detected ng/mL    Methamphetamine Qualitative Urine Not Detected NDET^Not Detected ng/mL    Barbiturates Qual Urine Not Detected NDET^Not Detected    Cannabinoids Qualitative Urine Not Detected NDET^Not Detected ng/mL    Buprenorphine Qualitative Urine Not Detected NDET^Not Detected ng/mL   CT Head w/o Contrast    Narrative    PROCEDURE: CT HEAD W/O CONTRAST 4/30/2021 1:55 PM    HISTORY: Mental status change, unknown cause    COMPARISONS: 4/18/2018.    Meds/Dose Given: None.    TECHNIQUE: Axial noncontrast enhanced images with coronal and sagittal  reformatted images.    FINDINGS: There is some generalized atrophy, not unusual for patient  of this age. No mass or midline shift is seen and there is no  extra-axial fluid collection or focal hemorrhage.    Bone windows show no fracture. Visualized paranasal sinuses and  mastoid air cells are clear.         Impression    IMPRESSION: No acute mass effect or hemorrhage.    CARA GOMES MD       Medications   amLODIPine (NORVASC) tablet 5 mg (has no administration in time range)   donepezil (ARICEPT) tablet 10 mg (has no administration in time range)   QUEtiapine (SEROquel) half-tab 50 mg (has no administration in time range)   LORazepam (ATIVAN) tablet 1 mg (has no administration in time range)    cyanocobalamin (VITAMIN B-12) tablet 500 mcg (has no administration in time range)   QUEtiapine (SEROquel) half-tab 50 mg (50 mg Oral Given 4/30/21 1503)   LORazepam (ATIVAN) tablet 1 mg (1 mg Oral Given 4/30/21 1549)       Assessments & Plan (with Medical Decision Making)     I have reviewed the nursing notes.    I have reviewed the findings, diagnosis, plan and need for follow up with the patient.      New Prescriptions    LORAZEPAM (ATIVAN) 1 MG TABLET    Take 1 tablet (1 mg) by mouth every 4 hours as needed for agitation    QUETIAPINE (SEROQUEL) 50 MG TABLET    Take 1 tablet (50 mg) by mouth 3 times daily       Final diagnoses:   Aggressive behavior   Senile dementia, with behavioral disturbance (H)     Afebrile.  Vital signs stable.  Patient with a history of dementia and now with some aggressive outburst.  Recently started on Seroquel.  [facility] yes I just want to make sure that okay call [email or web address]  to go somewhere so he is over the weekend [facility] has stated they will not take him back due to his aggressive behavior.  Patient is frustrated because he feels he has not been seeing his wife who is currently undergoing rehabilitation at [facility].  However patient has seen her this seems to forget to due to his dementia.  CRT evaluation and at this point both his options of [facility] are full at this time.  Referral to our our  for additional coordination of care.  His caregiver has been attempting outpatient placement as well.  The patient is demonstrating some aggressive outbursts while in the ER.  He was given 50 mg of Seroquel orally as well as Ativan 1 mg.  Afterwards the patient was much more cooperative with no aggressive outbursts noted over an extended timeframe.  Had a long discussion with the patient and his family.  They are agreeable to spend more time with his caregiving as well as [facility] will accept his return.  I discussed this with her grandson and they are  open to this idea however if this patient continues to have additional aggressive outbursts this will not be tolerated.  Both family and the patient seemed understand this at this time.  A prescription for Ativan as well as Seroquel were faxed to [facility] in order for them to fill these through [facility].  Follow-up if there is any concerns for further evaluation as needed.    4/30/2021   [facility]            Jeromy Agustin PA-C  04/30/21 7418

## 2021-04-30 NOTE — ED TRIAGE NOTES
EMS Arrival Note  ________________________________  Juan Miguel Delaney is a 88 year old Male [facility]  Pre hospital clinical presentation per EMS personnel includes pt had a violent outburst and was swinging his walker around and threatening other residents.  Pre hospital personnel report vital signs of:  B/P 133/68; HR 76, RR 20;SpO2 98  Patient arrives with:  GCS Total = 14  Airway intact  Breathing Assessment Normal.    Circulation Assessment Normal.    Placed in room 910, gowned, warm blanket provided, side rails up,  ID verified and band placed, and call light within reach.       Previous living situation [facility]

## 2021-04-30 NOTE — PROGRESS NOTES
Clinic Care Coordination Contact  Care Team Conversations    Patient with dementia currently in ED due to escalating behaviors at assisted living.     Lanny from [facility] called to say that family (son, Ankit) is concerned that patient's behaviors have been escalating and are now unsafe to himself and others.  Talked with daughter Kavita who described events of last month. She was told by [facility] staff that the next time he is disruptive, threatening and a danger to other residents they will call 911 and will not take him back at the facility. Daughter said he made threats of jumping out of a window and of shooting people (thinks he does not have a gun). Is distressed that his wife is not home due to her own medical needs, and participating in rehab after 2 surgeries. Family's hope is that his behaviors can be improved before he comes home, which family feels is too hard on their mother.      They want this information considered for a safe discharge plan.  CC updated and , [facility], is well informed and looking for an appropriate placement for someone with dementia.     CC to follow up as needed should patient return home and he or family need support.   Ema Jain, RN on 4/30/2021 at 2:03 PM

## 2021-05-02 ENCOUNTER — HOSPITAL ENCOUNTER (OUTPATIENT)
Facility: OTHER | Age: 86
Setting detail: OBSERVATION
Discharge: NURSING, REHAB OR LONG-TERM CARE | End: 2021-05-11
Attending: EMERGENCY MEDICINE | Admitting: INTERNAL MEDICINE
Payer: MEDICARE

## 2021-05-02 DIAGNOSIS — Z79.899 ENCOUNTER FOR LONG-TERM (CURRENT) USE OF MEDICATIONS: ICD-10-CM

## 2021-05-02 DIAGNOSIS — I10 ESSENTIAL (PRIMARY) HYPERTENSION: ICD-10-CM

## 2021-05-02 DIAGNOSIS — L60.0 INGROWN TOENAIL: ICD-10-CM

## 2021-05-02 DIAGNOSIS — F02.80 LATE ONSET ALZHEIMER'S DISEASE WITHOUT BEHAVIORAL DISTURBANCE (H): ICD-10-CM

## 2021-05-02 DIAGNOSIS — R42 DIZZINESS AND GIDDINESS: ICD-10-CM

## 2021-05-02 DIAGNOSIS — F02.818 LATE ONSET ALZHEIMER'S DISEASE WITH BEHAVIORAL DISTURBANCE (H): Primary | ICD-10-CM

## 2021-05-02 DIAGNOSIS — T50.905A MEDICATION SIDE EFFECTS, INITIAL ENCOUNTER: ICD-10-CM

## 2021-05-02 DIAGNOSIS — G30.1 PRIMARY DEGENERATIVE DEMENTIA OF THE ALZHEIMER TYPE, SENILE ONSET (H): ICD-10-CM

## 2021-05-02 DIAGNOSIS — G30.1 LATE ONSET ALZHEIMER'S DISEASE WITHOUT BEHAVIORAL DISTURBANCE (H): ICD-10-CM

## 2021-05-02 DIAGNOSIS — I12.9 MALIGNANT HYPERTENSIVE KIDNEY DISEASE WITH CHRONIC KIDNEY DISEASE STAGE I THROUGH STAGE IV, OR UNSPECIFIED(403.00): ICD-10-CM

## 2021-05-02 DIAGNOSIS — G30.1 LATE ONSET ALZHEIMER'S DISEASE WITH BEHAVIORAL DISTURBANCE (H): Primary | ICD-10-CM

## 2021-05-02 DIAGNOSIS — Z11.52 ENCOUNTER FOR SCREENING LABORATORY TESTING FOR COVID-19 VIRUS: ICD-10-CM

## 2021-05-02 DIAGNOSIS — F02.80 PRIMARY DEGENERATIVE DEMENTIA OF THE ALZHEIMER TYPE, SENILE ONSET (H): ICD-10-CM

## 2021-05-02 DIAGNOSIS — E78.5 HYPERLIPIDEMIA, UNSPECIFIED HYPERLIPIDEMIA TYPE: ICD-10-CM

## 2021-05-02 DIAGNOSIS — N18.30 STAGE 3 CHRONIC KIDNEY DISEASE, UNSPECIFIED WHETHER STAGE 3A OR 3B CKD (H): ICD-10-CM

## 2021-05-02 LAB
ALBUMIN SERPL-MCNC: 3.8 G/DL (ref 3.5–5.7)
ALBUMIN UR-MCNC: NEGATIVE MG/DL
ALP SERPL-CCNC: 32 U/L (ref 34–104)
ALT SERPL W P-5'-P-CCNC: 14 U/L (ref 7–52)
ANION GAP SERPL CALCULATED.3IONS-SCNC: 8 MMOL/L (ref 3–14)
APPEARANCE UR: CLEAR
AST SERPL W P-5'-P-CCNC: 13 U/L (ref 13–39)
BASOPHILS # BLD AUTO: 0 10E9/L (ref 0–0.2)
BASOPHILS NFR BLD AUTO: 0.8 %
BILIRUB SERPL-MCNC: 0.4 MG/DL (ref 0.3–1)
BILIRUB UR QL STRIP: NEGATIVE
BUN SERPL-MCNC: 26 MG/DL (ref 7–25)
CALCIUM SERPL-MCNC: 8.8 MG/DL (ref 8.6–10.3)
CHLORIDE SERPL-SCNC: 105 MMOL/L (ref 98–107)
CO2 SERPL-SCNC: 26 MMOL/L (ref 21–31)
COLOR UR AUTO: NORMAL
CREAT SERPL-MCNC: 1.57 MG/DL (ref 0.7–1.3)
DIFFERENTIAL METHOD BLD: NORMAL
EOSINOPHIL # BLD AUTO: 0.1 10E9/L (ref 0–0.7)
EOSINOPHIL NFR BLD AUTO: 1.9 %
ERYTHROCYTE [DISTWIDTH] IN BLOOD BY AUTOMATED COUNT: 13.2 % (ref 10–15)
GFR SERPL CREATININE-BSD FRML MDRD: 42 ML/MIN/{1.73_M2}
GLUCOSE SERPL-MCNC: 106 MG/DL (ref 70–105)
GLUCOSE UR STRIP-MCNC: NEGATIVE MG/DL
HCT VFR BLD AUTO: 42.6 % (ref 40–53)
HGB BLD-MCNC: 14.6 G/DL (ref 13.3–17.7)
HGB UR QL STRIP: NEGATIVE
IMM GRANULOCYTES # BLD: 0 10E9/L (ref 0–0.4)
IMM GRANULOCYTES NFR BLD: 0.4 %
KETONES UR STRIP-MCNC: NEGATIVE MG/DL
LABORATORY COMMENT REPORT: NORMAL
LACTATE BLD-SCNC: 0.8 MMOL/L (ref 0.7–2)
LEUKOCYTE ESTERASE UR QL STRIP: NEGATIVE
LYMPHOCYTES # BLD AUTO: 1 10E9/L (ref 0.8–5.3)
LYMPHOCYTES NFR BLD AUTO: 21 %
MCH RBC QN AUTO: 30.6 PG (ref 26.5–33)
MCHC RBC AUTO-ENTMCNC: 34.3 G/DL (ref 31.5–36.5)
MCV RBC AUTO: 89 FL (ref 78–100)
MONOCYTES # BLD AUTO: 0.5 10E9/L (ref 0–1.3)
MONOCYTES NFR BLD AUTO: 9.8 %
NEUTROPHILS # BLD AUTO: 3.1 10E9/L (ref 1.6–8.3)
NEUTROPHILS NFR BLD AUTO: 66.1 %
NITRATE UR QL: NEGATIVE
PH UR STRIP: 5 PH (ref 5–7)
PLATELET # BLD AUTO: 194 10E9/L (ref 150–450)
POTASSIUM SERPL-SCNC: 4.8 MMOL/L (ref 3.5–5.1)
PROT SERPL-MCNC: 5.7 G/DL (ref 6.4–8.9)
RBC # BLD AUTO: 4.77 10E12/L (ref 4.4–5.9)
SARS-COV-2 RNA RESP QL NAA+PROBE: NEGATIVE
SODIUM SERPL-SCNC: 139 MMOL/L (ref 134–144)
SOURCE: NORMAL
SP GR UR STRIP: 1.01 (ref 1–1.03)
SPECIMEN SOURCE: NORMAL
UROBILINOGEN UR STRIP-MCNC: NORMAL MG/DL (ref 0–2)
WBC # BLD AUTO: 4.7 10E9/L (ref 4–11)

## 2021-05-02 PROCEDURE — 81003 URINALYSIS AUTO W/O SCOPE: CPT | Performed by: EMERGENCY MEDICINE

## 2021-05-02 PROCEDURE — 36415 COLL VENOUS BLD VENIPUNCTURE: CPT | Performed by: EMERGENCY MEDICINE

## 2021-05-02 PROCEDURE — 96360 HYDRATION IV INFUSION INIT: CPT | Performed by: EMERGENCY MEDICINE

## 2021-05-02 PROCEDURE — C9803 HOPD COVID-19 SPEC COLLECT: HCPCS | Performed by: EMERGENCY MEDICINE

## 2021-05-02 PROCEDURE — U0005 INFEC AGEN DETEC AMPLI PROBE: HCPCS | Performed by: EMERGENCY MEDICINE

## 2021-05-02 PROCEDURE — 93005 ELECTROCARDIOGRAM TRACING: CPT | Performed by: EMERGENCY MEDICINE

## 2021-05-02 PROCEDURE — U0003 INFECTIOUS AGENT DETECTION BY NUCLEIC ACID (DNA OR RNA); SEVERE ACUTE RESPIRATORY SYNDROME CORONAVIRUS 2 (SARS-COV-2) (CORONAVIRUS DISEASE [COVID-19]), AMPLIFIED PROBE TECHNIQUE, MAKING USE OF HIGH THROUGHPUT TECHNOLOGIES AS DESCRIBED BY CMS-2020-01-R: HCPCS | Performed by: EMERGENCY MEDICINE

## 2021-05-02 PROCEDURE — 83605 ASSAY OF LACTIC ACID: CPT | Performed by: EMERGENCY MEDICINE

## 2021-05-02 PROCEDURE — 99285 EMERGENCY DEPT VISIT HI MDM: CPT | Mod: 25 | Performed by: EMERGENCY MEDICINE

## 2021-05-02 PROCEDURE — 93010 ELECTROCARDIOGRAM REPORT: CPT | Performed by: INTERNAL MEDICINE

## 2021-05-02 PROCEDURE — 258N000003 HC RX IP 258 OP 636: Performed by: EMERGENCY MEDICINE

## 2021-05-02 PROCEDURE — 80053 COMPREHEN METABOLIC PANEL: CPT | Performed by: EMERGENCY MEDICINE

## 2021-05-02 PROCEDURE — 85025 COMPLETE CBC W/AUTO DIFF WBC: CPT | Performed by: EMERGENCY MEDICINE

## 2021-05-02 PROCEDURE — 99285 EMERGENCY DEPT VISIT HI MDM: CPT | Performed by: EMERGENCY MEDICINE

## 2021-05-02 RX ORDER — QUETIAPINE FUMARATE 25 MG/1
75 TABLET, FILM COATED ORAL 3 TIMES DAILY
Status: DISCONTINUED | OUTPATIENT
Start: 2021-05-03 | End: 2021-05-11 | Stop reason: HOSPADM

## 2021-05-02 RX ORDER — OLANZAPINE 10 MG/2ML
5 INJECTION, POWDER, FOR SOLUTION INTRAMUSCULAR
Status: COMPLETED | OUTPATIENT
Start: 2021-05-02 | End: 2021-05-08

## 2021-05-02 RX ADMIN — SODIUM CHLORIDE 1000 ML: 9 INJECTION, SOLUTION INTRAVENOUS at 20:37

## 2021-05-03 PROBLEM — F02.818 LATE ONSET ALZHEIMER'S DISEASE WITH BEHAVIORAL DISTURBANCE (H): Status: ACTIVE | Noted: 2018-04-19

## 2021-05-03 LAB — INTERPRETATION ECG - MUSE: NORMAL

## 2021-05-03 PROCEDURE — 250N000013 HC RX MED GY IP 250 OP 250 PS 637: Mod: GY | Performed by: FAMILY MEDICINE

## 2021-05-03 PROCEDURE — G0378 HOSPITAL OBSERVATION PER HR: HCPCS

## 2021-05-03 PROCEDURE — 99219 PR INITIAL OBSERVATION CARE,LEVEL II: CPT | Performed by: FAMILY MEDICINE

## 2021-05-03 RX ORDER — ACETAMINOPHEN 500 MG/1
1000 TABLET ORAL 2 TIMES DAILY
Status: ON HOLD | COMMUNITY
Start: 2021-04-22 | End: 2021-05-03

## 2021-05-03 RX ORDER — AMLODIPINE BESYLATE 5 MG/1
5 TABLET ORAL DAILY
Status: DISCONTINUED | OUTPATIENT
Start: 2021-05-03 | End: 2021-05-06

## 2021-05-03 RX ORDER — ACETAMINOPHEN 500 MG
1000 TABLET ORAL 2 TIMES DAILY
Status: DISCONTINUED | OUTPATIENT
Start: 2021-05-03 | End: 2021-05-11 | Stop reason: HOSPADM

## 2021-05-03 RX ORDER — UREA 10 %
500 LOTION (ML) TOPICAL DAILY
Status: DISCONTINUED | OUTPATIENT
Start: 2021-05-03 | End: 2021-05-11 | Stop reason: HOSPADM

## 2021-05-03 RX ORDER — DONEPEZIL HYDROCHLORIDE 10 MG/1
10 TABLET, ORALLY DISINTEGRATING ORAL DAILY
COMMUNITY
End: 2022-03-10

## 2021-05-03 RX ORDER — ACETAMINOPHEN 500 MG
1000 TABLET ORAL 2 TIMES DAILY
COMMUNITY
End: 2021-06-30

## 2021-05-03 RX ORDER — ACETAMINOPHEN 325 MG/1
650 TABLET ORAL EVERY 4 HOURS PRN
Status: DISCONTINUED | OUTPATIENT
Start: 2021-05-03 | End: 2021-05-11 | Stop reason: HOSPADM

## 2021-05-03 RX ORDER — QUETIAPINE FUMARATE 25 MG/1
25 TABLET, FILM COATED ORAL DAILY PRN
COMMUNITY
Start: 2021-04-29 | End: 2023-01-01

## 2021-05-03 RX ORDER — NAPROXEN 500 MG/1
500 TABLET ORAL
Status: ON HOLD | COMMUNITY
End: 2021-05-03

## 2021-05-03 RX ORDER — DONEPEZIL HYDROCHLORIDE 5 MG/1
10 TABLET, FILM COATED ORAL DAILY
Status: DISCONTINUED | OUTPATIENT
Start: 2021-05-03 | End: 2021-05-11 | Stop reason: HOSPADM

## 2021-05-03 RX ORDER — QUETIAPINE FUMARATE 25 MG/1
25 TABLET, FILM COATED ORAL EVERY 6 HOURS PRN
Status: DISCONTINUED | OUTPATIENT
Start: 2021-05-03 | End: 2021-05-11 | Stop reason: HOSPADM

## 2021-05-03 RX ADMIN — ACETAMINOPHEN 1000 MG: 500 TABLET, FILM COATED ORAL at 21:17

## 2021-05-03 RX ADMIN — ACETAMINOPHEN 1000 MG: 500 TABLET, FILM COATED ORAL at 11:31

## 2021-05-03 RX ADMIN — QUETIAPINE FUMARATE 75 MG: 25 TABLET ORAL at 08:41

## 2021-05-03 RX ADMIN — QUETIAPINE FUMARATE 75 MG: 25 TABLET ORAL at 17:10

## 2021-05-03 RX ADMIN — DONEPEZIL HYDROCHLORIDE 10 MG: 5 TABLET ORAL at 11:29

## 2021-05-03 RX ADMIN — AMLODIPINE BESYLATE 5 MG: 5 TABLET ORAL at 11:33

## 2021-05-03 RX ADMIN — CYANOCOBALAMIN TAB 500 MCG 500 MCG: 500 TAB at 11:32

## 2021-05-03 RX ADMIN — QUETIAPINE FUMARATE 75 MG: 25 TABLET ORAL at 12:26

## 2021-05-03 NOTE — PROGRESS NOTES
Patient up to bathroom with standby assist. Patient became agitated with door being open. This RN closed bathroom door part way while still being able to monitor patient's gait.    Patient is asking when he can go home. This RN reassured patient that we will work on that this AM.    Patient did sleep from 230 AM until 0500.    Vitals are: B/P: 140/77, T: 98.1, P: 58, R: 18

## 2021-05-03 NOTE — ED NOTES
Spoke with daughter. She is coming to pick pt up in about half hour.   Spoke to Carmen storey at [facility]

## 2021-05-03 NOTE — ED TRIAGE NOTES
ED Nursing Triage Note (General)   ________________________________    Juan Miguel NAIMA Delaney is a 88 year old Male that presents to triage ambulance  With history of  Calling the ambulance tonight because he was feeling dizzy. He called the ambulance tonight without the staff at [facility] knowing. History dementia and recent med changes  reported by EMS  Significant symptoms had onset unknown.  /80   Pulse 53   Temp 95.8  F (35.4  C) (Tympanic)   Resp 14   SpO2 98% t  Patient appears alert , oriented and orientated to self only, in no acute distress., and cooperative, pleasant and calm behavior.    GCS Total = 14  Airway: intact  Breathing noted as Normal.  Circulation Normal  Skin normal, warm  Action taken:  Triage to critical care immediately      PRE HOSPITAL PR[facility] LIVING SITUATION [facility]

## 2021-05-03 NOTE — H&P
[facility]    History and Physical - Hospitalist Service       Date of Admission:  5/2/2021    Assessment & Plan   Juan Miguel Delaney is a 88 year old male admitted on 5/2/2021. He presents from Assisted Living at [facility] with complaints of dizziness but because of severe underlying dementia the family, daughter Kavita relates that the issues are episodes of increased agitation which scare her and staff. Wife is not with him due to illness and without her he has been more difficult to control with more outbursts. This has not been well managed with increased doses of seroquel and prn dosing of ativan. Yesterday in ER, the plan to send him back to AL was not accepted by daughter and he has been brought in under observation status to look for other options including memory care.  will be looking for options    Late onset Alzheimer's disease with behavioral disturbance (H)  Presents with increased agitation and aggressiveness at times related to his underlying dementia  Currently living in Assisted living at [facility], which at time time is questionable in taking him back   will be involved looking for options, potentially going back to Assisted living with changes, or going to Memory care    Benign essential hypertension  Stable controlled taking Norvasc  No change         Diet: Regular Diet Adult    DVT Prophylaxis: Low Risk/Ambulatory with no VTE prophylaxis indicated  Puri Catheter: not present  Code Status: No CPR- Do NOT Intubate           Disposition Plan   Expected discharge: Today, recommended to safe living option once safe disposition plan/ TCU bed available.  Entered: Cj Leone MD 05/03/2021, 8:12 AM     The patient's care was discussed with the Patient and Patient's Family.    Cj Leone MD  [facility]  Contact information available via [facility]  Paging/Directory      ______________________________________________________________________    Chief Complaint   Brought to ER by ambulance with complaints of dizziness    History is obtained from the patient, electronic health record, emergency department physician and patient's daughter    History of Present Illness   Juan Miguel NAIMA Delaney is a 88 year old male who was brought to ER by ambulance that he called for dizziness. Patient with severe dementia and has no recollection of his symptoms or reasons for calling. Living at [facility]isted living, previously with his wife who is not there due to illness or surgery?, now at [facility]. He has had increased episodes of agitation, at times becomes belligerent, angry, suicidal, aggressive. Dr. Fernando has increased dosing of seroquel with some improvement. Seen in ED Friday, sent back to [facility] with family support, son stayed with him, but over time patient became more angry, demanding that he leave. Family has been thinking about memory care or psych eval at [place] or [place]. Wife might be able to come back soon to [facility]. At this time, patient not safe to go back to [facility] by himself, Family looking for options    Review of Systems    The 5 point Review of Systems is negative other than noted in the HPI or here.     Past Medical History    I have reviewed this patient's medical history and updated it with pertinent information if needed.   Past Medical History:   Diagnosis Date     Actinic keratosis     2004,5-FU treatment on forehead     Chronic kidney disease, stage III (moderate)     No Comments Provided     Elevated erythrocyte sedimentation rate     2005,weight loss, night sweats due to episode of acute inflammatory illness, etiology unclear     Essential (primary) hypertension     No Comments Provided     Family history of malignant neoplasm of prostate     No Comments Provided     Hyperlipidemia     No Comments Provided     Osteoarthritis      No Comments Provided     Pneumonia     2002,and severe NSAID allergic reaction, hospitalized     Sciatica     No Comments Provided       Past Surgical History   I have reviewed this patient's surgical history and updated it with pertinent information if needed.  Past Surgical History:   Procedure Laterality Date     ARTHROPLASTY KNEE      2006     ARTHROPLASTY KNEE      2008     ARTHROSCOPY SHOULDER      1996,left     ARTHROSCOPY SHOULDER      2001,AC arthrosis & distal clavicle resection     ARTHROSCOPY SHOULDER      2011     COLONOSCOPY      2008,normal     CYSTOSCOPY, TRANSURETHRAL RESECTION (TUR) PROSTATE, COMBINED      No Comments Provided     LAMINECTOMY LUMBAR ONE LEVEL      2015,[place], spinal stenosis     OTHER SURGICAL HISTORY      2004,207384,SCAN-STRESS TEST,negative to heart rate 138     OTHER SURGICAL HISTORY      2013,600000,OTHER,Right 5th digit     RELEASE CARPAL TUNNEL      bilateral       Social History   I have reviewed this patient's social history and updated it with pertinent information if needed.  Social History     Tobacco Use     Smoking status: Never Smoker     Smokeless tobacco: Never Used   Substance Use Topics     Alcohol use: No     Alcohol/week: 0.0 standard drinks     Drug use: No       Family History   I have reviewed this patient's family history and updated it with pertinent information if needed.  Family History   Problem Relation Age of Onset     Prostate Cancer Father         Cancer-prostate     Hypertension Mother         Hypertension     Other - See Comments Mother         Old age, 99     Prostate Cancer Brother         Cancer-prostate     Unknown/Adopted Brother         Unknown       Prior to Admission Medications   Prior to Admission Medications   Prescriptions Last Dose Informant Patient Reported? Taking?   LORazepam (ATIVAN) 1 MG tablet 5/2/2021 at 1830  No Yes   Sig: Take 1 tablet (1 mg) by mouth every 4 hours as needed for agitation   QUEtiapine (SEROQUEL) 50 MG  tablet 5/2/2021 at Unknown time  No Yes   Sig: Take 1 tablet (50 mg) by mouth 3 times daily   SM PAIN RELIEVER EX  MG tablet 5/2/2021 at PM  Yes Yes   Sig: Take 1,000 mg by mouth 2 times daily   amLODIPine (NORVASC) 5 MG tablet 5/2/2021 at Unknown time  No Yes   Sig: Take 1 tablet (5 mg) by mouth daily   cyanocobalamin (VITAMIN B-12) 500 MCG tablet 5/2/2021 at Unknown time  No Yes   Sig: TAKE 1 TABLET (500 MCG) BY MOUTH DAILY   donepezil (ARICEPT) 10 MG ODT 5/2/2021 at Unknown time  Yes Yes   Sig: Take 10 mg by mouth daily   naproxen (NAPROSYN) 500 MG tablet   Yes Yes   Sig: Take 500 mg by mouth every evening as needed (PAIN)      Facility-Administered Medications: None     Allergies   Allergies   Allergen Reactions     Penicillins Hives     Celecoxib Rash     Ibuprofen Rash     All NSAIDS cause rash     Lisinopril Other (See Comments)     Dry throat     Naprosyn [Naproxen] Other (See Comments)     Epistaxis--resolved when DC'd     Rofecoxib Hives     Vioxx       Physical Exam   Vital Signs: Temp: 98.1  F (36.7  C) Temp src: Tympanic BP: (!) 140/77 Pulse: 58   Resp: 18 SpO2: 99 % O2 Device: None (Room air)    Weight: 176 lbs 12.8 oz    Constitutional: awake, alert, cooperative, no apparent distress, and appears stated age  Eyes: Lids and lashes normal, pupils equal, round and reactive to light, extra ocular muscles intact, sclera clear, conjunctiva normal  ENT: Normocephalic, without obvious abnormality, atraumatic, sinuses nontender on palpation, external ears without lesions, oral pharynx with moist mucous membranes, tonsils without erythema or exudates, gums normal and good dentition.  Hematologic / Lymphatic: no cervical lymphadenopathy and no supraclavicular lymphadenopathy  Respiratory: No increased work of breathing, good air exchange, clear to auscultation bilaterally, no crackles or wheezing  Cardiovascular: regular rate and rhythm  GI: normal bowel sounds, soft and non-distended  Skin: no bruising or  "bleeding and has a \"clincher\" left great toenail  Musculoskeletal: There is no redness, warmth, or swelling of the joints.  Full range of motion noted.  Motor strength is 5 out of 5 all extremities bilaterally.  Tone is normal.  Neurologic: Awake, alert, oriented to name, place and time.  Cranial nerves II-XII are grossly intact.  Motor is 5 out of 5 bilaterally.  Cerebellar finger to nose, heel to shin intact.  Sensory is intact.  Babinski down going, Romberg negative, and gait is normal.    Data   Data reviewed today: I reviewed all medications, new labs and imaging results over the last 24 hours. I personally reviewed no images or EKG's today.    Recent Labs   Lab 05/02/21  1940   WBC 4.7   HGB 14.6   MCV 89         POTASSIUM 4.8   CHLORIDE 105   CO2 26   BUN 26*   CR 1.57*   ANIONGAP 8   LORENZO 8.8   *   ALBUMIN 3.8   PROTTOTAL 5.7*   BILITOTAL 0.4   ALKPHOS 32*   ALT 14   AST 13     "

## 2021-05-03 NOTE — PROGRESS NOTES
:    I called patients daughter this am to discuss discharge planning needs.  Daughter Kavita stated she would like patient placed at a [facility] center to get his medications regulated for his behaviors then back to [facility].  Kavita stated her mom may get out the end of the week at [facility] for rehab.  I placed calls to [facility] and left message with Joseline ([phone]) as well as Lisa at [facility] ([phone]).  Waiting back for their calls.  I also called SEBASTIAN Patrick ([phone]) at [facility] and she was also in support of patient going to a behavioral facility prior to coming back.     CASSANDRA Angulo on 5/3/2021 at 10:39 AM    :    I received a call from Martha at [facility] requesting more information to be faxed.  I faxed over updated notes to her to review.    CASSANDRA Angulo on 5/3/2021 at 11:45 AM    :    I called patients daughter Kavita and gave her update.  I also called SEBASTIAN Patrick at [facility] and gave her update. Celina stated she has also been talking to Carmen Reynaga at [facility] as they have an opening in memory care. Family is still wanting patient to go to a  Behavioral Health facility first.  Celina will have more discussions with family.   will continue to work on placement.    CASSANDRA Angulo on 5/3/2021 at 2:46 PM    :    Nursing staff reached out to me to see if I could call daughter Kavita and maybe she could come in or call patients room to calm him down.  Nursing reported that he is wanting to leave facility.  Security is present is in his room.  Daughter stated she would  call the nurses station and offer any support she could give.    CASSANDRA Angulo on 5/3/2021 at 3:15 PM

## 2021-05-03 NOTE — PLAN OF CARE
Patient is confused and has early dementia. Patient is redirectable at this time. Patient continues asking how he got here and when he is leaving.    Reassured patient that we are here to take care of him and that Kavita his daughter is coming back in the morning.

## 2021-05-03 NOTE — ASSESSMENT & PLAN NOTE
Stable controlled taking Norvasc  Blood pressures have been on the higher side and will increase to 10 mg daily

## 2021-05-03 NOTE — ASSESSMENT & PLAN NOTE
Presents with increased agitation and aggressiveness at times related to his underlying dementia  Currently living in Assisted living at [facility], which at this time is questionable in taking him back   will be involved looking for options, potentially going back to Assisted living with changes, or going to Memory care  5/4/2021-remains pleasantly confused, minimal agitation over the evening.   continuing to look for placement options.  5/5/2021-no change in mental status, at times becoming  agitated, still waiting social service input  5/6/2021-mental status has improved over the past 36 to 48 hours.  Much less agitation, doing much better with staff with no outbursts.  Pleasant to me, recognizing me from yesterday, currently at Seroquel, 75 mg 3 times daily.  Spoke with daughter, Kavita, will continue to have  look on placement options

## 2021-05-03 NOTE — PROGRESS NOTES
"Patient pleasant and cooperative. Daughter Kavita at bedside. Daughter informs this RN that her dad \"scares me\" with his outbursts. This RN educated daughter that with dementia the best thing  for her dad is to stay in a familiar place in his own routine.     Daughter Kavita did inform this RN that she is looking for a dementia unit for her father to go to. Jax Walls also informed this RN that she would be speaking to one of our social workers first thing in the morning.  "

## 2021-05-03 NOTE — PROGRESS NOTES
SAFETY CHECKLIST  ID Bands and Risk clasps correct and in place (DNR, Fall risk, Allergy, Latex, Limb):  Yes  All Lines Reconciled and labeled correctly: Yes  Whiteboard updated:Yes  Environmental interventions (bed/chair alarm on, call light, side rails, restraints, sitter....): Yes  Verify Tele #:NA   Joseline Posey RN on 5/3/2021 at 7:20 AM

## 2021-05-03 NOTE — PHARMACY-ADMISSION MEDICATION HISTORY
Pharmacy -- Admission Medication Reconciliation    Prior to admission (PTA) medications were reviewed and the patient's PTA medication list was updated.    Sources Consulted: [facility], sure scripts, chart review    The reliability of this Medication Reconciliation is: Reliability: Reliable    The following significant changes were made:  Naproxen REMOVED - has not filled since 11/20 and not on MAR  Acetaminophen CHANGED to 500 mg tablets  Donepezil dose CHANGED to 10 mg - takes in AM  Quetiapine 25 mg daily PRN ADDED - this was started on 4/29 - patient has not received any PRN doses    Note: patient recently started on quetiapine 4/23/21 and his dose has been escalated since.  (started on PRN only on 4/23, then increased to 12.5 mg BID 4/27, then increased to 25 mg TID on 4/29 and further increased to 50 mg TID on 4/30)  Note: lorazepam 1 mg also recently started PRN for agitation; patient has received 2 doses on both 5/1 and 5/2    In addition, the patient's allergies were reviewed with the patient and updated as follows:   Allergies: Penicillins, Celecoxib, Ibuprofen, Lisinopril, Naprosyn [naproxen], and Rofecoxib    The pharmacist has reviewed with the patient that all personal medications should be removed from the building or locked in the belongings safe.  Patient shall only take medications ordered by the physician and administered by the nursing staff.       Medication barriers identified: alzheimers dementia; wife recently hospitalized, then discharged to SNF on 4/26/21 - no longer living together - may be contributing to increased agitation   Medication adherence concerns: per notes, has been hard for patient to take some medications when agitated   Understanding of emergency medications: using lorazepam for agitation not controlled by quetiapine    Deann Shah [facility], 5/3/2021,  9:14 AM

## 2021-05-03 NOTE — PLAN OF CARE
Problem: Adult Inpatient Plan of Care  Goal: Plan of Care Review  5/3/2021 0903 by Joseline Posey RN  Outcome: No Change  Flowsheets (Taken 5/3/2021 0158 by Ema Navarro RN)  Plan of Care Reviewed With:    patient    daughter  Progress: no change  5/3/2021 0902 by Joseline Posey RN  Outcome: No Change  Flowsheets (Taken 5/3/2021 0158 by Ema Navarro RN)  Plan of Care Reviewed With:    patient    daughter  Progress: no change  Goal: Patient-Specific Goal (Individualized)  5/3/2021 0903 by Joseline Posye RN  Outcome: No Change  Flowsheets (Taken 5/3/2021 0200 by Ema Navarro RN)  Individualized Care Needs: Daily cares assitance  Anxieties, Fears or Concerns: How Patient got here.  5/3/2021 0902 by Joseline Posey RN  Outcome: No Change  Flowsheets (Taken 5/3/2021 0200 by Ema Navarro RN)  Individualized Care Needs: Daily cares assitance  Anxieties, Fears or Concerns: How Patient got here.  Goal: Absence of Hospital-Acquired Illness or Injury  5/3/2021 0903 by Joseline Posey RN  Outcome: No Change  5/3/2021 0902 by Joseline Posey RN  Outcome: No Change  Intervention: Prevent Skin Injury  Recent Flowsheet Documentation  Taken 5/3/2021 0800 by Joseline Posey RN  Body Position: position changed independently  Intervention: Prevent and Manage VTE (Venous Thromboembolism) Risk  Recent Flowsheet Documentation  Taken 5/3/2021 0800 by Joseline Posey RN  VTE Prevention/Management: fluids promoted  Goal: Optimal Comfort and Wellbeing  5/3/2021 0903 by Joseline Posey, RN  Outcome: No Change  5/3/2021 0902 by Joseline Posey RN  Outcome: No Change  Goal: Readiness for Transition of Care  5/3/2021 0903 by Joseline Posey RN  Outcome: No Change  5/3/2021 0902 by Joseline Posey RN  Outcome: No Change    Patient disoriented to time, situation, and place.  Cooperative this morning.  VSS, Lets needs known to staff.          Joseline Posey RN on 5/3/2021 at 9:10 AM

## 2021-05-03 NOTE — ED PROVIDER NOTES
[facility]  Emergency Department Visit Note    Dizziness      History of Present Illness     HPI:  Juan Miguel Delaney is a 88 year old male with advanced dementia presenting by EMS from Assisted Living for dizziness. He called 911 and on their arrival the staff was unaware that he had called. He is unable to remember why he called 911. he denies any pain. He denies dizziness or vertigo.Hw was seen in the ED and his Seroquel was increased to 25 tid to 75 tid. he was also started on ativan. No vomiting. No tinnitus or hearing changes. No headache, fever, chills, chest pain, abdominal pain, weakness, numbness, difficulties with balance. There is no recent preceding illness.    Medications:  Prior to Admission medications    Medication Sig Last Dose Taking? Auth Provider   acetaminophen (TYLENOL) 325 MG tablet Take 1,000 mg by mouth 2 times daily   Reported, Patient   amLODIPine (NORVASC) 5 MG tablet Take 1 tablet (5 mg) by mouth daily   Lilly Shepard APRN CNP   cyanocobalamin (VITAMIN B-12) 500 MCG tablet TAKE 1 TABLET (500 MCG) BY MOUTH DAILY   Jason Fernando MD   donepezil (ARICEPT) 5 MG ODT Take 2 tablets (10 mg) by mouth daily   Jason Fernando MD   LORazepam (ATIVAN) 1 MG tablet Take 1 tablet (1 mg) by mouth every 4 hours as needed for agitation   Jeromy Agustin PA-C   naproxen (NAPROSYN) 500 MG tablet 1 tab PO daily with food PRN if tylenol not effective   Lilly Shepard APRN CNP   QUEtiapine (SEROQUEL) 25 MG tablet 25 mg PO TID daily and 1 tab (25 mg) PRN for agitation, restlessness, aggressive behaviors daily   Lilly Shepard APRN CNP   QUEtiapine (SEROQUEL) 50 MG tablet Take 1 tablet (50 mg) by mouth 3 times daily   Jeromy Agustin PA-C   SM PAIN RELIEVER EX  MG tablet TAKE 2 TABLETS (1,000 MG) BY MOUTH 2 TIMES DAILY   Lilly Shepard APRN CNP       Allergies:  Allergies   Allergen Reactions     Lisinopril Other (See Comments)     Dry throat  Dry throat     Naprosyn  [Naproxen] Other (See Comments)     Epistaxis--resolved when DC'd     Penicillins Hives     Rofecoxib Hives     Vioxx     Celecoxib Rash     Ibuprofen Rash     All NSAIDS cause rash       Problem List:  Patient Active Problem List   Diagnosis     Chronic insomnia     Chronic left sacroiliac pain     CKD (chronic kidney disease) stage 3, GFR 30-59 ml/min (H)     Mixed hyperlipidemia     Benign essential hypertension     Spinal stenosis of lumbar region with neurogenic claudication     Neck pain, chronic     Osteoarthrosis     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     S/P lumbar laminectomy     Trigger finger, left ring finger     Late onset Alzheimer's disease without behavioral disturbance (H)     Medication side effects, initial encounter       Past Medical History:  Past Medical History:   Diagnosis Date     Actinic keratosis     2004,5-FU treatment on forehead     Chronic kidney disease, stage III (moderate)     No Comments Provided     Elevated erythrocyte sedimentation rate     2005,weight loss, night sweats due to episode of acute inflammatory illness, etiology unclear     Essential (primary) hypertension     No Comments Provided     Family history of malignant neoplasm of prostate     No Comments Provided     Hyperlipidemia     No Comments Provided     Osteoarthritis     No Comments Provided     Pneumonia     2002,and severe NSAID allergic reaction, hospitalized     Sciatica     No Comments Provided       Past Surgical History:  Past Surgical History:   Procedure Laterality Date     ARTHROPLASTY KNEE      2006     ARTHROPLASTY KNEE      2008     ARTHROSCOPY SHOULDER      1996,left     ARTHROSCOPY SHOULDER      2001,AC arthrosis & distal clavicle resection     ARTHROSCOPY SHOULDER      2011     COLONOSCOPY      2008,normal     CYSTOSCOPY, TRANSURETHRAL RESECTION (TUR) PROSTATE, COMBINED      No Comments Provided     LAMINECTOMY LUMBAR ONE LEVEL      2015,[place], spinal stenosis     OTHER SURGICAL  HISTORY      2004,959541,SCAN-STRESS TEST,negative to heart rate 138     OTHER SURGICAL HISTORY      2013,148544,OTHER,Right 5th digit     RELEASE CARPAL TUNNEL      bilateral       Social History:  Social History     Tobacco Use     Smoking status: Never Smoker     Smokeless tobacco: Never Used   Substance Use Topics     Alcohol use: No     Alcohol/week: 0.0 standard drinks     Drug use: No       Review of Systems:  10 point review of systems obtained and pertinent positive and negative findings noted in HPI. Review of systems otherwise negative.      Physical Exam     Vital signs: BP (!) 147/82   Pulse 56   Temp 95.8  F (35.4  C) (Tympanic)   Resp 14   Wt 77.1 kg (170 lb)   SpO2 97%   BMI 26.63 kg/m      Physical Exam:    General: awake and alert but confused, comfortable  HEENT: atraumatic, no scleral injection, no nasal discharge, neck supple  Chest: clear to auscultation bilaterally without wheezes or crackles, non labored respirations, symmetric chest rise  Cardiovascular: regular rate and rhythm, no murmurs or gallops  Abdomen: soft, nontender, no rebound or guarding, nondistended  Extremities: no deformities, edema, or tenderness  Skin: warm, dry, no rashes  Neuro: alert and oriented to person only, 5/5 bilateral hand , bicep, tricep, plantar/dorsiflexion, sensation intact to light touch bilateral hands and feet, ambulates without difficulty or ataxia, finger to nose normal bilaterally, CN II-XII intact including VIII grossly symmetric and intact, no skew deviation, no symptoms elicited by [place]      Medical Decision Making & ED Course     Juan Miguel Delaney is a 88 year old old male presenting with dizziness that has since resolved. He was given Seroquel and ativan about 1-2 hours prior to his calling. His vitals and exam are normal. I suspect this is a medication reaction. Seroquel is a high risk medication for elderly especially with the addition of ativan. His labs and exam are reassuring.  There are plans for him to be transferred to [place] or [facility] tomorrow. The staff have agrees to check on his every hour during the night  Recommend reducing the Seroquel and only giving ativan for severe agitation. I have placed a SW referral to assist with new place  to higher level of staffing care in case a transfer iis not able to be done tomorrow as anticipated    ED Course as of May 03 0000   Sun May 02, 2021   2024 Slightly above baseline. Will give fluid bolus   GFR Estimate(!): 42   2134 Patient had been calm and cooperative and asymptomatic and daughter was to bring him to [facility] but on arrival she refuses. Case discussed with Dr Hicks. Will transfer to observation      .    Diagnosis & Disposition     Diagnosis:  1. Medication side effects, initial encounter    2. Late onset Alzheimer's disease without behavioral disturbance (H)        Disposition:  Home       Deann Lafleur MD  05/03/21 0000

## 2021-05-03 NOTE — PROGRESS NOTES
Pt's daughter given the observation hand out. She refused the need for watching the video when offered and explained.

## 2021-05-03 NOTE — PROGRESS NOTES
Patient disoriented to time, situation, and place.  Cooperative, calm.  Wanting to go home today. Up out of chair frequently does not use call light appropriately. Appetite good. Able to redirect with snacks, coffee, and walks.  VSS      Joseline Posey RN on 5/3/2021 at 2:15 PM    Patient has been impulsive,unsafe, does not use call light appropriately. Has not been violent. Has not used threats. Security sat with patient for safety and is now a 1:1 with nursing assistant.    Continued redirection of snacks, communication, and walks.   1;1 observation to promote safety and reduce risk of injury.   Joseline Posey RN on 5/3/2021 at 4:58 PM

## 2021-05-03 NOTE — ED NOTES
Pt's daughter is uncomfortable having pt go back to [facility]. Pt to stay in hospital on observation.

## 2021-05-04 ENCOUNTER — PATIENT OUTREACH (OUTPATIENT)
Dept: CARE COORDINATION | Facility: CLINIC | Age: 86
End: 2021-05-04

## 2021-05-04 DIAGNOSIS — R45.1 AGITATION: ICD-10-CM

## 2021-05-04 DIAGNOSIS — F02.818 LATE ONSET ALZHEIMER'S DISEASE WITH BEHAVIORAL DISTURBANCE (H): Primary | ICD-10-CM

## 2021-05-04 DIAGNOSIS — G30.1 LATE ONSET ALZHEIMER'S DISEASE WITH BEHAVIORAL DISTURBANCE (H): Primary | ICD-10-CM

## 2021-05-04 PROCEDURE — 99225 PR SUBSEQUENT OBSERVATION CARE,LEVEL II: CPT | Performed by: FAMILY MEDICINE

## 2021-05-04 PROCEDURE — 250N000013 HC RX MED GY IP 250 OP 250 PS 637: Mod: GY | Performed by: FAMILY MEDICINE

## 2021-05-04 PROCEDURE — G0378 HOSPITAL OBSERVATION PER HR: HCPCS

## 2021-05-04 RX ADMIN — ACETAMINOPHEN 1000 MG: 500 TABLET, FILM COATED ORAL at 22:24

## 2021-05-04 RX ADMIN — QUETIAPINE FUMARATE 75 MG: 25 TABLET ORAL at 08:48

## 2021-05-04 RX ADMIN — QUETIAPINE FUMARATE 25 MG: 25 TABLET ORAL at 22:24

## 2021-05-04 RX ADMIN — ACETAMINOPHEN 650 MG: 325 TABLET, FILM COATED ORAL at 02:05

## 2021-05-04 RX ADMIN — AMLODIPINE BESYLATE 5 MG: 5 TABLET ORAL at 09:13

## 2021-05-04 RX ADMIN — QUETIAPINE FUMARATE 75 MG: 25 TABLET ORAL at 13:41

## 2021-05-04 RX ADMIN — ACETAMINOPHEN 1000 MG: 500 TABLET, FILM COATED ORAL at 09:13

## 2021-05-04 RX ADMIN — QUETIAPINE FUMARATE 75 MG: 25 TABLET ORAL at 17:07

## 2021-05-04 RX ADMIN — QUETIAPINE FUMARATE 25 MG: 25 TABLET ORAL at 00:05

## 2021-05-04 RX ADMIN — DONEPEZIL HYDROCHLORIDE 10 MG: 5 TABLET ORAL at 09:12

## 2021-05-04 RX ADMIN — CYANOCOBALAMIN TAB 500 MCG 500 MCG: 500 TAB at 09:13

## 2021-05-04 NOTE — PLAN OF CARE
Upon entry to room, patient asked why he was here and quickly became agitated, yelling that he did not want to be here. Spoke with him and redirected fairly well, listened to his concerns and validated his feelings which seemed to calm him down. Stated he was sick of watching TV so offered a book or magazine to which he declined. Would like to leave and see his wife. Sitting upright in chair and had no other needs at this time.     Latanya Mcclellan RN on 5/4/2021 at 11:11 AM

## 2021-05-04 NOTE — PLAN OF CARE
Patient remains vitally stable. Is up in chair speaking to sitter about his time in [place], a subject he very much enjoys conversing about. He is calm at this time, laughing and joking with sitter. Appears comfortable and denies pain.   Latanya Mcclellan RN on 5/4/2021 at 3:28 PM

## 2021-05-04 NOTE — UTILIZATION REVIEW
Concurrent stay review; Secondary Review Determination     [facility]          Under the authority of the Utilization Management Committee, the utilization review process indicated a secondary review on the above patient.  The review outcome is based on review of the medical records, discussions with staff, and applying clinical experience noted on the date of the review.          (x) Observation Status Appropriate - Concurrent stay review    RATIONALE FOR DETERMINATION   87 yo man Patient admitted to observation status on 5/2/21.  Patient called EMS for dizziness and once in ED, did not remember why he called.  Patient with underlying dementia, but with increased agitation and behaviors.  Daughter does not want him to return to assisted living due to his mental status. No labs since admit.  No IV access. Patient is receiving scheduled seroquel as well as prn seroquel.  Medically stable, but now has 1:1 sitter for safety.   is trying to find behavioral SNF.    There is no medical necessity of clinical indication to advance patient to inpatient status, patient delay discharge is related to disposition and half-way care      This document was produced using voice recognition software       The information on this document is developed by the utilization review team in order for the business office to ensure compliance.  This only denotes the appropriateness of proper admission status and does not reflect the quality of care rendered.         The definitions of Inpatient Status and Observation Status used in making the determination above are those provided in the CMS Coverage Manual, Chapter 1 and Chapter 6, section 70.4.      Sincerely,     MATT RUGGIERO MD    System Medical Director  Utilization Management  [facility].

## 2021-05-04 NOTE — PROGRESS NOTES
SAFETY CHECKLIST  ID Bands and Risk clasps correct and in place (DNR, Fall risk, Allergy, Latex, Limb):  Yes  All Lines Reconciled and labeled correctly: Yes  Whiteboard updated:Yes  Environmental interventions (bed/chair alarm on, call light, side rails, restraints, sitter....): Yes  Verify Tele #:

## 2021-05-04 NOTE — PLAN OF CARE
Patient is currently resting comfortably with sitter at bedside. Holding off on vital signs for now to promote restful sleep and therapeutic environment. No aggressive behaviors toward staff. Safety interventions in place, including 1:1 sitter. Latanya Mcclellan RN on 5/4/2021 at 8:09 AM

## 2021-05-04 NOTE — PROGRESS NOTES
Clinic Care Coordination Contact    Writer received referral from Swati Shepard NP on this date.     Writer has been working with spouse of patient and family in the past.    Patient and spouse reside at [facility] for long-term care placement.    Spouse recently transition to SNF for short-term rehab, at that time patient has not been successful in current living environment due to dementia and behaviors.    Patient currently hospitalized, after reviewing notes, appears hospital  attempting to transition patient to behavioral unit.  Difficulties with openings for placement at this time.  Writer will reach out to hospital  to offer support and any additional pertinent information and attempt to have a safe plan for patient upon discharge.    Writer will remain involved in supportive to patient and family as able.    CASSANDRA Chang on 5/4/2021 at 11:43 AM

## 2021-05-04 NOTE — PLAN OF CARE
"Patient disoriented to time, place and situation. Sitter at bedside. Vitals stable, elevated BP. Intermittent hallucinations, seeing animals in room. Calm and cooperative, constant reorientation as he wants to go home. Refused to use walker while up, SBA. Patient pulled his own IV out tonight. Patient resting quietly in bed.   Thi Torres RN on 5/3/2021 at 9:31 PM     Patient requested a PRN medication for sleep. 25 mg Seroquel given per MAR. Patient able to rest in bed. Thi Torres RN on 5/4/2021 at 1:10 AM     Patient showing increased signs of agitation. Patient got out of bed and slammed his door yelling at nurses station to \"be quiet.\"  Patient redirected into bed.  Thi Torres RN on 5/4/2021 at 4:42 AM   "

## 2021-05-04 NOTE — PLAN OF CARE
Patient had an episode of increased confusion. He was redirected per staff, and given scheduled dose of Seroquel. Patient currently sitting evans chair in his room. Sitter is present.

## 2021-05-04 NOTE — PROGRESS NOTES
[facility]    Medicine Progress Note - Hospitalist Service       Date of Admission:  5/2/2021  Assessment & Plan       Late onset Alzheimer's disease with behavioral disturbance (H)  Presents with increased agitation and aggressiveness at times related to his underlying dementia  Currently living in Assisted living at [facility], which at this time is questionable in taking him back   will be involved looking for options, potentially going back to Assisted living with changes, or going to Memory care  5/4/2021-remains pleasantly confused, minimal agitation over the evening.   continuing to look for placement options.    Benign essential hypertension  Stable controlled taking Norvasc  No change    CKD (chronic kidney disease) stage 3, GFR 30-59 ml/min  Stable creatinine  No change           Diet: Regular Diet Adult    DVT Prophylaxis: Low Risk/Ambulatory with no VTE prophylaxis indicated  Puri Catheter: not present  Code Status: No CPR- Do NOT Intubate           Disposition Plan   Expected discharge: Today or tomorrow, recommended to Psychiatric evaluation unit once safe disposition plan/ TCU bed available.  Entered: Cj Leone MD 05/04/2021, 9:36 AM       The patient's care was discussed with the Patient.    Cj Leone MD  Hospitalist Service  [facility]  Contact information available via [facility] Paging/Directory    ______________________________________________________________________    Interval History   Remains pleasantly confused.  Not as agitated over the evening hours.  Slept well, eating well.   working on potential placement with no clear leads yet.  He offers no complaints    Data reviewed today: I reviewed all medications, new labs and imaging results over the last 24 hours. I personally reviewed no images or EKG's today.    Physical Exam   Vital Signs: Temp: 97.2  F (36.2  C) Temp src: Tympanic  BP: (!) 159/78 Pulse: 51   Resp: 16 SpO2: 99 % O2 Device: None (Room air)    Weight: 176 lbs 12.8 oz  Constitutional: awake, alert, cooperative, no apparent distress, and appears stated age  Respiratory: No increased work of breathing, good air exchange, clear to auscultation bilaterally, no crackles or wheezing  Cardiovascular: regular rate and rhythm  GI: normal bowel sounds, soft and non-distended  Neuropsychiatric: General: normal, calm and normal eye contact  Level of consciousness: alert / normal  Affect: normal and pleasant  Orientation: oriented only to self  Memory and insight: impaired: Very poor short-term memory, not aware of wife's status    Data   Recent Labs   Lab 05/02/21  1940   WBC 4.7   HGB 14.6   MCV 89         POTASSIUM 4.8   CHLORIDE 105   CO2 26   BUN 26*   CR 1.57*   ANIONGAP 8   LORENZO 8.8   *   ALBUMIN 3.8   PROTTOTAL 5.7*   BILITOTAL 0.4   ALKPHOS 32*   ALT 14   AST 13

## 2021-05-04 NOTE — PLAN OF CARE
SAFETY CHECKLIST  ID Bands and Risk clasps correct and in place (DNR, Fall risk, Allergy, Latex, Limb):  Yes  All Lines Reconciled and labeled correctly: Yes  Whiteboard updated:Yes  Environmental interventions (bed/chair alarm on, call light, side rails, restraints, sitter....): Yes    Latanya Mcclellan RN on 5/4/2021 at 8:04 AM

## 2021-05-04 NOTE — PROGRESS NOTES
:    I received a call from [facility] in [place] and they will not have any potential openings until middle next week.     I left message for Martha at [facility] and left message regarding referral that was sent over.    I called [facility] in [place] and they said to check back after 1100.    I called [facility] and they will not accept anyone with aggression right now due to staffing issues.    CASSANDRA Angulo on 5/4/2021 at 9:24 AM      :    Received a call from Martha at [facility] and she stated they will not have an opening this week.  She is uncertain when next week they will have an opening.    CASSANDRA Angulo on 5/4/2021 at 10:58 AM      :    I called [facility] and they do not have an opening at this time.  They said to call back later today.    CASSANDAR Angulo on 5/4/2021 at 11:19 AM      :    I called patients daughter Kavita and gave her discharge update.  Kavita was open to us making referrals to memory care facilities as she is aware several [facility] facilities do not have any openings until mid next week.  I made referrals to [facility], [facility], and [facility] for a memory care bed.  I also called SEBASTIAN Patrick and gave her discharge update.    CASSANDRA Angulo on 5/4/2021 at 1:56 PM

## 2021-05-05 PROCEDURE — 250N000013 HC RX MED GY IP 250 OP 250 PS 637: Mod: GY | Performed by: FAMILY MEDICINE

## 2021-05-05 PROCEDURE — 99225 PR SUBSEQUENT OBSERVATION CARE,LEVEL II: CPT | Performed by: FAMILY MEDICINE

## 2021-05-05 PROCEDURE — G0378 HOSPITAL OBSERVATION PER HR: HCPCS

## 2021-05-05 PROCEDURE — 250N000013 HC RX MED GY IP 250 OP 250 PS 637: Performed by: FAMILY MEDICINE

## 2021-05-05 RX ADMIN — QUETIAPINE FUMARATE 75 MG: 25 TABLET ORAL at 10:50

## 2021-05-05 RX ADMIN — AMLODIPINE BESYLATE 5 MG: 5 TABLET ORAL at 09:26

## 2021-05-05 RX ADMIN — QUETIAPINE FUMARATE 25 MG: 25 TABLET ORAL at 23:44

## 2021-05-05 RX ADMIN — QUETIAPINE FUMARATE 75 MG: 25 TABLET ORAL at 16:36

## 2021-05-05 RX ADMIN — ACETAMINOPHEN 1000 MG: 500 TABLET, FILM COATED ORAL at 09:25

## 2021-05-05 RX ADMIN — DONEPEZIL HYDROCHLORIDE 10 MG: 5 TABLET ORAL at 09:26

## 2021-05-05 RX ADMIN — QUETIAPINE FUMARATE 75 MG: 25 TABLET ORAL at 07:59

## 2021-05-05 RX ADMIN — CYANOCOBALAMIN TAB 500 MCG 500 MCG: 500 TAB at 09:26

## 2021-05-05 RX ADMIN — ACETAMINOPHEN 1000 MG: 500 TABLET, FILM COATED ORAL at 23:43

## 2021-05-05 NOTE — PROGRESS NOTES
[facility]    Medicine Progress Note - Hospitalist Service       Date of Admission:  5/2/2021  Assessment & Plan       Late onset Alzheimer's disease with behavioral disturbance (H)  Presents with increased agitation and aggressiveness at times related to his underlying dementia  Currently living in Assisted living at [facility], which at this time is questionable in taking him back   will be involved looking for options, potentially going back to Assisted living with changes, or going to Memory care  5/4/2021-remains pleasantly confused, minimal agitation over the evening.   continuing to look for placement options.  5/5/2021-no change in mental status, at times becoming quite agitated, still waiting social service input    Benign essential hypertension  Stable controlled taking Norvasc  No change    CKD (chronic kidney disease) stage 3, GFR 30-59 ml/min  Stable creatinine  No change           Diet: Regular Diet Adult    DVT Prophylaxis: Low Risk/Ambulatory with no VTE prophylaxis indicated  Puri Catheter: not present  Code Status: No CPR- Do NOT Intubate           Disposition Plan   Expected discharge: When bed available, recommended to Safe place once safe disposition plan/ TCU bed available.  Entered: Cj Leone MD 05/05/2021, 9:55 AM       The patient's care was discussed with the Patient.    Cj Leone MD  Hospitalist Service  [facility]  Contact information available via [facility] Paging/Directory    ______________________________________________________________________    Interval History   Overnight stable.  Pleasantly confused this a.m., at times gets agitated, frustrated with disposition issues.  Denies chest pain, shortness of breath or abdominal symptoms    Data reviewed today: I reviewed all medications, new labs and imaging results over the last 24 hours. I personally reviewed no images or EKG's  today.    Physical Exam   Vital Signs: Temp: 96.9  F (36.1  C) Temp src: Tympanic BP: (!) 141/72 Pulse: 57   Resp: 14 SpO2: 93 % O2 Device: None (Room air)    Weight: 176 lbs 12.8 oz  Constitutional: awake, alert, cooperative, no apparent distress, and appears stated age  Respiratory: No increased work of breathing, good air exchange, clear to auscultation bilaterally, no crackles or wheezing  Cardiovascular: regular rate and rhythm  Neuropsychiatric: Alert, oriented to self, not to place or time.  Poor recollection of recent events although does remember living at assisted living with his wife but questions whether she is alive or dead.  Overnight becoming quite agitated but would reorientate and settle down.    Data   Recent Labs   Lab 05/02/21  1940   WBC 4.7   HGB 14.6   MCV 89         POTASSIUM 4.8   CHLORIDE 105   CO2 26   BUN 26*   CR 1.57*   ANIONGAP 8   LORENZO 8.8   *   ALBUMIN 3.8   PROTTOTAL 5.7*   BILITOTAL 0.4   ALKPHOS 32*   ALT 14   AST 13

## 2021-05-05 NOTE — PLAN OF CARE
Patient is resting comfortably up in chair. He is calm and pleasant, no outbursts of any sort so far this shift. Vital signs remain stable, denies pain and has no other complaints. Conversing pleasantly with staff, doing well with no sitter, scheduled meds are effective. Asks when he can go home. Has no interest in watching tv, was offered reading material to which he declined.    Latanya Mcclellan RN on 5/5/2021 at 3:19 PM

## 2021-05-05 NOTE — PROGRESS NOTES
SAFETY CHECKLIST  ID Bands and Risk clasps correct and in place (DNR, Fall risk, Allergy, Latex, Limb):  Yes  All Lines Reconciled and labeled correctly: Yes  Whiteboard updated:Yes  Environmental interventions (bed/chair alarm on, call light, side rails, restraints, sitter....): Yes  Verify Tele #:   Thi Torres RN on 5/4/2021 at 7:33 PM

## 2021-05-05 NOTE — PLAN OF CARE
SAFETY CHECKLIST  ID Bands and Risk clasps correct and in place (DNR, Fall risk, Allergy, Latex, Limb):  Yes  All Lines Reconciled and labeled correctly: Yes  Whiteboard updated:Yes  Environmental interventions (bed/chair alarm on, call light, side rails, restraints, sitter....): Yes    Latanya Mcclellan RN on 5/5/2021 at 7:50 AM

## 2021-05-05 NOTE — PROGRESS NOTES
Patient had a restful night of sleep. Vitals stable, lungs clear, bowels active. Sitter pulled at 2000, patient has been sleeping since. Held off on second assessment and vitals to promote restful sleep. Patient impulsive around 2220, PRN seroquel given, no further issues for the night.  Thi Torres RN on 5/5/2021 at 5:45 AM

## 2021-05-05 NOTE — PROGRESS NOTES
:    I received a call from SEBASTIAN Yost wanting an update on patient.  I returned her call and left message.    CASSANDRA Angulo on 5/5/2021 at 9:39 AM    :    SEBASTIAN Yost at [facility] called back and I gave her update on how much patient has improved.  I placed call to daughter Kavita asking if she had a chance to talk with her mother as she was unsure if she would be able to caregive for patient if he could return to [facility]. I discussed the possibility of family also helping for awhile. Kavita plans to talk with her mother at [facility] hopefully tomorrow. Dicussed we really need to have a plan before the weekend.  Daughter was very appreciative for our assistance. I will continue to push forward for a memory care assisted living placement until I hear back from daughter.    CASSANDRA Angulo on 5/5/2021 at 11:37 AM

## 2021-05-05 NOTE — PLAN OF CARE
Patient is up in chair this morning, calm and cooperative. Calmly asked when he could see his wife. Vitals are stable, denies pain. He is using his call light appropriately, called to use bathroom, ambulated to bathroom with walker and stand-by assist, tolerated well.     Latanya Mcclellan RN on 5/5/2021 at 8:48 AM

## 2021-05-05 NOTE — PROGRESS NOTES
Patient becoming restless. Is redirectable. Was given noon dose of Seroquel. Patient very agreeable and cooperative with staff.

## 2021-05-06 ENCOUNTER — PATIENT OUTREACH (OUTPATIENT)
Dept: CARE COORDINATION | Facility: CLINIC | Age: 86
End: 2021-05-06

## 2021-05-06 PROCEDURE — G0378 HOSPITAL OBSERVATION PER HR: HCPCS

## 2021-05-06 PROCEDURE — 99225 PR SUBSEQUENT OBSERVATION CARE,LEVEL II: CPT | Performed by: FAMILY MEDICINE

## 2021-05-06 PROCEDURE — 250N000013 HC RX MED GY IP 250 OP 250 PS 637: Performed by: FAMILY MEDICINE

## 2021-05-06 PROCEDURE — 250N000013 HC RX MED GY IP 250 OP 250 PS 637: Mod: GY | Performed by: FAMILY MEDICINE

## 2021-05-06 RX ORDER — QUETIAPINE FUMARATE 50 MG/1
50 TABLET, FILM COATED ORAL 3 TIMES DAILY
Qty: 90 TABLET | Refills: 5 | OUTPATIENT
Start: 2021-05-06

## 2021-05-06 RX ORDER — AMLODIPINE BESYLATE 10 MG/1
10 TABLET ORAL DAILY
Status: DISCONTINUED | OUTPATIENT
Start: 2021-05-06 | End: 2021-05-11 | Stop reason: HOSPADM

## 2021-05-06 RX ORDER — QUETIAPINE FUMARATE 25 MG/1
TABLET, FILM COATED ORAL
Qty: 120 TABLET | Refills: 5 | OUTPATIENT
Start: 2021-05-06

## 2021-05-06 RX ADMIN — CYANOCOBALAMIN TAB 500 MCG 500 MCG: 500 TAB at 10:55

## 2021-05-06 RX ADMIN — QUETIAPINE FUMARATE 75 MG: 25 TABLET ORAL at 07:56

## 2021-05-06 RX ADMIN — ACETAMINOPHEN 1000 MG: 500 TABLET, FILM COATED ORAL at 20:36

## 2021-05-06 RX ADMIN — QUETIAPINE FUMARATE 75 MG: 25 TABLET ORAL at 12:03

## 2021-05-06 RX ADMIN — DONEPEZIL HYDROCHLORIDE 10 MG: 5 TABLET ORAL at 10:55

## 2021-05-06 RX ADMIN — AMLODIPINE BESYLATE 10 MG: 10 TABLET ORAL at 10:55

## 2021-05-06 RX ADMIN — QUETIAPINE FUMARATE 25 MG: 25 TABLET ORAL at 20:37

## 2021-05-06 RX ADMIN — QUETIAPINE FUMARATE 75 MG: 25 TABLET ORAL at 17:15

## 2021-05-06 RX ADMIN — QUETIAPINE FUMARATE 25 MG: 25 TABLET ORAL at 12:53

## 2021-05-06 RX ADMIN — ACETAMINOPHEN 1000 MG: 500 TABLET, FILM COATED ORAL at 10:55

## 2021-05-06 NOTE — PLAN OF CARE
Patient having increased agitation. When spoken to he calms. He is very angry that he cant see his wife. Continued to pound the chair with his hands, medicated with prn med to help the patient calm, when asked patient, he wanted something to help.Patient calmed after med and is sitting in the recliner.

## 2021-05-06 NOTE — PLAN OF CARE
Patient alert and orientated. Cooperative with cares. Asked about his wife, was agreeable with answer. Gait steady, walks with walker. Vital signs stable.

## 2021-05-06 NOTE — TELEPHONE ENCOUNTER
[id number] of [place] sent Rx request for the following:      Requested Prescriptions   Pending Prescriptions Disp Refills   QUEtiapine (SEROQUEL) 25 MG tablet [Pharmacy Med Name: QUETIAPINE 25MG TABLET] 45 tablet     Sig: TAKE 1 TAB BY MOUTH THREE TIMES DAILY, MAY ALSO TAKE 1 TAB DAILY ASNEEDED   Last Office Visit:              4/27/21 (NH Visit, Lilly Shepard)  Future Office visit:             Next 5 appointments (look out 90 days)    May 26, 2021  1:20 PM  Office Visit with Jason Fernando MD  [facility] ([facility] ) [address]  [place] [id number] [phone]   Routing refill request to provider for review/approval because:      Antipsychotic Medications Failed - 5/6/2021  4:31 PM       Failed - Blood pressure under 140/90 in past 12 months    BP Readings from Last 3 Encounters:   05/06/21 (!) 153/60   04/30/21 119/67   10/19/20 128/70          Failed - Lipid panel on file within the past 12 months       Failed - Recent (6 mo) or future (30 days) visit within the authorizing provider's specialty   Historically reported as taking 25 mg by mouth daily as needed  Needs associated diagnosis    Unable to complete prescription refill per RN Medication Refill Policy. Ema Spencer RN .............. 5/6/2021  4:34 PM

## 2021-05-06 NOTE — PLAN OF CARE
Pt is A&Ox4, forgetful at times due to Alzheimer's. Pleasant and cooperative. SBA with walker. Vitals stable. Requested tylenol for ingrown nail to L great toe. Area is red and swollen. Fall precautions in place.

## 2021-05-06 NOTE — TELEPHONE ENCOUNTER
[id number] of [place] sent Rx request for the following:      Requested Prescriptions   Pending Prescriptions Disp Refills   QUEtiapine (SEROQUEL) 50 MG tablet 90 tablet 0    Sig: Take 1 tablet (50 mg) by mouth 3 times daily   Last Prescription Date:   4/30/21  Last Fill Qty/Refills:         90, R-0 (Dr. Agustin)    Last Office Visit:              4/27/21 (NH Visit, Lilly Shepard)  Future Office visit:             Next 5 appointments (look out 90 days)    May 26, 2021  1:20 PM  Office Visit with Jason Fernando MD  [facility] ([facility] ) [address]  [facility] [id number] [phone]      Routing refill request to provider for review/approval because:   Antipsychotic Medications Failed - 5/6/2021  4:27 PM       Failed - Blood pressure under 140/90 in past 12 months    BP Readings from Last 3 Encounters:   05/06/21 (!) 153/60   04/30/21 119/67   10/19/20 128/70          Failed - Lipid panel on file within the past 12 months       Failed - Recent (6 mo) or future (30 days) visit within the authorizing provider's specialty   Needs associated diagnosis.    Unable to complete prescription refill per RN Medication Refill Policy. Ema Spencer RN .............. 5/6/2021  4:30 PM

## 2021-05-06 NOTE — PLAN OF CARE
Pt had outburst about wanting to see wife. Banging hands on chair and yelling. Sitter initiated until patient calms down. Scheduled Seroquel given. Will cont. To monitor.     Rosy Ham RN on 5/6/2021 at 5:12 PM

## 2021-05-06 NOTE — PROGRESS NOTES
:     I spoke with Nelly CARDENAS regarding patient and will reach out to Ashley sebastian  for collaboration regarding patients needs as Nelly stated Daniela has been working with this family prior to patient coming to hospital.     Reviewed website and it appears only 1 available bed in the state that he is eligible for. I placed call to [facility] [phone] and they are full but  to call back after 1000 to see about bed availably.      It appears Daniela may have called same facility I had called earlier and at that time had an opening.  Facility would not let her fax a referral over as they did not have bed availability.     CASSANDRA Angulo on 5/6/2021 at 9:36 AM    :    Met with Daniela and she is going to continue to call facility as well for a potential bed opening so official referral can be faxed over.  I called and left message at [facility] to call for an update.    CASSANDRA Angulo on 5/6/2021 at 11:23 AM      I placed call to [facility] [phone] and spoke with Ginny.   She stated they did have an opening but  they need a current DEC assessment stating patient needs an inpatient [facility] bed.  I updated Dr. Leone and he is going to place order for DEC assessment. I called patients daughter Kavita and gave her discharge update.  Discussed with her that  the DEC assessment  completed on patient Friday in the ED did not recommend placement at that time and patient has improved a lot since then.  Kavita would like the DEC  to call her for input.  I called DEC and gave them that information.  Kavita also stated she spoke with her mother and they both do not want patient to return to the assisted living with her mother.  Her mother is getting out of [facility] today.  Patient  needs to find another assisted living memory care bed.  I told her [facility] had reached out to me and would like more  information. I re-faxed information over to Hemant at [facility] and left message. She was interested in this placement.   I updated CASSANDRA Mathews on 5/6/2021 at 1:08 PM    :    I called Zoe RN at [facility] and she requested I fax over referral again for Zoe and she stated she will review referral.     DEC assessment has been completed and waiting for results.  Daniela BALDERAS was updated.        CASSANDRA Angulo on 5/6/2021 at 2:27 PM      :    Son Ankit called and wanted an update.    CASSANDRA Angulo on 5/6/2021 at 3:12 PM

## 2021-05-06 NOTE — PROGRESS NOTES
Clinic Care Coordination Contact    Writer spoke with hospital , she will be handling discharge from here.  Writer stepping out of discharge planning, updated NAA Shepard.     CASSANDRA Chang on 2021 at 9:49 AM    Clinic Care Coordination Contact    Per DEC assessment, patient in need of memory care placement, not irma behavior unit. This is a new change/recommendation.     Per assessment, appears hospital  has been reaching out to [facility] with memory care openings.     Writer reaching out to , they have no opening in .   Celina RN states she would like to have patient and significant other reside under 1 roof 1 in assisted living memory memory care.  She states patient will be at the top of her memory care list to assess for appropriate placement when apartment becomes available.    Writer will touch base with [facility] [place] social memory care openings that she has called up to at this time.    CASSANDRA Chang on 2021 at 9:28 [facility] Care Coord[facility] Contact    Writer spoke with Trinh at [facility].   Facility located [place], [place].   Call back number, name and  of patient given.   Direct dial for facility is [phone].  Unable to take patient as he is not able to voluntarily place himself and signed himself incompetent.  States even if [facility] [place] placed him on a hold, will hold and that state lines.    CASSANDRA Chang on 2021 at 2:49 PM      Clinic Care Coordination Contact    Obdulia from [facility] states due to their current client population they are unable to meet patient's needs, younger population and numerous behaviors that this would not be a good fit.  She did states she did fax writer over additional resources for geriatric inpatient.  Awaiting those resources at this time.    CASSANDRA Chang on 2021 at 2:38 PM  '  Clinic Care Coordination Contact    Writer reached out to our [facility] and [facility] behavioral unit in  [place], spoke with Obdulia about geriatric bed for patient.  Explained situation and need for geriatric behavior unit discharge.  Obdulia has access to epic, will be discussing with  And get back to writer and hospital  shortly.  Writer updated hospital  with this information, hospital  anticipating call from Obdulia.    Writer will remain involved in supportive to patient and family as able.    CASSANDRA Chang on 5/6/2021 at 11:32 AM    Clinic Care Coordination Contact    Writer attempted to reach back out to [facility], she states call back in a couple hours again as they are not certain if they have a bed.    CASSANDRA Chang on 5/6/2021 at 11:31 AM    Clinic Care Coordination Contact    Referral placed by NP Swati Shepard to assist hospital social workers with placement of irma behavior unit to meet patient current needs.     Patient has recently had increased behaviors due to spouse's recent transition to SNF for short-term.  Spouse and patient live together at [facility], per RN at [facility], they work well together as a couple and spouse is able to redirect and comfort patient on a day-to-day basis.    Patient has had increased behaviors since departure of spouse, as he is unable to comprehend that his spouse is receiving therapies and rehab unit.    Writer please call to [facility] located in [place] on this date and attempt to determine if bed available for patient.    They currently do have a bed for to have a referral ahead of patient, I spoke with Chirag and alisha, she states to call back in 2 hours to determine if they still have bed available after review of current referral.  Writer requested to fax information over to Chirag, she states wait until they determine if they have a bed.    Writer will plan to recheck exterior in 2 hours at [phone].    CASSANDRA Chang on 5/6/2021 at 9:35 AM

## 2021-05-06 NOTE — PROGRESS NOTES
[facility]    Medicine Progress Note - Hospitalist Service       Date of Admission:  5/2/2021  Assessment & Plan       Late onset Alzheimer's disease with behavioral disturbance (H)  Presents with increased agitation and aggressiveness at times related to his underlying dementia  Currently living in Assisted living at [facility], which at this time is questionable in taking him back   will be involved looking for options, potentially going back to Assisted living with changes, or going to Memory care  5/4/2021-remains pleasantly confused, minimal agitation over the evening.   continuing to look for placement options.  5/5/2021-no change in mental status, at times becoming  agitated, still waiting social service input  5/6/2021-mental status has improved over the past 36 to 48 hours.  Much less agitation, doing much better with staff with no outbursts.  Pleasant to me, recognizing me from yesterday, currently at Seroquel, 75 mg 3 times daily.  Spoke with daughter, Kavita, will continue to have  look on placement options    Benign essential hypertension  Stable controlled taking Norvasc  Blood pressures have been on the higher side and will increase to 10 mg daily    CKD (chronic kidney disease) stage 3, GFR 30-59 ml/min  Stable creatinine  No change           Diet: Regular Diet Adult    DVT Prophylaxis: Low Risk/Ambulatory with no VTE prophylaxis indicated  Puri Catheter: not present  Code Status: No CPR- Do NOT Intubate           Disposition Plan   Expected discharge: 1 to 2 days once placement found, recommended to To be determined once safe disposition plan/ TCU bed available.  Entered: Cj Leone MD 05/06/2021, 9:44 AM       The patient's care was discussed with the Patient and Patient's Family.    Cj Leone MD  Hospitalist Service  [facility]  Contact information available via [facility]  Katharineing/y    ______________________________________________________________________    Interval History   Staff reports that is been quite comfortable, very pleasant with no episodes of agitation.  Slept well, eating well, polite and cooperative with staff.  He offers no complaints    Data reviewed today: I reviewed all medications, new labs and imaging results over the last 24 hours. I personally reviewed no images or EKG's today.    Physical Exam   Vital Signs: Temp: 97.9  F (36.6  C) Temp src: Tympanic BP: (!) 153/60 Pulse: 66   Resp: 16 SpO2: 97 % O2 Device: None (Room air)    Weight: 176 lbs 12.8 oz  Constitutional: awake, alert, cooperative, no apparent distress, and appears stated age  Respiratory: No increased work of breathing, good air exchange, clear to auscultation bilaterally, no crackles or wheezing  Cardiovascular: regular rate and rhythm  Neuropsychiatric: Pleasant, oriented to person and place.  Appropriate his questions appreciative of answers.    Data   Recent Labs   Lab 05/02/21  1940   WBC 4.7   HGB 14.6   MCV 89         POTASSIUM 4.8   CHLORIDE 105   CO2 26   BUN 26*   CR 1.57*   ANIONGAP 8   LORENZO 8.8   *   ALBUMIN 3.8   PROTTOTAL 5.7*   BILITOTAL 0.4   ALKPHOS 32*   ALT 14   AST 13

## 2021-05-07 PROCEDURE — 99224 PR SUBSEQUENT OBSERVATION CARE,LEVEL I: CPT | Performed by: FAMILY MEDICINE

## 2021-05-07 PROCEDURE — G0378 HOSPITAL OBSERVATION PER HR: HCPCS

## 2021-05-07 PROCEDURE — 250N000013 HC RX MED GY IP 250 OP 250 PS 637: Performed by: FAMILY MEDICINE

## 2021-05-07 RX ADMIN — DONEPEZIL HYDROCHLORIDE 10 MG: 5 TABLET ORAL at 09:38

## 2021-05-07 RX ADMIN — AMLODIPINE BESYLATE 10 MG: 10 TABLET ORAL at 09:38

## 2021-05-07 RX ADMIN — CYANOCOBALAMIN TAB 500 MCG 500 MCG: 500 TAB at 09:38

## 2021-05-07 RX ADMIN — QUETIAPINE FUMARATE 75 MG: 25 TABLET ORAL at 07:45

## 2021-05-07 RX ADMIN — QUETIAPINE FUMARATE 75 MG: 25 TABLET ORAL at 11:59

## 2021-05-07 RX ADMIN — ACETAMINOPHEN 1000 MG: 500 TABLET, FILM COATED ORAL at 09:38

## 2021-05-07 RX ADMIN — ACETAMINOPHEN 650 MG: 325 TABLET, FILM COATED ORAL at 19:31

## 2021-05-07 RX ADMIN — QUETIAPINE FUMARATE 75 MG: 25 TABLET ORAL at 18:50

## 2021-05-07 NOTE — PROGRESS NOTES
[facility]    Medicine Progress Note - Hospitalist Service       Date of Admission:  5/2/2021  Assessment & Plan             Late onset Alzheimer's disease with behavioral disturbance (H)  Presents with increased agitation and aggressiveness at times related to his underlying dementia    DEC assessment completed and confirmed that memory care is appropriate.   will continue to work on placement.  Once a bed is found, I anticipate discharge.    Benign essential hypertension  Stable controlled taking Norvasc  Tolerating increased amlodipine dose of 10 mg daily.    CKD (chronic kidney disease) stage 3, GFR 30-59 ml/min  Stable creatinine  No change     Diet: Regular Diet Adult    DVT Prophylaxis: Low Risk/Ambulatory with no VTE prophylaxis indicated  Puri Catheter: not present  Code Status: No CPR- Do NOT Intubate           Disposition Plan   Expected discharge: Today, recommended to memory care once safe disposition plan/ TCU bed available.  Entered: Chano Ngo 05/07/2021, 7:44 AM       The patient's care was discussed with the Patient and .    Chano Ngo  Hospitalist Service  [facility]  Contact information available via [facility] Paging/Directory    ______________________________________________________________________    Interval History   Periodic agitation overnight.  Sitter was removed.  He continues to be tearful and not understanding why he is in the hospital.  No other acute concerns.    Data reviewed today: I reviewed all medications, new labs and imaging results over the last 24 hours. I personally reviewed no images or EKG's today.    Physical Exam   Vital Signs: Temp: 97.2  F (36.2  C) Temp src: Tympanic BP: 122/64 Pulse: (!) 22   Resp: 16 SpO2: 98 % O2 Device: None (Room air)    Weight: 176 lbs 12.8 oz  General Appearance: In chair, tearful and agitated as he does not want to be in the hospital.    Data   Recent Labs   Lab  05/02/21 1940   WBC 4.7   HGB 14.6   MCV 89         POTASSIUM 4.8   CHLORIDE 105   CO2 26   BUN 26*   CR 1.57*   ANIONGAP 8   LORENZO 8.8   *   ALBUMIN 3.8   PROTTOTAL 5.7*   BILITOTAL 0.4   ALKPHOS 32*   ALT 14   AST 13

## 2021-05-07 NOTE — TELEPHONE ENCOUNTER
The dose appears to have been tripled.... but then went to ER for problems due to the med increase.     I will leave all medication changes to you at this time.   I do not want to accidentally change something that is working.     Jason Fernando MD

## 2021-05-07 NOTE — PLAN OF CARE
Problem: Adult Inpatient Plan of Care  Goal: Readiness for Transition of Care  Outcome: Improving     Problem: Pain Acute  Goal: Acceptable Pain Control and Functional Ability  Outcome: Improving     Problem: Confusion Chronic  Goal: Optimal Cognitive Function  Outcome: Improving  Patient is alert and orientated at this time with intermittent forgetfulness. Patient is anxious, frustrated, and tearful. Doesn't know what he did to be at the hospital and is concerned about his wife, provided empathetic listening and this helped some. Denies left great toe discomfort at this time, ingrown toenail is slightly red. Put on wander guard to allow patient independence in room d/t increased frustration with bed and chair alarms. Steady gait with walker, educated yo call if he feels weak or dizzy. Lungs clear but dim in bases. Trace edema to bilat ankles, legs elevated as tolerated. Patient would not allow for full skin assessment. VSS. Will continue to monitor.     /64   Pulse 64   Temp 97.8  F (36.6  C) (Tympanic)   Resp 16   Wt 80.2 kg (176 lb 12.8 oz)   SpO2 95%   BMI 27.69 kg/m

## 2021-05-07 NOTE — TELEPHONE ENCOUNTER
Dr Fernando    I believe the dose has been tripled since I started this  Possibly 75 mg 3-4 x day ?    My understanding is that his placement is still being worked on    I'm waiting until his final destination to determine  If and when to resume further management    Keep me in the loop please--I don't get copied since I'm not PCP    Lilly Shepard, APRN CNP   May 7, 2021

## 2021-05-07 NOTE — PROGRESS NOTES
:    DEC assessment was completed and recommendations were for assisted living memory care bed placement and not [facility].  I placed calls to Hemant at [facility], and SEBASTIAN Enriquez at [facility] and had to leave voicemail's to see if they have had a chance to review referral.   I faxed over referral to Carmen MONTALVO at [facility] and at this time they do not have any openings until maybe next week.    I called SEBASTIAN Patrick  at [facility] and gave her update and she has him on the waiting list for memory care.       CASSANDRA Angulo on 5/7/2021 at 9:13 AM    :    I left message to Hemant at [facility] again to see if they had a chance to screen patient for possible admission.    I called and left message for SEBASTIAN Enriquez at [facility] to see if they had a chance to screen patient. Both of these facilities have locked memory care units. I called patients daughter Kavita and gave her discharge update and support.     .CASSANDRA Angulo on 5/7/2021 at 1:48 PM

## 2021-05-07 NOTE — TELEPHONE ENCOUNTER
Patient is currently admitted and discharge plan not completed at this time--the dose has been changed several times    No refills of anything until final discharge and medications stabilized    Thanks Lola Shepard, APRN CNP   May 7, 2021

## 2021-05-07 NOTE — PLAN OF CARE
Pt is alert to self and situation, forgetful of time and place. Pleasant and cooperative all shift. Sitter removed at 2000. Vitals stable. Cooperative with focused nursing assessment. Unable to complete full skin assessment due to concern for privacy. Continues to complain of left great toe pain due to ingrown nail. Soaked foot before bed and gave PRN tylenol. Slept without complaints.

## 2021-05-07 NOTE — PROGRESS NOTES
Wanderguard #5 placed to left ankle to allow patient independence in room. Skin assessed prior, no open areas noted.     /64   Pulse 64   Temp 97.8  F (36.6  C) (Tympanic)   Resp 16   Wt 80.2 kg (176 lb 12.8 oz)   SpO2 95%   BMI 27.69 kg/m

## 2021-05-08 PROCEDURE — 96372 THER/PROPH/DIAG INJ SC/IM: CPT | Performed by: FAMILY MEDICINE

## 2021-05-08 PROCEDURE — 99224 PR SUBSEQUENT OBSERVATION CARE,LEVEL I: CPT | Performed by: FAMILY MEDICINE

## 2021-05-08 PROCEDURE — 250N000013 HC RX MED GY IP 250 OP 250 PS 637: Performed by: FAMILY MEDICINE

## 2021-05-08 PROCEDURE — 250N000011 HC RX IP 250 OP 636: Performed by: FAMILY MEDICINE

## 2021-05-08 PROCEDURE — G0378 HOSPITAL OBSERVATION PER HR: HCPCS

## 2021-05-08 RX ORDER — LORAZEPAM 2 MG/ML
0.5 INJECTION INTRAMUSCULAR EVERY 4 HOURS PRN
Status: DISCONTINUED | OUTPATIENT
Start: 2021-05-08 | End: 2021-05-10

## 2021-05-08 RX ADMIN — CYANOCOBALAMIN TAB 500 MCG 500 MCG: 500 TAB at 09:27

## 2021-05-08 RX ADMIN — ACETAMINOPHEN 1000 MG: 500 TABLET, FILM COATED ORAL at 21:01

## 2021-05-08 RX ADMIN — QUETIAPINE FUMARATE 25 MG: 25 TABLET ORAL at 09:27

## 2021-05-08 RX ADMIN — OLANZAPINE 5 MG: 10 INJECTION, POWDER, FOR SOLUTION INTRAMUSCULAR at 10:36

## 2021-05-08 RX ADMIN — ACETAMINOPHEN 1000 MG: 500 TABLET, FILM COATED ORAL at 01:40

## 2021-05-08 RX ADMIN — DONEPEZIL HYDROCHLORIDE 10 MG: 5 TABLET ORAL at 09:27

## 2021-05-08 RX ADMIN — QUETIAPINE FUMARATE 75 MG: 25 TABLET ORAL at 18:30

## 2021-05-08 RX ADMIN — ACETAMINOPHEN 1000 MG: 500 TABLET, FILM COATED ORAL at 09:27

## 2021-05-08 RX ADMIN — AMLODIPINE BESYLATE 10 MG: 10 TABLET ORAL at 09:26

## 2021-05-08 RX ADMIN — QUETIAPINE FUMARATE 75 MG: 25 TABLET ORAL at 12:59

## 2021-05-08 RX ADMIN — QUETIAPINE FUMARATE 75 MG: 25 TABLET ORAL at 07:24

## 2021-05-08 NOTE — PROGRESS NOTES
VSS. Patient reports of left big ingrown toenail hurting that responds with tylenol. Was given tylenol around 1930 for left hip/leg/side pain that has since resolved. Patient reports no other complaints. Will continue to monitor.    /43   Pulse 67   Temp 96.8  F (36  C) (Tympanic)   Resp 14   Wt 80.2 kg (176 lb 12.8 oz)   SpO2 93%   BMI 27.69 kg/m      Ar Noble RN on 5/7/2021 at 11:21 PM

## 2021-05-08 NOTE — PLAN OF CARE
Problem: Adult Inpatient Plan of Care  Goal: Optimal Comfort and Wellbeing  Outcome: Improving     Problem: Adult Inpatient Plan of Care  Goal: Readiness for Transition of Care  Outcome: Improving     Problem: Pain Acute  Goal: Acceptable Pain Control and Functional Ability  5/8/2021 1649 by Ar Palafox RN  Outcome: Improving  Patient has remained calm since this morning. Alert and orientated to self, place, and time but confused to situation as he doesn't understand why he can't leave to be with his wife. Denies pain. Remains independent in his room. VSS. Will continue to monitor.    /73   Pulse 78   Temp 98.6  F (37  C) (Tympanic)   Resp 16   Wt 80.2 kg (176 lb 12.8 oz)   SpO2 98%   BMI 27.69 kg/m

## 2021-05-08 NOTE — PROGRESS NOTES
VSS. Patient intermittently pleasantly confused. Easily reoriented. Continues to have intermittent left hip/leg/side pain; given scheduled tylenol at 0130. Patient really would like to see his wife. No other complaints. Will continue to monitor.    /50   Pulse 57   Temp 97.3  F (36.3  C) (Tympanic)   Resp 15   Wt 80.2 kg (176 lb 12.8 oz)   SpO2 97%   BMI 27.69 kg/m      Ar Noble RN on 5/8/2021 at 3:47 AM

## 2021-05-08 NOTE — PLAN OF CARE
VSS. Patient's only complaint was pain from left big ingrown toenail and left side pain. Treated with tylenol. Patient otherwise slept well overnight.     /50   Pulse 57   Temp 97.3  F (36.3  C) (Tympanic)   Resp 15   Wt 80.2 kg (176 lb 12.8 oz)   SpO2 97%   BMI 27.69 kg/m      Ar Noble RN on 5/8/2021 at 6:02 AM

## 2021-05-08 NOTE — PROGRESS NOTES
[facility]    Medicine Progress Note - Hospitalist Service       Date of Admission:  5/2/2021  Assessment & Plan             Late onset Alzheimer's disease with behavioral disturbance (H)  Presents with increased agitation and aggressiveness at times related to his underlying dementia    DEC assessment completed and confirmed that memory care is appropriate.   will continue to work on placement.  Once a bed is found, I anticipate discharge.    We will add as needed Ativan to regimen as needed for behaviors.    Benign essential hypertension  Stable controlled taking Norvasc  Tolerating increased amlodipine dose of 10 mg daily.    CKD (chronic kidney disease) stage 3, GFR 30-59 ml/min  Stable creatinine  No change     Diet: Regular Diet Adult    DVT Prophylaxis: Low Risk/Ambulatory with no VTE prophylaxis indicated  Puri Catheter: not present  Code Status: No CPR- Do NOT Intubate           Disposition Plan   Expected discharge: Today, recommended to memory care once safe disposition plan/ TCU bed available.  Entered: Chano Ngo 05/08/2021, 12:03 PM       The patient's care was discussed with the Patient and .    Chano Ngo  Hospitalist Service  [facility]  Contact information available via [facility] Paging/Directory    ______________________________________________________________________    Interval History   Periodic agitation overnight, continues to not understand why he cannot see his wife.  Continued difficulty finding memory care unit accepting admission.    Data reviewed today: I reviewed all medications, new labs and imaging results over the last 24 hours. I personally reviewed no images or EKG's today.    Physical Exam   Vital Signs: Temp: 97.7  F (36.5  C) Temp src: Tympanic BP: 119/77 Pulse: 75   Resp: 16 SpO2: 94 % O2 Device: None (Room air)    Weight: 176 lbs 12.8 oz  General Appearance: In chair, tearful and agitated as he does  not want to be in the hospital.    Data   Recent Labs   Lab 05/02/21 1940   WBC 4.7   HGB 14.6   MCV 89         POTASSIUM 4.8   CHLORIDE 105   CO2 26   BUN 26*   CR 1.57*   ANIONGAP 8   LORENZO 8.8   *   ALBUMIN 3.8   PROTTOTAL 5.7*   BILITOTAL 0.4   ALKPHOS 32*   ALT 14   AST 13

## 2021-05-08 NOTE — PLAN OF CARE
Problem: Adult Inpatient Plan of Care  Goal: Optimal Comfort and Wellbeing  5/7/2021 1902 by Ar Palafox, RN  Outcome: Improving     Problem: Adult Inpatient Plan of Care  Goal: Readiness for Transition of Care  5/7/2021 1902 by Ar Palafox, RN  Outcome: Improving     Problem: Pain Acute  Goal: Acceptable Pain Control and Functional Ability  5/7/2021 1902 by Ar Palafox, RN  Outcome: Improving    Patient remains calm and cooperative, denies pain or discomfort. VSS. Will continue to monitor.    BP (!) 155/74   Pulse 76   Temp 97.2  F (36.2  C) (Tympanic)   Resp 18   Wt 80.2 kg (176 lb 12.8 oz)   SpO2 97%   BMI 27.69 kg/m

## 2021-05-08 NOTE — PLAN OF CARE
Problem: Pain Acute  Goal: Acceptable Pain Control and Functional Ability  Outcome: Improving     Problem: Confusion Chronic  Goal: Optimal Cognitive Function  Outcome: No Change  Patient remains alert and orientated to self, place, and time but confused to situation d/t short term memory loss. Currently is calm and cooperative, responded well to IM Zyprexa. Becomes agitated over missing his wife, empathetic listening provided and calm music also used. VSS. Independent in room, wander guard remains in place on left ankle, skin is intact. Some discoloration to left great toe r/t ingrown toenail, tylenol has been providing adequate relief. Lungs clear but dim bases. Will continue to monitor.     BP (!) 146/78   Pulse 72   Temp 98.3  F (36.8  C) (Tympanic)   Resp 16   Wt 80.2 kg (176 lb 12.8 oz)   SpO2 95%   BMI 27.69 kg/m

## 2021-05-08 NOTE — PROGRESS NOTES
SAFETY CHECKLIST  ID Bands and Risk clasps correct and in place (DNR, Fall risk, Allergy, Latex, Limb):  Yes  All Lines Reconciled and labeled correctly: Yes  Whiteboard updated:Yes  Environmental interventions (bed/chair alarm on, call light, side rails, restraints, sitter....): Yes    /66   Pulse 65   Temp 98.6  F (37  C) (Tympanic)   Resp 17   Wt 80.2 kg (176 lb 12.8 oz)   SpO2 97%   BMI 27.69 kg/m      Ar Noble RN on 5/7/2021 at 7:25 PM

## 2021-05-09 PROCEDURE — 250N000013 HC RX MED GY IP 250 OP 250 PS 637: Performed by: FAMILY MEDICINE

## 2021-05-09 PROCEDURE — 99224 PR SUBSEQUENT OBSERVATION CARE,LEVEL I: CPT | Performed by: FAMILY MEDICINE

## 2021-05-09 PROCEDURE — G0378 HOSPITAL OBSERVATION PER HR: HCPCS

## 2021-05-09 RX ADMIN — ACETAMINOPHEN 1000 MG: 500 TABLET, FILM COATED ORAL at 10:51

## 2021-05-09 RX ADMIN — ACETAMINOPHEN 1000 MG: 500 TABLET, FILM COATED ORAL at 21:03

## 2021-05-09 RX ADMIN — QUETIAPINE FUMARATE 75 MG: 25 TABLET ORAL at 17:34

## 2021-05-09 RX ADMIN — CYANOCOBALAMIN TAB 500 MCG 500 MCG: 500 TAB at 10:51

## 2021-05-09 RX ADMIN — QUETIAPINE FUMARATE 75 MG: 25 TABLET ORAL at 07:13

## 2021-05-09 RX ADMIN — QUETIAPINE FUMARATE 75 MG: 25 TABLET ORAL at 12:56

## 2021-05-09 RX ADMIN — AMLODIPINE BESYLATE 10 MG: 10 TABLET ORAL at 10:51

## 2021-05-09 RX ADMIN — DONEPEZIL HYDROCHLORIDE 10 MG: 5 TABLET ORAL at 10:51

## 2021-05-09 NOTE — PROGRESS NOTES
[facility]    Medicine Progress Note - Hospitalist Service       Date of Admission:  5/2/2021  Assessment & Plan                 Late onset Alzheimer's disease with behavioral disturbance (H)  Presents with increased agitation and aggressiveness at times related to his underlying dementia    DEC assessment completed and confirmed that memory care is appropriate.   will continue to work on placement.  Once a bed is found, I anticipate discharge.    Has required 1 dose of Zyprexa and 0 doses of Ativan over the past 24 hours.    Benign essential hypertension  Stable controlled taking Norvasc  Tolerating increased amlodipine dose of 10 mg daily.    CKD (chronic kidney disease) stage 3, GFR 30-59 ml/min  Stable creatinine  No change       Diet: Regular Diet Adult    DVT Prophylaxis: Low Risk/Ambulatory with no VTE prophylaxis indicated  Puri Catheter: not present  Code Status: No CPR- Do NOT Intubate           Disposition Plan   Expected discharge: Able to be discharged once bed is available.  Entered: Chano Ngo 05/09/2021, 8:12 AM       The patient's care was discussed with the Patient.    Chano Ngo  Hospitalist Service  [facility]  Contact information available via [facility] Paging/Directory    ______________________________________________________________________    Interval History   Did well overnight, agitation was minimal.  Still fixates on being able to see his wife.    Data reviewed today: I reviewed all medications, new labs and imaging results over the last 24 hours. I personally reviewed no images or EKG's today.    Physical Exam   Vital Signs: Temp: 96.3  F (35.7  C) Temp src: Tympanic BP: (!) 133/99(after ambulation) Pulse: 70   Resp: 16 SpO2: 98 % O2 Device: None (Room air)    Weight: 173 lbs 0 oz  General Appearance: Pleasantly demented

## 2021-05-09 NOTE — PLAN OF CARE
Patient had a good night, with no complaints. Was very calm and appropriate. VSS. Did not replace IV overnight.    BP (!) 153/74   Pulse 56   Temp 95.9  F (35.5  C) (Tympanic)   Resp 14   Wt 78.5 kg (173 lb)   SpO2 98%   BMI 27.10 kg/m      Ar Noble RN on 5/9/2021 at 6:00 AM

## 2021-05-09 NOTE — PROGRESS NOTES
VSS. Blood pressure elevated to 153/74; asymptomatic. Voiding without difficulty. Pleasantly confused. Denies pain and declines tylenol. Patient has slept well so far. Will continue to monitor.    BP (!) 153/74   Pulse 56   Temp 95.9  F (35.5  C) (Tympanic)   Resp 14   Wt 78.5 kg (173 lb)   SpO2 98%   BMI 27.10 kg/m      Ar Noble RN on 5/9/2021 at 3:35 AM

## 2021-05-09 NOTE — PLAN OF CARE
Problem: Pain Acute  Goal: Acceptable Pain Control and Functional Ability  Outcome: Improving     Problem: Confusion Chronic  Goal: Optimal Cognitive Function  Outcome: Improving   Patient is alert and orientated with confusion to situation as to why he is still here. Is calm, cooperative, and pleasant. Denies pain at this time. Slight erythema to left great toe. Lungs clear throughout. Slightly hypertensive but vitally stable. Independent in room. Wander guard remains in place, skin is intact. Will continue to monitor.    BP (!) 133/99   Pulse 70   Temp 96.3  F (35.7  C) (Tympanic)   Resp 16   Wt 78.5 kg (173 lb)   SpO2 98%   BMI 27.10 kg/m

## 2021-05-09 NOTE — PLAN OF CARE
Problem: Confusion Chronic  Goal: Optimal Cognitive Function  5/9/2021 1730 by Ar Palafox, RN  Outcome: Improving  Patient is alert and orientated. He remains calm, cooperative, and very pleasant. Patient showered independently with writer in bathroom for assistance and safety. Currently eating dinner while watching tv. VSS. Will continue to monitor.    /75   Pulse 67   Temp 97.3  F (36.3  C) (Tympanic)   Resp 16   Wt 78.5 kg (173 lb)   SpO2 96%   BMI 27.10 kg/m

## 2021-05-09 NOTE — PROGRESS NOTES
Patient doing well. VSS. Patient's IV was removed at some point; not placing another one at this time. Denies pain. Will continue to monitor.    /65   Pulse 73   Temp 98.4  F (36.9  C) (Tympanic)   Resp 14   Wt 80.2 kg (176 lb 12.8 oz)   SpO2 94%   BMI 27.69 kg/m      Ar Noble RN on 5/8/2021 at 11:16 PM

## 2021-05-09 NOTE — PLAN OF CARE
Problem: Confusion Chronic  Goal: Optimal Cognitive Function  5/9/2021 1418 by Ar Palafox, RN  Outcome: Improving     Problem: Pain Acute  Goal: Acceptable Pain Control and Functional Ability  5/9/2021 1418 by Ar Palafox, RN  Outcome: Adequate for Discharge  Patient has remained calm, cooperative, and pleasant all shift thus far, is currently reading his bible and listening to music. Denies pain and appears comfortable. Remains independent in his room. VSS. Will continue to monitor.

## 2021-05-09 NOTE — PROGRESS NOTES
SAFETY CHECKLIST  ID Bands and Risk clasps correct and in place (DNR, Fall risk, Allergy, Latex, Limb):  Yes  All Lines Reconciled and labeled correctly: Yes  Whiteboard updated:Yes  Environmental interventions (bed/chair alarm on, call light, side rails, restraints, sitter....): Yes

## 2021-05-10 LAB
LABORATORY COMMENT REPORT: NORMAL
SARS-COV-2 RNA RESP QL NAA+PROBE: NEGATIVE
SPECIMEN SOURCE: NORMAL

## 2021-05-10 PROCEDURE — 250N000013 HC RX MED GY IP 250 OP 250 PS 637: Performed by: FAMILY MEDICINE

## 2021-05-10 PROCEDURE — G0378 HOSPITAL OBSERVATION PER HR: HCPCS

## 2021-05-10 PROCEDURE — 99224 PR SUBSEQUENT OBSERVATION CARE,LEVEL I: CPT | Performed by: INTERNAL MEDICINE

## 2021-05-10 PROCEDURE — U0003 INFECTIOUS AGENT DETECTION BY NUCLEIC ACID (DNA OR RNA); SEVERE ACUTE RESPIRATORY SYNDROME CORONAVIRUS 2 (SARS-COV-2) (CORONAVIRUS DISEASE [COVID-19]), AMPLIFIED PROBE TECHNIQUE, MAKING USE OF HIGH THROUGHPUT TECHNOLOGIES AS DESCRIBED BY CMS-2020-01-R: HCPCS | Performed by: INTERNAL MEDICINE

## 2021-05-10 PROCEDURE — C9803 HOPD COVID-19 SPEC COLLECT: HCPCS

## 2021-05-10 PROCEDURE — 250N000013 HC RX MED GY IP 250 OP 250 PS 637: Mod: GY | Performed by: INTERNAL MEDICINE

## 2021-05-10 PROCEDURE — U0005 INFEC AGEN DETEC AMPLI PROBE: HCPCS | Performed by: INTERNAL MEDICINE

## 2021-05-10 RX ORDER — LORAZEPAM 1 MG/1
1 TABLET ORAL EVERY 4 HOURS PRN
Status: DISCONTINUED | OUTPATIENT
Start: 2021-05-10 | End: 2021-05-11 | Stop reason: HOSPADM

## 2021-05-10 RX ADMIN — DONEPEZIL HYDROCHLORIDE 10 MG: 5 TABLET ORAL at 10:17

## 2021-05-10 RX ADMIN — AMLODIPINE BESYLATE 10 MG: 10 TABLET ORAL at 10:17

## 2021-05-10 RX ADMIN — QUETIAPINE FUMARATE 75 MG: 25 TABLET ORAL at 17:11

## 2021-05-10 RX ADMIN — QUETIAPINE FUMARATE 75 MG: 25 TABLET ORAL at 12:47

## 2021-05-10 RX ADMIN — LORAZEPAM 1 MG: 1 TABLET ORAL at 10:17

## 2021-05-10 RX ADMIN — ACETAMINOPHEN 1000 MG: 500 TABLET, FILM COATED ORAL at 10:17

## 2021-05-10 RX ADMIN — CYANOCOBALAMIN TAB 500 MCG 500 MCG: 500 TAB at 10:17

## 2021-05-10 RX ADMIN — QUETIAPINE FUMARATE 75 MG: 25 TABLET ORAL at 08:05

## 2021-05-10 RX ADMIN — ACETAMINOPHEN 650 MG: 325 TABLET, FILM COATED ORAL at 03:20

## 2021-05-10 RX ADMIN — ACETAMINOPHEN 1000 MG: 500 TABLET, FILM COATED ORAL at 21:46

## 2021-05-10 NOTE — PLAN OF CARE
VSS. Patient had a good night. Pleasantly confused at times. Developed some neck pain overnight that was treated with PRN tylenol and an ice pack.     BP (!) 145/73   Pulse 68   Temp 97.8  F (36.6  C) (Tympanic)   Resp 16   Wt 78.5 kg (173 lb)   SpO2 99%   BMI 27.10 kg/m      Problem: Adult Inpatient Plan of Care  Goal: Absence of Hospital-Acquired Illness or Injury  Intervention: Identify and Manage Fall Risk  Recent Flowsheet Documentation  Taken 5/10/2021 0200 by Ar Noble RN  Safety Promotion/Fall Prevention: safety round/check completed  Taken 5/9/2021 1920 by Ar Noble RN  Safety Promotion/Fall Prevention: safety round/check completed  Intervention: Prevent Skin Injury  Recent Flowsheet Documentation  Taken 5/10/2021 0200 by Ar Noble RN  Body Position:    position changed independently    position maintained    supine  Taken 5/9/2021 1920 by Ar Noble RN  Body Position:    position changed independently    position maintained    supine  Intervention: Prevent and Manage VTE (Venous Thromboembolism) Risk  Recent Flowsheet Documentation  Taken 5/10/2021 0200 by Ar Noble RN  VTE Prevention/Management:    ambulation promoted    fluids promoted  Taken 5/9/2021 1920 by Ar Noble RN  VTE Prevention/Management:    ambulation promoted    fluids promoted  Problem: Confusion Chronic  Goal: Optimal Cognitive Function  Outcome: No Change      Ar Noble RN on 5/10/2021 at 5:38 AM

## 2021-05-10 NOTE — PROGRESS NOTES
[facility]    Medicine Progress Note - Hospitalist Service       Date of Admission:  5/2/2021  Assessment & Plan                 Late onset Alzheimer's disease with behavioral disturbance (H)  Presents with increased agitation and aggressiveness at times related to his underlying dementia. DEC assessment completed and confirmed that memory care is appropriate.   will continue to work on placement.  Continues to remain medically stable for discharge and is here for care home care at this point.  -Continue Seroquel 75 mg p.o. 3 times daily  -New home Aricept  -Continue home Ativan as needed    Benign essential hypertension  Stable control taking Norvasc, tolerated increased dose of 10 mg daily.    CKD (chronic kidney disease) stage 3, GFR 30-59 ml/min  Stable creatinine  No change       Diet: Regular Diet Adult    DVT Prophylaxis: Low Risk/Ambulatory with no VTE prophylaxis indicated  Puri Catheter: not present  Code Status: No CPR- Do NOT Intubate           Disposition Plan   Expected discharge: Able to be discharged once bed is available.  Entered: Meek Hicks MD 05/10/2021, 9:42 AM       The patient's care was discussed with the Patient.    Meek Hicks MD  Hospitalist Service  [facility]  Contact information available via [facility] Paging/Directory    ______________________________________________________________________    Interval History   Night no acute events and afebrile, behaviors have been well controlled, he would like to see his wife, no new pain headaches dizziness weakness nausea vomiting tolerated regular consistency breakfast, he would like to read some magazines, no other new complaints.    Data reviewed today: I reviewed all medications, new labs and imaging results over the last 24 hours.    Physical Exam   Vital Signs: Temp: 98.8  F (37.1  C) Temp src: Tympanic BP: 132/67 Pulse: 69   Resp: 16 SpO2: 99 % O2 Device: None (Room air)    Weight:  173 lbs 0 oz    Exam:   GENERAL: Talkative, in the chair, in no apparent distress.  CARDIOVASCULAR: regular rate and rhythm, no murmurs, rubs, or gallops. Normal S1/S2. No lower extremity edema.   RESPIRATORY: clear to auscultation bilaterally, no wheezes, no crackles.   GI: soft, non-tender, non-distended, normoactive bowel sounds.  MUSCULOSKELETAL: warm and well perfused, 2+ dorsalis pedis pulses bilaterally.

## 2021-05-10 NOTE — PROGRESS NOTES
:    I faxed referral to [facility] in [place] for possible discharge option.  It has a locked memory care unit.      CASSANDRA Angulo on 5/10/2021 at 8:49 AM      :    I called Carmen DE LA PAZKathy at [facility] for a memory care unit bed.  They are assessing someone in house for that opening in memory care.  I asked her to keep patient in mind if they do not fill that memory care bed.    I called and left message for Zoe RN at [facility] to see if they could screen patient for possible placement.      I called and left message for Hemant at [facility] and he is going to have the RN reach out to me to see about a possible assessment.  Gave him updated information on patient.     CASSANDRA Angulo on 5/10/2021 at 9:18 AM      :     I called Lore at [facility] and she stated she would start screening patient.  She was given the nurses station number also for a nursing update as needed.    I received a call from SEBASTIAN Davila at [facility] regarding patient.  I called her back and had to leave voicemail.    CASSANDRA Angulo on 5/10/2021 at 12:37 PM      :    I received a call from Ritika at [facility]  and didn't really have anything to update her on except still trying to find placement.  I will give them an update before I leave today if anything new arises.    CASSANDRA Angulo on 5/10/2021 at 12:48 PM      :    [facility] memory care accepted patient.  I just received a phone call from daughter Kavita and I gave her discharge update.  She informed me that her brother was in route over to hospital with patients wife and if the visit went well and wife was in agreement as well as [facility] then they would like patient to return back to [facility]. Kavita stated she feels patient may not need memory care placement.  I said I would need to call [facility] to see if  that was even an option.  I placed call to Ritika at [facility] and she needs to talk with SEBASTIAN Patrick who is working from home.  Son is wailing in another room while patient wife and patient are visiting in room.  I discussed with daughter that we do not want to lose patients spot at [facility] either.  Anticipated discharge either back to [facility] living or to [facility] options memory care possibly tomorrow.  Hoping to hear something back from [facility] today.    CASSANDRA Angulo on 5/10/2021 at 2:25 PM      :    Received call from Ritika at [facility] and they wanted to know more details  about how the visit ended.  I filled her in that the visit ended great and that we really needed to have an answer by tomorrow.  Daughter Kavita called and I gave her update.    CASSANDRA Angulo on 5/10/2021 at 3:20 PM

## 2021-05-10 NOTE — PLAN OF CARE
Problem: Confusion Chronic  Goal: Optimal Cognitive Function  Outcome: Improving    Patient is alert and orientated to self, place, and time with some confusion as to why he is still here. Has been pleasant and cooperative all morning. Patient reported feeling very anxious about wanting to leave the hospital and prn ativan was given. Lungs clear throughout. Heart regular. Slight erythema to left great toe r/t ingrown toenail, denies discomfort at this time. Abdomen soft and non tender, active bowel sounds. Independent in room with walker. Wander guard in place to left ankle, skin is intact. VSS. Will continue to monitor.    /76   Pulse 69   Temp 98.8  F (37.1  C) (Tympanic)   Resp 16   Wt 78.5 kg (173 lb)   SpO2 99%   BMI 27.10 kg/m

## 2021-05-10 NOTE — PROGRESS NOTES
VSS. Denies pain. Sleeping well overnight. Pleasantly confused. Independent in room. No complaints. Will continue to monitor.    /51   Pulse 68   Temp 97  F (36.1  C) (Tympanic)   Resp 14   Wt 78.5 kg (173 lb)   SpO2 97%   BMI 27.10 kg/m      Ar Noble RN on 5/10/2021 at 12:28 AM

## 2021-05-10 NOTE — PLAN OF CARE
Problem: Confusion Chronic  Goal: Optimal Cognitive Function  5/10/2021 1716 by Ar Palafox, RN  Outcome: Improving  Patient remains alert, orientated, and pleasant. Denies pain and appears comfortable. Is resting comfortably while reading his bible and drinking coffee. Remains independent in his room. Wander guard to left ankle remains in place, skin intact. VSS. Will continue to monitor.     BP (!) 141/78 (BP Location: Left arm)   Pulse 82   Temp 97.8  F (36.6  C) (Tympanic)   Resp 16   Wt 78.5 kg (173 lb)   SpO2 99%   BMI 27.10 kg/m

## 2021-05-10 NOTE — PROGRESS NOTES
Patient having chronic neck pain, assisted to recliner with warm pack to neck and tylenol.  Patient has no other needs at this time.  Calm and cooperative, alert and oriented at this time.      BP (!) 145/73   Pulse 68   Temp 97.8  F (36.6  C) (Tympanic)   Resp 16   Wt 78.5 kg (173 lb)   SpO2 99%   BMI 27.10 kg/m

## 2021-05-10 NOTE — PROGRESS NOTES
Patient doing well. VSS. Reading bible. Pleasant. Ambulating independently in room. No IV access. Denies pain.    /76   Pulse 67   Temp 97.5  F (36.4  C) (Tympanic)   Resp 15   Wt 78.5 kg (173 lb)   SpO2 97%   BMI 27.10 kg/m      Ar Noble RN on 5/9/2021 at 7:33 PM

## 2021-05-10 NOTE — PROGRESS NOTES
SAFETY CHECKLIST  ID Bands and Risk clasps correct and in place (DNR, Fall risk, Allergy, Latex, Limb):  Yes  All Lines Reconciled and labeled correctly: Yes  Whiteboard updated:Yes  Environmental interventions (bed/chair alarm on, call light, side rails, restraints, sitter....): Yes    Ar Noble RN on 5/9/2021 at 7:20 PM

## 2021-05-10 NOTE — PLAN OF CARE
Problem: Confusion Chronic  Goal: Optimal Cognitive Function  5/10/2021 1459 by Ar Palafox, RN  Outcome: Improving  Patient is alert and orientated with forgetfulness and short term memory issues r/t Alzheimers. Denies pain, appears comfortable. Patient just had a very pleasant visit with his wife and expressed his happiness to finally be able to see her. Patient remains independent in room. VSS. Will continue to monitor.     /72 (BP Location: Right arm)   Pulse 76   Temp 97.4  F (36.3  C) (Tympanic)   Resp 16   Wt 78.5 kg (173 lb)   SpO2 98%   BMI 27.10 kg/m

## 2021-05-11 VITALS
HEART RATE: 75 BPM | BODY MASS INDEX: 27.31 KG/M2 | DIASTOLIC BLOOD PRESSURE: 70 MMHG | SYSTOLIC BLOOD PRESSURE: 140 MMHG | TEMPERATURE: 98 F | OXYGEN SATURATION: 97 % | WEIGHT: 174.4 LBS | RESPIRATION RATE: 16 BRPM

## 2021-05-11 PROCEDURE — G0378 HOSPITAL OBSERVATION PER HR: HCPCS

## 2021-05-11 PROCEDURE — 99217 PR OBSERVATION CARE DISCHARGE: CPT | Performed by: INTERNAL MEDICINE

## 2021-05-11 PROCEDURE — 250N000013 HC RX MED GY IP 250 OP 250 PS 637: Performed by: FAMILY MEDICINE

## 2021-05-11 RX ORDER — AMLODIPINE BESYLATE 5 MG/1
10 TABLET ORAL DAILY
Qty: 90 TABLET | Refills: 3 | Status: SHIPPED | OUTPATIENT
Start: 2021-05-11 | End: 2021-10-11

## 2021-05-11 RX ORDER — QUETIAPINE FUMARATE 50 MG/1
50 TABLET, FILM COATED ORAL 3 TIMES DAILY
Qty: 90 TABLET | Refills: 0 | Status: CANCELLED | OUTPATIENT
Start: 2021-05-11

## 2021-05-11 RX ORDER — QUETIAPINE FUMARATE 50 MG/1
75 TABLET, FILM COATED ORAL 3 TIMES DAILY
Qty: 90 TABLET | Refills: 0 | Status: SHIPPED | OUTPATIENT
Start: 2021-05-11 | End: 2021-06-20

## 2021-05-11 RX ADMIN — DONEPEZIL HYDROCHLORIDE 10 MG: 5 TABLET ORAL at 11:07

## 2021-05-11 RX ADMIN — ACETAMINOPHEN 1000 MG: 500 TABLET, FILM COATED ORAL at 11:06

## 2021-05-11 RX ADMIN — QUETIAPINE FUMARATE 75 MG: 25 TABLET ORAL at 12:32

## 2021-05-11 RX ADMIN — AMLODIPINE BESYLATE 10 MG: 10 TABLET ORAL at 11:07

## 2021-05-11 RX ADMIN — QUETIAPINE FUMARATE 75 MG: 25 TABLET ORAL at 08:33

## 2021-05-11 RX ADMIN — CYANOCOBALAMIN TAB 500 MCG 500 MCG: 500 TAB at 11:07

## 2021-05-11 NOTE — PROGRESS NOTES
SAFETY CHECKLIST  ID Bands and Risk clasps correct and in place (DNR, Fall risk, Allergy, Latex, Limb):  Yes  All Lines Reconciled and labeled correctly: Yes  Whiteboard updated:Yes  Environmental interventions (bed/chair alarm on, call light, side rails, restraints, sitter....): Yes  Verify Tele #:   Velia Simmons RN on 5/10/2021 at 1905

## 2021-05-11 NOTE — PHARMACY - DISCHARGE MEDICATION RECONCILIATION
Pharmacy:  Discharge Counseling and Medication Reconciliation    Juan Miguel Delaney  [address]  [place] [address]  [phone] (home)   88 year old male  PCP: Jason Fernando    Allergies: Penicillins, Celecoxib, Ibuprofen, Lisinopril, Naprosyn [naproxen], and Rofecoxib    Discharge Counseling:    Did not meet with patient at time of discharge. Patient will have all medications managed for him at [facility].     Discharge Medication Reconciliation:    It has been determined that the patient has an adequate supply of medications available or which can be obtained from the patient's preferred pharmacy, which HE/SHE has confirmed as: [facility]  [An updated medication list will be faxed to the patient's pharmacy.]    Thank you for the consult.    Marisela Montero [facility]........May 11, 2021 10:53 AM

## 2021-05-11 NOTE — PROGRESS NOTES
Was unsuccessful to give nurse to nurse prior to patient leaving. Printed discharge information and patient has with him.    Ar Palafox RN May 11, 2021 1:07 PM        Left voicemail for [facility] nurse to give report on patient. Son or daughter will be coming to get patient at 1300.    Ar Palafox RN May 11, 2021 11:58 AM

## 2021-05-11 NOTE — PHARMACY - DISCHARGE MEDICATION RECONCILIATION
[facility]  [facility]  [address] [place] [id number]    May 11, 2021    Dear Pharmacist,    Your customer, Juan Miguel Delaney, born on 6/21/1932, was recently discharged from [facility].  We have updated his medication list and want to alert you to the following:       Review of your medicines      CONTINUE these medicines which may have CHANGED, or have new prescriptions. If we are uncertain of the size of tablets/capsules you have at home, strength may be listed as something that might have changed.      Dose / Directions   amLODIPine 5 MG tablet  Commonly known as: NORVASC  This may have changed: how much to take  Used for: Essential (primary) hypertension      Dose: 10 mg  Take 2 tablets (10 mg) by mouth daily  Quantity: 90 tablet  Refills: 3     * QUEtiapine 25 MG tablet  Commonly known as: SEROquel  This may have changed: Another medication with the same name was changed. Make sure you understand how and when to take each.      Dose: 25 mg  Take 25 mg by mouth daily as needed (AGITATION)  Refills: 0     * QUEtiapine 50 MG tablet  Commonly known as: SEROquel  This may have changed: how much to take  Used for: Late onset Alzheimer's disease with behavioral disturbance (H)      Dose: 75 mg  Take 1.5 tablets (75 mg) by mouth 3 times daily  Quantity: 90 tablet  Refills: 0         * This list has 2 medication(s) that are the same as other medications prescribed for you. Read the directions carefully, and ask your doctor or other care provider to review them with you.            CONTINUE these medicines which have NOT CHANGED      Dose / Directions   acetaminophen 500 MG tablet  Commonly known as: TYLENOL      Dose: 1,000 mg  Take 1,000 mg by mouth 2 times daily  Refills: 0     cyanocobalamin 500 MCG tablet  Commonly known as: VITAMIN B-12  Used for: Vitamin B12 deficiency (non anemic)      Dose: 500 mcg  TAKE 1 TABLET (500 MCG) BY MOUTH  DAILY  Quantity: 90 tablet  Refills: 3     donepezil 10 MG ODT  Commonly known as: ARICEPT      Dose: 10 mg  Take 10 mg by mouth daily  Refills: 0     LORazepam 1 MG tablet  Commonly known as: ATIVAN      Dose: 1 mg  Take 1 tablet (1 mg) by mouth every 4 hours as needed for agitation  Quantity: 40 tablet  Refills: 0           Where to get your medicines      These medications were sent to  [id number] - [place], [place] - [address]  [address] [facility] [address]    Phone: [phone]     amLODIPine 5 MG tablet    QUEtiapine 50 MG tablet         We also reviewed Juan Miguel Delaney's allergy list and updated it as needed:  Allergies: Penicillins, Celecoxib, Ibuprofen, Lisinopril, Naprosyn [naproxen], and Rofecoxib    Thank you for continuing to care for Juan Miguel Delaney.  We look forward to working together with you in the future.    Sincerely,  Marisela Montero, [facility]

## 2021-05-11 NOTE — PROGRESS NOTES
:    I placed call to SEBASTIAN Yost at [facility] and left message if they can accept patient back to facility.      I received a call from SEBASTIAN Davila at [facility] and she would like to come and screen patient for potential placement for a memory care bed.  I called her back and had to leave message that [facility] and [facility]  was also in the running but if she is more then welcome to come and screen patient.    CASSANDRA Angulo on 5/11/2021 at 8:33 AM    :    I received a call from SEBASTIAN Yost at [facility] and after talking with family and SEBASTIAN Patrick they will accept patient back. I offered them [facility] options number incase patient does need memory care at some point as they currently have openings.  I called daughter Kavita and she would like to pick patient up at 1300.or her brother will be here.  She is going to drop clothing off first.  Nursing was updated. I called Lola at [facility] and [facility] and gave them discharge update.  No further needs at this time.    CASSANDRA Angulo on 5/11/2021 at 10:13 AM

## 2021-05-11 NOTE — PLAN OF CARE
Problem: Confusion Chronic  Goal: Optimal Cognitive Function  Outcome: Improving   Patient is alert and orientated to self, place, and situation. Denies pain and appears comfortable. Remains forgetful at times, but has been pleasant, calm, and cooperative all shift. Received the news he is returning home to his wife and he expressed great happiness and appreciation towards staff for making this happen. Patient showered and shaved this AM. Remains independent in room. VSS. Will continue to monitor.    BP (!) 144/85 (BP Location: Right arm)   Pulse 63   Temp 98  F (36.7  C) (Tympanic)   Resp 16   Wt 79.1 kg (174 lb 6.4 oz)   SpO2 96%   BMI 27.31 kg/m

## 2021-05-11 NOTE — PLAN OF CARE
Pt is alert to self. Pleasant and cooperative. Slept all night. No complaints. L ingrown toe nail bothers at times, declined assessment of toe. Vitals stable.

## 2021-05-11 NOTE — DISCHARGE SUMMARY
"[facility]    Discharge Summary  Hospitalist    Date of Admission:  5/2/2021  Date of Discharge:  5/11/2021  Discharging Provider: Meek Hicks  Date of Service (when I saw the patient): 05/11/21    Discharge Diagnoses   Principal Problem:    Late onset Alzheimer's disease with behavioral disturbance (H) (4/19/2018)  Active Problems:    CKD (chronic kidney disease) stage 3, GFR 30-59 ml/min (5/25/2015)    Benign essential hypertension (10/24/2011)      History of Present Illness   Juan Miguel Delaney is an 88 year old male who presented with the above.  Patient was admitted by Dr. Leone and per H&P, \"88 year old male who was brought to ER by ambulance that he called for dizziness. Patient with severe dementia and has no recollection of his symptoms or reasons for calling. Living at [facility], previously with his wife who is not there due to illness or surgery?, now at [facility]. He has had increased episodes of agitation, at times becomes belligerent, angry, suicidal, aggressive. Dr. Fernando has increased dosing of seroquel with some improvement. Seen in ED Friday, sent back to [facility] with family support, son stayed with him, but over time patient became more angry, demanding that he leave. Family has been thinking about memory care or psych eval at [place] or [place]. Wife might be able to come back soon to [facility]. At this time, patient not safe to go back to [facility] by himself, Family looking for options.\"     Hospital Course   Juan Miguel Delaney was admitted on 5/2/2021.  The following problems were addressed during his hospitalization:    Late onset Alzheimer's disease with behavioral disturbance (H): Presents with increased agitation and aggressiveness at times related to his underlying dementia. DEC assessment completed and confirmed that memory care is appropriate.    He had no other exacerbating factors found such as occult infection etc.  After multiple referrals " and significant social service intervention for his care home care, family opted to return back to [facility].  They were given multiple options regarding other assisted living's in the future if behaviors remain a problem and outpatient geriatric psych eval could be considered in the future if required.  His Seroquel was increased from 50 to 75 mg p.o. 3 times daily, continued on his home Aricept and Ativan as needed as well as extra Seroquel as needed.      Benign essential hypertension: Stable control and Norvasc dose was increased from 5 to 10 mg daily for improved control.       Ingrown toenail: Noted on left great toe without evidence of surrounding erythema or significant pain.  Patient would like to see a foot specialist and a podiatry referral was placed.     Meek Hicks MD    Significant Results and Procedures   none    Pending Results   These results will be followed up by NA  Unresulted Labs Ordered in the Past 30 Days of this Admission     No orders found from 4/2/2021 to 5/3/2021.          Code Status   DNR / DNI       Primary Care Physician   Jason Fernando    Physical Exam   Temp: 98  F (36.7  C) Temp src: Tympanic BP: (!) 144/85 Pulse: 63   Resp: 16 SpO2: 96 % O2 Device: None (Room air)    Vitals:    05/09/21 0015 05/10/21 0318 05/11/21 0639   Weight: 78.5 kg (173 lb) 78.5 kg (173 lb) 79.1 kg (174 lb 6.4 oz)     Vital Signs with Ranges  Temp:  [97.4  F (36.3  C)-98  F (36.7  C)] 98  F (36.7  C)  Pulse:  [54-82] 63  Resp:  [12-16] 16  BP: (131-144)/(72-89) 144/85  SpO2:  [96 %-99 %] 96 %  I/O last 3 completed shifts:  In: 240 [P.O.:240]  Out: -     Exam on day of discharge:   GENERAL: Talkative,  pleasant, appropriate, oriented to person and place, sitting in the chair, had just showered, in no apparent distress.  CARDIOVASCULAR: regular rate and rhythm, no murmurs, rubs, or gallops. Normal S1/S2. No lower extremity edema.   RESPIRATORY: clear to auscultation bilaterally, no wheezes, no  crackles.   GI: soft, non-tender, non-distended, normoactive bowel sounds.  MUSCULOSKELETAL: warm and well perfused, 2+ dorsalis pedis pulses bilaterally.    Left greater toe with ingrown toenail without surrounding erythema.  Significant onychomycosis.    Discharge Disposition   Discharged to assisted living  Condition at discharge: Stable    Consultations This Hospital Stay   SOCIAL WORK IP CONSULT    Time Spent on this Encounter   I, Meek Hicks MD, personally saw the patient today and spent less than or equal to 30 minutes discharging this patient.    Discharge Orders      SOCIAL WORK REFERRAL (ONLY FOR THESE LOCATIONS: [facility])      Orthopedic & Spine  Referral      Reason for your hospital stay    Behavior outbursts     Follow-up and recommended labs and tests     5/26/2021 at 1:20 PM With Dr. Fernando  Follow-up with the podiatrist as scheduled     Activity    Your activity upon discharge: activity as tolerated     When to contact your care team    Call your primary doctor if you have any of the following: temperature greater than 101,  increased shortness of breath, increased drainage, increased swelling or increased pain.     Discharge Instructions    - increase your norvasc to 10mg daily to better improve your blood pressure  - increase your seroquel to 75mg 3 times daily     No CPR- Do NOT Intubate     Diet    Follow this diet upon discharge: Orders Placed This Encounter      Regular Diet Adult     Discharge Medications   Current Discharge Medication List      CONTINUE these medications which have CHANGED    Details   amLODIPine (NORVASC) 5 MG tablet Take 2 tablets (10 mg) by mouth daily  Qty: 90 tablet, Refills: 3    Associated Diagnoses: Essential (primary) hypertension      !! QUEtiapine (SEROQUEL) 50 MG tablet Take 1.5 tablets (75 mg) by mouth 3 times daily  Qty: 90 tablet, Refills: 0    Associated Diagnoses: Late onset Alzheimer's disease with behavioral disturbance (H)       !! -  Potential duplicate medications found. Please discuss with provider.      CONTINUE these medications which have NOT CHANGED    Details   acetaminophen (TYLENOL) 500 MG tablet Take 1,000 mg by mouth 2 times daily      cyanocobalamin (VITAMIN B-12) 500 MCG tablet TAKE 1 TABLET (500 MCG) BY MOUTH DAILY  Qty: 90 tablet, Refills: 3    Associated Diagnoses: Vitamin B12 deficiency (non anemic)      donepezil (ARICEPT) 10 MG ODT Take 10 mg by mouth daily      LORazepam (ATIVAN) 1 MG tablet Take 1 tablet (1 mg) by mouth every 4 hours as needed for agitation  Qty: 40 tablet, Refills: 0      !! QUEtiapine (SEROQUEL) 25 MG tablet Take 25 mg by mouth daily as needed (AGITATION)       !! - Potential duplicate medications found. Please discuss with provider.        Allergies   Allergies   Allergen Reactions     Penicillins Hives     Celecoxib Rash     Ibuprofen Rash     All NSAIDS cause rash     Lisinopril Other (See Comments)     Dry throat     Naprosyn [Naproxen] Other (See Comments)     Epistaxis--resolved when DC'd     Rofecoxib Hives     Vioxx     Data   Most Recent 3 CBC's:  Recent Labs   Lab Test 05/02/21 1940 07/28/20  1100 01/21/19  2030   WBC 4.7 6.0 6.1   HGB 14.6 15.9 16.0   MCV 89 89 87    184 171      Most Recent 3 BMP's:  Recent Labs   Lab Test 05/02/21 1940 08/11/20  0940 07/28/20  1100 01/21/19  2030     --  141 140   POTASSIUM 4.8 4.2 5.5* 4.6   CHLORIDE 105  --  106 105   CO2 26  --  29 29   BUN 26*  --  20 21   CR 1.57*  --  1.36* 1.44*   ANIONGAP 8  --  6 6   LORENZO 8.8  --  9.4 9.2   *  --  78 102     Most Recent 2 LFT's:  Recent Labs   Lab Test 05/02/21 1940 07/28/20  1100   AST 13 15   ALT 14 13   ALKPHOS 32* 38   BILITOTAL 0.4 0.8     Most Recent INR's and Anticoagulation Dosing History:  Anticoagulation Dose History     Recent Dosing and Labs Latest Ref Rng & Units 4/18/2018    INR 0 - 1.3 1.02        Most Recent 3 Troponin's:  Recent Labs   Lab Test 01/22/19  1212 01/22/19  0705  01/22/19  0300   TROPI 0.054* 0.044* 0.052*     Most Recent Cholesterol Panel:  Recent Labs   Lab Test 01/22/19  0705   CHOL 187   *   HDL 39   TRIG 161*     Most Recent 6 Bacteria Isolates From Any Culture (See EPIC Reports for Culture Details):No lab results found.  Most Recent TSH, T4 and A1c Labs:No lab results found.  Results for orders placed or performed during the hospital encounter of 04/30/21   CT Head w/o Contrast    Narrative    PROCEDURE: CT HEAD W/O CONTRAST 4/30/2021 1:55 PM    HISTORY: Mental status change, unknown cause    COMPARISONS: 4/18/2018.    Meds/Dose Given: None.    TECHNIQUE: Axial noncontrast enhanced images with coronal and sagittal  reformatted images.    FINDINGS: There is some generalized atrophy, not unusual for patient  of this age. No mass or midline shift is seen and there is no  extra-axial fluid collection or focal hemorrhage.    Bone windows show no fracture. Visualized paranasal sinuses and  mastoid air cells are clear.         Impression    IMPRESSION: No acute mass effect or hemorrhage.    CARA GOMES MD

## 2021-05-12 ENCOUNTER — PATIENT OUTREACH (OUTPATIENT)
Dept: CARE COORDINATION | Facility: CLINIC | Age: 86
End: 2021-05-12

## 2021-05-12 NOTE — PROGRESS NOTES
Clinic Care Coordination Contact    Transitional Care Management Phone Call    Spoke with RN, states patient is adjust well in current setting, medications send back to  for repackaging as there was a slight error, RN has no concerns at this time.     Summary of hospitalization:  [facility] discharge summary reviewed  DISCHARGE DIAGNOSIS: Alzheimer's Disease with behavioral disturbance.     DATE OF DISCHARGE: 05/11/2021    Diagnostic Tests/Treatments reviewed.  Follow up needed: Kassie or NAA Shepard at facility, message out to RN for clarification    Post Discharge Medication Reconciliation: discharge medications reconciled, continue medications without change.    Medications reviewed by: by myself    Problems taking medications regularly:  None  Problems adhering to non-medication therapy:  None    Other Healthcare Providers Involved in Patient s Care:         Homecare- [facility] team    Update since discharge: stable.     Plan of care communicated with patient and caregiver    Just a friendly reminder that you appointment is   Next 5 appointments (look out 90 days)    May 26, 2021  1:20 PM  Office Visit with Jason Fernando MD  [facility] ([facility] ) [address]  [place] [id number] [phone]      .  We encourage you to keep this appointment.  Please remember to bring all of your pills in their bottles (including any vitamins or over the counter pills) with you to your appointment.   The patient indicates understanding of these issues and agrees with the plan of care.   Yes    Was the patient contacted within the 2 business days or other approved timeframe?  Yes     Was the Medication reconciliation and management done since the patient was discharged? Yes    CASSANDRA Chang  5/12/2021 2:53 PM      Clinic Care Coordination Contact    Call X2 placed out to  on this date, left message requesting return call.      Clinic Care Coordination Contact    Writer placed call to SEBASTIAN Patrick at [facility] where patient returned to apt with spouse.     Celina goins has not had up update yet this morning, requested writer to call back later today.    CASSANDRA Chang on 5/12/2021 at 9:00 AM

## 2021-05-14 ENCOUNTER — PATIENT OUTREACH (OUTPATIENT)
Dept: CARE COORDINATION | Facility: CLINIC | Age: 86
End: 2021-05-14

## 2021-05-14 NOTE — PROGRESS NOTES
Clinic Care Coordination Contact    Writer reached out to daughter of patientKavita on this date to check in and offer support/assistance as needed.     Daughter very grateful of out reach and everyone's teamwork to support patient and spouse.    States patient and spouse are reunited at [facility] and both doing extremely well.    She has no concerns at this time, feels comfortable reaching out to [facility] or [facility] if and when needed    CASSANDRA Chang on 5/14/2021 at 10:56 AM

## 2021-05-25 ENCOUNTER — NURSING HOME VISIT (OUTPATIENT)
Dept: GERIATRICS | Facility: OTHER | Age: 86
End: 2021-05-25
Attending: NURSE PRACTITIONER
Payer: MEDICARE

## 2021-05-25 ENCOUNTER — OFFICE VISIT (OUTPATIENT)
Dept: INTERNAL MEDICINE | Facility: OTHER | Age: 86
End: 2021-05-25
Attending: INTERNAL MEDICINE
Payer: COMMERCIAL

## 2021-05-25 VITALS
BODY MASS INDEX: 28.51 KG/M2 | SYSTOLIC BLOOD PRESSURE: 130 MMHG | DIASTOLIC BLOOD PRESSURE: 72 MMHG | TEMPERATURE: 97.8 F | WEIGHT: 182 LBS | HEART RATE: 62 BPM | RESPIRATION RATE: 16 BRPM | OXYGEN SATURATION: 97 %

## 2021-05-25 DIAGNOSIS — M16.12 ARTHRITIS OF LEFT HIP: Primary | ICD-10-CM

## 2021-05-25 DIAGNOSIS — F02.818 LATE ONSET ALZHEIMER'S DISEASE WITH BEHAVIORAL DISTURBANCE (H): ICD-10-CM

## 2021-05-25 DIAGNOSIS — I10 BENIGN ESSENTIAL HYPERTENSION: ICD-10-CM

## 2021-05-25 DIAGNOSIS — G30.1 LATE ONSET ALZHEIMER'S DISEASE WITH BEHAVIORAL DISTURBANCE (H): ICD-10-CM

## 2021-05-25 DIAGNOSIS — N18.32 STAGE 3B CHRONIC KIDNEY DISEASE (H): ICD-10-CM

## 2021-05-25 DIAGNOSIS — M48.062 SPINAL STENOSIS OF LUMBAR REGION WITH NEUROGENIC CLAUDICATION: ICD-10-CM

## 2021-05-25 DIAGNOSIS — M25.552 HIP PAIN, LEFT: Primary | ICD-10-CM

## 2021-05-25 PROBLEM — T50.905A MEDICATION SIDE EFFECTS, INITIAL ENCOUNTER: Status: RESOLVED | Noted: 2021-05-02 | Resolved: 2021-05-25

## 2021-05-25 PROCEDURE — G0463 HOSPITAL OUTPT CLINIC VISIT: HCPCS

## 2021-05-25 PROCEDURE — 99214 OFFICE O/P EST MOD 30 MIN: CPT | Performed by: INTERNAL MEDICINE

## 2021-05-25 RX ORDER — NAPROXEN SODIUM 220 MG
220 TABLET ORAL 2 TIMES DAILY WITH MEALS
Qty: 28 TABLET | Refills: 0 | Status: SHIPPED | OUTPATIENT
Start: 2021-05-25 | End: 2021-05-25

## 2021-05-25 ASSESSMENT — ENCOUNTER SYMPTOMS
CONFUSION: 1
WOUND: 0
BACK PAIN: 0
SHORTNESS OF BREATH: 0
ABDOMINAL PAIN: 0
FEVER: 0
ADENOPATHY: 0
HEMATURIA: 0
CHILLS: 0
CHEST TIGHTNESS: 0
BRUISES/BLEEDS EASILY: 0

## 2021-05-25 ASSESSMENT — PAIN SCALES - GENERAL: PAINLEVEL: WORST PAIN (10)

## 2021-05-25 NOTE — PATIENT INSTRUCTIONS
Left hip injection ordered  - they will call with date/time of appointment.      Continue physical therapy - for strengthening and balance.     -- Stop / Cancel Ativan / Lorazepam -- removed from med list today.     Continue Seroquel for behaviors.     -- Continue regular walking and exercise.   -- Use your walker for stability.     Avoid the stairs.     Blood pressure is well controlled.     Medications refilled.     Return as needed for follow-up with Dr. Fernando.    Clinic : [phone]  Appointment line: [phone]

## 2021-05-25 NOTE — PROGRESS NOTES
Mr. Delaney is a 88 year old male who presents to the clinic for evaluation of the following problems:      ICD-10-CM    1. Arthritis of left hip  M16.12 XR Joint Injection Major Left   2. Late onset Alzheimer's disease with behavioral disturbance (H)  G30.1     F02.81    3. Spinal stenosis of lumbar region with neurogenic claudication  M48.062    4. Stage 3b chronic kidney disease  N18.32    5. Benign essential hypertension  I10      HPI  Patient is brought into the clinic today for follow-up.  I got a note from the assisted living nurse practitioner stating that he had a steroid, imaging guided hip injection into his left hip in January and did well.  He is having a bit more hip pain again.  Uses walker with ambulation everywhere.  Aleve/ibuprofen was used recently and was very effective.  He would like to get another hip injection ordered.  Orders placed.  He is not on any aspirin or blood thinners.    Late onset Alzheimer's disease with behavioral disturbance.  This is doing much better with Seroquel.  His wife was sick and not at the assisted living.  She has back now and his behavior has been much better.  He ended up being started on Seroquel and lorazepam from the emergency room.  Lorazepam is not being used, as far as his daughter is aware and removed from med list today.    He has history of spinal stenosis with claudication.    Stage IIIb chronic kidney disease, low-dose ibuprofen intermittently has been very effective for his back and hip pain.  Continue current dosing.    Hypertension, blood pressure is currently well controlled.  No changes medications currently.    --Daughter and I discussed that the goal with Seroquel is to continue to constantly work on dose reduction and hopefully elimination of Seroquel if possible.    Functional Capacity: less than 4 METS. + using walker regularly.    Review of Systems   Constitutional: Negative for chills and fever.   HENT: Positive for hearing loss. Negative for  "congestion.    Eyes: Negative for visual disturbance.   Respiratory: Negative for chest tightness and shortness of breath.    Cardiovascular: Negative for chest pain and leg swelling.   Gastrointestinal: Negative for abdominal pain.   Genitourinary: Negative for hematuria.   Musculoskeletal: Negative for back pain.   Skin: Negative for rash and wound.        Left great toenail has darkened + ingrown   Neurological: Negative for syncope.   Hematological: Negative for adenopathy. Does not bruise/bleed easily.   Psychiatric/Behavioral: Positive for confusion.        Reviewed and updated as needed this visit by Provider   Tobacco  Allergies  Meds  Problems  Med Hx  Surg Hx  Fam Hx          EXAM:   Vitals:    05/25/21 1621   BP: 130/72   BP Location: Right arm   Patient Position: Sitting   Cuff Size: Adult Regular   Pulse: 62   Resp: 16   Temp: 97.8  F (36.6  C)   TempSrc: Tympanic   SpO2: 97%   Weight: 82.6 kg (182 lb)       Current Pain Score: Worst Pain (10)     BP Readings from Last 3 Encounters:   05/25/21 130/72   05/11/21 (!) 140/70   04/30/21 119/67      Wt Readings from Last 3 Encounters:   05/25/21 82.6 kg (182 lb)   05/11/21 79.1 kg (174 lb 6.4 oz)   04/30/21 77.1 kg (170 lb)      Estimated body mass index is 28.51 kg/m  as calculated from the following:    Height as of 4/30/21: 1.702 m (5' 7\").    Weight as of this encounter: 82.6 kg (182 lb).     Physical Exam  Constitutional:       General: He is not in acute distress.     Appearance: He is well-developed. He is not diaphoretic.   HENT:      Head: Normocephalic and atraumatic.   Eyes:      General: No scleral icterus.     Conjunctiva/sclera: Conjunctivae normal.   Neck:      Musculoskeletal: Neck supple.   Cardiovascular:      Rate and Rhythm: Normal rate and regular rhythm.   Pulmonary:      Effort: Pulmonary effort is normal.      Breath sounds: Normal breath sounds.   Abdominal:      Palpations: Abdomen is soft.      Tenderness: There is no " abdominal tenderness.   Musculoskeletal:         General: No deformity.   Lymphadenopathy:      Cervical: No cervical adenopathy.   Skin:     General: Skin is warm and dry.      Findings: No rash.      Comments: Ingrown left great toenail, with brown discoloration   Neurological:      Mental Status: He is alert and oriented to person, place, and time. Mental status is at baseline.      Comments: + memory loss   Psychiatric:         Mood and Affect: Mood normal.         Behavior: Behavior normal.         Procedures   INVESTIGATIONS:  - Labs reviewed in [facility]   Recent Labs   Lab Test 05/02/21  1940 08/11/20  0940 07/28/20  1100 01/22/19  0705 01/21/19  2030 06/28/17  1715 06/28/17  1715 04/12/16  1717 04/12/16 1717   LDL  --   --   --  116*  --   --  131*  --  118*   HDL  --   --   --  39  --   --  43  --  37   TRIG  --   --   --  161*  --   --  116  --  128   ALT 14  --  13  --  9   < >  --   --   --    CR 1.57*  --  1.36*  --  1.44*   < > 1.24   < > 1.28   GFRESTIMATED 42*  --  49*  --  47*   < >  --   --   --    GFRESTBLACK 51*  --  60*  --  56*   < > >60   < > >60   POTASSIUM 4.8 4.2 5.5*  --  4.6   < > 4.4   < > 4.8    < > = values in this interval not displayed.      ASSESSMENT AND PLAN:  Problem List Items Addressed This Visit        Nervous and Auditory    Spinal stenosis of lumbar region with neurogenic claudication    Late onset Alzheimer's disease with behavioral disturbance (H)       Circulatory    Benign essential hypertension       Urinary    Stage 3b chronic kidney disease      Other Visit Diagnoses     Arthritis of left hip    -  Primary    Relevant Orders    XR Joint Injection Major Left        reviewed diet, exercise and weight control, recommended sodium restriction  -- Expected clinical course discussed    -- Medications and their side effects discussed    Patient Instructions   Left hip injection ordered  - they will call with date/time of appointment.      Continue physical therapy - for  strengthening and balance.     -- Stop / Cancel Ativan / Lorazepam -- removed from med list today.     Continue Seroquel for behaviors.     -- Continue regular walking and exercise.   -- Use your walker for stability.     Avoid the stairs.     Blood pressure is well controlled.     Medications refilled.     Return as needed for follow-up with Dr. Fernando.    Clinic : [phone]  Appointment line: [phone]     Jason Fernando MD  Internal Medicine  [facility]

## 2021-05-27 ENCOUNTER — HOSPITAL ENCOUNTER (OUTPATIENT)
Dept: GENERAL RADIOLOGY | Facility: OTHER | Age: 86
Discharge: HOME | End: 2021-05-27
Attending: INTERNAL MEDICINE | Admitting: INTERNAL MEDICINE
Payer: MEDICARE

## 2021-05-27 DIAGNOSIS — M16.12 ARTHRITIS OF LEFT HIP: ICD-10-CM

## 2021-05-27 DIAGNOSIS — M12.9 ARTHROPATHY: ICD-10-CM

## 2021-05-27 PROCEDURE — 27096 INJECT SACROILIAC JOINT: CPT | Mod: LT

## 2021-05-27 PROCEDURE — 255N000002 HC RX 255 OP 636: Performed by: RADIOLOGY

## 2021-05-27 PROCEDURE — 250N000011 HC RX IP 250 OP 636: Performed by: RADIOLOGY

## 2021-05-27 PROCEDURE — 250N000009 HC RX 250: Performed by: RADIOLOGY

## 2021-05-27 RX ORDER — BUPIVACAINE HYDROCHLORIDE 5 MG/ML
3 INJECTION, SOLUTION EPIDURAL; INTRACAUDAL ONCE
Status: COMPLETED | OUTPATIENT
Start: 2021-05-27 | End: 2021-05-27

## 2021-05-27 RX ORDER — LIDOCAINE HYDROCHLORIDE 10 MG/ML
2 INJECTION, SOLUTION INFILTRATION; PERINEURAL ONCE
Status: COMPLETED | OUTPATIENT
Start: 2021-05-27 | End: 2021-05-27

## 2021-05-27 RX ORDER — TRIAMCINOLONE ACETONIDE 40 MG/ML
40 INJECTION, SUSPENSION INTRA-ARTICULAR; INTRAMUSCULAR ONCE
Status: COMPLETED | OUTPATIENT
Start: 2021-05-27 | End: 2021-05-27

## 2021-05-27 RX ADMIN — TRIAMCINOLONE ACETONIDE 40 MG: 40 INJECTION, SUSPENSION INTRA-ARTICULAR; INTRAMUSCULAR at 15:00

## 2021-05-27 RX ADMIN — LIDOCAINE HYDROCHLORIDE 2 ML: 10 INJECTION, SOLUTION INFILTRATION; PERINEURAL at 15:00

## 2021-05-27 RX ADMIN — IOHEXOL 2 ML: 240 INJECTION, SOLUTION INTRATHECAL; INTRAVASCULAR; INTRAVENOUS; ORAL at 15:00

## 2021-05-27 RX ADMIN — BUPIVACAINE HYDROCHLORIDE 3 ML: 5 INJECTION, SOLUTION EPIDURAL; INTRACAUDAL at 15:00

## 2021-05-30 NOTE — PROGRESS NOTES
Juan Miguel Delaney is a 88 year old male being seen today for follow up visit at [facility].    Code Status: DNR / DNI.   Health Care Power of : Extended Emergency Contact Information  Primary Emergency Contact: Criss Delaney   [facility]  Home Phone: [phone]  Relation: Spouse  Secondary Emergency Contact: Kavita Hassan   [facility]  Home Phone: [phone]  Mobile Phone: [phone]  Relation: Daughter     Allergies: Penicillins, Celecoxib, Ibuprofen, Lisinopril, Naprosyn [naproxen], and Rofecoxib     Chief Complaint / HPI: Patient and staff requested a follow-up on chronic and recurrent issues with left hip pain  Has been flaring since hospitalization--- had ordered and started physical therapy at facility  Working with physical therapist--- limited by short-term memory loss  Spoke with daughter Chandrika about options--- discussed risks and benefits Chandrika is agreeable with trying Aleve again  This was utilized last year when Juan Miguel had his Ortho appointment with Dr. Ngo--was mildly effective took the edge off  Exacerbations  Continues on scheduled Tylenol    Staff reported last night her leg was asleep on toilet--- Seroquel dose tripled during recent hospitalization for severe agitation and physical aggressive behaviors after his wife was admitted to short-term rehab.  Apparently there was difficulty finding geriatric behavioral health unit and family was agreeable with trying readmission to assisted living after wife returned from short-term rehab--- family aware this is short-term and as resident and wife's physical condition changes will expect another exacerbation of behaviors which I feel are triggered from frustration mostly with short-term memory loss  Chandrika, daughter is very involved and supportive with parents.        Past Medical, Surgical, Family and Social History reviewed: YES.     Medications: reviewed  Medications - recent changes: Seroquel increased during  hospitalization    Review of Systems:  Musculoskeletal: positive for arthritis, joint pain, joint stiffness and muscular weakness  Psychiatric: positive for anxiety, agitation and memory loss  All other systems negative      Toileting:    Continent of Bowel: Yes   Continent of Bladder: Yes  Mobility: walker    Recent Labs: reviewed      Current Therapies: physical therapy    Exam:  Vital signs reviewed.   GENERAL APPEARANCE:  alert and no distress  EYES: Eyes grossly normal to inspection  RESP: lungs clear   MS: no musculoskeletal defects are noted and gait is walker dependent  SKIN: warm and dry  PSYCH: mentation appears normal and affect normal/bright    Assessment and Plan:    Will trial Aleve with breakfast and dinner short-term for couple of weeks to see if this is effective along with physical therapy on hip pain    Has a hospital follow-up visit scheduled with PCP and will discuss other options such as another image guided injection    Will follow up with PCP to discuss dialing down Seroquel dose to prevent drowsiness and falls      (M25.552) Hip pain, left  (primary encounter diagnosis)    Plan: DISCONTINUED: naproxen sodium (ANAPROX) 220 MG         tablet            (G30.1,  F02.81) Late onset Alzheimer's disease with behavioral disturbance (H)

## 2021-06-02 ENCOUNTER — TRANSFERRED RECORDS (OUTPATIENT)
Dept: HEALTH INFORMATION MANAGEMENT | Facility: OTHER | Age: 86
End: 2021-06-02

## 2021-06-08 DIAGNOSIS — M25.552 HIP PAIN, LEFT: ICD-10-CM

## 2021-06-09 RX ORDER — NAPROXEN SODIUM 220 MG/1
TABLET, FILM COATED ORAL
Qty: 28 TABLET | Refills: 0 | OUTPATIENT
Start: 2021-06-09

## 2021-06-09 NOTE — TELEPHONE ENCOUNTER
[id number] sent Rx request for the following:      NAPROXEN SODIUM 220MG TABLET  Sig: TAKE 1 TABLET (220 MG) BY MOUTH 2 TIMES DAILY (WITH MEALS) FOR 14 DAYS      Last Prescription Date:   11/16/2020  Last Fill Qty/Refills:         60, R-1    Last Office Visit:              5/25/2021   Future Office visit:           none    Routing refill request to provider for review/approval because:  Drug not active on patient's medication list    Unable to complete prescription refill per RN Medication Refill Policy.................... Jermain Altman RN ....................  6/9/2021   2:08 PM

## 2021-06-30 DIAGNOSIS — M19.90 ARTHRITIS PAIN: Primary | ICD-10-CM

## 2021-06-30 RX ORDER — ACETAMINOPHEN 500 MG/1
TABLET ORAL
Qty: 120 TABLET | Refills: 10 | Status: SHIPPED | OUTPATIENT
Start: 2021-06-30 | End: 2022-05-20

## 2021-06-30 NOTE — TELEPHONE ENCOUNTER
[id number] sent Rx request for the following:      Requested Prescriptions   Pending Prescriptions Disp Refills   SM PAIN RELIEVER EX  MG tablet [Pharmacy Med Name: PAIN RELIEVER ES 500MG TABLET] 120 tablet     Sig: TAKE 2 TABLETS (1,000 MG) BY MOUTH 2 TIMES DAILY   Last Office Visit:              5/25/21  Future Office visit:           None  Routing refill request to provider for review/approval because:    Historically reported    Needs associated diagnosis    Unable to complete prescription refill per RN Medication Refill Policy. Ema Spencer RN .............. 6/30/2021  11:24 AM

## 2021-07-06 ENCOUNTER — NURSING HOME VISIT (OUTPATIENT)
Dept: GERIATRICS | Facility: OTHER | Age: 86
End: 2021-07-06
Attending: NURSE PRACTITIONER
Payer: COMMERCIAL

## 2021-07-06 DIAGNOSIS — G30.1 LATE ONSET ALZHEIMER'S DISEASE WITH BEHAVIORAL DISTURBANCE (H): ICD-10-CM

## 2021-07-06 DIAGNOSIS — M25.552 HIP PAIN, LEFT: ICD-10-CM

## 2021-07-06 DIAGNOSIS — F02.818 LATE ONSET ALZHEIMER'S DISEASE WITH BEHAVIORAL DISTURBANCE (H): ICD-10-CM

## 2021-07-06 DIAGNOSIS — Z79.899 ENCOUNTER FOR MEDICATION REVIEW: Primary | ICD-10-CM

## 2021-07-06 DIAGNOSIS — M16.0 PRIMARY OSTEOARTHRITIS OF BOTH HIPS: ICD-10-CM

## 2021-07-06 RX ORDER — NAPROXEN SODIUM 220 MG
220 TABLET ORAL 2 TIMES DAILY WITH MEALS
Qty: 28 TABLET | Refills: 0 | Status: SHIPPED | OUTPATIENT
Start: 2021-07-06 | End: 2021-07-16

## 2021-07-06 RX ORDER — QUETIAPINE FUMARATE 50 MG/1
50 TABLET, FILM COATED ORAL 3 TIMES DAILY
Qty: 90 TABLET | Refills: 3 | Status: SHIPPED | OUTPATIENT
Start: 2021-07-06 | End: 2021-07-27 | Stop reason: DRUGHIGH

## 2021-07-12 NOTE — PROGRESS NOTES
Juan Miguel Delaney is a 89 year old male being seen today for comprehensive and follow up visit at [facility].    Code Status: Advance Directives: YES.   Health Care Power of : Extended Emergency Contact Information  Primary Emergency Contact: Criss Delaney   [facility]  Home Phone: [phone]  Relation: Spouse  Secondary Emergency Contact: Kavita Hassan   [facility]  Home Phone: [phone]  Mobile Phone: [phone]  Relation: Daughter     Allergies: Penicillins, Celecoxib, Ibuprofen, Lisinopril, Naprosyn [naproxen], and Rofecoxib     Chief Complaint / HPI: Medication review and followup on left hip pain. Recently had left SI joint injection, reports left hip pain. Had imaged guided injection left hip January 2021, and most recently Left SI joint injection about 6 weeks ago. Has worked with PT, uses walker most of the time.  Recent hospitalization for dementia with restlessness and agitation, was on Seroquel previously to hospitalization at 25 mg which was titrated up to 75 mg TID during hospitalization and discharge. Staff report there have not been any elopement or behaviors not responsive to redirection and other behavioral  Interventions. Staff had report an incident of somnolence found sleeping on toilet 2 weeks after discharge.   No recent reports of drowsiness or excessive sleeping. Has tolerated medication.  Resident unable to provide complete history due to memory loss.        Past Medical, Surgical, Family and Social History reviewed: YES.     Medications: reviewed and reconciled  Medications - recent changes:     Review of Systems:  Musculoskeletal: positive for arthritis, joint pain and muscular weakness  Psychiatric: positive for agitation and dementia   All other systems negative    Toileting:    Continent of Bowel: Yes   Continent of Bladder: Yes  Mobility: walker    Recent Labs: None      Current Therapies: none    Exam:  Vital signs reviewed.   GENERAL APPEARANCE: alert and  no distress  EYES: Eyes grossly normal to inspection, conjunctivae and sclerae normal  RESP: lungs clear   MS: no musculoskeletal defects are noted and gait is age appropriate with walker--favors left hip--tenderness with palpation left trochanter lateral and posterior  Reproducible tenderness left SI joint, ROM, CMS intact  SKIN: warm and dry  PSYCH: Alert, pleasant cooperative    Assessment and Plan:    Will reduce Seroquel from 75 to 50 mg po TID and followup with staff on response--goal to reduce to smallest effective dose.    Called Chandrika to discuss above medication reduction and hip pain. Ideally most minimally invasive plan of action.  Discussed re-trying Aleve BID scheduled with food x 2 weeks. If not effective consider re-trying left hip image guided injection. Spoke with radiologist  And reasonable to try hip injection 6 weeks post SI joint injection if more trochanteric related.  Has been 6 months since last Hip injection.  Also discussed PT evaluation for recommendations for pain therapy.  Chandrika is agreeable with above.    Will [place] [facility] back in a couple weeks on pain level sooner PRN.    Reviewed current medications and reconciled--will check labs 7/13/21 for medication monitoring and adjust as indicated.      (Z79.899) Encounter for medication review  (primary encounter diagnosis)      (G30.1,  F02.81) Late onset Alzheimer's disease with behavioral disturbance (H)    Plan: QUEtiapine (SEROQUEL) 50 MG tablet            (M25.552) Hip pain, left    Plan: naproxen sodium (ANAPROX) 220 MG tablet            (M16.0) Primary osteoarthritis of both hips

## 2021-07-13 ENCOUNTER — MEDICAL CORRESPONDENCE (OUTPATIENT)
Dept: HEALTH INFORMATION MANAGEMENT | Facility: OTHER | Age: 86
End: 2021-07-13

## 2021-07-13 ENCOUNTER — APPOINTMENT (OUTPATIENT)
Dept: LAB | Facility: OTHER | Age: 86
End: 2021-07-13
Attending: NURSE PRACTITIONER
Payer: MEDICARE

## 2021-07-13 ENCOUNTER — LAB REQUISITION (OUTPATIENT)
Dept: LAB | Facility: OTHER | Age: 86
End: 2021-07-13
Payer: MEDICARE

## 2021-07-13 DIAGNOSIS — N18.9 CHRONIC KIDNEY DISEASE, UNSPECIFIED: ICD-10-CM

## 2021-07-13 DIAGNOSIS — R41.3 OTHER AMNESIA: ICD-10-CM

## 2021-07-13 LAB
ANION GAP SERPL CALCULATED.3IONS-SCNC: 12 MMOL/L (ref 3–14)
BUN SERPL-MCNC: 26 MG/DL (ref 7–25)
CALCIUM SERPL-MCNC: 8.8 MG/DL (ref 8.6–10.3)
CHLORIDE BLD-SCNC: 105 MMOL/L (ref 98–107)
CO2 SERPL-SCNC: 25 MMOL/L (ref 21–31)
CREAT SERPL-MCNC: 1.46 MG/DL (ref 0.7–1.3)
GFR SERPL CREATININE-BSD FRML MDRD: 42 ML/MIN/1.73M2
GLUCOSE BLD-MCNC: 99 MG/DL (ref 70–105)
POTASSIUM BLD-SCNC: 3.9 MMOL/L (ref 3.5–5.1)
SODIUM SERPL-SCNC: 142 MMOL/L (ref 134–144)
TSH SERPL DL<=0.005 MIU/L-ACNC: 3.28 MU/L (ref 0.4–4)
VIT B12 SERPL-MCNC: 871 PG/ML (ref 180–914)

## 2021-07-13 PROCEDURE — 36415 COLL VENOUS BLD VENIPUNCTURE: CPT | Performed by: NURSE PRACTITIONER

## 2021-07-13 PROCEDURE — 84443 ASSAY THYROID STIM HORMONE: CPT | Performed by: NURSE PRACTITIONER

## 2021-07-13 PROCEDURE — 80048 BASIC METABOLIC PNL TOTAL CA: CPT | Performed by: NURSE PRACTITIONER

## 2021-07-13 PROCEDURE — 82607 VITAMIN B-12: CPT | Performed by: NURSE PRACTITIONER

## 2021-07-16 DIAGNOSIS — M25.552 HIP PAIN, LEFT: ICD-10-CM

## 2021-07-16 RX ORDER — NAPROXEN SODIUM 220 MG/1
TABLET, FILM COATED ORAL
Qty: 60 TABLET | Refills: 1 | Status: SHIPPED | OUTPATIENT
Start: 2021-07-16 | End: 2021-09-22

## 2021-07-26 DIAGNOSIS — E53.8 VITAMIN B12 DEFICIENCY (NON ANEMIC): ICD-10-CM

## 2021-07-27 ENCOUNTER — NURSING HOME VISIT (OUTPATIENT)
Dept: GERIATRICS | Facility: OTHER | Age: 86
End: 2021-07-27
Attending: NURSE PRACTITIONER
Payer: COMMERCIAL

## 2021-07-27 DIAGNOSIS — M16.0 OSTEOARTHRITIS OF BOTH HIPS, UNSPECIFIED OSTEOARTHRITIS TYPE: ICD-10-CM

## 2021-07-27 DIAGNOSIS — Z79.899 ENCOUNTER FOR MEDICATION REVIEW: ICD-10-CM

## 2021-07-27 DIAGNOSIS — F02.818 LATE ONSET ALZHEIMER'S DISEASE WITH BEHAVIORAL DISTURBANCE (H): Primary | ICD-10-CM

## 2021-07-27 DIAGNOSIS — G30.1 LATE ONSET ALZHEIMER'S DISEASE WITH BEHAVIORAL DISTURBANCE (H): Primary | ICD-10-CM

## 2021-07-27 DIAGNOSIS — M25.552 HIP PAIN, LEFT: ICD-10-CM

## 2021-07-27 RX ORDER — QUETIAPINE FUMARATE 25 MG/1
25 TABLET, FILM COATED ORAL 3 TIMES DAILY
Qty: 90 TABLET | Refills: 2 | Status: SHIPPED | OUTPATIENT
Start: 2021-07-27 | End: 2021-08-17

## 2021-07-27 RX ORDER — UREA 10 %
500 LOTION (ML) TOPICAL DAILY
Qty: 90 TABLET | Refills: 3 | Status: SHIPPED | OUTPATIENT
Start: 2021-07-27 | End: 2022-05-25

## 2021-07-27 NOTE — TELEPHONE ENCOUNTER
"[id number] sent Rx request for the following:   cyanocobalamin (VITAMIN B-12) 500 MCG tablet  Sig TAKE 1 TABLET (500 MCG) BY MOUTH DAILY    Last Prescription Date:   07/27/2020  Last Fill Qty/Refills:         90, R-3    Last Office Visit:              07/06/2021 (Drea)   Future Office visit:           [facility] visit today with Lilly Shepard NP   Vitamin Supplements (Adult) Protocol Passed - 7/26/2021 10:05 AM        Passed - High dose Vitamin D not ordered        Passed - Recent (12 mo) or future (30 days) visit within the authorizing provider's specialty     Patient has had an office visit with the authorizing provider or a provider within the authorizing providers department within the previous 12 mos or has a future within next 30 days. See \"Patient Info\" tab in inbasket, or \"Choose Columns\" in Meds & Orders section of the refill encounter.              Passed - Medication is active on med list         Prescription approved per [facility] Refill Protocol.  Sara Quiñonez RN ....................  7/27/2021   3:53 PM      "

## 2021-07-28 NOTE — PROGRESS NOTES
Juan Miguel Delaney is a 89 year old male being seen today for comprehensive and follow up visit visit at [facility].    Code Status: DNR / DNI.   Health Care Power of : Extended Emergency Contact Information  Primary Emergency Contact: Criss Delaney   [facility]  Home Phone: [phone]  Relation: Spouse  Secondary Emergency Contact: Kavita Hassan   [facility]  Home Phone: [phone]  Mobile Phone: [phone]  Relation: Daughter     Allergies: Penicillins, Celecoxib, Ibuprofen, Lisinopril, Naprosyn [naproxen], and Rofecoxib     Chief Complaint / HPI: Follow-up on medication adjustment--recently reduced Seroquel 75 mg 3 times daily down to 50 mg 3 times daily  That was started a few months ago for anxious agitative and restless behaviors that were difficult and out-of-control requiring hospitalization.  Was discharged back to assisted living home on 75 mg of Seroquel 3 times daily.  Staff report has tolerated the 50 mg 3 times daily well--no agitative or out-of-control behaviors--overall resident has been pleasant, cooperative  And redirectable.   Most recently was also having issues with left hip pain--- also has a history of SI joint pain on the left side--he had an image guided left SI joint injection  And felt was somewhat effective--however pain returned within a couple of weeks.   Last January had image guided left trochanteric injection which was effective until after recent hospitalization, approximately 5-6 months.    Spoke with Chandrika, daughter who is most involved and POA about options--Chandrika would like to avoid clinic visits as much as possible  For concerns that being out of familiar home in a routine may trigger more agitative behaviors.  Chandrika has also felt that there have been no agitative behaviors with Seroquel dose reduction.  For the left hip pain, opted to try Aleve with food scheduled twice a day for couple of weeks with follow-up--- if this was not effective, would try PT  evaluation and therapy as this was helpful previously.   Met with patient and his wife in their apartment today--patient reports is continuing to have left hip pain points to his trochanter and posterior hip near SI joint area as well.  Left hip pain-appears more diffuse.  Patient felt Aleve has been helpful--walking to and from the dining room and in his apartment has not been an issue  However when tries to take a walk throughout the entire facility for exercise left hip will become bothersome towards the end of his walk.        Past Medical, Surgical, Family and Social History reviewed: YES.     Medications: reviewed, reconciled  Medications - recent changes: reduction Seroquel dose from 75 mg po TID to 50 mg po TID  Aleve with food BID    Review of Systems:  ENT: positive for hearing loss  Musculoskeletal: positive for back pain, arthritis and joint pain  Psychiatric: positive for dementia, anxiety  All other systems negative    Toileting:    Continent of Bowel: Yes   Continent of Bladder: Yes  Mobility: walker    Recent Labs: most recent reviewed      Current Therapies: none    Exam:  Vital signs reviewed.   GENERAL APPEARANCE:  alert and no distress, hard of hearing  EYES: Eyes grossly normal to inspection,  conjunctivae and sclerae normal  RESP: lungs clear   CV: regular rate, no peripheral edema   MS: no musculoskeletal defects are noted and gait is age appropriate without ataxia, reproducible palpable left hip trochanteric pain radiating posteriorly  SKIN: warm and dry  PSYCH: Alert, talkative, cooperative    Assessment and Plan:    Chronic intermittent--currently persistent left hip pain--differentials include trochanteric bursitis, SI joint arthritis  Recent SI joint injection did not provide lasting relief beyond a couple of weeks.  Has had lasting relief in the past with trochanteric injection.  Aleve has been helpful in reducing pain--however pain persists and is limiting some daily walking  exercise.    Patient is agreeable with trying physical therapy.  Had discussion with Chandrika, daughter and POA and she is agreeable with trying physical therapy.  Previous therapist Isai who knows and has worked with patient in the past Will evaluate further for determining focal pain site--- Chandrika is agreeable with trying another image guided injection if physical therapy  Is not able to accomplish pain relief and improve exercise tolerance.  Will [place] back with Chandrika after physical therapy evaluation and recommendations in a couple weeks. Juan Miguel is agreeable with trying PT again.    Spoke with director of nursing and Chandrika about reducing Seroquel further--both are agreeable  Will reduce from 50 to 25 mg 3 times daily--will continue once daily 25 mg PRN dose availability.  We will follow-up in a few weeks on medication reduction effectiveness, toleration and behaviors assessment.    Recheck renal function lab in 2 months  Staff will continue to monitor for any GI symptoms.      (G30.1,  F02.81) Late onset Alzheimer's disease with behavioral disturbance (H)  (primary encounter diagnosis)  Plan: QUEtiapine (SEROQUEL) 25 MG tablet            (M25.552) Hip pain, left      (M16.0) Osteoarthritis of both hips, unspecified osteoarthritis type      (Z79.899) Encounter for medication review

## 2021-07-29 ENCOUNTER — TRANSFERRED RECORDS (OUTPATIENT)
Dept: HEALTH INFORMATION MANAGEMENT | Facility: OTHER | Age: 86
End: 2021-07-29

## 2021-08-04 ENCOUNTER — TRANSFERRED RECORDS (OUTPATIENT)
Dept: HEALTH INFORMATION MANAGEMENT | Facility: OTHER | Age: 86
End: 2021-08-04

## 2021-08-10 ENCOUNTER — TRANSFERRED RECORDS (OUTPATIENT)
Dept: HEALTH INFORMATION MANAGEMENT | Facility: OTHER | Age: 86
End: 2021-08-10

## 2021-08-14 ENCOUNTER — HEALTH MAINTENANCE LETTER (OUTPATIENT)
Age: 86
End: 2021-08-14

## 2021-08-17 ENCOUNTER — NURSING HOME VISIT (OUTPATIENT)
Dept: GERIATRICS | Facility: OTHER | Age: 86
End: 2021-08-17
Attending: NURSE PRACTITIONER
Payer: COMMERCIAL

## 2021-08-17 DIAGNOSIS — Z79.899 ENCOUNTER FOR MEDICATION REVIEW: ICD-10-CM

## 2021-08-17 DIAGNOSIS — M16.12 PRIMARY OSTEOARTHRITIS OF LEFT HIP: Primary | ICD-10-CM

## 2021-08-17 DIAGNOSIS — M25.552 HIP PAIN, LEFT: ICD-10-CM

## 2021-08-17 DIAGNOSIS — F02.818 LATE ONSET ALZHEIMER'S DISEASE WITH BEHAVIORAL DISTURBANCE (H): ICD-10-CM

## 2021-08-17 DIAGNOSIS — G30.1 LATE ONSET ALZHEIMER'S DISEASE WITH BEHAVIORAL DISTURBANCE (H): ICD-10-CM

## 2021-08-17 RX ORDER — QUETIAPINE FUMARATE 25 MG/1
25 TABLET, FILM COATED ORAL 2 TIMES DAILY
Qty: 60 TABLET | Refills: 2 | Status: SHIPPED | OUTPATIENT
Start: 2021-08-17 | End: 2021-08-19

## 2021-08-17 NOTE — PROGRESS NOTES
Juan Miguel Delaney is a 89 year old male being seen today for comprehensive and follow up visit visit at [facility].    Code Status: Advance Directives: YES-.   Health Care Power of : Extended Emergency Contact Information  Primary Emergency Contact: Criss Delaney   [facility]  Home Phone: [phone]  Relation: Spouse  Secondary Emergency Contact: Sonya, Kavita   [facility]  Home Phone: [phone]  Mobile Phone: [phone]  Relation: Daughter     Allergies: Penicillins, Celecoxib, Ibuprofen, Lisinopril, Naprosyn [naproxen], and Rofecoxib     Chief Complaint / HPI: Follow-up on left hip and SI joint pain--- has been working with physical therapy and utilizing Aleve with food twice daily  I did speak with the physical therapist who has been working with Juan Miguel on adjusting height of walker and posture as he felt that this was contributory to exacerbating the hip pain  And working on exercises.  Juan Miguel had a hip image guided injection in January which was effective for about 5 months.  Had SI joint injection after discharge from the hospital however this was not effective.  Was hospitalized for dementia for about 10 days, agitation exacerbated with hospitalization of his wife, was discharged on Seroquel 75 mg 3 times daily and as needed doses.  Have been titrating Seroquel dose down currently at 25 mg 3 times daily.    Called Chandrika daughter to discuss medications and options.  Recommend trying reduction of Seroquel to twice daily and keep additional daily as needed dose.  Staff have not utilized as needed Seroquel since returning from hospital--Juan Miguel has been stable as his wife has been stable healthwise and there have not been any exacerbations to trigger any further episodes.    Chandrika is agreeable with reducing Seroquel to twice daily and keeping as needed dose as a backup--if this is needed would return to 3 times daily.  Focuses on quality of life, comfort measures reducing distress and  moderating anxiety.    Regarding hip pain, Chandrika is agreeable with trying an intertrochanteric injection.  We discussed risks and benefits with NSAIDs--Juan Miguel does not have any GI or GI bleeding history and Chandrika feels benefits of NSAIDs outweigh discontinuing as this does provide some relief, does have CKD III, has been stable.  Staff report Juan Miguel has been exhibiting some nonverbal signs of discomfort especially sitting down or getting out of a chair favoring his hip. Will monitor Renal function closely. Juan Miguel is not interested in topicals.    Past Medical, Surgical, Family and Social History reviewed: YES.     Medications: reviewed and reconciled  Medications - recent changes: reduction seroquel    Review of Systems:  General: positive for weakness  : positive for CKD  Musculoskeletal: positive for arthritis, joint pain, muscle weakness  Psychiatric: positive for anxiety, agitation and memory loss  All other systems negative    Toileting:    Continent of Bowel: Yes   Continent of Bladder: Yes  Mobility: walker    Recent Labs: reviewed labs done at facility 7/13/21      Current Therapies: physical therapy    Exam:  Vital signs reviewed.   GENERAL APPEARANCE:  alert and no distress  EYES: Eyes grossly normal to inspection,conjunctivae and sclerae normal  RESP: lungs clear   MS: no musculoskeletal defects are noted and gait is walker dependent without ataxia, reproducible trochanteric pain pain left hip radiates to SI  SKIN: warm and dry  PSYCH: Alert, pleasant, oriented to self and place, short term memory difficulties--needed to repeat and cue frequently--mood pleasant    Assessment and Plan:    20-minute phone call with Chandrika to discuss medications, therapeutic options risks and benefits  Chandrika is agreeable with trying hip injection--order placed --we will trial, continue PT until ready to discharge  After hip injection we will follow-up--if effective we will try reducing Aleve to as needed, monitor renal  function  Staff directed to monitor for any GI symptoms    If injection fails we discussed returning to [facility] scheduled with food depending on renal function stability and toleration--would start Pepcid or omeprazole  for GI support.      We will trial Seroquel reduction from 3 times daily to twice daily 25 mg--we will keep 25 mg daily as needed dose as a safety net at this time  For agitation--- [place] back in a month on medications and effectiveness--sooner if needed and will adjust PRN    Over 30 minutes spent in chart review, medication adjustments, care coordination with family and staff and followup treatment planning    Addendum: received call from JOSEPH with report resident had episode of anxiety, crying--reported flashback from  history  Possible breakthrough anxiety with recent dose decrease--will resume 25 mg TID.    (M16.12) Primary osteoarthritis of left hip  (primary encounter diagnosis)    Plan: XR Joint Injection Major Left            (M25.552) Hip pain, left    Plan: XR Joint Injection Major Left            (G30.1,  F02.81) Late onset Alzheimer's disease with behavioral disturbance (H)  Plan: QUEtiapine (SEROQUEL) 25 MG tablet            (Z79.899) Encounter for medication review

## 2021-08-19 ENCOUNTER — APPOINTMENT (OUTPATIENT)
Dept: GENERAL RADIOLOGY | Facility: OTHER | Age: 86
End: 2021-08-19
Attending: PHYSICIAN ASSISTANT
Payer: MEDICARE

## 2021-08-19 ENCOUNTER — HOSPITAL ENCOUNTER (EMERGENCY)
Facility: OTHER | Age: 86
Discharge: HOME | End: 2021-08-20
Attending: PHYSICIAN ASSISTANT | Admitting: PHYSICIAN ASSISTANT
Payer: MEDICARE

## 2021-08-19 VITALS
RESPIRATION RATE: 20 BRPM | DIASTOLIC BLOOD PRESSURE: 90 MMHG | OXYGEN SATURATION: 96 % | SYSTOLIC BLOOD PRESSURE: 148 MMHG | WEIGHT: 193 LBS | BODY MASS INDEX: 30.29 KG/M2 | HEIGHT: 67 IN | TEMPERATURE: 97.6 F | HEART RATE: 70 BPM

## 2021-08-19 DIAGNOSIS — R07.9 CHEST PAIN: ICD-10-CM

## 2021-08-19 LAB
ALBUMIN SERPL-MCNC: 4.1 G/DL (ref 3.5–5.7)
ALP SERPL-CCNC: 48 U/L (ref 34–104)
ALT SERPL W P-5'-P-CCNC: 9 U/L (ref 7–52)
ANION GAP SERPL CALCULATED.3IONS-SCNC: 8 MMOL/L (ref 3–14)
AST SERPL W P-5'-P-CCNC: 12 U/L (ref 13–39)
BASOPHILS # BLD AUTO: 0.1 10E3/UL (ref 0–0.2)
BASOPHILS NFR BLD AUTO: 1 %
BILIRUB SERPL-MCNC: 0.5 MG/DL (ref 0.3–1)
BUN SERPL-MCNC: 24 MG/DL (ref 7–25)
CALCIUM SERPL-MCNC: 9.4 MG/DL (ref 8.6–10.3)
CHLORIDE BLD-SCNC: 105 MMOL/L (ref 98–107)
CO2 SERPL-SCNC: 26 MMOL/L (ref 21–31)
CREAT SERPL-MCNC: 1.57 MG/DL (ref 0.7–1.3)
EOSINOPHIL # BLD AUTO: 0.1 10E3/UL (ref 0–0.7)
EOSINOPHIL NFR BLD AUTO: 2 %
ERYTHROCYTE [DISTWIDTH] IN BLOOD BY AUTOMATED COUNT: 13.2 % (ref 10–15)
GFR SERPL CREATININE-BSD FRML MDRD: 39 ML/MIN/1.73M2
GLUCOSE BLD-MCNC: 122 MG/DL (ref 70–105)
HCT VFR BLD AUTO: 44.8 % (ref 40–53)
HGB BLD-MCNC: 15.4 G/DL (ref 13.3–17.7)
IMM GRANULOCYTES # BLD: 0 10E3/UL
IMM GRANULOCYTES NFR BLD: 1 %
LYMPHOCYTES # BLD AUTO: 1.1 10E3/UL (ref 0.8–5.3)
LYMPHOCYTES NFR BLD AUTO: 19 %
MCH RBC QN AUTO: 30.7 PG (ref 26.5–33)
MCHC RBC AUTO-ENTMCNC: 34.4 G/DL (ref 31.5–36.5)
MCV RBC AUTO: 89 FL (ref 78–100)
MONOCYTES # BLD AUTO: 0.7 10E3/UL (ref 0–1.3)
MONOCYTES NFR BLD AUTO: 11 %
NEUTROPHILS # BLD AUTO: 4.1 10E3/UL (ref 1.6–8.3)
NEUTROPHILS NFR BLD AUTO: 66 %
NRBC # BLD AUTO: 0 10E3/UL
NRBC BLD AUTO-RTO: 0 /100
NT-PROBNP SERPL-MCNC: 262 PG/ML (ref 0–100)
PLATELET # BLD AUTO: 192 10E3/UL (ref 150–450)
POTASSIUM BLD-SCNC: 4.4 MMOL/L (ref 3.5–5.1)
PROT SERPL-MCNC: 6.3 G/DL (ref 6.4–8.9)
RBC # BLD AUTO: 5.02 10E6/UL (ref 4.4–5.9)
SARS-COV-2 RNA RESP QL NAA+PROBE: NEGATIVE
SODIUM SERPL-SCNC: 139 MMOL/L (ref 134–144)
TROPONIN I SERPL-MCNC: 40.7 PG/ML (ref 0–34)
TROPONIN I SERPL-MCNC: 44.5 PG/ML (ref 0–34)
WBC # BLD AUTO: 6.1 10E3/UL (ref 4–11)

## 2021-08-19 PROCEDURE — 71045 X-RAY EXAM CHEST 1 VIEW: CPT | Mod: TC

## 2021-08-19 PROCEDURE — U0005 INFEC AGEN DETEC AMPLI PROBE: HCPCS | Performed by: PHYSICIAN ASSISTANT

## 2021-08-19 PROCEDURE — 93010 ELECTROCARDIOGRAM REPORT: CPT | Performed by: INTERNAL MEDICINE

## 2021-08-19 PROCEDURE — 250N000013 HC RX MED GY IP 250 OP 250 PS 637: Mod: GY | Performed by: PHYSICIAN ASSISTANT

## 2021-08-19 PROCEDURE — 85025 COMPLETE CBC W/AUTO DIFF WBC: CPT | Performed by: PHYSICIAN ASSISTANT

## 2021-08-19 PROCEDURE — 84484 ASSAY OF TROPONIN QUANT: CPT | Performed by: PHYSICIAN ASSISTANT

## 2021-08-19 PROCEDURE — 82040 ASSAY OF SERUM ALBUMIN: CPT | Performed by: PHYSICIAN ASSISTANT

## 2021-08-19 PROCEDURE — 93005 ELECTROCARDIOGRAM TRACING: CPT | Performed by: PHYSICIAN ASSISTANT

## 2021-08-19 PROCEDURE — 99283 EMERGENCY DEPT VISIT LOW MDM: CPT | Performed by: PHYSICIAN ASSISTANT

## 2021-08-19 PROCEDURE — 99285 EMERGENCY DEPT VISIT HI MDM: CPT | Mod: 25 | Performed by: PHYSICIAN ASSISTANT

## 2021-08-19 PROCEDURE — 36415 COLL VENOUS BLD VENIPUNCTURE: CPT | Performed by: PHYSICIAN ASSISTANT

## 2021-08-19 PROCEDURE — 83880 ASSAY OF NATRIURETIC PEPTIDE: CPT | Performed by: PHYSICIAN ASSISTANT

## 2021-08-19 RX ORDER — QUETIAPINE FUMARATE 25 MG/1
25 TABLET, FILM COATED ORAL 3 TIMES DAILY
Qty: 90 TABLET | Refills: 2 | Status: SHIPPED | OUTPATIENT
Start: 2021-08-19 | End: 2021-11-05

## 2021-08-19 RX ORDER — NITROGLYCERIN 0.4 MG/1
0.4 TABLET SUBLINGUAL EVERY 5 MIN PRN
Status: DISCONTINUED | OUTPATIENT
Start: 2021-08-19 | End: 2021-08-20 | Stop reason: HOSPADM

## 2021-08-19 RX ORDER — CLOPIDOGREL BISULFATE 75 MG/1
600 TABLET ORAL ONCE
Status: COMPLETED | OUTPATIENT
Start: 2021-08-19 | End: 2021-08-19

## 2021-08-19 RX ADMIN — CLOPIDOGREL BISULFATE 600 MG: 75 TABLET, FILM COATED ORAL at 22:36

## 2021-08-19 ASSESSMENT — MIFFLIN-ST. JEOR: SCORE: 1499.07

## 2021-08-20 PROCEDURE — 250N000011 HC RX IP 250 OP 636: Performed by: PHYSICIAN ASSISTANT

## 2021-08-20 PROCEDURE — 250N000013 HC RX MED GY IP 250 OP 250 PS 637: Performed by: PHYSICIAN ASSISTANT

## 2021-08-20 PROCEDURE — 96372 THER/PROPH/DIAG INJ SC/IM: CPT | Performed by: PHYSICIAN ASSISTANT

## 2021-08-20 RX ORDER — CLOPIDOGREL BISULFATE 75 MG/1
75 TABLET ORAL DAILY
Qty: 30 TABLET | Refills: 0 | Status: SHIPPED | OUTPATIENT
Start: 2021-08-20 | End: 2021-09-24

## 2021-08-20 RX ORDER — METOPROLOL TARTRATE 25 MG/1
25 TABLET, FILM COATED ORAL ONCE
Status: COMPLETED | OUTPATIENT
Start: 2021-08-20 | End: 2021-08-20

## 2021-08-20 RX ORDER — NITROGLYCERIN 0.4 MG/1
0.4 TABLET SUBLINGUAL EVERY 5 MIN PRN
Qty: 25 TABLET | Refills: 0 | Status: ON HOLD | OUTPATIENT
Start: 2021-08-20 | End: 2023-01-01

## 2021-08-20 RX ORDER — METOPROLOL TARTRATE 25 MG/1
25 TABLET, FILM COATED ORAL DAILY
Qty: 14 TABLET | Refills: 0 | Status: SHIPPED | OUTPATIENT
Start: 2021-08-20 | End: 2021-08-24

## 2021-08-20 RX ADMIN — ENOXAPARIN SODIUM 80 MG: 100 INJECTION SUBCUTANEOUS at 00:11

## 2021-08-20 RX ADMIN — METOPROLOL TARTRATE 25 MG: 25 TABLET, FILM COATED ORAL at 00:11

## 2021-08-20 ASSESSMENT — ENCOUNTER SYMPTOMS
SHORTNESS OF BREATH: 0
WOUND: 0
ADENOPATHY: 0
HEMATURIA: 0
FEVER: 0
CONFUSION: 0
BACK PAIN: 0
CHILLS: 0
ABDOMINAL PAIN: 0
BRUISES/BLEEDS EASILY: 0
CHEST TIGHTNESS: 0

## 2021-08-20 NOTE — ED NOTES
Pt ambulated to BR with minimal assist.  Denies SOB/CP after activity. Rocío Neely RN on 8/19/2021 at 9:58 PM

## 2021-08-20 NOTE — ED NOTES
"This RN was able to get in contact with staff at [facility].  The staff member who was involved in the incident had left for the evening.  The current staff member there was able to report patient was in his room at the time and reported \"severe\" chest pain at 2030 accompanied by SOB, unsure what patient was doing at the time of the episode.  Staff called the ambulance.  Symptoms completely resolved by 2100 when EMS arrived.  Family updated that patient brought to hospital.    "

## 2021-08-20 NOTE — ED TRIAGE NOTES
ED Nursing Triage Note (General)   ________________________________    Juan Miguel Delaney is a 89 year old Male that presents to triage ambulance  With history of  HTN, chest pain and SOB. Pt comes from [facility].  Pt does not remember having chest pain.  reported by EMS  Significant symptoms had onset unsure.  Pt states the staff nurse requested he come and be seen in the ER.  Had no symptoms by the time EMS got on scene.     Patient appears alert  and oriented slightly forgetful.  Poor historian, in no acute distress., and cooperative and calm behavior.    GCS:15  Airway: intact  Breathing noted as Normal  Circulation Normal  Skin:  Normal  Action taken:  Triage to critical care immediately      PRE HOSPITAL PRIOR LIVING SITUATION Spouse and Nursing Home

## 2021-08-20 NOTE — DISCHARGE INSTRUCTIONS
Get plenty of fluids and rest.  Take your medication as directed.  I did place a referral for you to follow-up with PCP and cardiology.  If you are having worsening or concerning symptoms please return the ED for further evaluation.  I also placed referral for you to obtain an echocardiogram.

## 2021-08-20 NOTE — ED PROVIDER NOTES
History     Chief Complaint   Patient presents with     Chest Pain     HPI  Juan Miguel Delaney is a 89 year old male who presents to the ED for evaluation of chest pain.  He does have a history of dementia which makes obtaining his HPI difficult.  He lives at [facility] with his wife.  He has significant history of hypertension, hyperlipidemia, CKD. It is reported by staff from [facility] that the patient was complaining of severe chest pain and sob and they called the ambulance. Upon arrival here, he first does not complain of any pain but then tells me he is having left upper chest/shoulder pain but then immediately afterwards he says he is having no pain whatsoever. No n/v, fever, cough.     Allergies:  Allergies   Allergen Reactions     Penicillins Hives     Celecoxib Rash     Ibuprofen Rash     All NSAIDS cause rash     Lisinopril Other (See Comments)     Dry throat     Naprosyn [Naproxen] Other (See Comments)     Epistaxis--resolved when DC'd     Rofecoxib Hives     Vioxx       Problem List:    Patient Active Problem List    Diagnosis Date Noted     Late onset Alzheimer's disease with behavioral disturbance (H) 04/19/2018     Priority: Medium     Trigger finger, left ring finger 02/23/2018     Priority: Medium     Mixed hyperlipidemia 02/08/2018     Priority: Medium     Neck pain, chronic 02/08/2018     Priority: Medium     Osteoarthrosis 02/08/2018     Priority: Medium     Chronic left sacroiliac pain 10/19/2017     Priority: Medium     Chronic insomnia 08/08/2017     Priority: Medium     S/P lumbar laminectomy 05/29/2015     Priority: Medium     Stage 3b chronic kidney disease 05/25/2015     Priority: Medium     Spinal stenosis of lumbar region with neurogenic claudication 04/24/2015     Priority: Medium     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 11/09/2012     Priority: Medium     Benign essential hypertension 10/24/2011     Priority: Medium        Past Medical History:    Past Medical History:    Diagnosis Date     Actinic keratosis      Chronic kidney disease, stage III (moderate)      Elevated erythrocyte sedimentation rate      Essential (primary) hypertension      Family history of malignant neoplasm of prostate      Hyperlipidemia      Osteoarthritis      Pneumonia      Sciatica        Past Surgical History:    Past Surgical History:   Procedure Laterality Date     ARTHROPLASTY KNEE      2006     ARTHROPLASTY KNEE      2008     ARTHROSCOPY SHOULDER      1996,left     ARTHROSCOPY SHOULDER      2001,AC arthrosis & distal clavicle resection     ARTHROSCOPY SHOULDER      2011     COLONOSCOPY      2008,normal     CYSTOSCOPY, TRANSURETHRAL RESECTION (TUR) PROSTATE, COMBINED      No Comments Provided     LAMINECTOMY LUMBAR ONE LEVEL      2015,[place], spinal stenosis     OTHER SURGICAL HISTORY      2004,[id number],SCAN-STRESS TEST,negative to heart rate 138     OTHER SURGICAL HISTORY      2013,600000,OTHER,Right 5th digit     RELEASE CARPAL TUNNEL      bilateral       Family History:    Family History   Problem Relation Age of Onset     Prostate Cancer Father         Cancer-prostate     Hypertension Mother         Hypertension     Other - See Comments Mother         Old age, 99     Prostate Cancer Brother         Cancer-prostate     Unknown/Adopted Brother         Unknown       Social History:  Marital Status:   [2]  Social History     Tobacco Use     Smoking status: Never Smoker     Smokeless tobacco: Never Used   Substance Use Topics     Alcohol use: No     Alcohol/week: 0.0 standard drinks     Drug use: No        Medications:    amLODIPine (NORVASC) 5 MG tablet  clopidogrel (PLAVIX) 75 MG tablet  donepezil (ARICEPT) 10 MG ODT  metoprolol tartrate (LOPRESSOR) 25 MG tablet  nitroGLYcerin (NITROSTAT) 0.4 MG sublingual tablet  QUEtiapine (SEROQUEL) 25 MG tablet  QUEtiapine (SEROQUEL) 25 MG tablet  SM NAPROXEN SODIUM 220 MG tablet  cyanocobalamin (VITAMIN B-12) 500 MCG tablet  SM PAIN RELIEVER EX  MG  "tablet          Review of Systems   Constitutional: Negative for chills and fever.   HENT: Negative for congestion.    Eyes: Negative for visual disturbance.   Respiratory: Negative for chest tightness and shortness of breath.    Cardiovascular: Positive for chest pain.   Gastrointestinal: Negative for abdominal pain.   Genitourinary: Negative for hematuria.   Musculoskeletal: Negative for back pain.   Skin: Negative for rash and wound.   Neurological: Negative for syncope.   Hematological: Negative for adenopathy. Does not bruise/bleed easily.   Psychiatric/Behavioral: Negative for confusion.       Physical Exam   BP: (!) 168/87  Pulse: 84  Temp: 97.6  F (36.4  C)  Resp: 20  Height: 170.2 cm (5' 7\")  Weight: 87.5 kg (193 lb)  SpO2: 98 %      Physical Exam  Constitutional:       General: He is not in acute distress.     Appearance: He is well-developed. He is not diaphoretic.   HENT:      Head: Normocephalic and atraumatic.   Eyes:      General: No scleral icterus.     Conjunctiva/sclera: Conjunctivae normal.   Cardiovascular:      Rate and Rhythm: Normal rate and regular rhythm.   Pulmonary:      Effort: Pulmonary effort is normal.      Breath sounds: Normal breath sounds.   Abdominal:      Palpations: Abdomen is soft.      Tenderness: There is no abdominal tenderness.   Musculoskeletal:         General: No deformity.      Cervical back: Neck supple.   Lymphadenopathy:      Cervical: No cervical adenopathy.   Skin:     General: Skin is warm and dry.      Coloration: Skin is not jaundiced.      Findings: No rash.   Neurological:      Mental Status: He is alert and oriented to person, place, and time. Mental status is at baseline.   Psychiatric:         Mood and Affect: Mood normal.         Behavior: Behavior normal.         ED Course        Procedures         EKG read at 2139. HR 83, sinus rhythm with marked sinus arrhythmia, no ST changes.    Critical Care time:  none               Results for orders placed or " performed during the hospital encounter of 08/19/21 (from the past 24 hour(s))   CBC with platelets differential    Narrative    The following orders were created for panel order CBC with platelets differential.  Procedure                               Abnormality         Status                     ---------                               -----------         ------                     CBC with platelets and d...[[id number]]                      Final result                 Please view results for these tests on the individual orders.   Comprehensive metabolic panel   Result Value Ref Range    Sodium 139 134 - 144 mmol/L    Potassium 4.4 3.5 - 5.1 mmol/L    Chloride 105 98 - 107 mmol/L    Carbon Dioxide (CO2) 26 21 - 31 mmol/L    Anion Gap 8 3 - 14 mmol/L    Urea Nitrogen 24 7 - 25 mg/dL    Creatinine 1.57 (H) 0.70 - 1.30 mg/dL    Calcium 9.4 8.6 - 10.3 mg/dL    Glucose 122 (H) 70 - 105 mg/dL    Alkaline Phosphatase 48 34 - 104 U/L    AST 12 (L) 13 - 39 U/L    ALT 9 7 - 52 U/L    Protein Total 6.3 (L) 6.4 - 8.9 g/dL    Albumin 4.1 3.5 - 5.7 g/dL    Bilirubin Total 0.5 0.3 - 1.0 mg/dL    GFR Estimate 39 (L) >60 mL/min/1.73m2   Troponin I   Result Value Ref Range    Troponin I 44.5 (H) 0.0 - 34.0 pg/mL   Nt probnp inpatient (BNP)   Result Value Ref Range    N terminal Pro BNP Inpatient 262 (H) 0 - 100 pg/mL   CBC with platelets and differential   Result Value Ref Range    WBC Count 6.1 4.0 - 11.0 10e3/uL    RBC Count 5.02 4.40 - 5.90 10e6/uL    Hemoglobin 15.4 13.3 - 17.7 g/dL    Hematocrit 44.8 40.0 - 53.0 %    MCV 89 78 - 100 fL    MCH 30.7 26.5 - 33.0 pg    MCHC 34.4 31.5 - 36.5 g/dL    RDW 13.2 10.0 - 15.0 %    Platelet Count 192 150 - 450 10e3/uL    % Neutrophils 66 %    % Lymphocytes 19 %    % Monocytes 11 %    % Eosinophils 2 %    % Basophils 1 %    % Immature Granulocytes 1 %    NRBCs per 100 WBC 0 <1 /100    Absolute Neutrophils 4.1 1.6 - 8.3 10e3/uL    Absolute Lymphocytes 1.1 0.8 - 5.3 10e3/uL    Absolute  Monocytes 0.7 0.0 - 1.3 10e3/uL    Absolute Eosinophils 0.1 0.0 - 0.7 10e3/uL    Absolute Basophils 0.1 0.0 - 0.2 10e3/uL    Absolute Immature Granulocytes 0.0 <=0.0 10e3/uL    Absolute NRBCs 0.0 10e3/uL   XR Chest Port 1 View    Narrative    PROCEDURE INFORMATION:   Exam: XR Chest   Exam date and time: 8/19/2021 10:04 PM   Age: 89 years old   Clinical indication: Chest pressure; Patient HX: With history of HTN, chest   pain and SOB. PT does not remember having chest pain. Reported by EMS.   Significant symptoms onset unsure. PT states the staff nurse requested he come   and be seen in the er. Had no symptoms by the time EMS got on scene.     TECHNIQUE:   Imaging protocol: XR of the chest.   Views: 1 view.     COMPARISON:   CR XR CHEST 2 VW 1/22/2019 1:03 AM     FINDINGS:   Lungs: No consolidative infiltrate.   Pleural spaces: No pleural effusion or pneumothorax.   Heart/Mediastinum: Heart size upper limits normal to mildly enlarged, size   overestimated on AP view.   Bones/joints: No acute abnormality.   Radiograph appears slightly different than the comparison exam as this is an   expiratory AP study compared to good inspiration on prior PA exam.       Impression    IMPRESSION:   No radiographic sign of acute process.     THIS DOCUMENT HAS BEEN ELECTRONICALLY SIGNED BY PURVI BROWN MD   Asymptomatic COVID-19 Virus (Coronavirus) by PCR Nasopharyngeal    Specimen: Nasopharyngeal; Swab   Result Value Ref Range    SARS CoV2 PCR Negative Negative    Narrative    Testing was performed using the Xpert Xpress SARS-CoV-2 Assay on the   Cepheid Gene-Xpert Instrument Systems. Additional information about   this Emergency Use Authorization (EUA) assay can be found via the Lab   Guide. This test should be ordered for the detection of SARS-CoV-2 in   individuals who meet SARS-CoV-2 clinical and/or epidemiological   criteria. Test performance is unknown in asymptomatic patients. This   test is for in vitro diagnostic use  under the FDA EUA for   laboratories certified under CLIA to perform high complexity testing.   This test has not been FDA cleared or approved. A negative result   does not rule out the presence of PCR inhibitors in the specimen or   target RNA in concentration below the limit of detection for the   assay. The possibility of a false negative should be considered if   the patient's recent exposure or clinical presentation suggests   COVID-19. This test was validated by [facility]. This laboratory is certified under the Clinic  al Laboratory Improvement Amendments (CLIA) as qualified to perform high complex  ity clinical laboratory testing.   Troponin I (second draw)   Result Value Ref Range    Troponin I 40.7 (H) 0.0 - 34.0 pg/mL       Medications   nitroGLYcerin (NITROSTAT) sublingual tablet 0.4 mg (has no administration in time range)   clopidogrel (PLAVIX) tablet 600 mg (600 mg Oral Given 8/19/21 2236)   enoxaparin ANTICOAGULANT (LOVENOX) injection 80 mg (80 mg Subcutaneous Given 8/20/21 0011)   metoprolol tartrate (LOPRESSOR) tablet 25 mg (25 mg Oral Given 8/20/21 0011)       Assessments & Plan (with Medical Decision Making)   Pt nontoxic in NAD. Heart, lung, bowel sounds normal, abd soft, nontender to palpation, nondistended. VSS, afebrile.     He does have an elevated troponin 44.5.  A repeat troponin study did decrease to 40.7.    There is concern for possible NSTEMI.  I did talk with Dr. Warner, cardiologist at [facility] in [place].  Is felt with the patient being 89-year-old dementia patient with no current chest pain that he would not be a candidate at this time to go to the cath lab.  Every tertiary center in the region is on divert.  The patient does not wish to be in the hospital any longer.  It is recommended by Dr. Warner to give the patient a dose of lovenox, start him on a beta-blocker and Plavix as well as nitro as needed.  I will place  referral for him to obtain an outpatient echocardiogram as well as follow-up with PCP/cardiology for reassessment.  The patient is encouraged to return to the ED if he is having any worsening or concerning symptoms.  I also spoke face-to-face with patient's daughter, Kavita remainder of the work-up and the situation.  She voices understanding and is in agreement with the current plan and the patient is discharged.    Tarun Bradford PA-C    I have reviewed the nursing notes.    I have reviewed the findings, diagnosis, plan and need for follow up with the patient.       New Prescriptions    CLOPIDOGREL (PLAVIX) 75 MG TABLET    Take 1 tablet (75 mg) by mouth daily    METOPROLOL TARTRATE (LOPRESSOR) 25 MG TABLET    Take 1 tablet (25 mg) by mouth daily for 14 days    NITROGLYCERIN (NITROSTAT) 0.4 MG SUBLINGUAL TABLET    Place 1 tablet (0.4 mg) under the tongue every 5 minutes as needed for chest pain (CALL 911 IF NOT IMPROVED AFTER THREE CONSECUTIVE DOSES)       Final diagnoses:   Chest pain       8/19/2021   [facility]     Tarun Bradford PA  08/20/21 0026

## 2021-08-23 LAB
ATRIAL RATE - MUSE: 83 BPM
DIASTOLIC BLOOD PRESSURE - MUSE: NORMAL MMHG
INTERPRETATION ECG - MUSE: NORMAL
P AXIS - MUSE: 50 DEGREES
PR INTERVAL - MUSE: 176 MS
QRS DURATION - MUSE: 72 MS
QT - MUSE: 386 MS
QTC - MUSE: 453 MS
R AXIS - MUSE: 9 DEGREES
SYSTOLIC BLOOD PRESSURE - MUSE: NORMAL MMHG
T AXIS - MUSE: 125 DEGREES
VENTRICULAR RATE- MUSE: 83 BPM

## 2021-08-24 ENCOUNTER — NURSING HOME VISIT (OUTPATIENT)
Dept: GERIATRICS | Facility: OTHER | Age: 86
End: 2021-08-24
Attending: NURSE PRACTITIONER
Payer: COMMERCIAL

## 2021-08-24 VITALS — SYSTOLIC BLOOD PRESSURE: 118 MMHG | DIASTOLIC BLOOD PRESSURE: 60 MMHG | HEART RATE: 46 BPM | OXYGEN SATURATION: 96 %

## 2021-08-24 DIAGNOSIS — G30.9 ALZHEIMER'S DEMENTIA WITH BEHAVIORAL DISTURBANCE, UNSPECIFIED TIMING OF DEMENTIA ONSET: ICD-10-CM

## 2021-08-24 DIAGNOSIS — R00.1 BRADYCARDIA: ICD-10-CM

## 2021-08-24 DIAGNOSIS — Z79.899 ENCOUNTER FOR MEDICATION REVIEW: ICD-10-CM

## 2021-08-24 DIAGNOSIS — I51.89 GRADE II DIASTOLIC DYSFUNCTION: ICD-10-CM

## 2021-08-24 DIAGNOSIS — M25.552 HIP PAIN, LEFT: ICD-10-CM

## 2021-08-24 DIAGNOSIS — Z87.898 HISTORY OF CHEST PAIN: Primary | ICD-10-CM

## 2021-08-24 DIAGNOSIS — I05.0 MODERATE MITRAL STENOSIS BY PRIOR ECHOCARDIOGRAM: ICD-10-CM

## 2021-08-24 DIAGNOSIS — F02.81 ALZHEIMER'S DEMENTIA WITH BEHAVIORAL DISTURBANCE, UNSPECIFIED TIMING OF DEMENTIA ONSET: ICD-10-CM

## 2021-08-24 RX ORDER — METOPROLOL TARTRATE 25 MG/1
12.5 TABLET, FILM COATED ORAL DAILY
Qty: 14 TABLET | Refills: 0 | Status: SHIPPED | OUTPATIENT
Start: 2021-08-24 | End: 2021-09-10

## 2021-08-25 ENCOUNTER — TELEPHONE (OUTPATIENT)
Dept: FAMILY MEDICINE | Facility: OTHER | Age: 86
End: 2021-08-25

## 2021-08-25 NOTE — TELEPHONE ENCOUNTER
Echo scheduled for patient tomorrow for ED followup.  Patient is not and has not been symptomatic since ED visit. Discussed with primary Dr Fernando and patient with age, medical history, DNR/DNI status and dementia   No further diagnostic followup recommended at this time.  Chandrika Hassan daughter is completely agreeable--does not want any invasive procedure and patient would not tolerate being in the hospital due to severe dementia.  Chandrika is comfortable with medical management, which he is currently receiving.    ECHO cancelled.    Lilly Shepard, APRN CNP   August 25, 2021

## 2021-08-25 NOTE — PROGRESS NOTES
Juan Miguel Delaney is a 89 year old male being seen today for comprehensive and follow up visit visit at [facility].    Code Status: DNR / DNI.   Health Care Power of : Extended Emergency Contact Information  Primary Emergency Contact: Criss Delaney   [facility]  Home Phone: [phone]  Relation: Spouse  Secondary Emergency Contact: Kavita Hassan   [facility]  Home Phone: [phone]  Mobile Phone: [phone]  Relation: Daughter     Allergies: Penicillins, Celecoxib, Ibuprofen, Lisinopril, Naprosyn [naproxen], and Rofecoxib     Chief Complaint / HPI: Follow-up on recent hospitalization in ED for episode of left-sided chest pain--- ED notes and diagnostics reviewed.  Positive for elevated troponin, follow-up troponin elevated however not trending up.  Resident has dementia with memory loss--- significant history of agitation restlessness, was hospitalized for 10 days due to difficult to control behaviors when his wife was discharged to a skilled nursing facility after complications from surgery.  Was discharged from the hospital on 75 mg of Seroquel three times a day and as needed--recently gently adjusted down to 25 mg 3 times daily--- tried 25 mg twice daily with as needed dose  However staff had reported some breakthrough restlessness and agitation--currently on Seroquel 25 mg 3 times daily with 25 mg as needed,  PRN dose has not needed to be used recently.  Significant history of chest pain 2019, echocardiogram reviewed showing moderate mitral calcification and grade 2 diastolic dysfunction.  Patient was discharged from ED started on Plavix and beta-blocker, PRN NTG and ECHO scheduled.  Has had chronic issues with bradycardia, had placed Zio patch year ago however resident became agitated and confused and took Zio patch off about 36 hours after placed which showed overall sinus bradycardia.    No chest pain that Juan Miguel remembers since discharge wife verifies he has not complained of chest  pain, denies any dyspnea.  Reports his main issue is left hip pain, has been working with physical therapy and has a hip injection scheduled first week in September.    Called his daughter Chandrika to discuss ED visit, review findings, Chandrika, daughter who is healthcare POA that both parents are DNI DNR  And does not want any aggressive procedures, including surgeries and would like to avoid hospitalizations if at all possible  As Juan Miguel becomes extremely anxious and agitated at the hospital--- Chandrika reports Juan Miguel became anxious and restless during ED visit to go home.  Daughter goals for care are comfort, quality of life, not interested in any measures or preventive interventions to sustain longer life span.            Past Medical, Surgical, Family and Social History reviewed: YES.     Medications: Reviewed  Medications - recent changes: Plavix    Review of Systems:  General: positive for weakness  Musculoskeletal: positive for arthritis and joint pain  Psychiatric: positive for anxiety and dementia  All other systems negative    Toileting:    Continent of Bowel: Yes   Continent of Bladder: Yes  Mobility: walker    Recent Labs: reviewed      Current Therapies: physical therapy    Exam:  Vital signs reviewed.   GENERAL APPEARANCE:  alert and no distress  EYES: Eyes grossly normal to inspection, conjunctivae and sclerae normal  NECK: no adenopathy, no asymmetry  RESP: lungs clear   CV: Bradycardia, no peripheral edema  MS: no musculoskeletal defects are noted and gait is walker dependent  SKIN: warm and dry  PSYCH: Alert, pleasantly confused    Assessment and Plan:    Recent ED episode for chest pain, probable angina--denies any subsequent episodes of chest pain--wife confirms.  Discharged on medical therapies for CAD.  Daughter is adamant that goals of care comfort and quality of life, avoid hospitalization and any aggressive interventions or surgeries.  Would like father to be as comfortable as possible and to remain in  his assisted living apartment for as long as possible.    Will discuss upcoming scheduled ECHO with PCP and follow-up with daughter on plan.  Daughter is not interested in any surgeries or stenting procedure--- Juan Miguel would not do well with any hospitalization or procedure and  Would need to be heavily sedated due to dementia with agitation and restlessness tendencies.        Blood pressure bradycardic at 47 today--- decrease beta-blocker to 12.5 mg with hold orders for less than 60  Encouraged patient and wife to notify staff with any chest pain onset for NTG use    Plan to Consider monitoring blood pressures daily, reducing amlodipine and if blood pressure stable would start low-dose Imdur.        (Z87.891) History of chest pain  (primary encounter diagnosis)  Plan: metoprolol tartrate (LOPRESSOR) 25 MG tablet            (Z79.89) Encounter for medication review      (G30.9,  F02.81) Alzheimer's dementia with behavioral disturbance, unspecified timing of dementia onset (H)      (M25.552) Hip pain, left      (R00.1) Bradycardia      (I51.9) Grade II diastolic dysfunction      (I05.0) Moderate mitral stenosis by prior echocardiogram

## 2021-08-26 PROBLEM — I51.89 GRADE II DIASTOLIC DYSFUNCTION: Status: ACTIVE | Noted: 2021-08-26

## 2021-09-01 ENCOUNTER — TELEPHONE (OUTPATIENT)
Dept: FAMILY MEDICINE | Facility: OTHER | Age: 86
End: 2021-09-01

## 2021-09-01 DIAGNOSIS — I51.89 GRADE II DIASTOLIC DYSFUNCTION: Primary | ICD-10-CM

## 2021-09-01 NOTE — TELEPHONE ENCOUNTER
Juan Miguel has been scheduled for a hip injection at 9/13 at 2pm, prior to that hip injection they need to hold his Plavix medication for 5-7 days prior to injection.    Elisa Motley on 9/1/2021 at 3:21 PM

## 2021-09-01 NOTE — TELEPHONE ENCOUNTER
Okay to hold Plavix 5 to 7 days prior to injection.    Electronically signed by:  Jason Fernando MD  on September 1, 2021 4:05 PM

## 2021-09-03 NOTE — TELEPHONE ENCOUNTER
[id number] of [place] sent Rx request for the following:      Requested Prescriptions   Pending Prescriptions Disp Refills     clopidogrel (PLAVIX) 75 MG tablet 30 tablet 0     Sig: Take 1 tablet (75 mg) by mouth daily       Plavix Passed - 9/1/2021  2:31 PM        Last Prescription Date:   8/20/21-9/19/21  Last Fill Qty/Refills:         30, R-0   Last Office Visit:              8/24/21  Future Office visit:           None  Routing refill request to provider for review/approval because:    Future plan for this medication is unclear. Colette Marcial RN on 9/3/2021 at 4:01 PM

## 2021-09-06 RX ORDER — CLOPIDOGREL BISULFATE 75 MG/1
75 TABLET ORAL DAILY
Qty: 30 TABLET | Refills: 0 | OUTPATIENT
Start: 2021-09-06

## 2021-09-07 DIAGNOSIS — I51.89 GRADE II DIASTOLIC DYSFUNCTION: Primary | ICD-10-CM

## 2021-09-09 DIAGNOSIS — Z87.898 HISTORY OF CHEST PAIN: ICD-10-CM

## 2021-09-10 RX ORDER — METOPROLOL TARTRATE 25 MG/1
12.5 TABLET, FILM COATED ORAL DAILY
Qty: 14 TABLET | Refills: 0 | Status: SHIPPED | OUTPATIENT
Start: 2021-09-10 | End: 2021-10-11

## 2021-09-10 RX ORDER — CLOPIDOGREL BISULFATE 75 MG/1
75 TABLET ORAL DAILY
Qty: 30 TABLET | Refills: 0 | OUTPATIENT
Start: 2021-09-10

## 2021-09-10 NOTE — TELEPHONE ENCOUNTER
[id number] sent Rx request for the following:      Requested Prescriptions   Pending Prescriptions Disp Refills     clopidogrel (PLAVIX) 75 MG tablet 30 tablet 0     Sig: Take 1 tablet (75 mg) by mouth daily       Plavix Passed - 9/10/2021  4:42 PM          Last Prescription Date:   8/20/21  Last Fill Qty/Refills:         30, R-0   Last Office Visit:               8/24/21 (Lilly Shepard- [facility])  Future Office visit:           None  Routing refill request to provider for review/approval because:    Medication has an end date for 9/19/21. MD has indicated that this medication will not be refilled as he should be on comfort care. Colette Marcial RN on 9/10/2021 at 4:49 PM

## 2021-09-13 ENCOUNTER — HOSPITAL ENCOUNTER (OUTPATIENT)
Dept: GENERAL RADIOLOGY | Facility: OTHER | Age: 86
Discharge: HOME | End: 2021-09-13
Attending: NURSE PRACTITIONER | Admitting: NURSE PRACTITIONER
Payer: MEDICARE

## 2021-09-13 DIAGNOSIS — M16.12 PRIMARY OSTEOARTHRITIS OF LEFT HIP: ICD-10-CM

## 2021-09-13 DIAGNOSIS — M25.552 HIP PAIN, LEFT: ICD-10-CM

## 2021-09-13 PROCEDURE — 250N000009 HC RX 250: Performed by: STUDENT IN AN ORGANIZED HEALTH CARE EDUCATION/TRAINING PROGRAM

## 2021-09-13 PROCEDURE — 250N000011 HC RX IP 250 OP 636: Performed by: STUDENT IN AN ORGANIZED HEALTH CARE EDUCATION/TRAINING PROGRAM

## 2021-09-13 PROCEDURE — 255N000002 HC RX 255 OP 636: Performed by: STUDENT IN AN ORGANIZED HEALTH CARE EDUCATION/TRAINING PROGRAM

## 2021-09-13 PROCEDURE — 20610 DRAIN/INJ JOINT/BURSA W/O US: CPT | Mod: LT

## 2021-09-13 RX ORDER — BUPIVACAINE HYDROCHLORIDE 5 MG/ML
3 INJECTION, SOLUTION EPIDURAL; INTRACAUDAL ONCE
Status: COMPLETED | OUTPATIENT
Start: 2021-09-13 | End: 2021-09-13

## 2021-09-13 RX ORDER — LIDOCAINE HYDROCHLORIDE 10 MG/ML
2 INJECTION, SOLUTION INFILTRATION; PERINEURAL ONCE
Status: COMPLETED | OUTPATIENT
Start: 2021-09-13 | End: 2021-09-13

## 2021-09-13 RX ORDER — TRIAMCINOLONE ACETONIDE 40 MG/ML
40 INJECTION, SUSPENSION INTRA-ARTICULAR; INTRAMUSCULAR ONCE
Status: COMPLETED | OUTPATIENT
Start: 2021-09-13 | End: 2021-09-13

## 2021-09-13 RX ADMIN — BUPIVACAINE HYDROCHLORIDE 3 ML: 5 INJECTION, SOLUTION EPIDURAL; INTRACAUDAL; PERINEURAL at 14:10

## 2021-09-13 RX ADMIN — IOHEXOL 2 ML: 240 INJECTION, SOLUTION INTRATHECAL; INTRAVASCULAR; INTRAVENOUS; ORAL at 14:11

## 2021-09-13 RX ADMIN — TRIAMCINOLONE ACETONIDE 40 MG: 40 INJECTION, SUSPENSION INTRA-ARTICULAR; INTRAMUSCULAR at 14:11

## 2021-09-13 RX ADMIN — LIDOCAINE HYDROCHLORIDE 2 ML: 10 INJECTION, SOLUTION INFILTRATION; PERINEURAL at 14:11

## 2021-09-14 ENCOUNTER — NURSING HOME VISIT (OUTPATIENT)
Dept: GERIATRICS | Facility: OTHER | Age: 86
End: 2021-09-14
Attending: NURSE PRACTITIONER
Payer: COMMERCIAL

## 2021-09-14 VITALS
TEMPERATURE: 98 F | SYSTOLIC BLOOD PRESSURE: 130 MMHG | BODY MASS INDEX: 30.23 KG/M2 | RESPIRATION RATE: 16 BRPM | HEART RATE: 70 BPM | DIASTOLIC BLOOD PRESSURE: 75 MMHG | OXYGEN SATURATION: 95 % | WEIGHT: 193 LBS

## 2021-09-14 DIAGNOSIS — R53.81 PHYSICAL DECONDITIONING: ICD-10-CM

## 2021-09-14 DIAGNOSIS — N18.32 STAGE 3B CHRONIC KIDNEY DISEASE (H): ICD-10-CM

## 2021-09-14 DIAGNOSIS — F02.818 LATE ONSET ALZHEIMER'S DISEASE WITH BEHAVIORAL DISTURBANCE (H): ICD-10-CM

## 2021-09-14 DIAGNOSIS — I51.89 GRADE II DIASTOLIC DYSFUNCTION: ICD-10-CM

## 2021-09-14 DIAGNOSIS — R63.5 WEIGHT GAIN: ICD-10-CM

## 2021-09-14 DIAGNOSIS — Z79.899 ENCOUNTER FOR MEDICATION REVIEW: Primary | ICD-10-CM

## 2021-09-14 DIAGNOSIS — G30.1 LATE ONSET ALZHEIMER'S DISEASE WITH BEHAVIORAL DISTURBANCE (H): ICD-10-CM

## 2021-09-14 DIAGNOSIS — M25.552 HIP PAIN, LEFT: ICD-10-CM

## 2021-09-14 NOTE — PROGRESS NOTES
Juan Miguel Delaney is a 89 year old male being seen today for comprehensive and follow up visit visit at [facility].    Code Status: Advance Directives: YES-.   Health Care Power of : Extended Emergency Contact Information  Primary Emergency Contact: Criss Delaney   [facility]  Home Phone: [phone]  Relation: Spouse  Secondary Emergency Contact: Sonya Kavita   [facility]  Home Phone: [phone]  Mobile Phone: [phone]  Relation: Daughter     Allergies: Penicillins, Celecoxib, Ibuprofen, Lisinopril, Naprosyn [naproxen], and Rofecoxib     Chief Complaint / HPI: Staff requesting a follow-up for weight gain appears she has had about a 15 pound weight gain over the last several months and about 20 pounds over the past year current weight 193 pounds today, in reviewing back average weight 174 180  There has been no dyspnea, chest pain, edema.  Resident enjoys walking exercise throughout the facility--has been limited on and off for the past couple of months by left hip pain, had hip injection image guided yesterday--reports hip pain is minimal today.  Currently on naproxen, which she has tolerated well and has helped moderate the pain but not completely eliminated.  History provided by resident and wife as resident has short-term memory loss  And staff have provided historical data.  Overall patient reports feeling well, smiling and laughing.  Has been stable on Seroquel which was started in May after a severe agitation and restlessness onset after his wife was hospitalized for appendectomy surgery and had additional complications and hospitalizations, was on Seroquel 75 mg 3 times daily and additional PRN doses.  Has been stable with no reported agitation and out-of-control episodes of behaviors, wife has been home and stable.  Had tapered down prednisone to 25 mg 3 times a day  Had trialed twice daily with 1 additional dose as needed however staff reported resident had an anxiety attack  describing past incidences when he was in the [employer] So restarted at 25 mg 3 times daily, no reports of agitated behavior.    Past Medical, Surgical, Family and Social History reviewed: YES.     Medications: reviewed and reconciled  Medications - recent changes: none    Review of Systems:  General: positive for weakness, weight gain  Musculoskeletal: positive for arthritis, joint pain and muscular weakness  Psychiatric: positive for anxiety and memory loss  All other systems negative    Toileting:    Continent of Bowel: Yes   Continent of Bladder: Yes  Mobility: walker    Recent Labs: reviewed    Current Therapies: physical therapy    Exam:  Vital signs reviewed.   GENERAL APPEARANCE:  alert and no distress  EYES: Eyes grossly normal to inspection,conjunctivae and sclerae normal  RESP: lungs clear  CV: regular rates and rhythm, normal S1 S2, no S3 or S4, no murmur, click or rub, no peripheral edema and peripheral pulses strong  MS: no musculoskeletal defects are noted and gait is age appropriate with walker  SKIN:warm and dry  PSYCH: Alert, pleasantly confused    Assessment and Plan:              (Z79.899) Encounter for medication review  (primary encounter diagnosis)    Medications reviewed and reconciled, suspect Seroquel and limited activity secondary to left hip pain and bursitis  Contributory to weight gain  Benefits of keeping anxiety, agitation and restlessness under control outweigh discontinuing medication at this time  Patient is aware of weight gain--wife will help remind him to make healthy choices  Encouraged walking exercise as tolerated limited by hip pain        (G30.1,  F02.81) Late onset Alzheimer's disease with behavioral disturbance (H)  Agitation, restlessness, elopement seeking and out-of-control behaviors have been under control with medication combined with behavioral and nonmedication therapies        (R63.5) Weight gain--- suspect constellation of Seroquel medication initiation due to  compelling agitative and difficult to control behavior secondary to dementia And deconditioning with limited exercise due to left hip pain  Unfortunately dietitian services not available at assisted living facility--- wife will assist with making healthy choices and encouraging activity  Will monitor weights, recheck next month and will check renal function and CBC monitoring with medications.        (M25.552) Hip pain, left  Left hip image guided injection received yesterday, at this time patient reports improvement with left hip pain  Time will tell-will [place] back in a couple of weeks--if hip pain relieved would like to discontinue scheduled Naprosyn  And have available as needed only    (R53.81) Physical deconditioning  Affected by left hip pain--encouraged walking exercise, and utilization of fitness center if tolerated  Just completed physical therapy a few weeks ago--- was recommended to adjust posture as this was felt by physical therapist to affect left hip        (I51.9) Grade II diastolic dysfunction  Asymptomatic, no evidence of dyspnea, nocturnal dyspnea, weakness, chest pain  Recent echo was canceled, as goals of care reviewed with Chandrika daughter are for comfort only  Will treat symptomatically  Staff report adjusting metoprolol to 12.5 mg daily with hold for less than 60 heart rate--- has worked well  Have only had to hold dose 3 times in a month--no severely bradycardic episodes, presyncopal episodes.      CKD stage 3  We will obtain renal function lab monitoring in 1 month at facility lab day

## 2021-09-20 DIAGNOSIS — M25.552 HIP PAIN, LEFT: ICD-10-CM

## 2021-09-22 RX ORDER — NAPROXEN SODIUM 220 MG
TABLET ORAL
Qty: 60 TABLET | Refills: 3 | Status: SHIPPED | OUTPATIENT
Start: 2021-09-22 | End: 2022-01-18

## 2021-09-22 NOTE — TELEPHONE ENCOUNTER
Routing refill request to provider for review/approval because:  Allergy /contraindications  LOV: 9/14/2021 with Lilly Rogers RN on 9/22/2021 at 8:08 AM

## 2021-09-24 DIAGNOSIS — F02.818 LATE ONSET ALZHEIMER'S DISEASE WITH BEHAVIORAL DISTURBANCE (H): Primary | ICD-10-CM

## 2021-09-24 DIAGNOSIS — G30.1 LATE ONSET ALZHEIMER'S DISEASE WITH BEHAVIORAL DISTURBANCE (H): Primary | ICD-10-CM

## 2021-09-24 RX ORDER — CLOPIDOGREL BISULFATE 75 MG/1
75 TABLET ORAL DAILY
Qty: 30 TABLET | Refills: 0 | Status: SHIPPED | OUTPATIENT
Start: 2021-09-24 | End: 2021-10-26

## 2021-09-24 NOTE — TELEPHONE ENCOUNTER
[facility] sent Rx request for the following:     Requested Prescriptions   Pending Prescriptions Disp Refills     clopidogrel (PLAVIX) 75 MG tablet 30 tablet 0     Sig: Take 1 tablet (75 mg) by mouth daily      Last Prescription Date:   8/20/2021 ED ordered.   Last Fill Qty/Refills:         30, R-0     Last Office Visit:              NH visit 9/14/2021  Future Office visit:           none      clopidogrel (PLAVIX) 75 MG tablet 30 tablet 0 8/20/2021 9/19/2021 --   Sig - Route: Take 1 tablet (75 mg) by mouth daily - Oral       Unable to complete prescription refill per RN Medication Refill Policy.................... Bria Verduzco RN ....................  9/24/2021   10:09 AM

## 2021-10-07 DIAGNOSIS — I10 ESSENTIAL (PRIMARY) HYPERTENSION: ICD-10-CM

## 2021-10-07 DIAGNOSIS — Z87.898 HISTORY OF CHEST PAIN: ICD-10-CM

## 2021-10-11 RX ORDER — AMLODIPINE BESYLATE 5 MG/1
10 TABLET ORAL DAILY
Qty: 180 TABLET | Refills: 2 | Status: SHIPPED | OUTPATIENT
Start: 2021-10-11 | End: 2022-05-06

## 2021-10-11 RX ORDER — METOPROLOL TARTRATE 25 MG/1
12.5 TABLET, FILM COATED ORAL 2 TIMES DAILY
Qty: 90 TABLET | Refills: 0 | Status: SHIPPED | OUTPATIENT
Start: 2021-10-11 | End: 2021-12-06

## 2021-10-11 RX ORDER — METOPROLOL TARTRATE 25 MG/1
TABLET, FILM COATED ORAL
Qty: 14 TABLET | Refills: 0 | Status: SHIPPED | OUTPATIENT
Start: 2021-10-11 | End: 2021-10-11

## 2021-10-11 NOTE — TELEPHONE ENCOUNTER
Routing refill request to provider for review/approval because:  Calcium Channel Blockers Protocol Cabqpp15/07/2021 11:43 AM   Recent (12 mo) or future (30 days) visit within the authorizing provider's specialty Protocol Details    Normal serum creatinine on file in past 12 months      LOV: 9/14/2021 NH Lilly Rogers RN on 10/11/2021 at 12:38 PM

## 2021-10-11 NOTE — TELEPHONE ENCOUNTER
Routing refill request to provider for review/approval because:    LOV: MIHIR Shepard  9/20/2021    Sary Rogers RN on 10/11/2021 at 12:42 PM

## 2021-10-21 DIAGNOSIS — G30.1 LATE ONSET ALZHEIMER'S DISEASE WITH BEHAVIORAL DISTURBANCE (H): ICD-10-CM

## 2021-10-21 DIAGNOSIS — F02.818 LATE ONSET ALZHEIMER'S DISEASE WITH BEHAVIORAL DISTURBANCE (H): ICD-10-CM

## 2021-10-26 RX ORDER — CLOPIDOGREL BISULFATE 75 MG/1
75 TABLET ORAL DAILY
Qty: 30 TABLET | Refills: 0 | Status: SHIPPED | OUTPATIENT
Start: 2021-10-26 | End: 2021-11-23

## 2021-11-04 DIAGNOSIS — G30.1 LATE ONSET ALZHEIMER'S DISEASE WITH BEHAVIORAL DISTURBANCE (H): ICD-10-CM

## 2021-11-04 DIAGNOSIS — F02.818 LATE ONSET ALZHEIMER'S DISEASE WITH BEHAVIORAL DISTURBANCE (H): ICD-10-CM

## 2021-11-04 DIAGNOSIS — Z87.898 HISTORY OF CHEST PAIN: ICD-10-CM

## 2021-11-04 RX ORDER — METOPROLOL TARTRATE 25 MG/1
TABLET, FILM COATED ORAL
Qty: 14 TABLET | Refills: 0 | OUTPATIENT
Start: 2021-11-04

## 2021-11-05 RX ORDER — QUETIAPINE FUMARATE 25 MG/1
TABLET, FILM COATED ORAL
Qty: 90 TABLET | Refills: 1 | Status: SHIPPED | OUTPATIENT
Start: 2021-11-05 | End: 2022-01-04

## 2021-11-05 NOTE — TELEPHONE ENCOUNTER
"[id number]  sent Rx request for the following:      Requested Prescriptions   Pending Prescriptions Disp Refills     QUEtiapine (SEROQUEL) 25 MG tablet [Pharmacy Med Name: QUETIAPINE 25MG TABLET] 90 tablet 1     Sig: TAKE 1 TAB BY MOUTH THREE TIMES DAILY       Antipsychotic Medications Failed - 11/4/2021 10:08 AM        Failed - Lipid panel on file within the past 12 months     Recent Labs   Lab Test 01/22/19  0705   CHOL 187   TRIG 161*   HDL 39   *   NHDL 148*           Failed - Recent (6 mo) or future (30 days) visit within the authorizing provider's specialty     Patient had office visit in the last 6 months or has a visit in the next 30 days with authorizing provider or within the authorizing provider's specialty.  See \"Patient Info\" tab in inbasket, or \"Choose Columns\" in Meds & Orders section of the refill encounter.          Passed - Blood pressure under 140/90 in past 12 months     BP Readings from Last 3 Encounters:   09/14/21 130/75   08/24/21 118/60   08/19/21 (!) 148/90           Passed - Patient is 12 years of age or older        Passed - CBC on file in past 12 months     Recent Labs   Lab Test 08/19/21  2146 04/18/18  0115 04/19/16  0151   WBC 6.1   < >  --    GICHWBC  --   --  8.5   RBC 5.02   < >  --    GICHRBC  --   --  4.83   HGB 15.4   < > 14.7   HCT 44.8   < > 42.8      < > 163    < > = values in this interval not displayed.           Passed - Heart Rate on file within past 12 months     Pulse Readings from Last 3 Encounters:   09/14/21 70   08/24/21 (!) 46   08/19/21 70           Passed - A1c or Glucose on file in past 12 months     Recent Labs   Lab Test 08/19/21  2146   *     Please review patients last 3 weights. If a weight gain of >10 lbs exists, you may refill the prescription once after instructing the patient to schedule an appointment within the next 30 days.    Wt Readings from Last 3 Encounters:   09/14/21 87.5 kg (193 lb)   08/19/21 87.5 kg (193 " lb)   05/25/21 82.6 kg (182 lb)           Passed - Medication is active on med list     Last Prescription Date:   8/19/21  Last Fill Qty/Refills:         90, R-2  Last Office Visit:              8/24/21  Future Office visit:           Nnone  Routing refill request to provider for review/approval because:  Unable to complete prescription refill per RN Medication Refill Policy. Luisa Gilliland RN .............. 11/5/2021  3:09 PM

## 2021-11-17 DIAGNOSIS — G30.1 LATE ONSET ALZHEIMER'S DISEASE WITH BEHAVIORAL DISTURBANCE (H): ICD-10-CM

## 2021-11-17 DIAGNOSIS — F02.818 LATE ONSET ALZHEIMER'S DISEASE WITH BEHAVIORAL DISTURBANCE (H): ICD-10-CM

## 2021-11-22 RX ORDER — CLOPIDOGREL BISULFATE 75 MG/1
75 TABLET ORAL DAILY
Qty: 30 TABLET | Refills: 0 | OUTPATIENT
Start: 2021-11-22

## 2021-11-22 NOTE — TELEPHONE ENCOUNTER
[id number] sent Rx request for the following:      Requested Prescriptions   Pending Prescriptions Disp Refills     clopidogrel (PLAVIX) 75 MG tablet [Pharmacy Med Name: CLOPIDOGREL 75MG TABLET] 30 tablet 0     Sig: TAKE 1 TABLET (75 MG) BY MOUTH DAILY       Plavix Passed - 11/22/2021 12:54 PM          Last Prescription Date:   10/26/2021  Last Fill Qty/Refills:         30, R-0  Last Office Visit:               9/14/21 NH- Lilly Shepard  Future Office visit:           None  Routing refill request to provider for review/approval because:    Medication was scheduled to be discontinued on 9/19/2021 due to comfort care, however it has been requested to be extended. Discussion with Daughter, after reviewing the risks and benefits of this medications and side effects, her and her brother (who were listening in on speaker phone) chose to stop this medication at this time. Colette Marcial RN on 11/22/2021 at 1:15 PM

## 2021-11-23 NOTE — TELEPHONE ENCOUNTER
"Daughter called residential and wants Plavix discontinued  Because \"he is comfort care\"    Will discontinue today    Lilly Shepard, APRN CNP   November 23, 2021     "

## 2022-01-18 DIAGNOSIS — M25.552 HIP PAIN, LEFT: ICD-10-CM

## 2022-01-18 RX ORDER — NAPROXEN SODIUM 220 MG
TABLET ORAL
Qty: 60 TABLET | Refills: 3 | Status: SHIPPED | OUTPATIENT
Start: 2022-01-18 | End: 2022-04-19

## 2022-03-09 DIAGNOSIS — Z87.898 HISTORY OF CHEST PAIN: ICD-10-CM

## 2022-03-09 DIAGNOSIS — F02.818 LATE ONSET ALZHEIMER'S DISEASE WITH BEHAVIORAL DISTURBANCE (H): Primary | ICD-10-CM

## 2022-03-09 DIAGNOSIS — G30.1 LATE ONSET ALZHEIMER'S DISEASE WITH BEHAVIORAL DISTURBANCE (H): Primary | ICD-10-CM

## 2022-03-10 RX ORDER — DONEPEZIL HYDROCHLORIDE 10 MG/1
TABLET, ORALLY DISINTEGRATING ORAL
Qty: 90 TABLET | Refills: 3 | Status: SHIPPED | OUTPATIENT
Start: 2022-03-10 | End: 2023-01-01

## 2022-03-10 RX ORDER — METOPROLOL TARTRATE 25 MG/1
TABLET, FILM COATED ORAL
Qty: 45 TABLET | Refills: 0 | Status: SHIPPED | OUTPATIENT
Start: 2022-03-10 | End: 2022-05-03 | Stop reason: SINTOL

## 2022-04-13 DIAGNOSIS — M25.552 HIP PAIN, LEFT: ICD-10-CM

## 2022-04-14 NOTE — TELEPHONE ENCOUNTER
Disp Refills Start End JAMAL   naproxen sodium (ANAPROX) 220 MG tablet 60 tablet 3 1/18/2022  No   Sig: TAKE 1 TABLET (220 MG) BY MOUTH 2 TIMES DAILY (WITH MEALS)         LOV: 9/14/2021  Future Office visit: No future appointment scheduled     Routing refill request to provider for review/approval.    Per Quality report patient is overdue for medicare annual wellness, PHQ-2, and immunizations.        Requested Prescriptions   Pending Prescriptions Disp Refills     naproxen sodium (ANAPROX) 220 MG tablet [Pharmacy Med Name: NAPROXEN SODIUM 220MG TABLET] 60 tablet 3     Sig: TAKE 1 TABLET (220 MG) BY MOUTH 2 TIMES DAILY (WITH MEALS)       There is no refill protocol information for this order        Routed to provider for review and consideration. Rosalind Díaz RN  ....................  4/14/2022   9:23 AM

## 2022-04-19 RX ORDER — NAPROXEN SODIUM 220 MG
TABLET ORAL
Qty: 60 TABLET | Refills: 3 | Status: SHIPPED | OUTPATIENT
Start: 2022-04-19 | End: 2022-05-03

## 2022-04-29 ENCOUNTER — TELEPHONE (OUTPATIENT)
Dept: INTERNAL MEDICINE | Facility: OTHER | Age: 87
End: 2022-04-29

## 2022-04-29 ENCOUNTER — HOSPITAL ENCOUNTER (EMERGENCY)
Facility: OTHER | Age: 87
Discharge: HOME | End: 2022-04-29
Attending: FAMILY MEDICINE | Admitting: FAMILY MEDICINE
Payer: MEDICARE

## 2022-04-29 ENCOUNTER — APPOINTMENT (OUTPATIENT)
Dept: GENERAL RADIOLOGY | Facility: OTHER | Age: 87
End: 2022-04-29
Attending: FAMILY MEDICINE
Payer: MEDICARE

## 2022-04-29 VITALS
OXYGEN SATURATION: 96 % | DIASTOLIC BLOOD PRESSURE: 79 MMHG | SYSTOLIC BLOOD PRESSURE: 144 MMHG | RESPIRATION RATE: 16 BRPM | TEMPERATURE: 98.2 F | BODY MASS INDEX: 30.23 KG/M2 | HEART RATE: 58 BPM | WEIGHT: 193 LBS

## 2022-04-29 DIAGNOSIS — I50.9 ACUTE ON CHRONIC CONGESTIVE HEART FAILURE, UNSPECIFIED HEART FAILURE TYPE (H): ICD-10-CM

## 2022-04-29 LAB
ALBUMIN UR-MCNC: NEGATIVE MG/DL
ANION GAP SERPL CALCULATED.3IONS-SCNC: 8 MMOL/L (ref 3–14)
APPEARANCE UR: CLEAR
BASOPHILS # BLD AUTO: 0.1 10E3/UL (ref 0–0.2)
BASOPHILS NFR BLD AUTO: 1 %
BILIRUB UR QL STRIP: NEGATIVE
BUN SERPL-MCNC: 29 MG/DL (ref 7–25)
CALCIUM SERPL-MCNC: 9.1 MG/DL (ref 8.6–10.3)
CHLORIDE BLD-SCNC: 106 MMOL/L (ref 98–107)
CO2 SERPL-SCNC: 24 MMOL/L (ref 21–31)
COLOR UR AUTO: YELLOW
CREAT SERPL-MCNC: 1.55 MG/DL (ref 0.7–1.3)
EOSINOPHIL # BLD AUTO: 0.2 10E3/UL (ref 0–0.7)
EOSINOPHIL NFR BLD AUTO: 2 %
ERYTHROCYTE [DISTWIDTH] IN BLOOD BY AUTOMATED COUNT: 13.2 % (ref 10–15)
FLUAV RNA SPEC QL NAA+PROBE: NEGATIVE
FLUBV RNA RESP QL NAA+PROBE: NEGATIVE
GFR SERPL CREATININE-BSD FRML MDRD: 43 ML/MIN/1.73M2
GLUCOSE BLD-MCNC: 109 MG/DL (ref 70–105)
GLUCOSE UR STRIP-MCNC: NEGATIVE MG/DL
HCT VFR BLD AUTO: 42.6 % (ref 40–53)
HGB BLD-MCNC: 14.2 G/DL (ref 13.3–17.7)
HGB UR QL STRIP: NEGATIVE
IMM GRANULOCYTES # BLD: 0 10E3/UL
IMM GRANULOCYTES NFR BLD: 1 %
KETONES UR STRIP-MCNC: NEGATIVE MG/DL
LEUKOCYTE ESTERASE UR QL STRIP: NEGATIVE
LYMPHOCYTES # BLD AUTO: 0.9 10E3/UL (ref 0.8–5.3)
LYMPHOCYTES NFR BLD AUTO: 10 %
MCH RBC QN AUTO: 29.3 PG (ref 26.5–33)
MCHC RBC AUTO-ENTMCNC: 33.3 G/DL (ref 31.5–36.5)
MCV RBC AUTO: 88 FL (ref 78–100)
MONOCYTES # BLD AUTO: 1.2 10E3/UL (ref 0–1.3)
MONOCYTES NFR BLD AUTO: 13 %
NEUTROPHILS # BLD AUTO: 6.5 10E3/UL (ref 1.6–8.3)
NEUTROPHILS NFR BLD AUTO: 73 %
NITRATE UR QL: NEGATIVE
NRBC # BLD AUTO: 0 10E3/UL
NRBC BLD AUTO-RTO: 0 /100
NT-PROBNP SERPL-MCNC: 230 PG/ML (ref 0–100)
PH UR STRIP: 6 [PH] (ref 5–9)
PLATELET # BLD AUTO: 240 10E3/UL (ref 150–450)
POTASSIUM BLD-SCNC: 4.3 MMOL/L (ref 3.5–5.1)
RBC # BLD AUTO: 4.84 10E6/UL (ref 4.4–5.9)
RBC URINE: 2 /HPF
RSV RNA SPEC NAA+PROBE: NEGATIVE
SARS-COV-2 RNA RESP QL NAA+PROBE: NEGATIVE
SODIUM SERPL-SCNC: 138 MMOL/L (ref 134–144)
SP GR UR STRIP: 1.02 (ref 1–1.03)
TROPONIN I SERPL-MCNC: 51.8 PG/ML (ref 0–34)
UROBILINOGEN UR STRIP-MCNC: NORMAL MG/DL
WBC # BLD AUTO: 8.7 10E3/UL (ref 4–11)
WBC URINE: 5 /HPF

## 2022-04-29 PROCEDURE — 93010 ELECTROCARDIOGRAM REPORT: CPT | Performed by: INTERNAL MEDICINE

## 2022-04-29 PROCEDURE — 93005 ELECTROCARDIOGRAM TRACING: CPT | Performed by: FAMILY MEDICINE

## 2022-04-29 PROCEDURE — 99285 EMERGENCY DEPT VISIT HI MDM: CPT | Mod: 25 | Performed by: FAMILY MEDICINE

## 2022-04-29 PROCEDURE — 80048 BASIC METABOLIC PNL TOTAL CA: CPT | Performed by: FAMILY MEDICINE

## 2022-04-29 PROCEDURE — 99284 EMERGENCY DEPT VISIT MOD MDM: CPT | Mod: CS | Performed by: FAMILY MEDICINE

## 2022-04-29 PROCEDURE — 250N000011 HC RX IP 250 OP 636: Performed by: FAMILY MEDICINE

## 2022-04-29 PROCEDURE — 83880 ASSAY OF NATRIURETIC PEPTIDE: CPT | Performed by: FAMILY MEDICINE

## 2022-04-29 PROCEDURE — 36415 COLL VENOUS BLD VENIPUNCTURE: CPT | Performed by: FAMILY MEDICINE

## 2022-04-29 PROCEDURE — C9803 HOPD COVID-19 SPEC COLLECT: HCPCS | Performed by: FAMILY MEDICINE

## 2022-04-29 PROCEDURE — 84484 ASSAY OF TROPONIN QUANT: CPT | Performed by: FAMILY MEDICINE

## 2022-04-29 PROCEDURE — 81001 URINALYSIS AUTO W/SCOPE: CPT | Performed by: FAMILY MEDICINE

## 2022-04-29 PROCEDURE — 71045 X-RAY EXAM CHEST 1 VIEW: CPT | Mod: TC

## 2022-04-29 PROCEDURE — 87637 SARSCOV2&INF A&B&RSV AMP PRB: CPT | Performed by: FAMILY MEDICINE

## 2022-04-29 PROCEDURE — 96374 THER/PROPH/DIAG INJ IV PUSH: CPT | Performed by: FAMILY MEDICINE

## 2022-04-29 PROCEDURE — 85025 COMPLETE CBC W/AUTO DIFF WBC: CPT | Performed by: FAMILY MEDICINE

## 2022-04-29 RX ORDER — FUROSEMIDE 10 MG/ML
20 INJECTION INTRAMUSCULAR; INTRAVENOUS ONCE
Status: COMPLETED | OUTPATIENT
Start: 2022-04-29 | End: 2022-04-29

## 2022-04-29 RX ADMIN — FUROSEMIDE 20 MG: 10 INJECTION, SOLUTION INTRAVENOUS at 02:08

## 2022-04-29 ASSESSMENT — ENCOUNTER SYMPTOMS
ABDOMINAL PAIN: 1
COUGH: 1
VOMITING: 0
NAUSEA: 0
DIARRHEA: 0

## 2022-04-29 NOTE — ED TRIAGE NOTES
EMS states patient had abdominal pain that was intermittent and 10/10 and sharp.  IV to RAC and 324 ASA given en route.     Triage Assessment     Row Name 04/29/22 0037       Triage Assessment (Adult)    Airway WDL WDL       Respiratory WDL    Respiratory WDL WDL       Skin Circulation/Temperature WDL    Skin Circulation/Temperature WDL WDL       Cardiac WDL    Cardiac WDL WDL       Peripheral/Neurovascular WDL    Peripheral Neurovascular WDL WDL    Capillary Refill, General less than/equal to 3 secs       Cognitive/Neuro/Behavioral WDL    Cognitive/Neuro/Behavioral WDL WDL       Gabe Coma Scale    Best Eye Response 4-->(E4) spontaneous    Best Motor Response 6-->(M6) obeys commands    Best Verbal Response 5-->(V5) oriented    [facility] Coma Scale Score 15

## 2022-04-29 NOTE — DISCHARGE INSTRUCTIONS
Juan Miguel    I think you are having complications of heart failure. I recommend that you follow  up with Dr Fernando for this.    I did send him a note about this visit.    Thank you for choosing our Emergency Department for your care.     You may receive a phone call or letter for a survey about your care in our ED.  Please complete this as this is how we improve care for our patients.     If you have any questions after leaving the ED you can call me at [phone] during hours that I am working. I am working today until 7 AM.    Sincerely,    Dr Gee Mcclellan M.D.

## 2022-04-29 NOTE — TELEPHONE ENCOUNTER
Patients daughter called and father was in ER and was told to come and see Dr. Fernando as soon as he can. He diagnosed him with CHF.  The appointments are way out. Can they be worked in.  Please call      Penny Cassidy on 4/29/2022 at 2:06 PM

## 2022-04-29 NOTE — TELEPHONE ENCOUNTER
Patient's daughter was contacted and an appointment was made with Dr. Fernando for 5.6.2022 for an ED Follow-up.  Julisa Steel on 4/29/2022 at 3:32 PM

## 2022-05-01 LAB
ATRIAL RATE - MUSE: 214 BPM
DIASTOLIC BLOOD PRESSURE - MUSE: NORMAL MMHG
INTERPRETATION ECG - MUSE: NORMAL
P AXIS - MUSE: NORMAL DEGREES
PR INTERVAL - MUSE: NORMAL MS
QRS DURATION - MUSE: 70 MS
QT - MUSE: 298 MS
QTC - MUSE: 352 MS
R AXIS - MUSE: 38 DEGREES
SYSTOLIC BLOOD PRESSURE - MUSE: NORMAL MMHG
T AXIS - MUSE: 266 DEGREES
VENTRICULAR RATE- MUSE: 84 BPM

## 2022-05-03 DIAGNOSIS — Z87.898 HISTORY OF CHEST PAIN: ICD-10-CM

## 2022-05-03 DIAGNOSIS — M25.552 HIP PAIN, LEFT: ICD-10-CM

## 2022-05-06 ENCOUNTER — OFFICE VISIT (OUTPATIENT)
Dept: INTERNAL MEDICINE | Facility: OTHER | Age: 87
End: 2022-05-06
Attending: INTERNAL MEDICINE
Payer: COMMERCIAL

## 2022-05-06 VITALS
HEIGHT: 67 IN | RESPIRATION RATE: 20 BRPM | SYSTOLIC BLOOD PRESSURE: 138 MMHG | DIASTOLIC BLOOD PRESSURE: 80 MMHG | HEART RATE: 72 BPM | OXYGEN SATURATION: 93 % | TEMPERATURE: 97.8 F | WEIGHT: 199.4 LBS | BODY MASS INDEX: 31.3 KG/M2

## 2022-05-06 DIAGNOSIS — Z66 DNR (DO NOT RESUSCITATE): ICD-10-CM

## 2022-05-06 DIAGNOSIS — I50.32 CHRONIC DIASTOLIC HEART FAILURE (H): ICD-10-CM

## 2022-05-06 DIAGNOSIS — Z78.9 DNI (DO NOT INTUBATE): ICD-10-CM

## 2022-05-06 DIAGNOSIS — N18.32 STAGE 3B CHRONIC KIDNEY DISEASE (H): ICD-10-CM

## 2022-05-06 DIAGNOSIS — I48.0 PAROXYSMAL A-FIB (H): Primary | ICD-10-CM

## 2022-05-06 DIAGNOSIS — G30.1 LATE ONSET ALZHEIMER'S DISEASE WITH BEHAVIORAL DISTURBANCE (H): ICD-10-CM

## 2022-05-06 DIAGNOSIS — F02.818 LATE ONSET ALZHEIMER'S DISEASE WITH BEHAVIORAL DISTURBANCE (H): ICD-10-CM

## 2022-05-06 DIAGNOSIS — I10 BENIGN ESSENTIAL HYPERTENSION: ICD-10-CM

## 2022-05-06 PROCEDURE — 99214 OFFICE O/P EST MOD 30 MIN: CPT | Performed by: INTERNAL MEDICINE

## 2022-05-06 PROCEDURE — G0463 HOSPITAL OUTPT CLINIC VISIT: HCPCS | Performed by: INTERNAL MEDICINE

## 2022-05-06 RX ORDER — TORSEMIDE 10 MG/1
10 TABLET ORAL DAILY
Qty: 90 TABLET | Refills: 0 | Status: SHIPPED | OUTPATIENT
Start: 2022-05-06 | End: 2022-05-25

## 2022-05-06 RX ORDER — AMLODIPINE BESYLATE 5 MG/1
10 TABLET ORAL DAILY
Qty: 180 TABLET | Refills: 4 | Status: SHIPPED | OUTPATIENT
Start: 2022-05-06 | End: 2023-01-01

## 2022-05-06 ASSESSMENT — ENCOUNTER SYMPTOMS
BACK PAIN: 0
BRUISES/BLEEDS EASILY: 0
AGITATION: 0
FEVER: 0
SHORTNESS OF BREATH: 0
HEMATURIA: 0
CHILLS: 0
WOUND: 0
CONFUSION: 1
CHEST TIGHTNESS: 0
ABDOMINAL PAIN: 0

## 2022-05-06 ASSESSMENT — PATIENT HEALTH QUESTIONNAIRE - PHQ9
SUM OF ALL RESPONSES TO PHQ QUESTIONS 1-9: 9
10. IF YOU CHECKED OFF ANY PROBLEMS, HOW DIFFICULT HAVE THESE PROBLEMS MADE IT FOR YOU TO DO YOUR WORK, TAKE CARE OF THINGS AT HOME, OR GET ALONG WITH OTHER PEOPLE: SOMEWHAT DIFFICULT
SUM OF ALL RESPONSES TO PHQ QUESTIONS 1-9: 9

## 2022-05-06 ASSESSMENT — PAIN SCALES - GENERAL: PAINLEVEL: NO PAIN (0)

## 2022-05-06 NOTE — PROGRESS NOTES
Assessment & Plan       ICD-10-CM    1. Paroxysmal A-fib (H)  I48.0 rivaroxaban ANTICOAGULANT (XARELTO) 15 MG TABS tablet   2. Late onset Alzheimer's disease with behavioral disturbance (H)  G30.1     F02.81    3. Stage 3b chronic kidney disease (H)  N18.32    4. Benign essential hypertension  I10 torsemide (DEMADEX) 10 MG tablet     amLODIPine (NORVASC) 5 MG tablet   5. Chronic diastolic heart failure (H)  I50.32 torsemide (DEMADEX) 10 MG tablet   6. DNR (do not resuscitate)  Z66    7. DNI (do not intubate)  Z78.9      Patient comes in with his daughter for follow-up of recent ER visits.  Diagnosed with atrial fibrillation which was new.  Likely paroxysmal given that he is now not in atrial fibrillation.  He was having heart failure exacerbation at that time and was started on some IV Lasix in the emergency room.  He had good diuresis and was subsequently discharged with close clinic follow-up.    Given his new diagnosis of atrial fibrillation.  Stroke risk is high enough that he qualifies for oral anticoagulation.  Options reviewed and discussed, he/daughter would like to start Xarelto once daily, renal dose adjustment of 15 mg in the evening.     We discussed echocardiogram.  At this point they declined.     CODE STATUS confirmed.  DNR/DNI.    Patient has late onset Alzheimer's disease.    Stage IIIb chronic kidney disease.  GFR is greater than 40.  Renal dosing of Xarelto as noted above.    Hypertension, currently taking amlodipine.  Blood pressures seem to be currently controlled.  Heart rates have been controlled.  Needs refills.    Chronic diastolic heart failure.  Start torsemide 10 mg daily to help with fluid retention.  Recommend metabolic panel in 1 to 2 weeks.    They plan to follow with Nelly Shepard NP at the assisted living.    Doesn't drive, Daughter Kavita takes to clinic appointments.   - wondering about getting handicapped sticker.     Encouraged regular exercise.     Return for Return as needed  for follow-up with Dr. Fernando., Appointments: [phone].    Jason Fernando MD  [facility]     Subjective   Juan Miguel is a 89 year old who presents for the following health issues follow up ER / Atrial Fp     History of Present Illness       Mental Health Follow-up:                    Today's PHQ-9         PHQ-9 Total Score: 9  PHQ-9 Q9 Thoughts of better off dead/self-harm past 2 weeks :   (P) Not at all    How difficult have these problems made it for you to do your work, take care of things at home, or get along with other people: Somewhat difficult        Reason for visit:  Er follow up    He eats 2-3 servings of fruits and vegetables daily.He consumes 1 sweetened beverage(s) daily.He exercises with enough effort to increase his heart rate 9 or less minutes per day.  He exercises with enough effort to increase his heart rate 3 or less days per week.   He is taking medications regularly.      Review of Systems   Constitutional: Negative for chills and fever.   HENT: Positive for hearing loss. Negative for congestion.    Eyes: Negative for visual disturbance.   Respiratory: Negative for chest tightness and shortness of breath.    Cardiovascular: Positive for peripheral edema. Negative for chest pain.   Gastrointestinal: Negative for abdominal pain.   Genitourinary: Negative for hematuria.   Musculoskeletal: Positive for gait problem (Walks with walker). Negative for back pain.   Skin: Negative for rash and wound.   Allergic/Immunologic: Negative for immunocompromised state.   Neurological: Negative for syncope.   Hematological: Does not bruise/bleed easily.   Psychiatric/Behavioral: Positive for confusion. Negative for agitation and behavioral problems (Relatively Controlled with Seroquel).        Memory loss          Objective    /80 (BP Location: Right arm, Patient Position: Sitting, Cuff Size: Adult Large)   Pulse 72   Temp 97.8  F (36.6  C) (Temporal)   Resp 20   Ht 1.689 m (5'  "6.5\")   Wt 90.4 kg (199 lb 6.4 oz)   SpO2 93%   BMI 31.70 kg/m    Body mass index is 31.7 kg/m .  Physical Exam  Constitutional:       General: He is not in acute distress.     Appearance: He is well-developed. He is not diaphoretic.   HENT:      Head: Normocephalic and atraumatic.   Eyes:      General: No scleral icterus.     Conjunctiva/sclera: Conjunctivae normal.   Cardiovascular:      Rate and Rhythm: Normal rate and regular rhythm.   Pulmonary:      Effort: Pulmonary effort is normal.      Breath sounds: Normal breath sounds.   Abdominal:      Palpations: Abdomen is soft.      Tenderness: There is no abdominal tenderness.   Musculoskeletal:         General: No deformity.      Cervical back: Neck supple.      Right lower le+ Pitting Edema present.      Left lower le+ Pitting Edema present.   Lymphadenopathy:      Cervical: No cervical adenopathy.   Skin:     General: Skin is warm and dry.      Coloration: Skin is not jaundiced.      Findings: No rash.   Neurological:      Mental Status: He is alert. Mental status is at baseline.      Comments: + Memory loss   Psychiatric:         Mood and Affect: Mood normal.         Behavior: Behavior normal.          Recent Labs   Lab Test 22  0049 21  2146 21  0920 21  0920 21  1940 20  0940 20  1100 19  0705 18  0115 17  1715 16  0208 16  1717   LDL  --   --   --   --   --   --   --  116*  --  131*  --  118*   HDL  --   --   --   --   --   --   --  39  --  43  --  37   TRIG  --   --   --   --   --   --   --  161*  --  116  --  128   ALT  --  9  --   --  14  --  13  --    < >  --   --   --    CR 1.55* 1.57*   < > 1.46* 1.57*  --  1.36*  --    < > 1.24   < > 1.28   GFRESTIMATED 43* 39*   < > 42* 42*  --  49*  --    < >  --   --   --    GFRESTBLACK  --   --   --   --  51*  --  60*  --    < > >60   < > >60   POTASSIUM 4.3 4.4   < > 3.9 4.8   < > 5.5*  --    < > 4.4   < > 4.8   TSH  --   --   --  " 3.28  --   --   --   --   --   --   --   --     < > = values in this interval not displayed.   EKG 4/29/2022-atrial fibrillation with rate of 84. Atrial fibrillation is new.  Creatinine is at baseline with GFR of 43.  Potassium and ALT are normal.  TSH was checked less than a year ago and was normal.

## 2022-05-06 NOTE — PATIENT INSTRUCTIONS
"Atrial Fibrillation      -  Rate controlled      -  Thyroid Evaluation - normal.       -  Echo -- declined.       -  Anticoagulant depends on their [id number] score.   Condition Points   C Congestive heartfailure (or Left ventricular systolic dysfunction) ?35% 1     H Hypertension: blood pressure consistently above 140/90 mmHg (or treated hypertension on medication) +1     A2 Age ?75 years +2     D Diabetes Mellitus 1     S2 Prior Stroke, TIA, or Thromboembolism 2     V Vascular disease (eg. peripheral artery disease, myocardial infarction, aortic plaque) 1     A Age 65-74 years 1     Sc Sex category (i.e. female gender) 1       Score Estimated Risk Anticoagulation Therapy Considerations       0 **Low - Stroke risk was 0% per year. Noantithrombotic therapy (or Aspirin) No antithrombotic therapy (or Aspirin 75-325mg daily)       1 **Moderate - Stroke risk was 1.3%per year.   Oral anticoagulant (or Aspirin) Oral anticoagulant, either new oral anticoagulant drug or well controlled warfarin at INR 2.0-3.0 (or Aspirin 75-325mg daily, depending on factorssuch as patient preference)     2,     ----> 3    , 4, 5, 6,   or greater **High - Stroke risk was 2.2%,     ----> 3.2%,     4%, 6.7%, 9.8% per year.   Oral anticoagulant Oral anticoagulant, using either a new oral anticoagulantdrug (eg rivaroxaban or dabigatran) or well controlled warfarin at INR 2.0-3.0   ** Risk according to Mo et. al's 2010 stroke study andthe European Society of Cardiology's guidelines        -  This patient's score is = 3.  Recommended therapy is Oral Anticoagulant    Use this score in conjunction with the HAS-BLED Score to help determine if anticoagulation is in the best interest of a particular patient.  \"HAS-BLED\" is an acronym for:   Hypertension   Abnormal Liver/Renal Function   Stroke History   Bleeding Predisposition   Labile INRs   Medication usage predisposing to bleeding (Aspirin, NSAIDS).   \"Elderly\" (Age >/= 65)   Drugs/Alcohol Usage "        1. Paroxysmal A-fib (H)    START:   - rivaroxaban ANTICOAGULANT (XARELTO) 15 MG TABS tablet; Take 1 tablet (15 mg) by mouth daily (with dinner)  Dispense: 90 tablet; Refill: 3    5. Chronic diastolic heart failure (H)    START:   - torsemide (DEMADEX) 10 MG tablet; Take 1 tablet (10 mg) by mouth daily - for Edema  Dispense: 90 tablet; Refill: 0    6. DNR (do not resuscitate)  7. DNI (do not intubate)      Return as needed for follow-up with Dr. Fernando.    Clinic : [phone]  Appointment line: [phone]

## 2022-05-06 NOTE — NURSING NOTE
"Chief Complaint   Patient presents with     Follow up ER  Atrial Fib         Initial /80 (BP Location: Right arm, Patient Position: Sitting, Cuff Size: Adult Large)   Pulse 72   Temp 97.8  F (36.6  C) (Temporal)   Resp 20   Ht 1.689 m (5' 6.5\")   Wt 90.4 kg (199 lb 6.4 oz)   SpO2 93%   BMI 31.70 kg/m   Estimated body mass index is 31.7 kg/m  as calculated from the following:    Height as of this encounter: 1.689 m (5' 6.5\").    Weight as of this encounter: 90.4 kg (199 lb 6.4 oz).       FOOD SECURITY SCREENING QUESTIONS:    The next two questions are to help us understand your food security.  If you are feeling you need any assistance in this area, we have resources available to support you today.    Hunger Vital Signs:  Within the past 12 months we worried whether our food would run out before we got money to buy more. Never  Within the past 12 months the food we bought just didn't last and we didn't have money to get more. Never    Advance Care Directive on file? yes      Medication reconciliation complete.      Julián Thayer,on 5/6/2022 at 2:58 PM        "

## 2022-05-07 ASSESSMENT — PATIENT HEALTH QUESTIONNAIRE - PHQ9: SUM OF ALL RESPONSES TO PHQ QUESTIONS 1-9: 9

## 2022-05-10 ENCOUNTER — NURSING HOME VISIT (OUTPATIENT)
Dept: GERIATRICS | Facility: OTHER | Age: 87
End: 2022-05-10
Attending: NURSE PRACTITIONER
Payer: MEDICARE

## 2022-05-10 DIAGNOSIS — Z53.9 ERRONEOUS ENCOUNTER--DISREGARD: Primary | ICD-10-CM

## 2022-05-17 ENCOUNTER — NURSING HOME VISIT (OUTPATIENT)
Dept: GERIATRICS | Facility: OTHER | Age: 87
End: 2022-05-17
Attending: NURSE PRACTITIONER
Payer: COMMERCIAL

## 2022-05-17 DIAGNOSIS — Z53.9 ERRONEOUS ENCOUNTER--DISREGARD: Primary | ICD-10-CM

## 2022-05-19 DIAGNOSIS — M19.90 ARTHRITIS PAIN: ICD-10-CM

## 2022-05-20 RX ORDER — PSEUDOEPHED/ACETAMINOPH/DIPHEN 30MG-500MG
TABLET ORAL
Qty: 120 TABLET | Refills: 10 | Status: SHIPPED | OUTPATIENT
Start: 2022-05-20 | End: 2022-11-01

## 2022-05-20 NOTE — TELEPHONE ENCOUNTER
"[id number] sent Rx request for the following:      Requested Prescriptions   Pending Prescriptions Disp Refills     ACETAMINOPHEN EXTRA STRENGTH 500 MG tablet [Pharmacy Med Name: ACETAMINOPHEN ES 500MG CAPLET] 120 tablet 10     Sig: TAKE 2 TABLETS (1,000 MG) BY MOUTH 2 TIMES DAILY       Analgesics (Non-Narcotic Tylenol and ASA Only) Passed - 5/19/2022 11:08 AM        Passed - Recent (12 mo) or future (30 days) visit within the authorizing provider's specialty     Patient has had an office visit with the authorizing provider or a provider within the authorizing providers department within the previous 12 mos or has a future within next 30 days. See \"Patient Info\" tab in inbasket, or \"Choose Columns\" in Meds & Orders section of the refill encounter.          Passed - Patient is 7 months old or older     If patient is a peds patient of the age 7 mos -12 years, ok to refill using weight-based dosing.     If >3g daily and/or sig is not \"prn\", check for liver enzymes. If normal in the last year, ok to refill.  If not, refer to the provider.        Passed - Medication is active on med list     Last Prescription Date:   06/30/21  Last Fill Qty/Refills:         120, R-10  Last Office Visit:              05/10/22   Future Office visit:             Next 5 appointments (look out 90 days)    May 25, 2022  2:00 PM  PHYSICAL with Jason Fernando MD  [facility] ([facility] ) [address]  [place] [address]  [phone]        Luisa Gilliland RN .............. 5/20/2022  12:23 PM       "

## 2022-05-25 ENCOUNTER — TELEPHONE (OUTPATIENT)
Dept: INTERNAL MEDICINE | Facility: OTHER | Age: 87
End: 2022-05-25
Payer: COMMERCIAL

## 2022-05-25 ENCOUNTER — OFFICE VISIT (OUTPATIENT)
Dept: INTERNAL MEDICINE | Facility: OTHER | Age: 87
End: 2022-05-25
Attending: INTERNAL MEDICINE
Payer: COMMERCIAL

## 2022-05-25 VITALS
RESPIRATION RATE: 20 BRPM | OXYGEN SATURATION: 98 % | SYSTOLIC BLOOD PRESSURE: 114 MMHG | BODY MASS INDEX: 31.47 KG/M2 | HEART RATE: 62 BPM | DIASTOLIC BLOOD PRESSURE: 60 MMHG | HEIGHT: 66 IN | TEMPERATURE: 98.4 F | WEIGHT: 195.8 LBS

## 2022-05-25 DIAGNOSIS — I50.32 CHRONIC DIASTOLIC HEART FAILURE (H): ICD-10-CM

## 2022-05-25 DIAGNOSIS — N18.32 STAGE 3B CHRONIC KIDNEY DISEASE (H): ICD-10-CM

## 2022-05-25 DIAGNOSIS — E53.8 VITAMIN B12 DEFICIENCY (NON ANEMIC): ICD-10-CM

## 2022-05-25 DIAGNOSIS — Z00.00 ENCOUNTER FOR MEDICARE ANNUAL WELLNESS EXAM: ICD-10-CM

## 2022-05-25 DIAGNOSIS — I48.0 PAROXYSMAL A-FIB (H): ICD-10-CM

## 2022-05-25 DIAGNOSIS — M48.062 SPINAL STENOSIS OF LUMBAR REGION WITH NEUROGENIC CLAUDICATION: ICD-10-CM

## 2022-05-25 DIAGNOSIS — G30.1 LATE ONSET ALZHEIMER'S DISEASE WITH BEHAVIORAL DISTURBANCE (H): ICD-10-CM

## 2022-05-25 DIAGNOSIS — Z71.85 VACCINE COUNSELING: ICD-10-CM

## 2022-05-25 DIAGNOSIS — F02.818 LATE ONSET ALZHEIMER'S DISEASE WITH BEHAVIORAL DISTURBANCE (H): ICD-10-CM

## 2022-05-25 DIAGNOSIS — I10 BENIGN ESSENTIAL HYPERTENSION: Primary | ICD-10-CM

## 2022-05-25 DIAGNOSIS — E78.2 MIXED HYPERLIPIDEMIA: ICD-10-CM

## 2022-05-25 LAB
ANION GAP SERPL CALCULATED.3IONS-SCNC: 11 MMOL/L (ref 3–14)
BUN SERPL-MCNC: 34 MG/DL (ref 7–25)
CALCIUM SERPL-MCNC: 9 MG/DL (ref 8.6–10.3)
CHLORIDE BLD-SCNC: 104 MMOL/L (ref 98–107)
CO2 SERPL-SCNC: 26 MMOL/L (ref 21–31)
CREAT SERPL-MCNC: 2 MG/DL (ref 0.7–1.3)
GFR SERPL CREATININE-BSD FRML MDRD: 31 ML/MIN/1.73M2
GLUCOSE BLD-MCNC: 118 MG/DL (ref 70–105)
POTASSIUM BLD-SCNC: 4.2 MMOL/L (ref 3.5–5.1)
SODIUM SERPL-SCNC: 141 MMOL/L (ref 134–144)

## 2022-05-25 PROCEDURE — 99214 OFFICE O/P EST MOD 30 MIN: CPT | Mod: 25 | Performed by: INTERNAL MEDICINE

## 2022-05-25 PROCEDURE — 80048 BASIC METABOLIC PNL TOTAL CA: CPT | Mod: ZL | Performed by: INTERNAL MEDICINE

## 2022-05-25 PROCEDURE — G0439 PPPS, SUBSEQ VISIT: HCPCS | Performed by: INTERNAL MEDICINE

## 2022-05-25 PROCEDURE — 36415 COLL VENOUS BLD VENIPUNCTURE: CPT | Mod: ZL | Performed by: INTERNAL MEDICINE

## 2022-05-25 PROCEDURE — G0463 HOSPITAL OUTPT CLINIC VISIT: HCPCS

## 2022-05-25 RX ORDER — TORSEMIDE 10 MG/1
10 TABLET ORAL DAILY
Qty: 90 TABLET | Refills: 4 | Status: SHIPPED | OUTPATIENT
Start: 2022-05-25 | End: 2023-01-01

## 2022-05-25 RX ORDER — UREA 10 %
500 LOTION (ML) TOPICAL DAILY
Qty: 90 TABLET | Refills: 4 | Status: SHIPPED | OUTPATIENT
Start: 2022-05-25 | End: 2023-01-01

## 2022-05-25 ASSESSMENT — ENCOUNTER SYMPTOMS
WEAKNESS: 0
FEVER: 0
ABDOMINAL PAIN: 0
HEMATOCHEZIA: 0
SORE THROAT: 0
NAUSEA: 0
WOUND: 0
CONSTIPATION: 0
DIARRHEA: 1
CHILLS: 0
HEMATURIA: 0
DIZZINESS: 0
FREQUENCY: 0
COUGH: 0
BACK PAIN: 0
NERVOUS/ANXIOUS: 0
MYALGIAS: 0
BRUISES/BLEEDS EASILY: 0
PALPITATIONS: 0
PARESTHESIAS: 0
DYSURIA: 0
ARTHRALGIAS: 1
HEARTBURN: 0
CONFUSION: 1
CHEST TIGHTNESS: 0
AGITATION: 0
HEADACHES: 0
EYE PAIN: 0
JOINT SWELLING: 0
SHORTNESS OF BREATH: 0

## 2022-05-25 ASSESSMENT — ANXIETY QUESTIONNAIRES
7. FEELING AFRAID AS IF SOMETHING AWFUL MIGHT HAPPEN: NOT AT ALL
GAD7 TOTAL SCORE: 1
GAD7 TOTAL SCORE: 1
2. NOT BEING ABLE TO STOP OR CONTROL WORRYING: NOT AT ALL
5. BEING SO RESTLESS THAT IT IS HARD TO SIT STILL: NOT AT ALL
7. FEELING AFRAID AS IF SOMETHING AWFUL MIGHT HAPPEN: NOT AT ALL
8. IF YOU CHECKED OFF ANY PROBLEMS, HOW DIFFICULT HAVE THESE MADE IT FOR YOU TO DO YOUR WORK, TAKE CARE OF THINGS AT HOME, OR GET ALONG WITH OTHER PEOPLE?: NOT DIFFICULT AT ALL
4. TROUBLE RELAXING: NOT AT ALL
GAD7 TOTAL SCORE: 1
6. BECOMING EASILY ANNOYED OR IRRITABLE: SEVERAL DAYS
1. FEELING NERVOUS, ANXIOUS, OR ON EDGE: NOT AT ALL
3. WORRYING TOO MUCH ABOUT DIFFERENT THINGS: NOT AT ALL

## 2022-05-25 ASSESSMENT — PATIENT HEALTH QUESTIONNAIRE - PHQ9
10. IF YOU CHECKED OFF ANY PROBLEMS, HOW DIFFICULT HAVE THESE PROBLEMS MADE IT FOR YOU TO DO YOUR WORK, TAKE CARE OF THINGS AT HOME, OR GET ALONG WITH OTHER PEOPLE: NOT DIFFICULT AT ALL
SUM OF ALL RESPONSES TO PHQ QUESTIONS 1-9: 2
SUM OF ALL RESPONSES TO PHQ QUESTIONS 1-9: 2

## 2022-05-25 ASSESSMENT — ACTIVITIES OF DAILY LIVING (ADL): CURRENT_FUNCTION: NO ASSISTANCE NEEDED

## 2022-05-25 ASSESSMENT — PAIN SCALES - GENERAL: PAINLEVEL: NO PAIN (0)

## 2022-05-25 NOTE — TELEPHONE ENCOUNTER
Patient can not make it into today. He has diarrhea and dtr felt he should stay home.  Can not get him in for an appointment till end of July or August.  Daughter feels this is a more urgent appointment and would like to get in soon.  Please call    Penny Cassidy on 5/25/2022 at 11:58 AM

## 2022-05-25 NOTE — PROGRESS NOTES
"SUBJECTIVE:   Juan Miguel Delaney is a 89 year old male who presents for Preventive Visit.      Patient has been advised of split billing requirements and indicates understanding: No  Are you in the first 12 months of your Medicare coverage?  No    Healthy Habits:     In general, how would you rate your overall health?  Fair    Frequency of exercise:  None    Do you usually eat at least 4 servings of fruit and vegetables a day, include whole grains    & fiber and avoid regularly eating high fat or \"junk\" foods?  No    Taking medications regularly:  Yes    Medication side effects:  None    Ability to successfully perform activities of daily living:  No assistance needed    Home Safety:  No safety concerns identified    Hearing Impairment:  No hearing concerns    In the past 6 months, have you been bothered by leaking of urine?  No    In general, how would you rate your overall mental or emotional health?  Good      PHQ-2 Total Score: 0    Additional concerns today:  No    Do you feel safe in your environment? Yes    Have you ever done Advance Care Planning? (For example, a Health Directive, POLST, or a discussion with a medical provider or your loved ones about your wishes): Yes, advance care planning is on file.    Fall risk  Fallen 2 or more times in the past year?: No  Any fall with injury in the past year?: No    Cognitive Screening   1) Repeat 3 items (Leader, Season, Table)    2) Clock draw: NORMAL  3) 3 item recall: Recalls NO objects   Results: 0 items recalled: PROBABLE COGNITIVE IMPAIRMENT, **INFORM PROVIDER**    Mini-CogTM Copyright PAOLA Kenyon. Licensed by the author for use in [facility]; reprinted with permission (juanjo@.[email or web address]). All rights reserved.      Do you have sleep apnea, excessive snoring or daytime drowsiness?: no    Reviewed and updated as needed this visit by clinical staff   Tobacco  Allergies  Meds  Problems  Med Hx  Surg Hx  Fam Hx  Soc   Hx          Reviewed and updated as " needed this visit by Provider   Tobacco  Allergies  Meds  Problems  Med Hx  Surg Hx  Fam Hx           Social History     Tobacco Use     Smoking status: Never Smoker     Smokeless tobacco: Never Used   Substance Use Topics     Alcohol use: No     Review current opioid prescriptions    For a patient with a current opioid prescription:    Reviewed potential Opioid Use Disorder (OUD) risk factors: Yes    Evaluate their pain severity and current treatment plan:     Provide information on non-opioid treatment options:    Refer to a specialist, as appropriate:    Get more information on pain management in the [facility] Pain Management Best Practices Inter-agency Task Force Report    Screen for potential Substance Use Disorders (SUDs)    Reviewed the patient s potential risk factors for SUDs: Yes     Refer to treatment or specialist, as appropriate:     A screening tool isn t required but you may use one:  Find more information in the National [place] on Drug Abuse Screening and Assessment Tools Chart    Alcohol Use 5/25/2022   Prescreen: >3 drinks/day or >7 drinks/week? Not Applicable     Current providers sharing in care for this patient include:   Patient Care Team:  Jason Fernando MD as PCP - General (Internal Medicine)  Jason Fernando MD as Assigned PCP  Lilly Shepard APRN CNP as Nurse Practitioner (Family Medicine)    The following health maintenance items are reviewed in Epic and correct as of today:  Health Maintenance Due   Topic Date Due     HF ACTION PLAN  Never done     MICROALBUMIN  Never done     PARATHYROID  Never done     PHOSPHORUS  Never done     ZOSTER IMMUNIZATION (1 of 2) Never done     URINE DRUG SCREEN  03/07/2019     LIPID  01/22/2020     Review of Systems   Constitutional: Negative for chills and fever.   HENT: Positive for hearing loss. Negative for congestion.    Eyes: Negative for visual disturbance.   Respiratory: Negative for chest tightness and shortness of breath.    Cardiovascular:  "Positive for peripheral edema. Negative for chest pain.   Gastrointestinal: Positive for diarrhea (x1 today). Negative for abdominal pain.   Genitourinary: Negative for hematuria.   Musculoskeletal: Positive for gait problem (Walks with walker). Negative for back pain.   Skin: Negative for rash and wound.   Allergic/Immunologic: Negative for immunocompromised state.   Neurological: Negative for syncope.   Hematological: Does not bruise/bleed easily.   Psychiatric/Behavioral: Positive for confusion. Negative for agitation and behavioral problems (Relatively Controlled with Seroquel).        Memory loss       OBJECTIVE:   /60 (BP Location: Right arm, Patient Position: Sitting, Cuff Size: Adult Regular)   Pulse 62   Temp 98.4  F (36.9  C) (Temporal)   Resp 20   Ht 1.676 m (5' 6\")   Wt 88.8 kg (195 lb 12.8 oz)   SpO2 98%   BMI 31.60 kg/m   Estimated body mass index is 31.6 kg/m  as calculated from the following:    Height as of this encounter: 1.676 m (5' 6\").    Weight as of this encounter: 88.8 kg (195 lb 12.8 oz).  Physical Exam  Constitutional:       General: He is not in acute distress.     Appearance: He is well-developed. He is not diaphoretic.   HENT:      Head: Normocephalic and atraumatic.   Eyes:      General: No scleral icterus.     Conjunctiva/sclera: Conjunctivae normal.   Cardiovascular:      Rate and Rhythm: Normal rate and regular rhythm.   Pulmonary:      Effort: Pulmonary effort is normal.      Breath sounds: Normal breath sounds.   Abdominal:      Palpations: Abdomen is soft.      Tenderness: There is no abdominal tenderness.   Musculoskeletal:         General: No deformity.      Cervical back: Neck supple.      Right lower leg: No edema.      Left lower leg: No edema.   Lymphadenopathy:      Cervical: No cervical adenopathy.   Skin:     General: Skin is warm and dry.      Coloration: Skin is not jaundiced.      Findings: No rash.   Neurological:      Mental Status: He is alert. Mental " status is at baseline.      Comments: + Memory loss   Psychiatric:         Mood and Affect: Mood normal.         Behavior: Behavior normal.       Diagnostic Test Results:  Labs reviewed in Epic  No results found for any visits on 05/25/22.   Recent Labs   Lab Test 04/29/22  0049 08/19/21  2146 07/13/21  0920 07/13/21  0920 05/02/21  1940 08/11/20  0940 07/28/20  1100 01/22/19  0705 04/18/18  0115 06/28/17  1715 04/19/16  0208 04/12/16  1717   LDL  --   --   --   --   --   --   --  116*  --  131*  --  118*   HDL  --   --   --   --   --   --   --  39  --  43  --  37   TRIG  --   --   --   --   --   --   --  161*  --  116  --  128   ALT  --  9  --   --  14  --  13  --    < >  --   --   --    CR 1.55* 1.57*   < > 1.46* 1.57*  --  1.36*  --    < > 1.24   < > 1.28   GFRESTIMATED 43* 39*   < > 42* 42*  --  49*  --    < >  --   --   --    GFRESTBLACK  --   --   --   --  51*  --  60*  --    < > >60   < > >60   POTASSIUM 4.3 4.4   < > 3.9 4.8   < > 5.5*  --    < > 4.4   < > 4.8   TSH  --   --   --  3.28  --   --   --   --   --   --   --   --     < > = values in this interval not displayed.   Previous creatinine was 1.5 with GFR 43.  Potassium was normal.  LDL and triglycerides were high.  5/25/2022 - Metabolic panel is pending.    ASSESSMENT / PLAN:       ICD-10-CM    1. Benign essential hypertension  I10 Basic Metabolic Panel     torsemide (DEMADEX) 10 MG tablet   2. Chronic diastolic heart failure (H)  I50.32 Basic Metabolic Panel     torsemide (DEMADEX) 10 MG tablet   3. Late onset Alzheimer's disease with behavioral disturbance (H)  G30.1     F02.81    4. Mixed hyperlipidemia  E78.2    5. Stage 3b chronic kidney disease  N18.32 Basic Metabolic Panel   6. Paroxysmal A-fib (H)  I48.0    7. Vitamin B12 deficiency (non anemic)  E53.8 cyanocobalamin (VITAMIN B-12) 500 MCG tablet   8. Spinal stenosis of lumbar region with neurogenic claudication  M48.062    9. Vaccine counseling  Z71.85    10. Encounter for Medicare annual  wellness exam  Z00.00    Patient presents for Medicare annual wellness visit as well as follow-up multiple issues.    Hypertension and chronic diastolic heart failure.  Was having a lot of problems with leg edema and shortness of breath at his last appointment.  We started Eliquis due to new diagnosis of atrial fibrillation.  Torsemide was started due to edema and shortness of breath.  Blood pressure is currently well controlled.  Doing well with current medication regimen.  Needs refills.  Check labs.    Late onset Alzheimer's disease with behavioral disturbance.  Seems to be doing quite well with use of Seroquel.  Daughter is with him today and indicates that things have been doing pretty well as far as she has heard.  Does continue with Aricept.  Continue Seroquel at current dosing.    Mixed hyperlipidemia.  Cholesterol levels are elevated.  May need to consider statin therapy in the future.  Encouraged healthy diet and exercise.  Currently not taking statins due to age and comorbidities.    Stage IIIb chronic kidney disease.  Recheck creatinine and metabolic panel today.    Paroxysmal atrial fibrillation.  See above.  Heart rate is controlled.  Oral anticoagulation with Xarelto.  No changes for now.    Vitamin B12 deficiency.  Continues with B12 supplement.  Needs refills.    Lumbar spinal stenosis.  Has neurogenic claudication.  Walks with four-wheel walker with seat.  This is been quite effective for him.  He can walk a reasonable distance especially with use of his walker.  No changes for now.  Encouraged regular exercise.    Vaccine counseling completed.  Currently vaccinations are up-to-date.    Patient has been advised of split billing requirements and indicates understanding: Yes    COUNSELING:  Reviewed preventive health counseling, as reflected in patient instructions  Special attention given to:       Regular exercise       Healthy diet/nutrition       Vision screening       Hearing screening        "Dental care       Bladder control       Fall risk prevention       Immunizations    Estimated body mass index is 31.6 kg/m  as calculated from the following:    Height as of this encounter: 1.676 m (5' 6\").    Weight as of this encounter: 88.8 kg (195 lb 12.8 oz).    Weight management plan: Discussed healthy diet and exercise guidelines    He reports that he has never smoked. He has never used smokeless tobacco.      Appropriate preventive services were discussed with this patient, including applicable screening as appropriate for cardiovascular disease, diabetes, osteopenia/osteoporosis, and glaucoma.  As appropriate for age/gender, discussed screening for colorectal cancer, prostate cancer, breast cancer, and cervical cancer. Checklist reviewing preventive services available has been given to the patient.    Reviewed patients plan of care and provided an AVS. The Complex Care Plan (for patients with higher acuity and needing more deliberate coordination of services) for Juan Miguel meets the Care Plan requirement. This Care Plan has been established and reviewed with the Patient and daughter.    Counseling Resources:  ATP IV Guidelines  Pooled Cohorts Equation Calculator  Breast Cancer Risk Calculator  Breast Cancer: Medication to Reduce Risk  FRAX Risk Assessment  ICSI Preventive Guidelines  Dietary Guidelines for Americans, 2010  [employer]'s MyPlate  ASA Prophylaxis  Lung CA Screening    Jason Fernando MD  [facility]    Identified Health Risks:  Answers for HPI/ROS submitted by the patient on 5/25/2022  If you checked off any problems, how difficult have these problems made it for you to do your work, take care of things at home, or get along with other people?: Not difficult at all  PHQ9 TOTAL SCORE: 2  ELIZABETH 7 TOTAL SCORE: 1        The patient was provided with suggestions to help him develop a healthy physical lifestyle.  He is at risk for lack of exercise and has been provided with information to " increase physical activity for the benefit of his well-being.  The patient was counseled and encouraged to consider modifying their diet and eating habits. He was provided with information on recommended healthy diet options.

## 2022-05-25 NOTE — TELEPHONE ENCOUNTER
Would keep appointment today. Probably needs to get evaluated for the diarrhea anyway.     Electronically signed by:  Jason Fernando MD  on May 25, 2022 1:12 PM

## 2022-05-25 NOTE — NURSING NOTE
"Chief Complaint   Patient presents with     Medicare Wellness / [place]         Initial /60 (BP Location: Right arm, Patient Position: Sitting, Cuff Size: Adult Regular)   Pulse 62   Temp 98.4  F (36.9  C) (Temporal)   Resp 20   Ht 1.676 m (5' 6\")   Wt 88.8 kg (195 lb 12.8 oz)   SpO2 98%   BMI 31.60 kg/m   Estimated body mass index is 31.6 kg/m  as calculated from the following:    Height as of this encounter: 1.676 m (5' 6\").    Weight as of this encounter: 88.8 kg (195 lb 12.8 oz).       FOOD SECURITY SCREENING QUESTIONS:    The next two questions are to help us understand your food security.  If you are feeling you need any assistance in this area, we have resources available to support you today.    Hunger Vital Signs:  Within the past 12 months we worried whether our food would run out before we got money to buy more. Never  Within the past 12 months the food we bought just didn't last and we didn't have money to get more. Never    Advance Care Directive on file? yes      Medication reconciliation complete.      Julián Thayer,on 5/25/2022 at 2:16 PM        "

## 2022-05-25 NOTE — PATIENT INSTRUCTIONS
Glad you are doing better!    Blood pressure is well controlled.   Medications refilled.   Labs are pending.       Return as needed for follow-up with Dr. Fernando.    Clinic : [phone]  Appointment line: [phone]   Patient Education   Personalized Prevention Plan  You are due for the preventive services outlined below.  Your care team is available to assist you in scheduling these services.  If you have already completed any of these items, please share that information with your care team to update in your medical record.  Health Maintenance Due   Topic Date Due     Heart Failure Action Plan  Never done     Kidney Microalbumin Urine Test  Never done     Parathyroid Lab  Never done     Phosphorous Lab  Never done     Zoster (Shingles) Vaccine (1 of 2) Never done     URINE DRUG SCREEN  03/07/2019     Cholesterol Lab  01/22/2020     Your Health Risk Assessment indicates you feel you are not in good health    A healthy lifestyle helps keep the body fit and the mind alert. It helps protect you from disease, helps you fight disease, and helps prevent chronic disease (disease that doesn't go away) from getting worse. This is important as you get older and begin to notice twinges in muscles and joints and a decline in the strength and stamina you once took for granted. A healthy lifestyle includes good healthcare, good nutrition, weight control, recreation, and regular exercise. Avoid harmful substances and do what you can to keep safe. Another part of a healthy lifestyle is stay mentally active and socially involved.    Good healthcare     Have a wellness visit every year.     If you have new symptoms, let us know right away. Don't wait until the next checkup.     Take medicines exactly as prescribed and keep your medicines in a safe place. Tell us if your medicine causes problems.   Healthy diet and weight control     Eat 3 or 4 small, nutritious, low-fat, high-fiber meals a day. Include a variety of  fruits, vegetables, and whole-grain foods.     Make sure you get enough calcium in your diet. Calcium, vitamin D, and exercise help prevent osteoporosis (bone thinning).     If you live alone, try eating with others when you can. That way you get a good meal and have company while you eat it.     Try to keep a healthy weight. If you eat more calories than your body uses for energy, it will be stored as fat and you will gain weight.     Recreation   Recreation is not limited to sports and team events. It includes any activity that provides relaxation, interest, enjoyment, and exercise. Recreation provides an outlet for physical, mental, and social energy. It can give a sense of worth and achievement. It can help you stay healthy.    Mental Exercise and Social Involvement  Mental and emotional health is as important as physical health. Keep in touch with friends and family. Stay as active as possible. Continue to learn and challenge yourself.   Things you can do to stay mentally active are:    Learn something new, like a foreign language or musical instrument.     Play SCRABBLE or do crossword puzzles. If you cannot find people to play these games with you at home, you can play them with others on your computer through the Internet.     Join a games club--anything from card games to chess or checkers or lawn bowling.     Start a new hobby.     Go back to school.     Volunteer.     Read.   Keep up with world events.    Exercise for a Healthier Heart  You may wonder how you can improve the health of your heart. If you re thinking about exercise, you re on the right track. You don t need to become an athlete. But you do need a certain amount of brisk exercise to help strengthen your heart. If you have been diagnosed with a heart condition, your healthcare provider may advise exercise to help stabilize your condition. To help make exercise a habit, choose safe, fun activities.      Exercise with a friend. When activity is  fun, you're more likely to stick with it.   Before you start  Check with your healthcare provider before starting an exercise program. This is especially important if you have not been active for a while. It's also important if you have a long-term (chronic) health problem such as heart disease, diabetes, or obesity. Or if you are at high risk for having these problems.   Why exercise?  Exercising regularly offers many healthy rewards. It can help you do all of the following:     Improve your blood cholesterol level to help prevent further heart trouble    Lower your blood pressure to help prevent a stroke or heart attack    Control diabetes, or reduce your risk of getting this disease    Improve your heart and lung function    Reach and stay at a healthy weight    Make your muscles stronger so you can stay active    Prevent falls and fractures by slowing the loss of bone mass (osteoporosis)    Manage stress better    Reduce your blood pressure    Improve your sense of self and your body image  Exercise tips      Ease into your routine. Set small goals. Then build on them. If you are not sure what your activity level should be, talk with your healthcare provider first before starting an exercise routine.    Exercise on most days. Aim for a total of 150 minutes (2 hours and 30 minutes) or more of moderate-intensity aerobic activity each week. Or 75 minutes (1 hour and 15 minutes) or more of vigorous-intensity aerobic activity each week. Or try for a combination of both. Moderate activity means that you breathe heavier and your heart rate increases but you can still talk. Think about doing 40 minutes of moderate exercise, 3 to 4 times a week. For best results, activity should last for about 40 minutes to lower blood pressure and cholesterol. It's OK to work up to the 40-minute period over time. Examples of moderate-intensity activity are walking 1 mile in 15 minutes. Or doing 30 to 45 minutes of yard work.    Step up  your daily activity level.  Along with your exercise program, try being more active the whole day. Walk instead of drive. Or park further away so that you take more steps each day. Do more household tasks or yard work. You may not be able to meet the advised mount of physical activity. But doing some moderate- or vigorous-intensity aerobic activity can help reduce your risk for heart disease. Your healthcare provider can help you figure out what is best for you.    Choose 1 or more activities you enjoy.  Walking is one of the easiest things you can do. You can also try swimming, riding a bike, dancing, or taking an exercise class.    When to call your healthcare provider  Call your healthcare provider if you have any of these:     Chest pain or feel dizzy or lightheaded    Burning, tightness, pressure, or heaviness in your chest, neck, shoulders, back, or arms    Abnormal shortness of breath    More joint or muscle pain    A very fast or irregular heartbeat (palpitations)  [employer] last reviewed this educational content on 7/1/2019 2000-2021 [employer]. All rights reserved. This information is not intended as a substitute for professional medical care. Always follow your healthcare professional's instructions.          Understanding USDA MyPlate  The USDA has guidelines to help you make healthy food choices. These are called MyPlate. MyPlate shows the food groups that make up healthy meals using the image of a place setting. Before you eat, think about the healthiest choices for what to put on your plate or in your cup or bowl. To learn more about building a healthy plate, visit www.choosemyplate.gov.    The food groups    Fruits. Any fruit or 100% fruit juice counts as part of the Fruit Group. Fruits may be fresh, canned, frozen, or dried, and may be whole, cut-up, or pureed. Make 1/2 of your plate fruits and vegetables.    Vegetables. Any vegetable or 100% vegetable juice counts as a member of  the Vegetable Group. Vegetables may be fresh, frozen, canned, or dried. They can be served raw or cooked and may be whole, cut-up, or mashed. Make 1/2 of your plate fruits and vegetables.    Grains. All foods made from grains are part of the Grains Group. These include wheat, rice, oats, cornmeal, and barley. Grains are often used to make foods such as bread, pasta, oatmeal, cereal, tortillas, and grits. Grains should be no more than 1/4 of your plate. At least half of your grains should be whole grains.    Protein. This group includes meat, poultry, seafood, beans and peas, eggs, processed soy products (such as tofu), nuts (including nut butters), and seeds. Make protein choices no more than 1/4 of your plate. Meat and poultry choices should be lean or low fat.    Dairy. The Dairy Group includes all fluid milk products and foods made from milk that contain calcium, such as yogurt and cheese. (Foods that have little calcium, such as cream, butter, and cream cheese, are not part of this group.) Most dairy choices should be low-fat or fat-free.    Oils. Oils aren't a food group, but they do contain essential nutrients. However it's important to watch your intake of oils. These are fats that are liquid at room temperature. They include canola, corn, olive, soybean, vegetable, and sunflower oil. Foods that are mainly oil include mayonnaise, certain salad dressings, and soft margarines. You likely already get your daily oil allowance from the foods you eat.  Things to limit  Eating healthy also means limiting these things in your diet:       Salt (sodium). Many processed foods have a lot of sodium. To keep sodium intake down, eat fresh vegetables, meats, poultry, and seafood when possible. Purchase low-sodium, reduced-sodium, or no-salt-added food products at the store. And don't add salt to your meals at home. Instead, season them with herbs and spices such as dill, oregano, cumin, and paprika. Or try adding flavor with  lemon or lime zest and juice.    Saturated fat. Saturated fats are most often found in animal products such as beef, pork, and chicken. They are often solid at room temperature, such as butter. To reduce your saturated fat intake, choose leaner cuts of meat and poultry. And try healthier cooking methods such as grilling, broiling, roasting, or baking. For a simple lower-fat swap, use plain nonfat yogurt instead of mayonnaise when making potato salad or macaroni salad.    Added sugars. These are sugars added to foods. They are in foods such as ice cream, candy, soda, fruit drinks, sports drinks, energy drinks, cookies, pastries, jams, and syrups. Cut down on added sugars by sharing sweet treats with a family member or friend. You can also choose fruit for dessert, and drink water or other unsweetened beverages.     [employer] last reviewed this educational content on 6/1/2020 2000-2021 [employer]. All rights reserved. This information is not intended as a substitute for professional medical care. Always follow your healthcare professional's instructions.

## 2022-05-25 NOTE — TELEPHONE ENCOUNTER
Patients daughter states she'll tell him he has to come in.     Moises Daniels, LPN on 5/25/2022 at 1:15 PM

## 2022-06-21 ENCOUNTER — NURSING HOME VISIT (OUTPATIENT)
Dept: GERIATRICS | Facility: OTHER | Age: 87
End: 2022-06-21
Attending: NURSE PRACTITIONER
Payer: MEDICARE

## 2022-06-21 DIAGNOSIS — Z53.9 ERRONEOUS ENCOUNTER--DISREGARD: Primary | ICD-10-CM

## 2022-06-28 ENCOUNTER — LAB REQUISITION (OUTPATIENT)
Dept: LAB | Facility: OTHER | Age: 87
End: 2022-06-28
Payer: MEDICARE

## 2022-06-28 ENCOUNTER — NURSING HOME VISIT (OUTPATIENT)
Dept: GERIATRICS | Facility: OTHER | Age: 87
End: 2022-06-28
Attending: NURSE PRACTITIONER
Payer: MEDICARE

## 2022-06-28 DIAGNOSIS — Z53.9 ERRONEOUS ENCOUNTER--DISREGARD: Primary | ICD-10-CM

## 2022-06-28 DIAGNOSIS — N18.9 CHRONIC KIDNEY DISEASE, UNSPECIFIED: ICD-10-CM

## 2022-06-28 DIAGNOSIS — I50.9 HEART FAILURE, UNSPECIFIED (H): ICD-10-CM

## 2022-06-28 LAB
ANION GAP SERPL CALCULATED.3IONS-SCNC: 11 MMOL/L (ref 3–14)
BASOPHILS # BLD AUTO: 0.1 10E3/UL (ref 0–0.2)
BASOPHILS NFR BLD AUTO: 1 %
BUN SERPL-MCNC: 25 MG/DL (ref 7–25)
CALCIUM SERPL-MCNC: 8.8 MG/DL (ref 8.6–10.3)
CHLORIDE BLD-SCNC: 103 MMOL/L (ref 98–107)
CO2 SERPL-SCNC: 25 MMOL/L (ref 21–31)
CREAT SERPL-MCNC: 1.69 MG/DL (ref 0.7–1.3)
EOSINOPHIL # BLD AUTO: 0.2 10E3/UL (ref 0–0.7)
EOSINOPHIL NFR BLD AUTO: 3 %
ERYTHROCYTE [DISTWIDTH] IN BLOOD BY AUTOMATED COUNT: 13.4 % (ref 10–15)
GFR SERPL CREATININE-BSD FRML MDRD: 38 ML/MIN/1.73M2
GLUCOSE BLD-MCNC: 143 MG/DL (ref 70–105)
HCT VFR BLD AUTO: 42.7 % (ref 40–53)
HGB BLD-MCNC: 14.1 G/DL (ref 13.3–17.7)
IMM GRANULOCYTES # BLD: 0 10E3/UL
IMM GRANULOCYTES NFR BLD: 0 %
LYMPHOCYTES # BLD AUTO: 0.9 10E3/UL (ref 0.8–5.3)
LYMPHOCYTES NFR BLD AUTO: 13 %
MCH RBC QN AUTO: 28.8 PG (ref 26.5–33)
MCHC RBC AUTO-ENTMCNC: 33 G/DL (ref 31.5–36.5)
MCV RBC AUTO: 87 FL (ref 78–100)
MONOCYTES # BLD AUTO: 0.5 10E3/UL (ref 0–1.3)
MONOCYTES NFR BLD AUTO: 8 %
NEUTROPHILS # BLD AUTO: 4.8 10E3/UL (ref 1.6–8.3)
NEUTROPHILS NFR BLD AUTO: 75 %
NRBC # BLD AUTO: 0 10E3/UL
NRBC BLD AUTO-RTO: 0 /100
PLATELET # BLD AUTO: 225 10E3/UL (ref 150–450)
POTASSIUM BLD-SCNC: 4.1 MMOL/L (ref 3.5–5.1)
RBC # BLD AUTO: 4.89 10E6/UL (ref 4.4–5.9)
SODIUM SERPL-SCNC: 139 MMOL/L (ref 134–144)
WBC # BLD AUTO: 6.4 10E3/UL (ref 4–11)

## 2022-06-28 PROCEDURE — 85025 COMPLETE CBC W/AUTO DIFF WBC: CPT | Performed by: NURSE PRACTITIONER

## 2022-06-28 PROCEDURE — 36415 COLL VENOUS BLD VENIPUNCTURE: CPT | Performed by: NURSE PRACTITIONER

## 2022-06-28 PROCEDURE — 80048 BASIC METABOLIC PNL TOTAL CA: CPT | Performed by: NURSE PRACTITIONER

## 2022-07-05 ENCOUNTER — NURSING HOME VISIT (OUTPATIENT)
Dept: GERIATRICS | Facility: OTHER | Age: 87
End: 2022-07-05
Attending: NURSE PRACTITIONER
Payer: MEDICARE

## 2022-07-05 DIAGNOSIS — Z53.9 ERRONEOUS ENCOUNTER--DISREGARD: Primary | ICD-10-CM

## 2022-07-19 NOTE — ED NOTES
The patient is Stable - Low risk of patient condition declining or worsening    Shift Goals  Clinical Goals: pain management/comfort  Patient Goals: pain management  Family Goals: parveen    Progress made toward(s) clinical / shift goals:        Problem: Pain - Standard  Goal: Alleviation of pain or a reduction in pain to the patient’s comfort goal  Outcome: Progressing     Problem: Knowledge Deficit - Standard  Goal: Patient and family/care givers will demonstrate understanding of plan of care, disease process/condition, diagnostic tests and medications  Outcome: Progressing     Problem: Skin Integrity  Goal: Skin integrity is maintained or improved  Outcome: Progressing     Problem: Fall Risk  Goal: Patient will remain free from falls  Outcome: Progressing       Patient is not progressing towards the following goals:       [facility]rifty white to sent prescriptions to [facility] tonight

## 2022-07-26 ENCOUNTER — NURSING HOME VISIT (OUTPATIENT)
Dept: GERIATRICS | Facility: OTHER | Age: 87
End: 2022-07-26
Attending: NURSE PRACTITIONER
Payer: COMMERCIAL

## 2022-07-26 DIAGNOSIS — M16.12 PRIMARY OSTEOARTHRITIS OF LEFT HIP: Primary | ICD-10-CM

## 2022-07-26 DIAGNOSIS — M25.552 HIP PAIN, LEFT: ICD-10-CM

## 2022-07-26 DIAGNOSIS — I51.89 GRADE II DIASTOLIC DYSFUNCTION: ICD-10-CM

## 2022-07-26 DIAGNOSIS — G30.1 LATE ONSET ALZHEIMER'S DISEASE WITH BEHAVIORAL DISTURBANCE (H): ICD-10-CM

## 2022-07-26 DIAGNOSIS — F02.818 LATE ONSET ALZHEIMER'S DISEASE WITH BEHAVIORAL DISTURBANCE (H): ICD-10-CM

## 2022-07-26 DIAGNOSIS — I48.0 PAROXYSMAL A-FIB (H): ICD-10-CM

## 2022-07-26 DIAGNOSIS — Z79.899 ENCOUNTER FOR MEDICATION REVIEW: ICD-10-CM

## 2022-07-26 DIAGNOSIS — N18.32 STAGE 3B CHRONIC KIDNEY DISEASE (H): ICD-10-CM

## 2022-07-26 DIAGNOSIS — Z79.01 LONG TERM CURRENT USE OF ANTICOAGULANT THERAPY: ICD-10-CM

## 2022-07-26 RX ORDER — NAPROXEN 250 MG/1
250 TABLET ORAL 2 TIMES DAILY WITH MEALS
Qty: 28 TABLET | Refills: 0 | Status: SHIPPED | OUTPATIENT
Start: 2022-07-26 | End: 2022-08-23

## 2022-08-08 ENCOUNTER — HOSPITAL ENCOUNTER (OUTPATIENT)
Dept: GENERAL RADIOLOGY | Facility: OTHER | Age: 87
Discharge: HOME | End: 2022-08-08
Attending: NURSE PRACTITIONER | Admitting: NURSE PRACTITIONER
Payer: MEDICARE

## 2022-08-08 DIAGNOSIS — M25.552 HIP PAIN, LEFT: ICD-10-CM

## 2022-08-08 DIAGNOSIS — M16.12 PRIMARY OSTEOARTHRITIS OF LEFT HIP: ICD-10-CM

## 2022-08-08 PROCEDURE — 250N000009 HC RX 250: Performed by: RADIOLOGY

## 2022-08-08 PROCEDURE — 250N000011 HC RX IP 250 OP 636: Performed by: RADIOLOGY

## 2022-08-08 PROCEDURE — 255N000002 HC RX 255 OP 636: Performed by: RADIOLOGY

## 2022-08-08 PROCEDURE — 77002 NEEDLE LOCALIZATION BY XRAY: CPT

## 2022-08-08 RX ORDER — BUPIVACAINE HYDROCHLORIDE 5 MG/ML
3 INJECTION, SOLUTION PERINEURAL ONCE
Status: COMPLETED | OUTPATIENT
Start: 2022-08-08 | End: 2022-08-08

## 2022-08-08 RX ORDER — LIDOCAINE HYDROCHLORIDE 10 MG/ML
1 INJECTION, SOLUTION INFILTRATION; PERINEURAL ONCE
Status: COMPLETED | OUTPATIENT
Start: 2022-08-08 | End: 2022-08-08

## 2022-08-08 RX ORDER — TRIAMCINOLONE ACETONIDE 40 MG/ML
1 INJECTION, SUSPENSION INTRA-ARTICULAR; INTRAMUSCULAR ONCE
Status: COMPLETED | OUTPATIENT
Start: 2022-08-08 | End: 2022-08-08

## 2022-08-08 RX ADMIN — LIDOCAINE HYDROCHLORIDE 1 ML: 10 INJECTION, SOLUTION INFILTRATION; PERINEURAL at 15:48

## 2022-08-08 RX ADMIN — BUPIVACAINE HYDROCHLORIDE 15 MG: 5 INJECTION, SOLUTION EPIDURAL; INTRACAUDAL; PERINEURAL at 15:48

## 2022-08-08 RX ADMIN — TRIAMCINOLONE ACETONIDE 40 MG: 40 INJECTION, SUSPENSION INTRA-ARTICULAR; INTRAMUSCULAR at 15:48

## 2022-08-08 RX ADMIN — IOHEXOL 1 ML: 240 INJECTION, SOLUTION INTRATHECAL; INTRAVASCULAR; INTRAVENOUS; ORAL at 15:47

## 2022-08-23 ENCOUNTER — NURSING HOME VISIT (OUTPATIENT)
Dept: GERIATRICS | Facility: OTHER | Age: 87
End: 2022-08-23
Attending: NURSE PRACTITIONER
Payer: COMMERCIAL

## 2022-08-23 DIAGNOSIS — M16.12 PRIMARY OSTEOARTHRITIS OF LEFT HIP: ICD-10-CM

## 2022-08-23 DIAGNOSIS — M25.552 HIP PAIN, LEFT: ICD-10-CM

## 2022-08-23 RX ORDER — NAPROXEN 250 MG/1
250 TABLET ORAL 2 TIMES DAILY WITH MEALS
Qty: 28 TABLET | Refills: 0 | Status: SHIPPED | OUTPATIENT
Start: 2022-08-23 | End: 2022-09-13

## 2022-08-31 DIAGNOSIS — K64.4 EXTERNAL HEMORRHOIDS: ICD-10-CM

## 2022-08-31 DIAGNOSIS — K59.00 CONSTIPATION, UNSPECIFIED CONSTIPATION TYPE: Primary | ICD-10-CM

## 2022-08-31 RX ORDER — SENNOSIDES 8.6 MG
TABLET ORAL
Qty: 60 TABLET | Refills: 3 | Status: SHIPPED | OUTPATIENT
Start: 2022-08-31 | End: 2022-10-04

## 2022-10-03 ENCOUNTER — MYC MEDICAL ADVICE (OUTPATIENT)
Dept: GERIATRICS | Facility: OTHER | Age: 87
End: 2022-10-03

## 2022-10-04 ENCOUNTER — NURSING HOME VISIT (OUTPATIENT)
Dept: GERIATRICS | Facility: OTHER | Age: 87
End: 2022-10-04
Attending: NURSE PRACTITIONER
Payer: COMMERCIAL

## 2022-10-04 VITALS
OXYGEN SATURATION: 96 % | WEIGHT: 187 LBS | RESPIRATION RATE: 16 BRPM | DIASTOLIC BLOOD PRESSURE: 77 MMHG | SYSTOLIC BLOOD PRESSURE: 121 MMHG | HEART RATE: 72 BPM | TEMPERATURE: 98.1 F | BODY MASS INDEX: 30.18 KG/M2

## 2022-10-04 DIAGNOSIS — M25.552 HIP PAIN, LEFT: Primary | ICD-10-CM

## 2022-10-04 DIAGNOSIS — K64.4 EXTERNAL HEMORRHOIDS: ICD-10-CM

## 2022-10-04 DIAGNOSIS — Z78.9 LIVING IN ASSISTED LIVING: ICD-10-CM

## 2022-10-04 DIAGNOSIS — K59.00 CONSTIPATION, UNSPECIFIED CONSTIPATION TYPE: ICD-10-CM

## 2022-10-04 DIAGNOSIS — M16.12 PRIMARY OSTEOARTHRITIS OF LEFT HIP: ICD-10-CM

## 2022-10-04 DIAGNOSIS — R19.5 LOOSE STOOLS: ICD-10-CM

## 2022-10-04 RX ORDER — LIDOCAINE 50 MG/G
1 PATCH TOPICAL EVERY 24 HOURS
Qty: 30 PATCH | Refills: 11 | Status: SHIPPED | OUTPATIENT
Start: 2022-10-04 | End: 2022-10-19

## 2022-10-04 RX ORDER — SENNOSIDES 8.6 MG
1 TABLET ORAL DAILY
Qty: 60 TABLET | Refills: 4 | Status: SHIPPED | OUTPATIENT
Start: 2022-10-04 | End: 2023-01-01

## 2022-10-04 ASSESSMENT — ENCOUNTER SYMPTOMS
CONFUSION: 1
HEADACHES: 0
NUMBNESS: 0
RECTAL PAIN: 0
ARTHRALGIAS: 1
DIZZINESS: 1
DYSURIA: 0
HEMATURIA: 0
CONSTITUTIONAL NEGATIVE: 1
BRUISES/BLEEDS EASILY: 1
LIGHT-HEADEDNESS: 0
ANAL BLEEDING: 0
CARDIOVASCULAR NEGATIVE: 1
HEMATOCHEZIA: 0
AGITATION: 1
PARESTHESIAS: 0
RESPIRATORY NEGATIVE: 1
WEAKNESS: 1
ABDOMINAL PAIN: 0
TREMORS: 0

## 2022-10-04 NOTE — PATIENT INSTRUCTIONS
Diet:  Water and clear liquids are important so you don't get dehydrated. Drink small amounts at a time. Don't guzzle it down. If you are very dehydrated, sports drinks aren't a good choice. They have too much sugar and not enough electrolytes. In this case, commercially available products called oral rehydration solutions are best.  Caffeine, tobacco, and alcohol can make the diarrhea, cramping, and pain worse. Try to stop using these until you are fully recovered.  Don't force yourself to eat, especially if you have cramping, vomiting, or diarrhea. Don't eat large amounts at a time, even if you are hungry. It may make you feel worse.  If you eat, don't have fatty, greasy, spicy, or fried foods.  Don't have any dairy products, as they can make diarrhea worse.  During the first 24 hours (the first full day) follow the diet below:   Drinks: Water, clear liquids, soft drinks without caffeine; ginger ale, mineral water (plain or flavored), decaffeinated tea and coffee  Soups: Clear broth, consommé, and bouillon  Desserts: Plain gelatin, ice pops, and fruit juice bars  During the next 24 hours (the second day) you may add these to the above if you are feeling better:   Hot cereal, plain toast, bread, rolls, crackers  Plain noodles, rice, mashed potatoes, chicken noodle or rice soup  Unsweetened canned fruit such as applesauce and bananas (not pineapple and citrus)  Limit fat intake to less than 15 grams per day. Don't eat margarine, butter, oils, mayonnaise, sauces, gravies, fried foods, peanut butter, meat, poultry, and fish.  Limit fiber. Don't eat raw or cooked vegetables, fresh fruits (except bananas), and bran cereals.  Limit caffeine and chocolate. No spices or seasonings except salt.  During the next 24 hours:  Slowly go back to a normal diet, as you feel better and your symptoms ease.  If at any time the diarrhea or cramping gets worse, go back to the simpler diet (above) or to clear liquids.

## 2022-10-04 NOTE — PROGRESS NOTES
Juan Miguel Delaney is a 89 year old male being seen today for acute and follow up visit visit at [facility].    Assessment & Plan       ICD-10-CM    1. Hip pain, left  M25.552 lidocaine (LIDODERM) 5 % patch   2. Primary osteoarthritis of left hip  M16.12 lidocaine (LIDODERM) 5 % patch   3. Living in assisted living  Z59.3    4. Loose stools  R19.5    5. Constipation, unspecified constipation type  K59.00 sennosides (SENOKOT) 8.6 MG tablet   6. External hemorrhoids  K64.4 sennosides (SENOKOT) 8.6 MG tablet     [facility] staff is requesting a follow-up appointment for residents continued complaints of left hip pain. Has history of osteoarthritis and bursitis of left hip. He has worked with PT, received corticosteroid injections, most recent being in August 2022. He was started on naproxen twice daily with meals 9/13/22, this has not been helping much per staff.   He would be able to get an injection again in November. I think a left hip SI injection would be beneficial. He is open to trying a 5% Lidoderm patch to help with pain as well.  After talking with the pharmacy, a prior authorization is needed. I called daughter to discuss whether she would be able to get some 4% lidocaine patches OTC while awaiting prior authorization. Message left on her VM.     During our visit today, Juan Miguel brought up that he has been having loose stools intermittently. He is taking senna, 1 tablet BID. The order does specifically state hold for loose stools, but staff has not been holding. Reiterated to staff to hold the senna for loose stools. Will decrease senna dose to 1 tablet, once daily. If patient is experiencing more episodes of constipation will reassess dose.     Discussed signs/ symptoms that would warrant urgent/ emergent follow-up. Patient in agreement with plan. All questions and concerns addressed this visit.           Return if symptoms worsen or fail to improve, for Return as needed for new or worsening symptoms.    Nesha  RONALDO [facility]     Code Status: DNR / DNI, Advance Directives: YES.   Health Care Power of : Extended Emergency Contact Information  Primary Emergency Contact: Criss Delaney   [facility]  Home Phone: [phone]  Relation: Spouse  Secondary Emergency Contact: Kavita Hassan   [facility]  Home Phone: [phone]  Mobile Phone: [phone]  Relation: Daughter     Allergies: Metoprolol, Penicillins, Celecoxib, Ibuprofen, Lisinopril, Naprosyn [naproxen], and Rofecoxib     Past Medical, Surgical, Family and Social History reviewed: YES.     Medications:   Current Outpatient Medications   Medication Sig Dispense Refill     lidocaine (LIDODERM) 5 % patch Place 1 patch onto the skin every 24 hours for 360 days To prevent lidocaine toxicity, patient should be patch free for 12 hrs daily. 30 patch 11     sennosides (SENOKOT) 8.6 MG tablet Take 1 tablet by mouth daily 1 tablet, once daily. HOLD FOR LOOSE STOOLS 60 tablet 4     ACETAMINOPHEN EXTRA STRENGTH 500 MG tablet TAKE 2 TABLETS (1,000 MG) BY MOUTH 2 TIMES DAILY 120 tablet 10     amLODIPine (NORVASC) 5 MG tablet Take 2 tablets (10 mg) by mouth daily 180 tablet 4     cyanocobalamin (VITAMIN B-12) 500 MCG tablet Take 1 tablet (500 mcg) by mouth daily 90 tablet 4     donepezil (ARICEPT) 10 MG ODT DISSOLVE 1 TAB BY MOUTH ONCE DAILY 90 tablet 3     naproxen (NAPROSYN) 250 MG tablet TAKE 1 TABLET (250 MG) BY MOUTH 2 TIMES DAILY (WITH MEALS) 60 tablet 11     nitroGLYcerin (NITROSTAT) 0.4 MG sublingual tablet Place 1 tablet (0.4 mg) under the tongue every 5 minutes as needed for chest pain (CALL 911 IF NOT IMPROVED AFTER THREE CONSECUTIVE DOSES) 25 tablet 0     QUEtiapine (SEROQUEL) 25 MG tablet Take 1 tablet (25 mg) by mouth 3 times daily 90 tablet 11     QUEtiapine (SEROQUEL) 25 MG tablet Take 25 mg by mouth daily as needed (AGITATION)       rivaroxaban ANTICOAGULANT (XARELTO) 15 MG TABS tablet Take 1 tablet (15 mg) by  mouth daily (with dinner) 90 tablet 3     torsemide (DEMADEX) 10 MG tablet Take 1 tablet (10 mg) by mouth daily - for Edema 90 tablet 4       HPI  Review of Systems   Constitutional: Negative.    HENT: Negative.    Respiratory: Negative.    Cardiovascular: Negative.         Irregular heartbeat, a- fib   Gastrointestinal: Negative for abdominal pain, anal bleeding, hematochezia and rectal pain.        History of hemorrhoids, uses TUCKS as needed     Genitourinary: Negative for dysuria and hematuria.   Musculoskeletal: Positive for arthralgias and gait problem.   Skin: Negative for rash.   Neurological: Positive for dizziness and weakness. Negative for tremors, syncope, light-headedness, numbness, headaches and paresthesias.   Hematological: Bruises/bleeds easily.   Psychiatric/Behavioral: Positive for agitation, behavioral problems and confusion.       Toileting:    Continent of Bowel: Yes   Continent of Bladder: Yes  Mobility: walker    Recent Labs: None    Pertinent Screening Tool results: None    Current Therapies: none    Physical Exam  Vitals and nursing note reviewed.   Cardiovascular:      Rate and Rhythm: Normal rate and regular rhythm.   Pulmonary:      Effort: Pulmonary effort is normal.   Musculoskeletal:         General: Tenderness present.      Cervical back: Normal range of motion.      Right hip: Normal.      Left hip: Tenderness present. Decreased range of motion.   Skin:     General: Skin is warm and dry.      Capillary Refill: Capillary refill takes less than 2 seconds.   Neurological:      Mental Status: Mental status is at baseline.   Psychiatric:         Mood and Affect: Mood normal.         Behavior: Behavior normal.

## 2022-10-05 ENCOUNTER — TELEPHONE (OUTPATIENT)
Dept: FAMILY MEDICINE | Facility: OTHER | Age: 87
End: 2022-10-05

## 2022-10-05 DIAGNOSIS — M16.12 PRIMARY OSTEOARTHRITIS OF LEFT HIP: Primary | ICD-10-CM

## 2022-10-05 DIAGNOSIS — M25.552 HIP PAIN, LEFT: ICD-10-CM

## 2022-10-05 RX ORDER — LIDOCAINE 4 G/G
1 PATCH TOPICAL EVERY 24 HOURS
Qty: 31 PATCH | Refills: 0 | Status: SHIPPED | OUTPATIENT
Start: 2022-10-05 | End: 2022-11-15

## 2022-10-05 NOTE — TELEPHONE ENCOUNTER
Telephone conversation with Chandrika VENTURA--- had questions about my chart notifications on assisted living visits--apparently the staff does not inform the family ahead of time and she was also wondering about the fact that they are scheduled at 3 AM?    I explained the scheduling issue and that the visits are not at 3 AM but need to be scheduled at that time in the system so they do not conflict with clinic schedules in epic.    We also talked about the pain and that the hip pain has been a source of irritation and frustration for Juan Miguel [place] sent a prescription for Lidoderm patches 5%--however this is under PA consideration    Daughter Chandrika would like me to send a prescription for 4% over-the-counter lidocaine patches to try for hip pain for a month--nursing staff can administer    I will see him in follow-up next week  Chandrika would like to try to avoid tramadol as her father had issues with increased confusion in the past however he was living at home at the time and may have not been taking it as directed  He has been on scheduled Tylenol and is currently on scheduled Naprosyn    We discussed a sports medicine orthopedic appointment for pain management of his arthritis hip pain--- patient has had physical therapy a couple of different times    Chandrika will consider those options and I will [place] back with her next week    Lilly Shepard, APRN CNP   October 5, 2022

## 2022-10-11 ENCOUNTER — NURSING HOME VISIT (OUTPATIENT)
Dept: GERIATRICS | Facility: OTHER | Age: 87
End: 2022-10-11
Attending: NURSE PRACTITIONER
Payer: COMMERCIAL

## 2022-10-11 VITALS
DIASTOLIC BLOOD PRESSURE: 87 MMHG | TEMPERATURE: 98 F | HEART RATE: 57 BPM | WEIGHT: 196 LBS | RESPIRATION RATE: 18 BRPM | SYSTOLIC BLOOD PRESSURE: 128 MMHG | OXYGEN SATURATION: 92 % | BODY MASS INDEX: 31.64 KG/M2

## 2022-10-11 DIAGNOSIS — T50.905A ADVERSE EFFECT OF DRUG, INITIAL ENCOUNTER: ICD-10-CM

## 2022-10-11 DIAGNOSIS — K62.5 RECTAL BLEEDING: Primary | ICD-10-CM

## 2022-10-11 DIAGNOSIS — M25.552 HIP PAIN, LEFT: ICD-10-CM

## 2022-10-11 DIAGNOSIS — T88.7XXA MEDICATION SIDE EFFECTS: ICD-10-CM

## 2022-10-11 DIAGNOSIS — F02.818 LATE ONSET ALZHEIMER'S DISEASE WITH BEHAVIORAL DISTURBANCE (H): ICD-10-CM

## 2022-10-11 DIAGNOSIS — Z78.9 LIVING IN ASSISTED LIVING: ICD-10-CM

## 2022-10-11 DIAGNOSIS — G30.1 LATE ONSET ALZHEIMER'S DISEASE WITH BEHAVIORAL DISTURBANCE (H): ICD-10-CM

## 2022-10-11 DIAGNOSIS — I48.0 PAROXYSMAL A-FIB (H): ICD-10-CM

## 2022-10-11 RX ORDER — PANTOPRAZOLE SODIUM 40 MG/1
40 TABLET, DELAYED RELEASE ORAL DAILY
Qty: 90 TABLET | Refills: 3 | Status: SHIPPED | OUTPATIENT
Start: 2022-10-11 | End: 2023-01-01

## 2022-10-11 NOTE — PROGRESS NOTES
Juan Miguel Delaney is a 89 year old male being seen today for acute and follow up visit visit at [facility].    Code Status: DNR / DNI, Advance Directives: YES-.   Health Care Power of : Extended Emergency Contact Information  Primary Emergency Contact: Criss Delaney   [facility]  Home Phone: [phone]  Relation: Spouse  Secondary Emergency Contact: Kavita Hassan   [facility]  Home Phone: [phone]  Mobile Phone: [phone]  Relation: Daughter     Allergies: Metoprolol, Penicillins, Celecoxib, Ibuprofen, Lisinopril, Naprosyn [naproxen], and Rofecoxib     Chief Complaint / HPI: Follow-up with resident per staff for bright red blood found in toilet, wife reports has found bright red toilet paper in waste paper basket in the bathroom.  Resident does have a past history of hemorrhoids denies feeling hemorrhoids are bothersome, denies any pain with bowel movements, denies any constipation--recently put on senna once daily with hold orders for loose stools  He denies constipation or diarrhea however resident unable to provide complete history due to dementia--wife Tatyana able to provide some feedback--notices blood spots in underwear and blood in the toilet after bowel movements.  Resident has been on naproxen for left hip pain that is not been responsive to scheduled Tylenol, on Xarelto for A. fib prophylaxis.  Resident denies any abdominal pain, appetite has been good and at baseline--wife denies any other concerns  Except left hip pain--had injection a couple months ago unfortunately pain relief did not last longer than a couple of weeks--next injection possibly in November if resident is able to tolerate a clinic visit.    I did speak with daughter Chandrika VENTURA and she reports it is very difficult for her father to tolerate any clinic appointments--has had past issues with agitation that escalates--currently on Seroquel which has managed to moderate most of agitation.  She does feel some of  the triggers are hip pain--- she does report taking tramadol before admission to assisted living a few years ago had caused severe confusion and wandering however at that time he was administering only medication and possibly was not taking it correctly or taking too much.  Waiting for PA on 5% Lidoderm patch--I did talk with Chandrika last week and sent 4% lidocaine patch to pharmacy however they did not deliver.  Chandrika would like to manage above issues as conservatively as possible.  Juan Miguel does have significant history of coronary artery disease and has had chest pain in the past and Chandrika has felt invasive diagnostics including angiography and possible stent or bypass surgery do not align with goals of care for comfort measures only.     Past Medical, Surgical, Family and Social History reviewed: YES.     Medications: Reviewed and reconciled  Medications - recent changes:     Review of Systems:  General: positive for weakness  ENT: positive for hearing loss  CV: positive for AFIB  GI: positive for rectal bleeding  Musculoskeletal: positive for arthritis, joint pain and muscular weakness  Neurologic: positive for memory problems and behavior changes  All other systems negative  Toileting:    Continent of Bowel: Yes   Continent of Bladder: Yes  Mobility: walker    Recent Labs: None      Current Therapies: none    Exam:  Vital signs reviewed.   GENERAL APPEARANCE:  alert and no distress  EYES: Eyes grossly normal to inspection  HENT: Hard of hearing  RESP: lungs clear   CV: regular rate, no peripheral edema   ABDOMEN: soft, nontender,bowel sounds normal  RECTAL: Digital rectal exam limited due to cooperation however no palpable pain or hemorrhoids internal or external on exam--visible dried blood around anus and intergluteal cleft--blood spots on underwear  MS: no musculoskeletal defects are noted and gait is age appropriate with walker  SKIN: Warm and dry  NEURO: Alert, mentation intact and speech normal--very pleasant,  confused--oriented to place and self--apparent short-term memory loss  PSYCH: mentation appears normal and affect normal/bright    Assessment and Plan:        (K62.5) Rectal bleeding  (primary encounter diagnosis)    Plan: CBC and Differential, Basic Metabolic Panel,         pantoprazole (PROTONIX) 40 MG EC tablet           (T88.7XXA) Medication side effects    Plan: CBC and Differential, Basic Metabolic Panel,         pantoprazole (PROTONIX) 40 MG EC tablet            (I48.0) Paroxysmal A-fib (H)  Plan: rivaroxaban ANTICOAGULANT (XARELTO) 15 MG TABS         tablet, pantoprazole (PROTONIX) 40 MG EC tablet            (T50.905A) Adverse effect of drug, initial encounter      (M25.552) Hip pain, left      (Z59.3) Living in assisted living      (G30.1,  F02.818) Late onset Alzheimer's disease with behavioral disturbance (H)    Very pleasant and socially interactive man who lives at assisted living apartment with his wife with advancing dementia--has been stable on current medications for mood issues and past agitation  Who has been reported by nursing staff for rectal bleeding--wife Tatyana also confirms.   Apparent rectal bleeding, no evidence of active bleeding on exam, evidence of intermittent bleeding as seen on underwear and toilet paper in bathroom wastebasket and as wife provides feedback. Discussed conservative approach versus being seen in the clinic for more diagnostics and work up-- unfortunately lab service not available after incidents reported to me later in day  Chandrika is not able to transport father into clinic this week for labs--May consider next week--I will order labs and she can schedule outpatient lab only through diagnostic center so this is available if she would like to complete these for baseline.  Will discontinue naproxen and put Xarelto on hold indefinitely, will start PPI and have directed staff to monitor condition and stools closely and have a low threshold for sending in if any change in  condition or vital signs or active bleeding onset.  Will plan to follow-up with him next week on progress--plan to obtain monitoring labs in 2 weeks on lab day at facility.  I did discuss above plan with Dr. Fernando his PCP and he agrees with conservative approach as long as patient is stable and asymptomatic with watchful monitoring. PCP feels he may possibly not be able to tolerate Xarelto.      After discussion with LORENZO Cobos regarding pain management-will start with 4% lidocaine OTC patch to hip in addition to his scheduled Tylenol that he has been on for years and monitor--- will ask facility RN to provide some feedback on effectiveness and consider trying tramadol half a tab in the morning to address pain during his most active mobility if no relief from 4% lidocaine patch next week.  Consider possibly SI joint injection mid November if resident is able to tolerate clinic procedure.    Will [place] back with LORENZO Cobos within the next week on progress and reassess.

## 2022-10-18 ENCOUNTER — NURSING HOME VISIT (OUTPATIENT)
Dept: GERIATRICS | Facility: OTHER | Age: 87
End: 2022-10-18
Attending: NURSE PRACTITIONER
Payer: COMMERCIAL

## 2022-10-18 DIAGNOSIS — K62.5 RECTAL BLEEDING: Primary | ICD-10-CM

## 2022-10-18 DIAGNOSIS — I48.0 PAROXYSMAL A-FIB (H): ICD-10-CM

## 2022-10-18 DIAGNOSIS — F02.818 LATE ONSET ALZHEIMER'S DISEASE WITH BEHAVIORAL DISTURBANCE (H): ICD-10-CM

## 2022-10-18 DIAGNOSIS — G30.1 LATE ONSET ALZHEIMER'S DISEASE WITH BEHAVIORAL DISTURBANCE (H): ICD-10-CM

## 2022-10-18 DIAGNOSIS — I50.32 CHRONIC DIASTOLIC HEART FAILURE (H): ICD-10-CM

## 2022-10-18 DIAGNOSIS — Z78.9 LIVING IN ASSISTED LIVING: ICD-10-CM

## 2022-10-18 DIAGNOSIS — Z79.899 ENCOUNTER FOR MEDICATION REVIEW: ICD-10-CM

## 2022-10-18 NOTE — PROGRESS NOTES
Juan Miguel Delaney is a 89 year old male being seen today for episodic and follow up visit visit at [facility].    Code Status: DNR / DNI, Advance Directives: YES-.   Health Care Power of : Extended Emergency Contact Information  Primary Emergency Contact: Criss Delaney   [facility]  Home Phone: [phone]  Relation: Spouse  Secondary Emergency Contact: Kavita Hassan   [facility]  Home Phone: [phone]  Mobile Phone: [phone]  Relation: Daughter     Allergies: Metoprolol, Penicillins, Celecoxib, Ibuprofen, Lisinopril, Naprosyn [naproxen], and Rofecoxib     Chief Complaint / HPI: Follow-up on a couple of recent issues--last week staff and wife identified resident was having bright red bloody stools, they were bright red soaked toilet paper sheets in the bathroom wastebasket and nursing staff found bright red blood in the toilet with bowel movement.  Naprosyn and Xarelto held, proton pump inhibitor started and staff monitoring for blood.  Labs planned at facility October 25 for monitoring hemoglobin and renal function.  Also resident has had chronic intermittent issues with left hip pain, had an image guided injection a couple of months ago relief did not last more than a couple weeks--unfortunately rectal bleeding with Naprosyn which was stopped.  Resident has been on scheduled Tylenol for years--- Nesha HIGGINS CNP had ordered a Lidoderm 5% patch, pharmacy was requesting a PA, assisted living staff contacted pharmacy on status today which is currently unknown and pharmacy staff faxed another PA to the [facility] PA department.  Topical pain relief would be ideal and safest for resident to decrease fall risk--though the staff reported he had a fall over the weekend while walking to the bathroom and was found on the floor standing up to walker--staff did not feel resident sustained any injury, there was no head injury known however I believe the fall was unwitnessed--nevertheless  the staff on the night shift had wanted to send him in for evaluation and the resident became agitated and declined.  Staff do not have details about the fall or mechanism of fall--however today during visit resident reports has less hip pain, has been walking in the hallways more often and his wife feels his pain level has been better controlled.  Resident is unable to provide complete history secondary to memory loss with dementia.  Wife is able to verify and provide feedback on history.  Resident and wife do not raise any other concerns.    Past Medical, Surgical, Family and Social History reviewed: YES.     Medications: Reviewed and reconciled  Medications - recent changes: discontinue Naprosyn, HOLD Xarelto, started PPI    Review of Systems:  General: positive for weakness  CV: positive for AFIB  Musculoskeletal: positive for arthritis, joint pain and muscular weakness  Neurologic: positive for memory problems  All other systems negative  Toileting:    Continent of Bowel: Yes   Continent of Bladder: Yes  Mobility: walker    Recent Labs: None        Current Therapies: None    Exam:  Vital signs reviewed.   GENERAL APPEARANCE: healthy, alert and no distress  EYES: Eyes grossly normal to inspection, PERRL and conjunctivae and sclerae normal  RESP: lungs clear to auscultation - no rales, rhonchi or wheezes  CV: regular rates and rhythm, normal S1 S2, no S3 or S4, no murmur, click or rub, no peripheral edema and peripheral pulses strong  ABDOMEN: soft, nontender, no hepatosplenomegaly, no masses and bowel sounds normal  MS: no musculoskeletal defects are noted and gait is age appropriate without ataxia  SKIN: no suspicious lesions or rashes  NEURO: Normal strength and tone, sensory exam grossly normal, mentation intact and speech normal  PSYCH: mentation appears normal and affect normal/bright    Assessment and Plan:    Very pleasant gentleman who resides in assisted living with his wife with memory loss secondary  to dementia with recent adverse effects from Naprosyn NSAID for chronic hip osteoarthritis pain and Xarelto for atrial fibrillation which was recently identified during ED visit.  Resident, wife, staff verify there has not been any evidence of rectal bleeding in underwear, toilet or any other garbage or waste basket.   We will continue to hold Xarelto, Naprosyn discontinued--we will continue proton pump inhibitor and will plan to check monitoring hemoglobin and renal function next Tuesday, October 25 facility lab day.  Staff will continue to monitor for any signs of bleeding.    4% Lidoderm patch over-the-counter started 1 week ago--by resident and wife report it appears there has been some pain relief.  Had discussed possibly starting a low-dose tramadol with his daughter and LORENZO Chandrika  For pain relief--however at this time the over-the-counter 4% Lidocaine patch appears to be effective as resident is able to walk with his wife to and from the dining room and in the hallway without pain.  I did contact Chandrika to discuss above and she is agreeable with checking monitoring labs on Tuesday and continuing with current plan.  Pharmacy did resend PA to [facility] department for 5% Lidoderm patch--- topical pain relief would be ideal as resident is at risk for falls and Chandrika reports in the past tramadol has caused confusion and wandering however at that time he was self administering medication before moving into assisted living and may not have been taking as directed.      (K62.5) Rectal bleeding  (primary encounter diagnosis)      (G30.1,  F02.818) Late onset Alzheimer's disease with behavioral disturbance (H)      (I48.0) Paroxysmal A-fib (H)      (I50.32) Chronic diastolic heart failure (H)      (Z79.899) Encounter for medication review      (Z59.3) Living in assisted living

## 2022-10-19 VITALS — RESPIRATION RATE: 16 BRPM | TEMPERATURE: 97 F | OXYGEN SATURATION: 99 % | HEART RATE: 68 BPM

## 2022-10-25 ENCOUNTER — LAB REQUISITION (OUTPATIENT)
Dept: LAB | Facility: OTHER | Age: 87
End: 2022-10-25
Payer: MEDICARE

## 2022-10-25 DIAGNOSIS — N18.9 CHRONIC KIDNEY DISEASE, UNSPECIFIED: ICD-10-CM

## 2022-10-25 LAB
ANION GAP SERPL CALCULATED.3IONS-SCNC: 11 MMOL/L (ref 3–14)
BASOPHILS # BLD AUTO: 0.1 10E3/UL (ref 0–0.2)
BASOPHILS NFR BLD AUTO: 1 %
BUN SERPL-MCNC: 27 MG/DL (ref 7–25)
CALCIUM SERPL-MCNC: 9 MG/DL (ref 8.6–10.3)
CHLORIDE BLD-SCNC: 102 MMOL/L (ref 98–107)
CO2 SERPL-SCNC: 24 MMOL/L (ref 21–31)
CREAT SERPL-MCNC: 1.77 MG/DL (ref 0.7–1.3)
EOSINOPHIL # BLD AUTO: 0.3 10E3/UL (ref 0–0.7)
EOSINOPHIL NFR BLD AUTO: 4 %
ERYTHROCYTE [DISTWIDTH] IN BLOOD BY AUTOMATED COUNT: 14.3 % (ref 10–15)
GFR SERPL CREATININE-BSD FRML MDRD: 36 ML/MIN/1.73M2
GLUCOSE BLD-MCNC: 135 MG/DL (ref 70–105)
HCT VFR BLD AUTO: 43 % (ref 40–53)
HGB BLD-MCNC: 14.3 G/DL (ref 13.3–17.7)
IMM GRANULOCYTES # BLD: 0 10E3/UL
IMM GRANULOCYTES NFR BLD: 0 %
LYMPHOCYTES # BLD AUTO: 1.1 10E3/UL (ref 0.8–5.3)
LYMPHOCYTES NFR BLD AUTO: 14 %
MCH RBC QN AUTO: 28.7 PG (ref 26.5–33)
MCHC RBC AUTO-ENTMCNC: 33.3 G/DL (ref 31.5–36.5)
MCV RBC AUTO: 86 FL (ref 78–100)
MONOCYTES # BLD AUTO: 0.7 10E3/UL (ref 0–1.3)
MONOCYTES NFR BLD AUTO: 8 %
NEUTROPHILS # BLD AUTO: 5.7 10E3/UL (ref 1.6–8.3)
NEUTROPHILS NFR BLD AUTO: 73 %
NRBC # BLD AUTO: 0 10E3/UL
NRBC BLD AUTO-RTO: 0 /100
PLATELET # BLD AUTO: 252 10E3/UL (ref 150–450)
POTASSIUM BLD-SCNC: 3.8 MMOL/L (ref 3.5–5.1)
RBC # BLD AUTO: 4.99 10E6/UL (ref 4.4–5.9)
SODIUM SERPL-SCNC: 137 MMOL/L (ref 134–144)
WBC # BLD AUTO: 7.8 10E3/UL (ref 4–11)

## 2022-10-25 PROCEDURE — 36415 COLL VENOUS BLD VENIPUNCTURE: CPT | Performed by: NURSE PRACTITIONER

## 2022-10-25 PROCEDURE — 85025 COMPLETE CBC W/AUTO DIFF WBC: CPT | Performed by: NURSE PRACTITIONER

## 2022-10-25 PROCEDURE — 80048 BASIC METABOLIC PNL TOTAL CA: CPT | Performed by: NURSE PRACTITIONER

## 2022-11-01 ENCOUNTER — NURSING HOME VISIT (OUTPATIENT)
Dept: GERIATRICS | Facility: OTHER | Age: 87
End: 2022-11-01
Attending: NURSE PRACTITIONER
Payer: COMMERCIAL

## 2022-11-01 DIAGNOSIS — G30.1 LATE ONSET ALZHEIMER'S DISEASE WITH BEHAVIORAL DISTURBANCE (H): ICD-10-CM

## 2022-11-01 DIAGNOSIS — M16.12 PRIMARY OSTEOARTHRITIS OF LEFT HIP: Primary | ICD-10-CM

## 2022-11-01 DIAGNOSIS — I48.0 PAROXYSMAL A-FIB (H): ICD-10-CM

## 2022-11-01 DIAGNOSIS — N18.32 STAGE 3B CHRONIC KIDNEY DISEASE (H): ICD-10-CM

## 2022-11-01 DIAGNOSIS — M19.90 ARTHRITIS PAIN: ICD-10-CM

## 2022-11-01 DIAGNOSIS — K62.5 RECTAL BLEEDING: ICD-10-CM

## 2022-11-01 DIAGNOSIS — Z78.9 LIVING IN ASSISTED LIVING: ICD-10-CM

## 2022-11-01 DIAGNOSIS — F02.818 LATE ONSET ALZHEIMER'S DISEASE WITH BEHAVIORAL DISTURBANCE (H): ICD-10-CM

## 2022-11-01 DIAGNOSIS — Z79.899 ENCOUNTER FOR MEDICATION REVIEW: ICD-10-CM

## 2022-11-01 RX ORDER — ACETAMINOPHEN 500 MG
TABLET ORAL
Qty: 120 TABLET | Refills: 10 | Status: SHIPPED | OUTPATIENT
Start: 2022-11-01 | End: 2023-01-01

## 2022-11-01 NOTE — PROGRESS NOTES
Juan Miguel Dleaney is a 89 year old male being seen today for follow up visit at [facility].    Code Status: DNR / DNI, Advance Directives: YES-.   Health Care Power of : Extended Emergency Contact Information  Primary Emergency Contact: Criss Delaney   [facility]  Home Phone: [phone]  Relation: Spouse  Secondary Emergency Contact: Kavita Hassan   [facility]  Home Phone: [phone]  Mobile Phone: [phone]  Relation: Daughter     Allergies: Metoprolol, Penicillins, Celecoxib, Ibuprofen, Lisinopril, Naprosyn [naproxen], and Rofecoxib     Chief Complaint / HPI: Follow-up with resident for left hip pain from osteoarthritis, recently had injection a few months ago however pain relief only lasted a couple of weeks.  Follow-up on rectal bleeding from 3 weeks ago, follow-up labs completed last week and hemoglobin stable, have continued to hold Xarelto and Naprosyn  And started on PPI.  Resident, wife, nursing staff report there have been no episodes of bright red blood with stools witnessed in toilet, underwear, wastebaskets.  Had started 4% lidocaine patch to left hip as insurance would not cover 5% Lidoderm patch--- facility LPN feels the 4% patch has been effective at managing pain  However there have been some issues with staff tracking and timely reordering patches--when resident goes without a patch for 24 hours pain returns.  During visit today he was quite relaxed, happy and animated--denies having any active pain--actually walked throughout his apartment for me with his walker was not antalgic  No problems with weightbearing--has been walking to meals with his wife--- his wife endorses he has not been painful.    I did reach out to his daughter LORENZO Cobos as Juan Miguel is a limited historian due to dementia to discuss above as some of the nursing staff had felt that he needed something else for pain--I am reluctant to use anything other than topicals unless necessary due to past issues with  reaction to tramadol causing increased confusion and wandering behavior.  We discussed trying some home care physical therapy--as PT has adjusted the height of his walker as they felt posture contributed to the hip pain in the past--- Juan Miguel does not feel he needs PT at this time----Chandrika would like to think this over and will reach out if she would like to pursue home care physical therapy.  Discussed we will continue to hold Xarelto and NSAIDs for another month with observation by nursing staff and family members for any evidence of rectal bleeding. Will recheck labs in a month.  Chandirka would like to hold off on scheduling any hip injections for the time being if the lidocaine patch is effective as Juan Miguel becomes very anxious and sometimes agitated  Attending clinic visits. Chandrika feels he is comfortable.   The nursing staff report that there have not been any episodes of increased agitation or witnessed falls.  Current dose of Seroquel has been helpful at moderating  Anxiety and agitation.  It is felt by staff and myself that the hip pain triggers agitation and affects his mood.    Past Medical, Surgical, Family and Social History reviewed: YES.     Medications: reviewed  Medications - recent changes: PPI, DC Xarelto and Naprosyn    Review of Systems:  General: positive for deconditioning  Musculoskeletal: positive for arthritis and joint pain  Neurologic: positive for memory problems  All other systems negative  Toileting:    Continent of Bowel: Yes   Continent of Bladder: Yes  Mobility: walker    Recent Labs: Reviewed from 10/25/22      Current Therapies: none    Exam:  Vital signs reviewed.   GENERAL APPEARANCE:  alert and no distress  EYES: Eyes grossly normal to inspection, conjunctivae and sclerae normal  RESP: lungs clear   MS: no musculoskeletal defects are noted and gait is age appropriate without ataxia  SKIN: warm and dry  NEURO: mentation intact and speech normal, memory changes--not a new finding  PSYCH:  mentation appears normal and affect normal/bright    Assessment and Plan:    Very pleasant gentleman who resides at assisted living apartment with his wife with Alzheimer's with profound short-term memory issues  With recently identified rectal bleeding secondary to NSAIDs and anticoagulation for recently discovered paroxysmal atrial fibrillation.  Naprosyn and Xarelto on hold, daily PPI are effective at this time with stable hemoglobin and no evidence of bleeding.Naprosyn for ledft hip OA pain.  Discussed options for pain control with Chandrika daughter and POA--- 4% lidocaine patch appears to be effective at managing pain overall  According to feedback from family members, nursing staff and face-to-face visit as long as the patch is changed daily.  Chandrika is agreeable with increasing his Tylenol scheduled dose to 3 times a day from previously twice daily--apparently there will not be an increase charge to the resident since he is already on 3 times daily medication administration.    Discussed some options with physical therapy and scheduling another hip injection--Chandrika would like to postpone at this time as clinic visits trigger anxiety and agitation and  Would like to continue topical conservative therapies with lidocaine patch and increase scheduled Tylenol. If Chandrika and Juan Miguel would like to proceed with a joint injection  I would trial SI joint.  Will continue to hold offending medications, continue daily PPI with observations and will [place] back in 4 weeks with lab recheck CBC, renal function  And we will follow-up with Chandrika on progress and discuss plan going forward.  Chandrika will reach out if she would like to trial home care physical therapy or schedule image guided injection.    Over 30 minutes spent in face to face visit, exam, chart review, medication management, family care conference and followup planning with care coordination.      (M16.12) Primary osteoarthritis of left hip  (primary encounter  diagnosis)    Plan: acetaminophen (ACETAMINOPHEN EXTRA STRENGTH)         500 MG tablet            (M19.90) Arthritis pain    Plan: acetaminophen (ACETAMINOPHEN EXTRA STRENGTH)         500 MG tablet           (K62.5) Rectal bleeding      (G30.1,  F02.818) Late onset Alzheimer's disease with behavioral disturbance (H)      (Z79.899) Encounter for medication review      (Z59.3) Living in assisted living      (I48.0) Paroxysmal A-fib (H)      (N18.32) Stage 3b chronic kidney disease

## 2022-11-15 DIAGNOSIS — M16.12 PRIMARY OSTEOARTHRITIS OF LEFT HIP: ICD-10-CM

## 2022-11-15 DIAGNOSIS — M25.552 HIP PAIN, LEFT: ICD-10-CM

## 2022-11-15 RX ORDER — LIDOCAINE 4 G/G
PATCH TOPICAL
Qty: 31 PATCH | Refills: 11 | Status: SHIPPED | OUTPATIENT
Start: 2022-11-15 | End: 2022-12-06

## 2022-11-29 ENCOUNTER — NURSING HOME VISIT (OUTPATIENT)
Dept: GERIATRICS | Facility: OTHER | Age: 87
End: 2022-11-29
Attending: NURSE PRACTITIONER
Payer: COMMERCIAL

## 2022-11-29 DIAGNOSIS — G47.00 INSOMNIA, UNSPECIFIED TYPE: ICD-10-CM

## 2022-11-29 DIAGNOSIS — M25.552 HIP PAIN, LEFT: Primary | ICD-10-CM

## 2022-11-29 DIAGNOSIS — F02.818 LATE ONSET ALZHEIMER'S DISEASE WITH BEHAVIORAL DISTURBANCE (H): ICD-10-CM

## 2022-11-29 DIAGNOSIS — F41.1 GAD (GENERALIZED ANXIETY DISORDER): ICD-10-CM

## 2022-11-29 DIAGNOSIS — Z79.899 ENCOUNTER FOR MEDICATION REVIEW: ICD-10-CM

## 2022-11-29 DIAGNOSIS — G30.1 LATE ONSET ALZHEIMER'S DISEASE WITH BEHAVIORAL DISTURBANCE (H): ICD-10-CM

## 2022-11-29 RX ORDER — GABAPENTIN 100 MG/1
100 CAPSULE ORAL AT BEDTIME
Qty: 30 CAPSULE | Refills: 0 | Status: SHIPPED | OUTPATIENT
Start: 2022-11-29 | End: 2022-12-13 | Stop reason: DRUGHIGH

## 2022-11-30 NOTE — PROGRESS NOTES
Juan Miguel Delaney is a 89 year old male being seen today for acute and follow up visit visit at [facility] ([place], [place]).    Code Status: DNR / DNI.   Health Care Power of : Extended Emergency Contact Information  Primary Emergency Contact: Criss Delaney   [facility]  Home Phone: [phone]  Relation: Spouse  Secondary Emergency Contact: Kavita Hassan   [facility]  Home Phone: [phone]  Mobile Phone: [phone]  Relation: Daughter     Allergies: Metoprolol, Penicillins, Celecoxib, Ibuprofen, Lisinopril, Naprosyn [naproxen], and Rofecoxib     Chief Complaint / HPI: Follow-up for staff reports of a couple of recent episodes of insomnia where resident reports flashbacks from  history during [place]--staff are concerned PTSD.  Staff also feel that his chronic right hip pain has been another trigger affecting sleep and mood.  5% Lidoderm patches were prescribed over 6 weeks ago however insurance would not cover initially without PA--up to this time  Pharmacy unaware of PA status--- family has been purchasing 4% over-the-counter lidocaine patches--these were effective initially however resident has been unable to tolerate wearing the patch and will take them off, staff will find them on the counter different areas of the apartment.   Had discussed pain management with daughter Chandrika VENTURA in the past--resident has a past history of adverse reaction to tramadol--causing increased confusion and wandering however this was before admission to assisted living.  Did have an image guided hip injection a few months ago--however clinic visits because stress, increased anxiety and agitation.  Resident is unable to provide complete history due to severe short-term memory loss and advanced dementia.  Nursing staff do report daily behavior and mood has been stable and there have not been any episodes of severe agitation or other difficult to manage behaviors.  Staff do not raise any other concerns.    Past  Medical, Surgical, Family and Social History reviewed: YES.     Medications: Reviewed and reconciled  Medications - recent changes: none    Review of Systems:  General: positive for weakness  Musculoskeletal: positive for arthritis, joint pain and muscular weakness  Neurologic: positive for memory problems and behavior changes  Psychiatric: positive for sleep disturbance and anxiety  All other systems negative  Toileting:    Continent of Bowel: Yes   Continent of Bladder: Yes  Mobility: walker    Recent Labs: reviewed      Current Therapies: none    Exam:  Vital signs reviewed.   GENERAL APPEARANCE: alert and no distress  MS: no musculoskeletal defects are noted and gait is antalgic favoring left hip joint  SKIN: warm and dry  NEURO: Alert,mentation intact and speech normal--apparent short term memory loss  PSYCH: mentation appears normal and affect normal/bright    Assessment and Plan:      Very pleasant gracious man with advancing dementia with secondary psychotic features overall seems stable and comfortable on current dose of scheduled Seroquel--I was actually able to reduce the dose after previous hospital discharge from 75 mg to 25 mg 3 times daily--- staff have not needed to use PRN dose.    Did reach out to Chandrika VENTURA over the phone to care conference regarding above concerns--- Chandrika was not made aware of any at bedtime awakening and insomnia.  Chandrika does acknowledge that the hip pain continues to be an issue.  Juan Miguel has become intolerant of wearing over-the-counter 4% lidocaine patches so this is no longer realistic option for adjunct with pain management.  Naproxen was not tolerated as this caused GI/rectal bleeding.  Discussed trying gabapentin at bedtime for dual benefits of pain reduction and anxiolytic benefits.  If well-tolerated could titrate up gently for pain relief--- discussed possibility of trying another image guided joint injection--would trial SI joint rather than hip joint.  Would also  consider trying sertraline or Prozac low-dose daily to address PTSD panic and anxiety episodes--however would trial 1 medication at a time--- would recommend starting with gabapentin 100 mg at at bedtime and if well-tolerated would increase to twice daily next week on rounds based on observations and response per nursing staff.  Chandrika is agreeable with this plan.    Will plan to recheck kidney function next lab day December 13.  Will [place] back with Chandrika on response, consider restarting Xarelto with close staff monitoring for atrial fibrillation prophylaxis.  To my knowledge there have not been any recent angina episodes requiring Nitrostat.  Goals of care are for comfort, quality of life and avoidance of any hospitalizations or further diagnostic work-up or invasive procedures.          .(M25.552) Hip pain, left  (primary encounter diagnosis)    Plan: gabapentin (NEURONTIN) 100 MG capsule            (F41.1) ELIZABETH (generalized anxiety disorder)    Plan: gabapentin (NEURONTIN) 100 MG capsule            (Z79.899) Encounter for medication review      (G47.00) Insomnia, unspecified type      (G30.1,  F02.818) Late onset Alzheimer's disease with behavioral disturbance (H)

## 2022-12-01 DIAGNOSIS — F41.1 GAD (GENERALIZED ANXIETY DISORDER): ICD-10-CM

## 2022-12-01 DIAGNOSIS — M25.552 HIP PAIN, LEFT: ICD-10-CM

## 2022-12-01 RX ORDER — GABAPENTIN 100 MG/1
100 CAPSULE ORAL AT BEDTIME
Qty: 30 CAPSULE | Refills: 0 | OUTPATIENT
Start: 2022-12-01

## 2022-12-01 NOTE — TELEPHONE ENCOUNTER
[facility] sent Rx request for the following:    GABAPENTIN 100MG CAPSULE  Last Prescription Date:   11/29/22  Last Fill Qty/Refills:         30, R-0    Last Office Visit:              11/29/22   Future Office visit:           None   Redundant refill request refused: Too soon:    Tih Torres RN on 12/1/2022 at 3:52 PM

## 2022-12-04 ENCOUNTER — MYC MEDICAL ADVICE (OUTPATIENT)
Dept: GERIATRICS | Facility: OTHER | Age: 87
End: 2022-12-04

## 2022-12-04 DIAGNOSIS — F03.911 AGITATION DUE TO DEMENTIA (H): Primary | ICD-10-CM

## 2022-12-04 DIAGNOSIS — F02.818 LATE ONSET ALZHEIMER'S DISEASE WITH BEHAVIORAL DISTURBANCE (H): ICD-10-CM

## 2022-12-04 DIAGNOSIS — G30.1 LATE ONSET ALZHEIMER'S DISEASE WITH BEHAVIORAL DISTURBANCE (H): ICD-10-CM

## 2022-12-05 RX ORDER — QUETIAPINE FUMARATE 25 MG/1
50 TABLET, FILM COATED ORAL 3 TIMES DAILY
Qty: 180 TABLET | Refills: 11 | Status: SHIPPED | OUTPATIENT
Start: 2022-12-05 | End: 2023-01-01 | Stop reason: DRUGHIGH

## 2022-12-06 ENCOUNTER — NURSING HOME VISIT (OUTPATIENT)
Dept: GERIATRICS | Facility: OTHER | Age: 87
End: 2022-12-06
Attending: NURSE PRACTITIONER
Payer: COMMERCIAL

## 2022-12-06 DIAGNOSIS — G30.9 ALZHEIMER'S DEMENTIA WITH BEHAVIORAL DISTURBANCE (H): ICD-10-CM

## 2022-12-06 DIAGNOSIS — M25.552 HIP PAIN, LEFT: ICD-10-CM

## 2022-12-06 DIAGNOSIS — R21 RASH AND NONSPECIFIC SKIN ERUPTION: Primary | ICD-10-CM

## 2022-12-06 DIAGNOSIS — F42.4 SKIN PICKING HABIT: ICD-10-CM

## 2022-12-06 DIAGNOSIS — M16.12 PRIMARY OSTEOARTHRITIS OF LEFT HIP: ICD-10-CM

## 2022-12-06 DIAGNOSIS — F02.818 ALZHEIMER'S DEMENTIA WITH BEHAVIORAL DISTURBANCE (H): ICD-10-CM

## 2022-12-06 DIAGNOSIS — Z78.9 LIVING IN ASSISTED LIVING: ICD-10-CM

## 2022-12-06 RX ORDER — MUPIROCIN 20 MG/G
OINTMENT TOPICAL
Qty: 30 G | Refills: 1 | Status: SHIPPED | OUTPATIENT
Start: 2022-12-06 | End: 2023-01-01

## 2022-12-08 RX ORDER — TRIAMCINOLONE ACETONIDE 1 MG/G
CREAM TOPICAL 2 TIMES DAILY PRN
Qty: 30 G | Refills: 1 | Status: SHIPPED | OUTPATIENT
Start: 2022-12-08

## 2022-12-08 RX ORDER — CEPHALEXIN 500 MG/1
500 CAPSULE ORAL 2 TIMES DAILY
Qty: 14 CAPSULE | Refills: 0 | Status: SHIPPED | OUTPATIENT
Start: 2022-12-08 | End: 2022-12-15

## 2022-12-09 NOTE — PROGRESS NOTES
Juan Miguel Delaney is a 89 year old male being seen today for comprehensive, acute and follow up visit at [facility].    Code Status: DNR / DNI, Advance Directives: YES-.   Health Care Power of : Extended Emergency Contact Information  Primary Emergency Contact: Criss Delaney   [facility]  Home Phone: [phone]  Relation: Spouse  Secondary Emergency Contact: Kavita Hassan   [facility]  Home Phone: [phone]  Mobile Phone: [phone]  Relation: Daughter     Allergies: Metoprolol, Penicillins, Celecoxib, Ibuprofen, Lisinopril, Naprosyn [naproxen], and Rofecoxib     Chief Complaint / HPI: Acute followup for recent behavior changes with resident who resides with his wife at [facility] apartment--Wife has been reporting  Some episodes where Juan Miguel is resistant to bathing and hygiene care and his wife is stressed as he becomes agitated.  His wife feels overwhelmed with responsibility of overseeing his personal cares.  Resident is unable to provide complete history due to advanced dementia however is very friendly, cooperative.  All history provided by nursing staff, family, wife.  Wife reported a rash that has been chest for a while however Juan Miguel has been resistant to allowing any care.  The other concern is that Juan Miguel is resistant to allowing female CNA staff to encourage daily self cares and supervise showers.  JuanM iguel has been on Seroquel for at least a year and a half, start Elavil 25 mg which was increased to 75 mg 3 times a day during hospitalization and post discharge  This was tapered down to 25 mg 3 times daily and has been stable on this dose for a while.   Another concern raised is his intermittent chronic issues with left hip pain secondary to severe degenerative osteoarthritis--- had a couple of image guided injections most recent injection 4 months ago did not provide any lasting relief--- did trial Naprosyn which was effective however resident developed rectal bleeding  Which has subsided since  Naprosyn was discontinued along with aspirin.  Xarelto has been on hold since October.          Past Medical, Surgical, Family and Social History reviewed: YES.     Medications: Reviewed and reconciled  Medications - recent changes: Gabapentin at HS    Review of Systems:  General: positive for weakness  Skin: positive for rash, itching  Musculoskeletal: positive for arthritis, joint pain and muscular weakness  Neurologic: positive for memory problems and behavior changes  Psychiatric: positive for anxiety and agitation  All other systems negative  Toileting:    Continent of Bowel: Yes   Continent of Bladder: Yes  Mobility: walker    Recent Labs: reviewed      Current Therapies: none    Exam:  Vital signs reviewed.   GENERAL APPEARANCE:  alert and no distress  EYES: Eyes grossly normal to inspection  NECK: no adenopathy, no asymmetry  RESP: lungs clear   CV: regular rate  ABDOMEN: soft, nontender  MS: Kyphotic posture and gait is age appropriate with walker-  SKIN: multiple varying sized papules grouped in patch about 7 cm over epigastric area--erythema, some crusted impetiginous varyting healing stages  With multiple scratch marks.  Later reported by staff has similar appearing rash patches on upper arms migrating toward lower arms with scratch marks--some healing crusted lesion  Pruritic. No rash seen on back or lower abdomen  NEURO: Alert,mentation intact and speech normal, cooperative, apparent difficulties with short term memory  PSYCH: mentation appears normal and affect normal/bright    Assessment and Plan:    Very pleasant gentleman who resides in assisted living apartment with his wife who has had episodic agitative moods which I believe partially triggered by chronic left hip pain  Along with mood disorder secondary to advancing dementia.  Insurance would not cover 5% lidocaine patches, so over-the-counter 4% patches were started  However resident is unable to tolerate anything on his skin and patches are  found all over the apartment--today Juan Miguel reported that he does not want to wear the patches and does not feel they are helpful.  Has been on scheduled Tylenol for years.    Reached out to Chandrika daughter and POA for telephone care conference with nursing staff and myself to discuss a plan.   Will trial dose increase from 25 to 50 mg Seroquel 3 times daily with as needed daily dose--which recently staff report was difficult for Jua nMiguel to except when he becomes agitated.  Chandrika had commented on his weight gain since he was discharged from the hospital May 2021 which was around the time Seroquel was started and dose was titrated up during hospitalization--suspect limited mobility and medication contribute to weight gain--however at this point priority is moderating and stabilizing mood   And providing support for resident and spouse with daily personal hygienic cares.  Nursing staff will trial standby assistance and cueing to ensure regular bathing and toileting hygiene--Chandrika is agreeable to home care skilled nursing to monitor medication adjustments, response and intermittent monitoring labs as indicated, PT and OT and CNA for bathing and personal cares.  Juan Miguel declines adamantly scheduling image guided injection--consider increase with HS gabapentin next week if stable and expected response to Seroquel adjustment.    Rash recently discovered by her wife--appears impetiginized--initial plan for spot treatment to areas with mupirocin however with other seated areas from scratching with skin picking we will start systemic cephalexin, and have a corticosteroid cream to pruritic areas available for inflammation, along with skin care and close monitoring on progress and response.    Face-to-face exam and home care documentation and referral sent as approved by family.    Followup monitoring labs scheduled at facility 12/27 to monitor renal function.    Over 45 minutes spent in chart review, care conferencing, direct exam,  follow-up plan, face-to-face documentation and orders for home therapy  Will [place] back with daughter in a couple of weeks--- I will plan to check in next week on rounds--- have asked the staff to monitor for any adverse effects or toleration issues  And to notify ASAP for concerns        (R21) Rash and nonspecific skin eruption  (primary encounter diagnosis)    Plan: mupirocin (BACTROBAN) 2 % external ointment,         Home Care Referral, cephALEXin (KEFLEX) 500 MG         capsule, triamcinolone (KENALOG) 0.1 % external        cream            (F42.4) Skin picking habit  Plan: mupirocin (BACTROBAN) 2 % external ointment,         Home Care Referral            (G30.9,  F02.818) Alzheimer's dementia with behavioral disturbance (H)    Plan: Home Care Referral            (M16.12) Primary osteoarthritis of left hip    Plan: Home Care Referral            (M25.552) Hip pain, left    Plan: Home Care Referral            (Z59.3) Living in assisted living

## 2022-12-13 ENCOUNTER — NURSING HOME VISIT (OUTPATIENT)
Dept: GERIATRICS | Facility: OTHER | Age: 87
End: 2022-12-13
Attending: NURSE PRACTITIONER
Payer: COMMERCIAL

## 2022-12-13 DIAGNOSIS — G30.1 LATE ONSET ALZHEIMER'S DISEASE WITH BEHAVIORAL DISTURBANCE (H): ICD-10-CM

## 2022-12-13 DIAGNOSIS — Z79.899 ENCOUNTER FOR MEDICATION REVIEW: Primary | ICD-10-CM

## 2022-12-13 DIAGNOSIS — F02.818 LATE ONSET ALZHEIMER'S DISEASE WITH BEHAVIORAL DISTURBANCE (H): ICD-10-CM

## 2022-12-13 DIAGNOSIS — F03.911 AGITATION DUE TO DEMENTIA (H): Primary | ICD-10-CM

## 2022-12-13 DIAGNOSIS — F42.4 SKIN PICKING HABIT: ICD-10-CM

## 2022-12-13 DIAGNOSIS — Z78.9 LIVING IN ASSISTED LIVING: ICD-10-CM

## 2022-12-13 DIAGNOSIS — R21 RASH AND NONSPECIFIC SKIN ERUPTION: ICD-10-CM

## 2022-12-13 DIAGNOSIS — F41.1 GAD (GENERALIZED ANXIETY DISORDER): ICD-10-CM

## 2022-12-13 DIAGNOSIS — M25.552 HIP PAIN, LEFT: ICD-10-CM

## 2022-12-13 DIAGNOSIS — L01.1 IMPETIGINIZATION OF OTHER DERMATOSES: ICD-10-CM

## 2022-12-13 RX ORDER — GABAPENTIN 300 MG/1
300 CAPSULE ORAL AT BEDTIME
Qty: 90 CAPSULE | Refills: 1 | Status: SHIPPED | OUTPATIENT
Start: 2022-12-13 | End: 2023-01-03

## 2022-12-16 NOTE — PROGRESS NOTES
Juan Miguel Delaney is a 89 year old male being seen today for acute and follow up visit at [facility].    Code Status: DNR / DNI, Advance Directives: YES-.   Health Care Power of : Extended Emergency Contact Information  Primary Emergency Contact: Criss Delaney   [facility]  Home Phone: [phone]  Relation: Spouse  Secondary Emergency Contact: Kavita Hassan   [facility]  Home Phone: [phone]  Mobile Phone: [phone]  Relation: Daughter     Allergies: Metoprolol, Penicillins, Celecoxib, Ibuprofen, Lisinopril, Naprosyn [naproxen], and Rofecoxib     Chief Complaint / HPI: Follow-up with resident on recent medication adjustment with Seroquel mood stabilizer from 25 to 50 mg 3 times daily and a recent discovered impetiginous appearing rash distributed on anterior chest mostly epigastric area, bilateral shoulders migrating down to forearms.  Started a course of Keflex along with mupirocin to spot treat affected areas.  Nursing staff report the rash is almost resolved  And that Juan Miguel allowed nursing staff to assist with shower and hygiene care--- recently started home care skilled nursing and PT services  Resident agrees to allow home care RN to assist with showers and hygiene and monitor skin which was reported there are no ulcers or new lesions.  Staff report no behavioral episodes that have required redirection or interventions.  Resident is unable to provide complete history due to advanced dementia--he is very pleasant and socially interactive.    Past Medical, Surgical, Family and Social History reviewed: YES.     Medications: Reviewed and reconciled  Medications - recent changes: Seroquel dose increased from 25 to 50 mg 3 times daily, Keflex     Review of Systems:  General: positive for weakness  Skin: positive for rash  Musculoskeletal: positive for arthritis, joint pain and muscular weakness  Neurologic: positive for memory problems and behavior changes  All other systems  negative  Toileting:    Continent of Bowel: Yes   Continent of Bladder: Yes  Mobility: walker    Recent Labs: most recent reviewed      Current Therapies: Home care skilled nursing and physical therapy    Exam:  Vital signs reviewed.   GENERAL APPEARANCE: alert and no distress  EYES: Eyes grossly normal to inspection  MS: no musculoskeletal defects are noted and gait is age appropriate with walker  SKIN: no suspicious lesions or rashes  NEURO: Alert mentation intact and speech normal, apparent short-term memory loss  PSYCH: mentation appears normal and affect normal/bright    Assessment and Plan:    Very pleasant gentleman with advanced dementia appears to be generally comfortable with no reported episodes in the past week of agitation or difficult to control behaviors.  There have been some reported occasional episodes where resident will wake up with possible  related flashbacks dreams.  Have asked staff to monitor and if this becomes more frequent would start a low-dose sertraline or fluoxetine at bedtime. Impetiginous rash on epigastric area and bilateral shoulders and upper arms almost resolved with Keflex and mupirocin.  Home care skilled nursing beneficial for monitoring skin and assisting with bath and hygiene will provide necessary oversight at controlling further rashes related to picking and poor hygiene.  Believe physical therapy will be starting this week to work on hip pain and mobility.    Medications reviewed, recommended to finish Keflex--will continue mupirocin as needed for flares.  Will increase gabapentin from 100 to 300 mg at bedtime with goal to moderate anxiety with dual benefits for pain relief with SI and hip pain.  Will follow-up in a few weeks on progress and if well-tolerated we will titrate up as indicated.  Resident unable to tolerate lidocaine patches or any topicals--these were discontinued  Consider restarting maintenance Xarelto for atrial fibrillation prophylaxis after  follow-up renal function labs reviewed.    We will plan to obtain monitoring renal function lab January 10 facility lab day for chronic kidney disease  Over 20 minutes spent in reviewing medications and managing doses, follow-up on recent antibiotic treatment and skin care        (Z79.899) Encounter for medication review  (primary encounter diagnosis)      (G30.1,  F02.818) Late onset Alzheimer's disease with behavioral disturbance (H)      (F42.4) Skin picking habit      (L01.1) Impetiginization of other dermatoses      (R21) Rash and nonspecific skin eruption      (M25.552) Hip pain, left      (Z59.3) Living in assisted living      (F41.1) ELIZABETH (generalized anxiety disorder)

## 2022-12-27 ENCOUNTER — NURSING HOME VISIT (OUTPATIENT)
Dept: GERIATRICS | Facility: OTHER | Age: 87
End: 2022-12-27
Attending: NURSE PRACTITIONER
Payer: COMMERCIAL

## 2022-12-27 DIAGNOSIS — F42.4 SKIN PICKING HABIT: ICD-10-CM

## 2022-12-27 DIAGNOSIS — Z79.899 ENCOUNTER FOR MEDICATION REVIEW: Primary | ICD-10-CM

## 2022-12-27 DIAGNOSIS — I48.0 PAROXYSMAL A-FIB (H): ICD-10-CM

## 2022-12-27 DIAGNOSIS — M16.12 PRIMARY OSTEOARTHRITIS OF LEFT HIP: ICD-10-CM

## 2022-12-27 DIAGNOSIS — G30.1 LATE ONSET ALZHEIMER'S DISEASE WITH BEHAVIORAL DISTURBANCE (H): ICD-10-CM

## 2022-12-27 DIAGNOSIS — Z78.9 LIVING IN ASSISTED LIVING: ICD-10-CM

## 2022-12-27 DIAGNOSIS — R21 RASH AND NONSPECIFIC SKIN ERUPTION: ICD-10-CM

## 2022-12-27 DIAGNOSIS — L85.3 DRY SKIN DERMATITIS: ICD-10-CM

## 2022-12-27 DIAGNOSIS — F02.818 LATE ONSET ALZHEIMER'S DISEASE WITH BEHAVIORAL DISTURBANCE (H): ICD-10-CM

## 2022-12-27 DIAGNOSIS — N18.30 STAGE 3 CHRONIC KIDNEY DISEASE, UNSPECIFIED WHETHER STAGE 3A OR 3B CKD (H): ICD-10-CM

## 2022-12-28 ENCOUNTER — LAB REQUISITION (OUTPATIENT)
Dept: LAB | Facility: OTHER | Age: 87
End: 2022-12-28
Payer: MEDICARE

## 2022-12-28 LAB
ALBUMIN SERPL BCG-MCNC: 3.9 G/DL (ref 3.5–5.2)
ALP SERPL-CCNC: 65 U/L (ref 40–129)
ALT SERPL W P-5'-P-CCNC: 7 U/L (ref 10–50)
ANION GAP SERPL CALCULATED.3IONS-SCNC: 13 MMOL/L (ref 7–15)
AST SERPL W P-5'-P-CCNC: 12 U/L (ref 10–50)
BASOPHILS # BLD AUTO: 0.1 10E3/UL (ref 0–0.2)
BASOPHILS NFR BLD AUTO: 1 %
BILIRUB SERPL-MCNC: 0.4 MG/DL
BUN SERPL-MCNC: 27.7 MG/DL (ref 8–23)
CALCIUM SERPL-MCNC: 8.9 MG/DL (ref 8.2–9.6)
CHLORIDE SERPL-SCNC: 107 MMOL/L (ref 98–107)
CREAT SERPL-MCNC: 1.72 MG/DL (ref 0.67–1.17)
DEPRECATED HCO3 PLAS-SCNC: 23 MMOL/L (ref 22–29)
EOSINOPHIL # BLD AUTO: 0.3 10E3/UL (ref 0–0.7)
EOSINOPHIL NFR BLD AUTO: 5 %
ERYTHROCYTE [DISTWIDTH] IN BLOOD BY AUTOMATED COUNT: 13.6 % (ref 10–15)
GFR SERPL CREATININE-BSD FRML MDRD: 37 ML/MIN/1.73M2
GLUCOSE SERPL-MCNC: 127 MG/DL (ref 70–99)
HCT VFR BLD AUTO: 43.8 % (ref 40–53)
HGB BLD-MCNC: 14.5 G/DL (ref 13.3–17.7)
IMM GRANULOCYTES # BLD: 0 10E3/UL
IMM GRANULOCYTES NFR BLD: 1 %
LYMPHOCYTES # BLD AUTO: 0.8 10E3/UL (ref 0.8–5.3)
LYMPHOCYTES NFR BLD AUTO: 13 %
MCH RBC QN AUTO: 28.5 PG (ref 26.5–33)
MCHC RBC AUTO-ENTMCNC: 33.1 G/DL (ref 31.5–36.5)
MCV RBC AUTO: 86 FL (ref 78–100)
MONOCYTES # BLD AUTO: 0.5 10E3/UL (ref 0–1.3)
MONOCYTES NFR BLD AUTO: 9 %
NEUTROPHILS # BLD AUTO: 4.5 10E3/UL (ref 1.6–8.3)
NEUTROPHILS NFR BLD AUTO: 71 %
NRBC # BLD AUTO: 0 10E3/UL
NRBC BLD AUTO-RTO: 0 /100
PLATELET # BLD AUTO: 216 10E3/UL (ref 150–450)
POTASSIUM SERPL-SCNC: 4 MMOL/L (ref 3.4–5.3)
PROT SERPL-MCNC: 5.8 G/DL (ref 6.4–8.3)
RBC # BLD AUTO: 5.09 10E6/UL (ref 4.4–5.9)
SODIUM SERPL-SCNC: 143 MMOL/L (ref 136–145)
WBC # BLD AUTO: 6.2 10E3/UL (ref 4–11)

## 2022-12-28 PROCEDURE — 80053 COMPREHEN METABOLIC PANEL: CPT | Performed by: NURSE PRACTITIONER

## 2022-12-28 PROCEDURE — 85025 COMPLETE CBC W/AUTO DIFF WBC: CPT | Performed by: NURSE PRACTITIONER

## 2022-12-29 NOTE — PROGRESS NOTES
Juan Miguel Delaney is a 89 year old male being seen today for acute, subsequent and follow up visit visit at [facility].    Code Status: DNR / DNI, Advance Directives: YES-.   Health Care Power of : Extended Emergency Contact Information  Primary Emergency Contact: Criss Delaney   [facility]  Home Phone: [phone]  Relation: Spouse  Secondary Emergency Contact: Sonya Kavita   [facility]  Home Phone: [phone]  Mobile Phone: [phone]  Relation: Daughter     Allergies: Metoprolol, Penicillins, Celecoxib, Ibuprofen, Lisinopril, Naprosyn [naproxen], and Rofecoxib     Chief Complaint / HPI: Follow-up with resident on recently adjusted medications for increased frequency with agitation triggered by anxiety and resistance to cares such as skin care and hygiene--does not want female staff to assist with standby shower.  Resident had rash distributed on chest and upper arms--impetiginized, treated with course of Keflex and mupirocin spot treatment has resolved.  Staff report declined female CNA assisting with shower this week.  Acceptance to staff overseeing hygiene has been challenging.  Resident is unable to provide complete history due to advanced dementia all historical information provided by staff, family and spouse who reports generally condition has improved with recent interventions.  Staff and family do not raise any additional concerns today other than challenges with resistance to cares from female CNAs.    Staff report resident has been sleeping well at night there have been no concerning or difficult to manage behaviors--occasionally resident will report hip pain to staff however today declines any pain--currently working with physical therapy and feels that has been going well--wife does not have any concerns at this time and does not feel overwhelmed with his care.  Resident adamant he will not have any image guided hip injections--- clinic visits have triggered increased anxiety  and agitation.            Past Medical, Surgical, Family and Social History reviewed: YES.  Male    Medications -reviewed and reconciled  recent changes: Seroquel dose increased from 25 to 50 mg 3 times daily--PRN Seroquel has not been needed per staff report    Review of Systems:  General: positive for weakness  Skin: positive for rash  Musculoskeletal: positive for joint pain, muscular weakness, osteoarthritis  Neurologic: positive for memory problems  All other systems negative  Toileting:    Continent of Bowel: Yes   Continent of Bladder: Yes  Mobility: walker    Recent Labs:   Recent Results (from the past 240 hour(s))   Comprehensive metabolic panel    Collection Time: 12/28/22  9:15 AM   Result Value Ref Range    Sodium 143 136 - 145 mmol/L    Potassium 4.0 3.4 - 5.3 mmol/L    Chloride 107 98 - 107 mmol/L    Carbon Dioxide (CO2) 23 22 - 29 mmol/L    Anion Gap 13 7 - 15 mmol/L    Urea Nitrogen 27.7 (H) 8.0 - 23.0 mg/dL    Creatinine 1.72 (H) 0.67 - 1.17 mg/dL    Calcium 8.9 8.2 - 9.6 mg/dL    Glucose 127 (H) 70 - 99 mg/dL    Alkaline Phosphatase 65 40 - 129 U/L    AST 12 10 - 50 U/L    ALT 7 (L) 10 - 50 U/L    Protein Total 5.8 (L) 6.4 - 8.3 g/dL    Albumin 3.9 3.5 - 5.2 g/dL    Bilirubin Total 0.4 <=1.2 mg/dL    GFR Estimate 37 (L) >60 mL/min/1.73m2   CBC with platelets and differential    Collection Time: 12/28/22  9:15 AM   Result Value Ref Range    WBC Count 6.2 4.0 - 11.0 10e3/uL    RBC Count 5.09 4.40 - 5.90 10e6/uL    Hemoglobin 14.5 13.3 - 17.7 g/dL    Hematocrit 43.8 40.0 - 53.0 %    MCV 86 78 - 100 fL    MCH 28.5 26.5 - 33.0 pg    MCHC 33.1 31.5 - 36.5 g/dL    RDW 13.6 10.0 - 15.0 %    Platelet Count 216 150 - 450 10e3/uL    % Neutrophils 71 %    % Lymphocytes 13 %    % Monocytes 9 %    % Eosinophils 5 %    % Basophils 1 %    % Immature Granulocytes 1 %    NRBCs per 100 WBC 0 <1 /100    Absolute Neutrophils 4.5 1.6 - 8.3 10e3/uL    Absolute Lymphocytes 0.8 0.8 - 5.3 10e3/uL    Absolute Monocytes 0.5  0.0 - 1.3 10e3/uL    Absolute Eosinophils 0.3 0.0 - 0.7 10e3/uL    Absolute Basophils 0.1 0.0 - 0.2 10e3/uL    Absolute Immature Granulocytes 0.0 <=0.4 10e3/uL    Absolute NRBCs 0.0 10e3/uL         Current Therapies: PT    Exam:  Vital signs reviewed.   GENERAL APPEARANCE: alert and no distress  EYES: Eyes grossly normal to inspection  RESP: lungs clear   CV: AFIB, no peripheral edema   MS: no musculoskeletal defects are noted and gait is age appropriate with walker  SKIN: Overall generally dry and scaly, impetiginized rash bilateral upper arms shoulders and epigastric area almost completely healed a few scabs no open or draining areas or focal erythema.  Resident allowed me to apply moisturization cream to upper extremities back and chest  NEURO: Alert ,mentation intact and speech normal, apparent short-term memory loss with repetitive questions--oriented to self and place  PSYCH: mentation appears normal and affect normal/bright    Assessment and Plan:    Very pleasant and gracious gentleman who resides with wife in assisted living apartment with advanced dementia with secondary behavioral and mood instability  Has been responsive to Seroquel with recent increases--staff report cooperative except for allowing female staff to assist with shower.  I did speak with daughter Chandrika VENTURA--there is not really a male family member available to assist with shower  Will reach out to facility for possible male CNA availability through corporate facility.  Chandrika and nursing staff endorse there have not been any angry or agitative episodes, wife does not report any behavioral concerns.  No medication adjustments are indicated at this time--does have PRN available--however has not been needed to be utilized.    Regarding chronic issues with left hip osteoarthritis--did start gabapentin at at bedtime, seems to be tolerating well--resident is unable to provide complete history due to dementia and increasing short-term memory  loss--- wife reports will occasionally complain of hip pain but has been ambulatory and active walking to and from dining room and other activities.  Resident currently on scheduled Tylenol--- unfortunately adverse effects with NSAID causing active rectal bleeding and with stage III chronic kidney disease risks outweigh any possible benefits.  Physical therapy and walker training has been helpful--- resident is adamant he will not go into clinic and have any guided hip injections--unable to tolerate lidocaine patch--staff would find patches torn off in garbage cans and on counters.  We will follow-up in a couple of weeks consider increasing gabapentin to twice daily--Chandrika is agreeable with this as felt indicated    Monitoring labs drawn as ordered through home care--- kidney function stable--slightly improved however not worse.  CBC negative.  Consider restarting prophylactic Xarelto if remains stable over the next few weeks.    Impetiginized dermatitis secondary to picking and scratching due to severe dry skin dermatitis has cleared with Keflex and mupirocin.   Resident allowed myself and facility nurse to moisturize upper extremities back and chest--- spoke with daughter who will purchase a tube of Eucerin or another moisturizer and nursing staff can apply to extremities twice a day with medication passes--hopefully this will prevent any further issues.      Over 40 minutes spent in chart review, ordering and review of monitoring labs results, medication review, reconciliation and management,  Direct visit exam and family care conference        (Z86.283) Encounter for medication review  (primary encounter diagnosis)      (L85.3) Dry skin dermatitis      (G30.1,  F02.818) Late onset Alzheimer's disease with behavioral disturbance (H)    (F42.4) Skin picking habit      (R21) Rash and nonspecific skin eruption      (N18.30) Stage 3 chronic kidney disease, unspecified whether stage 3a or 3b CKD (H)      (Z59.3)  Living in assisted living      (I48.0) Paroxysmal A-fib (H)      (M16.12) Primary osteoarthritis of left hip

## 2023-01-01 ENCOUNTER — TELEPHONE (OUTPATIENT)
Dept: FAMILY MEDICINE | Facility: OTHER | Age: 88
End: 2023-01-01
Payer: COMMERCIAL

## 2023-01-01 ENCOUNTER — APPOINTMENT (OUTPATIENT)
Dept: GENERAL RADIOLOGY | Facility: OTHER | Age: 88
DRG: 194 | End: 2023-01-01
Attending: PHYSICIAN ASSISTANT
Payer: MEDICARE

## 2023-01-01 ENCOUNTER — PATIENT OUTREACH (OUTPATIENT)
Dept: INTERNAL MEDICINE | Facility: OTHER | Age: 88
End: 2023-01-01
Payer: COMMERCIAL

## 2023-01-01 ENCOUNTER — APPOINTMENT (OUTPATIENT)
Dept: CT IMAGING | Facility: OTHER | Age: 88
DRG: 194 | End: 2023-01-01
Attending: PHYSICIAN ASSISTANT
Payer: MEDICARE

## 2023-01-01 ENCOUNTER — NURSING HOME VISIT (OUTPATIENT)
Dept: GERIATRICS | Facility: OTHER | Age: 88
End: 2023-01-01
Attending: NURSE PRACTITIONER
Payer: MEDICARE

## 2023-01-01 ENCOUNTER — LAB REQUISITION (OUTPATIENT)
Dept: LAB | Facility: OTHER | Age: 88
End: 2023-01-01
Payer: COMMERCIAL

## 2023-01-01 ENCOUNTER — NURSING HOME VISIT (OUTPATIENT)
Dept: GERIATRICS | Facility: OTHER | Age: 88
End: 2023-01-01
Attending: NURSE PRACTITIONER
Payer: COMMERCIAL

## 2023-01-01 ENCOUNTER — HEALTH MAINTENANCE LETTER (OUTPATIENT)
Age: 88
End: 2023-01-01

## 2023-01-01 ENCOUNTER — HOSPITAL ENCOUNTER (INPATIENT)
Facility: OTHER | Age: 88
LOS: 2 days | Discharge: NURSING, REHAB OR LONG-TERM CARE | DRG: 194 | End: 2023-06-21
Attending: PHYSICIAN ASSISTANT | Admitting: INTERNAL MEDICINE
Payer: MEDICARE

## 2023-01-01 VITALS
RESPIRATION RATE: 18 BRPM | WEIGHT: 185.3 LBS | HEART RATE: 71 BPM | TEMPERATURE: 97.1 F | BODY MASS INDEX: 29.91 KG/M2 | DIASTOLIC BLOOD PRESSURE: 62 MMHG | SYSTOLIC BLOOD PRESSURE: 128 MMHG | OXYGEN SATURATION: 93 %

## 2023-01-01 VITALS
BODY MASS INDEX: 31.93 KG/M2 | OXYGEN SATURATION: 99 % | DIASTOLIC BLOOD PRESSURE: 87 MMHG | SYSTOLIC BLOOD PRESSURE: 120 MMHG | HEART RATE: 70 BPM | WEIGHT: 197.8 LBS | TEMPERATURE: 98.1 F | RESPIRATION RATE: 16 BRPM

## 2023-01-01 VITALS
DIASTOLIC BLOOD PRESSURE: 66 MMHG | TEMPERATURE: 98 F | OXYGEN SATURATION: 98 % | SYSTOLIC BLOOD PRESSURE: 113 MMHG | HEART RATE: 51 BPM | RESPIRATION RATE: 16 BRPM

## 2023-01-01 VITALS
WEIGHT: 190 LBS | BODY MASS INDEX: 30.67 KG/M2 | OXYGEN SATURATION: 92 % | RESPIRATION RATE: 18 BRPM | SYSTOLIC BLOOD PRESSURE: 129 MMHG | DIASTOLIC BLOOD PRESSURE: 84 MMHG | HEART RATE: 52 BPM | TEMPERATURE: 98.1 F

## 2023-01-01 VITALS
BODY MASS INDEX: 30.83 KG/M2 | WEIGHT: 191 LBS | TEMPERATURE: 98.2 F | RESPIRATION RATE: 20 BRPM | DIASTOLIC BLOOD PRESSURE: 82 MMHG | HEART RATE: 59 BPM | SYSTOLIC BLOOD PRESSURE: 150 MMHG | OXYGEN SATURATION: 96 %

## 2023-01-01 VITALS — WEIGHT: 188 LBS | BODY MASS INDEX: 30.34 KG/M2

## 2023-01-01 VITALS
BODY MASS INDEX: 29.54 KG/M2 | RESPIRATION RATE: 16 BRPM | OXYGEN SATURATION: 96 % | DIASTOLIC BLOOD PRESSURE: 55 MMHG | TEMPERATURE: 98.2 F | SYSTOLIC BLOOD PRESSURE: 129 MMHG | HEART RATE: 71 BPM | WEIGHT: 183 LBS

## 2023-01-01 VITALS
SYSTOLIC BLOOD PRESSURE: 145 MMHG | DIASTOLIC BLOOD PRESSURE: 76 MMHG | HEART RATE: 59 BPM | TEMPERATURE: 98.3 F | RESPIRATION RATE: 18 BRPM | OXYGEN SATURATION: 99 %

## 2023-01-01 VITALS
WEIGHT: 188 LBS | TEMPERATURE: 98.1 F | SYSTOLIC BLOOD PRESSURE: 125 MMHG | HEART RATE: 68 BPM | BODY MASS INDEX: 30.34 KG/M2 | OXYGEN SATURATION: 97 % | DIASTOLIC BLOOD PRESSURE: 70 MMHG | RESPIRATION RATE: 14 BRPM

## 2023-01-01 DIAGNOSIS — F02.818 LATE ONSET ALZHEIMER'S DISEASE WITH BEHAVIORAL DISTURBANCE (H): Primary | ICD-10-CM

## 2023-01-01 DIAGNOSIS — G30.1 LATE ONSET ALZHEIMER'S DISEASE WITH BEHAVIORAL DISTURBANCE (H): ICD-10-CM

## 2023-01-01 DIAGNOSIS — G30.1 LATE ONSET ALZHEIMER'S DISEASE WITH BEHAVIORAL DISTURBANCE (H): Primary | ICD-10-CM

## 2023-01-01 DIAGNOSIS — F03.911 AGITATION DUE TO DEMENTIA (H): ICD-10-CM

## 2023-01-01 DIAGNOSIS — F03.918 AGGRESSIVE BEHAVIOR DUE TO DEMENTIA (H): ICD-10-CM

## 2023-01-01 DIAGNOSIS — N18.32 STAGE 3B CHRONIC KIDNEY DISEASE (H): ICD-10-CM

## 2023-01-01 DIAGNOSIS — I83.029 VENOUS STASIS ULCERS OF BOTH LOWER EXTREMITIES (H): ICD-10-CM

## 2023-01-01 DIAGNOSIS — J18.9 UNRESOLVED PNEUMONIA: ICD-10-CM

## 2023-01-01 DIAGNOSIS — R79.89 ELEVATED TROPONIN: ICD-10-CM

## 2023-01-01 DIAGNOSIS — F42.4 SKIN PICKING HABIT: ICD-10-CM

## 2023-01-01 DIAGNOSIS — R09.89 RESPIRATORY CRACKLES: ICD-10-CM

## 2023-01-01 DIAGNOSIS — G30.9 ALZHEIMER'S DISEASE, UNSPECIFIED (CODE) (H): ICD-10-CM

## 2023-01-01 DIAGNOSIS — I50.33 ACUTE ON CHRONIC DIASTOLIC CONGESTIVE HEART FAILURE (H): ICD-10-CM

## 2023-01-01 DIAGNOSIS — M19.90 ARTHRITIS PAIN: ICD-10-CM

## 2023-01-01 DIAGNOSIS — L97.919 VENOUS STASIS ULCERS OF BOTH LOWER EXTREMITIES (H): ICD-10-CM

## 2023-01-01 DIAGNOSIS — E53.8 VITAMIN B12 DEFICIENCY (NON ANEMIC): ICD-10-CM

## 2023-01-01 DIAGNOSIS — J18.9 COMMUNITY ACQUIRED PNEUMONIA OF RIGHT MIDDLE LOBE OF LUNG: Primary | ICD-10-CM

## 2023-01-01 DIAGNOSIS — F03.911 AGITATION DUE TO DEMENTIA (H): Primary | ICD-10-CM

## 2023-01-01 DIAGNOSIS — Z79.899 ENCOUNTER FOR MEDICATION REVIEW: Primary | ICD-10-CM

## 2023-01-01 DIAGNOSIS — Z87.898 HISTORY OF EPISTAXIS: ICD-10-CM

## 2023-01-01 DIAGNOSIS — F02.818 LATE ONSET ALZHEIMER'S DISEASE WITH BEHAVIORAL DISTURBANCE (H): ICD-10-CM

## 2023-01-01 DIAGNOSIS — I83.019 VENOUS STASIS ULCERS OF BOTH LOWER EXTREMITIES (H): ICD-10-CM

## 2023-01-01 DIAGNOSIS — F03.B0 MODERATE DEMENTIA, UNSPECIFIED DEMENTIA TYPE, UNSPECIFIED WHETHER BEHAVIORAL, PSYCHOTIC, OR MOOD DISTURBANCE OR ANXIETY (H): ICD-10-CM

## 2023-01-01 DIAGNOSIS — N39.46 MIXED INCONTINENCE: ICD-10-CM

## 2023-01-01 DIAGNOSIS — I48.0 PAROXYSMAL A-FIB (H): ICD-10-CM

## 2023-01-01 DIAGNOSIS — F02.80 ALZHEIMER'S DISEASE (H): ICD-10-CM

## 2023-01-01 DIAGNOSIS — K59.00 CONSTIPATION, UNSPECIFIED CONSTIPATION TYPE: ICD-10-CM

## 2023-01-01 DIAGNOSIS — Z78.9 LIVING IN ASSISTED LIVING: ICD-10-CM

## 2023-01-01 DIAGNOSIS — R26.81 UNSTEADY GAIT: ICD-10-CM

## 2023-01-01 DIAGNOSIS — I10 BENIGN ESSENTIAL HYPERTENSION: ICD-10-CM

## 2023-01-01 DIAGNOSIS — Z79.899 ENCOUNTER FOR MEDICATION REVIEW: ICD-10-CM

## 2023-01-01 DIAGNOSIS — I48.91 UNSPECIFIED ATRIAL FIBRILLATION (H): ICD-10-CM

## 2023-01-01 DIAGNOSIS — G30.9 ALZHEIMER'S DISEASE (H): Primary | ICD-10-CM

## 2023-01-01 DIAGNOSIS — N18.32 CHRONIC KIDNEY DISEASE (CKD) STAGE G3B/A1, MODERATELY DECREASED GLOMERULAR FILTRATION RATE (GFR) BETWEEN 30-44 ML/MIN/1.73 SQUARE METER AND ALBUMINURIA CREATININE RATIO LESS THAN 30 MG/G (H): ICD-10-CM

## 2023-01-01 DIAGNOSIS — M25.552 HIP PAIN, LEFT: ICD-10-CM

## 2023-01-01 DIAGNOSIS — G89.29 CHRONIC BILATERAL LOW BACK PAIN WITHOUT SCIATICA: ICD-10-CM

## 2023-01-01 DIAGNOSIS — R79.89 ELEVATED TROPONIN LEVEL: ICD-10-CM

## 2023-01-01 DIAGNOSIS — I87.2 STASIS DERMATITIS OF BOTH LEGS: ICD-10-CM

## 2023-01-01 DIAGNOSIS — R04.0 NASAL BLEEDING: ICD-10-CM

## 2023-01-01 DIAGNOSIS — U07.1 INFECTION DUE TO 2019 NOVEL CORONAVIRUS: Primary | ICD-10-CM

## 2023-01-01 DIAGNOSIS — Z78.9 LIVES IN ASSISTED LIVING FACILITY: ICD-10-CM

## 2023-01-01 DIAGNOSIS — F42.4 SKIN PICKING HABIT: Primary | ICD-10-CM

## 2023-01-01 DIAGNOSIS — T45.515D ANTICOAGULANT CAUSING ADVERSE EFFECT IN THERAPEUTIC USE, SUBSEQUENT ENCOUNTER: ICD-10-CM

## 2023-01-01 DIAGNOSIS — G30.9 ALZHEIMER'S DISEASE (H): ICD-10-CM

## 2023-01-01 DIAGNOSIS — R19.5 LOOSE STOOLS: ICD-10-CM

## 2023-01-01 DIAGNOSIS — I50.32 CHRONIC DIASTOLIC HEART FAILURE (H): ICD-10-CM

## 2023-01-01 DIAGNOSIS — N18.9 CHRONIC KIDNEY DISEASE, UNSPECIFIED: ICD-10-CM

## 2023-01-01 DIAGNOSIS — K64.4 EXTERNAL HEMORRHOIDS: ICD-10-CM

## 2023-01-01 DIAGNOSIS — L97.929 VENOUS STASIS ULCERS OF BOTH LOWER EXTREMITIES (H): ICD-10-CM

## 2023-01-01 DIAGNOSIS — Z91.199 NON-COMPLIANT PATIENT: ICD-10-CM

## 2023-01-01 DIAGNOSIS — R60.0 EDEMA OF BOTH LOWER LEGS: ICD-10-CM

## 2023-01-01 DIAGNOSIS — B35.3 TINEA PEDIS OF BOTH FEET: ICD-10-CM

## 2023-01-01 DIAGNOSIS — M16.12 PRIMARY OSTEOARTHRITIS OF LEFT HIP: ICD-10-CM

## 2023-01-01 DIAGNOSIS — Z66 DNR (DO NOT RESUSCITATE): ICD-10-CM

## 2023-01-01 DIAGNOSIS — R63.4 WEIGHT LOSS: ICD-10-CM

## 2023-01-01 DIAGNOSIS — L89.150 PRESSURE INJURY OF SACRAL REGION, UNSTAGEABLE (H): ICD-10-CM

## 2023-01-01 DIAGNOSIS — I50.33 ACUTE ON CHRONIC DIASTOLIC CONGESTIVE HEART FAILURE (H): Primary | ICD-10-CM

## 2023-01-01 DIAGNOSIS — R04.0 NASAL BLEEDING: Primary | ICD-10-CM

## 2023-01-01 DIAGNOSIS — I50.32 CHRONIC DIASTOLIC HEART FAILURE (H): Primary | ICD-10-CM

## 2023-01-01 DIAGNOSIS — Z91.81 HX OF FALL: ICD-10-CM

## 2023-01-01 DIAGNOSIS — Z09 HOSPITAL DISCHARGE FOLLOW-UP: Primary | ICD-10-CM

## 2023-01-01 DIAGNOSIS — R46.89 BEHAVIORAL CHANGE: ICD-10-CM

## 2023-01-01 DIAGNOSIS — U07.1 CLINICAL DIAGNOSIS OF COVID-19: ICD-10-CM

## 2023-01-01 DIAGNOSIS — J18.9 PNEUMONIA OF LEFT UPPER LOBE DUE TO INFECTIOUS ORGANISM: ICD-10-CM

## 2023-01-01 DIAGNOSIS — Z74.09 MOBILITY IMPAIRED: ICD-10-CM

## 2023-01-01 DIAGNOSIS — I48.91 ATRIAL FIBRILLATION, UNSPECIFIED TYPE (H): ICD-10-CM

## 2023-01-01 DIAGNOSIS — L03.031 PARONYCHIA OF TOE, RIGHT: Primary | ICD-10-CM

## 2023-01-01 DIAGNOSIS — R21 RASH AND NONSPECIFIC SKIN ERUPTION: ICD-10-CM

## 2023-01-01 DIAGNOSIS — F02.80 ALZHEIMER'S DISEASE (H): Primary | ICD-10-CM

## 2023-01-01 DIAGNOSIS — N18.30 STAGE 3 CHRONIC KIDNEY DISEASE, UNSPECIFIED WHETHER STAGE 3A OR 3B CKD (H): ICD-10-CM

## 2023-01-01 DIAGNOSIS — Z87.898 HISTORY OF DIARRHEA: ICD-10-CM

## 2023-01-01 DIAGNOSIS — Z79.899 OTHER LONG TERM (CURRENT) DRUG THERAPY: ICD-10-CM

## 2023-01-01 DIAGNOSIS — I50.33 ACUTE ON CHRONIC DIASTOLIC HEART FAILURE (H): ICD-10-CM

## 2023-01-01 DIAGNOSIS — F41.1 GAD (GENERALIZED ANXIETY DISORDER): ICD-10-CM

## 2023-01-01 DIAGNOSIS — F03.B0 MODERATE DEMENTIA WITHOUT BEHAVIORAL DISTURBANCE, PSYCHOTIC DISTURBANCE, MOOD DISTURBANCE, OR ANXIETY, UNSPECIFIED DEMENTIA TYPE (H): ICD-10-CM

## 2023-01-01 DIAGNOSIS — U07.1 LAB TEST POSITIVE FOR DETECTION OF COVID-19 VIRUS: ICD-10-CM

## 2023-01-01 DIAGNOSIS — Z79.01 LONG TERM (CURRENT) USE OF ANTICOAGULANTS: ICD-10-CM

## 2023-01-01 DIAGNOSIS — L60.2 OVERGROWN TOENAILS: ICD-10-CM

## 2023-01-01 DIAGNOSIS — F03.918 AGGRESSIVE BEHAVIOR DUE TO DEMENTIA (H): Primary | ICD-10-CM

## 2023-01-01 DIAGNOSIS — M16.12 PRIMARY OSTEOARTHRITIS OF LEFT HIP: Primary | ICD-10-CM

## 2023-01-01 DIAGNOSIS — R21 RASH AND NONSPECIFIC SKIN ERUPTION: Primary | ICD-10-CM

## 2023-01-01 DIAGNOSIS — Z78.9 DNI (DO NOT INTUBATE): ICD-10-CM

## 2023-01-01 DIAGNOSIS — Z11.52 ENCOUNTER FOR SCREENING LABORATORY TESTING FOR COVID-19 VIRUS: ICD-10-CM

## 2023-01-01 DIAGNOSIS — T88.7XXA MEDICATION SIDE EFFECTS: Primary | ICD-10-CM

## 2023-01-01 DIAGNOSIS — R60.0 BILATERAL LEG EDEMA: ICD-10-CM

## 2023-01-01 DIAGNOSIS — M54.50 CHRONIC BILATERAL LOW BACK PAIN WITHOUT SCIATICA: ICD-10-CM

## 2023-01-01 DIAGNOSIS — Z79.01 LONG TERM CURRENT USE OF ANTICOAGULANT THERAPY: ICD-10-CM

## 2023-01-01 DIAGNOSIS — U07.1 INFECTION DUE TO 2019 NOVEL CORONAVIRUS: ICD-10-CM

## 2023-01-01 LAB
ALBUMIN SERPL BCG-MCNC: 3.4 G/DL (ref 3.5–5.2)
ALBUMIN SERPL BCG-MCNC: 3.9 G/DL (ref 3.5–5.2)
ALP SERPL-CCNC: 106 U/L (ref 40–129)
ALP SERPL-CCNC: 99 U/L (ref 40–129)
ALT SERPL W P-5'-P-CCNC: 31 U/L (ref 0–70)
ALT SERPL W P-5'-P-CCNC: 42 U/L (ref 0–70)
ANION GAP SERPL CALCULATED.3IONS-SCNC: 11 MMOL/L (ref 7–15)
ANION GAP SERPL CALCULATED.3IONS-SCNC: 12 MMOL/L (ref 7–15)
ANION GAP SERPL CALCULATED.3IONS-SCNC: 13 MMOL/L (ref 7–15)
ANION GAP SERPL CALCULATED.3IONS-SCNC: 16 MMOL/L (ref 7–15)
ANION GAP SERPL CALCULATED.3IONS-SCNC: 17 MMOL/L (ref 7–15)
AST SERPL W P-5'-P-CCNC: 31 U/L (ref 0–45)
AST SERPL W P-5'-P-CCNC: 42 U/L (ref 0–45)
ATRIAL RATE - MUSE: 80 BPM
BACTERIA SPT CULT: ABNORMAL
BASOPHILS # BLD AUTO: 0.1 10E3/UL (ref 0–0.2)
BASOPHILS NFR BLD AUTO: 1 %
BILIRUB SERPL-MCNC: 0.4 MG/DL
BILIRUB SERPL-MCNC: 0.8 MG/DL
BUN SERPL-MCNC: 21.6 MG/DL (ref 8–23)
BUN SERPL-MCNC: 25 MG/DL (ref 8–23)
BUN SERPL-MCNC: 34.5 MG/DL (ref 8–23)
BUN SERPL-MCNC: 40.2 MG/DL (ref 8–23)
BUN SERPL-MCNC: 55.1 MG/DL (ref 8–23)
CALCIUM SERPL-MCNC: 8.2 MG/DL (ref 8.2–9.6)
CALCIUM SERPL-MCNC: 9 MG/DL (ref 8.2–9.6)
CALCIUM SERPL-MCNC: 9.2 MG/DL (ref 8.2–9.6)
CHLORIDE SERPL-SCNC: 105 MMOL/L (ref 98–107)
CHLORIDE SERPL-SCNC: 106 MMOL/L (ref 98–107)
CHLORIDE SERPL-SCNC: 107 MMOL/L (ref 98–107)
CHLORIDE SERPL-SCNC: 108 MMOL/L (ref 98–107)
CHLORIDE SERPL-SCNC: 108 MMOL/L (ref 98–107)
CREAT SERPL-MCNC: 1.37 MG/DL (ref 0.67–1.17)
CREAT SERPL-MCNC: 1.43 MG/DL (ref 0.67–1.17)
CREAT SERPL-MCNC: 1.78 MG/DL (ref 0.67–1.17)
CREAT SERPL-MCNC: 1.82 MG/DL (ref 0.67–1.17)
CREAT SERPL-MCNC: 2.31 MG/DL (ref 0.67–1.17)
DEPRECATED HCO3 PLAS-SCNC: 20 MMOL/L (ref 22–29)
DEPRECATED HCO3 PLAS-SCNC: 21 MMOL/L (ref 22–29)
DEPRECATED HCO3 PLAS-SCNC: 22 MMOL/L (ref 22–29)
DEPRECATED HCO3 PLAS-SCNC: 24 MMOL/L (ref 22–29)
DEPRECATED HCO3 PLAS-SCNC: 25 MMOL/L (ref 22–29)
DIASTOLIC BLOOD PRESSURE - MUSE: NORMAL MMHG
EOSINOPHIL # BLD AUTO: 0.1 10E3/UL (ref 0–0.7)
EOSINOPHIL # BLD AUTO: 0.2 10E3/UL (ref 0–0.7)
EOSINOPHIL # BLD AUTO: 0.3 10E3/UL (ref 0–0.7)
EOSINOPHIL NFR BLD AUTO: 2 %
EOSINOPHIL NFR BLD AUTO: 2 %
EOSINOPHIL NFR BLD AUTO: 4 %
ERYTHROCYTE [DISTWIDTH] IN BLOOD BY AUTOMATED COUNT: 14.1 % (ref 10–15)
ERYTHROCYTE [DISTWIDTH] IN BLOOD BY AUTOMATED COUNT: 14.6 % (ref 10–15)
ERYTHROCYTE [DISTWIDTH] IN BLOOD BY AUTOMATED COUNT: 15 % (ref 10–15)
ERYTHROCYTE [DISTWIDTH] IN BLOOD BY AUTOMATED COUNT: 15 % (ref 10–15)
FLUAV RNA SPEC QL NAA+PROBE: NEGATIVE
FLUBV RNA RESP QL NAA+PROBE: NEGATIVE
GFR SERPL CREATININE-BSD FRML MDRD: 26 ML/MIN/1.73M2
GFR SERPL CREATININE-BSD FRML MDRD: 35 ML/MIN/1.73M2
GFR SERPL CREATININE-BSD FRML MDRD: 36 ML/MIN/1.73M2
GFR SERPL CREATININE-BSD FRML MDRD: 47 ML/MIN/1.73M2
GFR SERPL CREATININE-BSD FRML MDRD: 49 ML/MIN/1.73M2
GLUCOSE SERPL-MCNC: 126 MG/DL (ref 70–99)
GLUCOSE SERPL-MCNC: 155 MG/DL (ref 70–99)
GLUCOSE SERPL-MCNC: 81 MG/DL (ref 70–99)
GLUCOSE SERPL-MCNC: 83 MG/DL (ref 70–99)
GLUCOSE SERPL-MCNC: 87 MG/DL (ref 70–99)
GRAM STAIN RESULT: ABNORMAL
HCT VFR BLD AUTO: 38.2 % (ref 40–53)
HCT VFR BLD AUTO: 38.7 % (ref 40–53)
HCT VFR BLD AUTO: 41.2 % (ref 40–53)
HCT VFR BLD AUTO: 43.5 % (ref 40–53)
HGB BLD-MCNC: 12.4 G/DL (ref 13.3–17.7)
HGB BLD-MCNC: 12.8 G/DL (ref 13.3–17.7)
HGB BLD-MCNC: 13.6 G/DL (ref 13.3–17.7)
HGB BLD-MCNC: 14.3 G/DL (ref 13.3–17.7)
HOLD SPECIMEN: NORMAL
IMM GRANULOCYTES # BLD: 0 10E3/UL
IMM GRANULOCYTES # BLD: 0.1 10E3/UL
IMM GRANULOCYTES # BLD: 0.2 10E3/UL
IMM GRANULOCYTES NFR BLD: 0 %
IMM GRANULOCYTES NFR BLD: 1 %
IMM GRANULOCYTES NFR BLD: 3 %
INTERPRETATION ECG - MUSE: NORMAL
LYMPHOCYTES # BLD AUTO: 0.6 10E3/UL (ref 0.8–5.3)
LYMPHOCYTES # BLD AUTO: 0.8 10E3/UL (ref 0.8–5.3)
LYMPHOCYTES # BLD AUTO: 0.9 10E3/UL (ref 0.8–5.3)
LYMPHOCYTES NFR BLD AUTO: 15 %
LYMPHOCYTES NFR BLD AUTO: 7 %
LYMPHOCYTES NFR BLD AUTO: 9 %
MAGNESIUM SERPL-MCNC: 2 MG/DL (ref 1.7–2.3)
MCH RBC QN AUTO: 28.6 PG (ref 26.5–33)
MCH RBC QN AUTO: 28.7 PG (ref 26.5–33)
MCH RBC QN AUTO: 29.2 PG (ref 26.5–33)
MCH RBC QN AUTO: 29.4 PG (ref 26.5–33)
MCHC RBC AUTO-ENTMCNC: 32 G/DL (ref 31.5–36.5)
MCHC RBC AUTO-ENTMCNC: 32.9 G/DL (ref 31.5–36.5)
MCHC RBC AUTO-ENTMCNC: 33 G/DL (ref 31.5–36.5)
MCHC RBC AUTO-ENTMCNC: 33.5 G/DL (ref 31.5–36.5)
MCV RBC AUTO: 87 FL (ref 78–100)
MCV RBC AUTO: 88 FL (ref 78–100)
MCV RBC AUTO: 88 FL (ref 78–100)
MCV RBC AUTO: 89 FL (ref 78–100)
MONOCYTES # BLD AUTO: 0.6 10E3/UL (ref 0–1.3)
MONOCYTES # BLD AUTO: 0.7 10E3/UL (ref 0–1.3)
MONOCYTES # BLD AUTO: 1.1 10E3/UL (ref 0–1.3)
MONOCYTES NFR BLD AUTO: 10 %
MONOCYTES NFR BLD AUTO: 11 %
MONOCYTES NFR BLD AUTO: 8 %
NEUTROPHILS # BLD AUTO: 4.1 10E3/UL (ref 1.6–8.3)
NEUTROPHILS # BLD AUTO: 5.5 10E3/UL (ref 1.6–8.3)
NEUTROPHILS # BLD AUTO: 9 10E3/UL (ref 1.6–8.3)
NEUTROPHILS NFR BLD AUTO: 69 %
NEUTROPHILS NFR BLD AUTO: 77 %
NEUTROPHILS NFR BLD AUTO: 79 %
NRBC # BLD AUTO: 0 10E3/UL
NRBC BLD AUTO-RTO: 0 /100
NT-PROBNP SERPL-MCNC: 4562 PG/ML (ref 0–1800)
P AXIS - MUSE: 61 DEGREES
PLATELET # BLD AUTO: 231 10E3/UL (ref 150–450)
PLATELET # BLD AUTO: 258 10E3/UL (ref 150–450)
PLATELET # BLD AUTO: 284 10E3/UL (ref 150–450)
PLATELET # BLD AUTO: 323 10E3/UL (ref 150–450)
POTASSIUM SERPL-SCNC: 4 MMOL/L (ref 3.4–5.3)
POTASSIUM SERPL-SCNC: 4.1 MMOL/L (ref 3.4–5.3)
POTASSIUM SERPL-SCNC: 4.6 MMOL/L (ref 3.4–5.3)
POTASSIUM SERPL-SCNC: 4.9 MMOL/L (ref 3.4–5.3)
POTASSIUM SERPL-SCNC: 4.9 MMOL/L (ref 3.4–5.3)
PR INTERVAL - MUSE: 148 MS
PROT SERPL-MCNC: 6.4 G/DL (ref 6.4–8.3)
PROT SERPL-MCNC: 7.3 G/DL (ref 6.4–8.3)
QRS DURATION - MUSE: 72 MS
QT - MUSE: 342 MS
QTC - MUSE: 413 MS
R AXIS - MUSE: 34 DEGREES
RBC # BLD AUTO: 4.33 10E6/UL (ref 4.4–5.9)
RBC # BLD AUTO: 4.36 10E6/UL (ref 4.4–5.9)
RBC # BLD AUTO: 4.66 10E6/UL (ref 4.4–5.9)
RBC # BLD AUTO: 4.98 10E6/UL (ref 4.4–5.9)
RSV RNA SPEC NAA+PROBE: NEGATIVE
SARS-COV-2 RNA RESP QL NAA+PROBE: NEGATIVE
SODIUM SERPL-SCNC: 141 MMOL/L (ref 136–145)
SODIUM SERPL-SCNC: 142 MMOL/L (ref 136–145)
SODIUM SERPL-SCNC: 142 MMOL/L (ref 136–145)
SODIUM SERPL-SCNC: 145 MMOL/L (ref 136–145)
SODIUM SERPL-SCNC: 145 MMOL/L (ref 136–145)
SYSTOLIC BLOOD PRESSURE - MUSE: NORMAL MMHG
T AXIS - MUSE: 57 DEGREES
TROPONIN T SERPL HS-MCNC: 101 NG/L
TROPONIN T SERPL HS-MCNC: 102 NG/L
TSH SERPL DL<=0.005 MIU/L-ACNC: 3.6 UIU/ML (ref 0.3–4.2)
VENTRICULAR RATE- MUSE: 88 BPM
VIT B12 SERPL-MCNC: 1181 PG/ML (ref 232–1245)
WBC # BLD AUTO: 11.2 10E3/UL (ref 4–11)
WBC # BLD AUTO: 12.9 10E3/UL (ref 4–11)
WBC # BLD AUTO: 6.1 10E3/UL (ref 4–11)
WBC # BLD AUTO: 7 10E3/UL (ref 4–11)

## 2023-01-01 PROCEDURE — 99232 SBSQ HOSP IP/OBS MODERATE 35: CPT | Performed by: INTERNAL MEDICINE

## 2023-01-01 PROCEDURE — 36415 COLL VENOUS BLD VENIPUNCTURE: CPT | Performed by: NURSE PRACTITIONER

## 2023-01-01 PROCEDURE — 99348 HOME/RES VST EST LOW MDM 30: CPT | Performed by: NURSE PRACTITIONER

## 2023-01-01 PROCEDURE — 87637 SARSCOV2&INF A&B&RSV AMP PRB: CPT | Performed by: EMERGENCY MEDICINE

## 2023-01-01 PROCEDURE — 93005 ELECTROCARDIOGRAM TRACING: CPT | Performed by: PHYSICIAN ASSISTANT

## 2023-01-01 PROCEDURE — 80048 BASIC METABOLIC PNL TOTAL CA: CPT | Performed by: NURSE PRACTITIONER

## 2023-01-01 PROCEDURE — 99349 HOME/RES VST EST MOD MDM 40: CPT | Performed by: NURSE PRACTITIONER

## 2023-01-01 PROCEDURE — 84484 ASSAY OF TROPONIN QUANT: CPT | Performed by: PHYSICIAN ASSISTANT

## 2023-01-01 PROCEDURE — 36415 COLL VENOUS BLD VENIPUNCTURE: CPT | Performed by: PHYSICIAN ASSISTANT

## 2023-01-01 PROCEDURE — 258N000003 HC RX IP 258 OP 636: Performed by: INTERNAL MEDICINE

## 2023-01-01 PROCEDURE — 99347 HOME/RES VST EST SF MDM 20: CPT | Performed by: NURSE PRACTITIONER

## 2023-01-01 PROCEDURE — 83880 ASSAY OF NATRIURETIC PEPTIDE: CPT | Performed by: PHYSICIAN ASSISTANT

## 2023-01-01 PROCEDURE — 80053 COMPREHEN METABOLIC PANEL: CPT | Performed by: PHYSICIAN ASSISTANT

## 2023-01-01 PROCEDURE — 250N000011 HC RX IP 250 OP 636: Performed by: PHYSICIAN ASSISTANT

## 2023-01-01 PROCEDURE — 71046 X-RAY EXAM CHEST 2 VIEWS: CPT

## 2023-01-01 PROCEDURE — 250N000013 HC RX MED GY IP 250 OP 250 PS 637: Performed by: INTERNAL MEDICINE

## 2023-01-01 PROCEDURE — 93010 ELECTROCARDIOGRAM REPORT: CPT | Performed by: INTERNAL MEDICINE

## 2023-01-01 PROCEDURE — 250N000011 HC RX IP 250 OP 636: Performed by: INTERNAL MEDICINE

## 2023-01-01 PROCEDURE — 94640 AIRWAY INHALATION TREATMENT: CPT | Mod: 76

## 2023-01-01 PROCEDURE — 94640 AIRWAY INHALATION TREATMENT: CPT

## 2023-01-01 PROCEDURE — 99350 HOME/RES VST EST HIGH MDM 60: CPT | Performed by: NURSE PRACTITIONER

## 2023-01-01 PROCEDURE — 87205 SMEAR GRAM STAIN: CPT | Performed by: INTERNAL MEDICINE

## 2023-01-01 PROCEDURE — 36415 COLL VENOUS BLD VENIPUNCTURE: CPT | Performed by: INTERNAL MEDICINE

## 2023-01-01 PROCEDURE — 85027 COMPLETE CBC AUTOMATED: CPT | Performed by: INTERNAL MEDICINE

## 2023-01-01 PROCEDURE — G1010 CDSM STANSON: HCPCS

## 2023-01-01 PROCEDURE — 120N000001 HC R&B MED SURG/OB

## 2023-01-01 PROCEDURE — 85025 COMPLETE CBC W/AUTO DIFF WBC: CPT | Performed by: NURSE PRACTITIONER

## 2023-01-01 PROCEDURE — 84443 ASSAY THYROID STIM HORMONE: CPT | Performed by: NURSE PRACTITIONER

## 2023-01-01 PROCEDURE — 83735 ASSAY OF MAGNESIUM: CPT | Performed by: PHYSICIAN ASSISTANT

## 2023-01-01 PROCEDURE — 96365 THER/PROPH/DIAG IV INF INIT: CPT | Mod: XU | Performed by: PHYSICIAN ASSISTANT

## 2023-01-01 PROCEDURE — 250N000009 HC RX 250: Performed by: INTERNAL MEDICINE

## 2023-01-01 PROCEDURE — 999N000157 HC STATISTIC RCP TIME EA 10 MIN

## 2023-01-01 PROCEDURE — C9803 HOPD COVID-19 SPEC COLLECT: HCPCS | Performed by: PHYSICIAN ASSISTANT

## 2023-01-01 PROCEDURE — 96366 THER/PROPH/DIAG IV INF ADDON: CPT | Performed by: PHYSICIAN ASSISTANT

## 2023-01-01 PROCEDURE — 87637 SARSCOV2&INF A&B&RSV AMP PRB: CPT | Performed by: PHYSICIAN ASSISTANT

## 2023-01-01 PROCEDURE — 99285 EMERGENCY DEPT VISIT HI MDM: CPT | Performed by: PHYSICIAN ASSISTANT

## 2023-01-01 PROCEDURE — 99285 EMERGENCY DEPT VISIT HI MDM: CPT | Mod: 25 | Performed by: PHYSICIAN ASSISTANT

## 2023-01-01 PROCEDURE — 80053 COMPREHEN METABOLIC PANEL: CPT | Performed by: INTERNAL MEDICINE

## 2023-01-01 PROCEDURE — 96375 TX/PRO/DX INJ NEW DRUG ADDON: CPT | Mod: XU | Performed by: PHYSICIAN ASSISTANT

## 2023-01-01 PROCEDURE — 250N000009 HC RX 250: Performed by: PHYSICIAN ASSISTANT

## 2023-01-01 PROCEDURE — 85025 COMPLETE CBC W/AUTO DIFF WBC: CPT | Performed by: PHYSICIAN ASSISTANT

## 2023-01-01 PROCEDURE — 82607 VITAMIN B-12: CPT | Performed by: NURSE PRACTITIONER

## 2023-01-01 PROCEDURE — 99239 HOSP IP/OBS DSCHRG MGMT >30: CPT | Performed by: INTERNAL MEDICINE

## 2023-01-01 RX ORDER — CEFDINIR 300 MG/1
300 CAPSULE ORAL 2 TIMES DAILY
Qty: 14 CAPSULE | Refills: 0 | Status: SHIPPED | OUTPATIENT
Start: 2023-01-01 | End: 2023-01-01

## 2023-01-01 RX ORDER — QUETIAPINE FUMARATE 50 MG/1
TABLET, FILM COATED ORAL
Qty: 150 TABLET | Refills: 3 | Status: SHIPPED | OUTPATIENT
Start: 2023-01-01 | End: 2023-01-01

## 2023-01-01 RX ORDER — LORAZEPAM 0.5 MG/1
0.5 TABLET ORAL 2 TIMES DAILY PRN
Status: DISCONTINUED | OUTPATIENT
Start: 2023-01-01 | End: 2023-01-01 | Stop reason: HOSPADM

## 2023-01-01 RX ORDER — QUETIAPINE FUMARATE 25 MG/1
TABLET, FILM COATED ORAL
Qty: 120 TABLET | Refills: 1 | Status: SHIPPED | OUTPATIENT
Start: 2023-01-01 | End: 2023-01-01

## 2023-01-01 RX ORDER — IPRATROPIUM BROMIDE AND ALBUTEROL SULFATE 2.5; .5 MG/3ML; MG/3ML
3 SOLUTION RESPIRATORY (INHALATION) ONCE
Status: COMPLETED | OUTPATIENT
Start: 2023-01-01 | End: 2023-01-01

## 2023-01-01 RX ORDER — QUETIAPINE FUMARATE 50 MG/1
TABLET, FILM COATED ORAL
Qty: 120 TABLET | Refills: 3 | Status: SHIPPED | OUTPATIENT
Start: 2023-01-01 | End: 2023-01-01

## 2023-01-01 RX ORDER — QUETIAPINE FUMARATE 25 MG/1
25 TABLET, FILM COATED ORAL 2 TIMES DAILY PRN
Qty: 100 TABLET | Refills: 1 | Status: SHIPPED | OUTPATIENT
Start: 2023-01-01 | End: 2023-01-01

## 2023-01-01 RX ORDER — LORAZEPAM 0.5 MG/1
0.5 TABLET ORAL 2 TIMES DAILY PRN
Qty: 60 TABLET | Refills: 1 | Status: SHIPPED | OUTPATIENT
Start: 2023-01-01

## 2023-01-01 RX ORDER — UREA 10 %
500 LOTION (ML) TOPICAL DAILY
Qty: 90 TABLET | Refills: 4 | Status: SHIPPED | OUTPATIENT
Start: 2023-01-01

## 2023-01-01 RX ORDER — AZITHROMYCIN 250 MG/1
250 TABLET, FILM COATED ORAL DAILY
Status: DISCONTINUED | OUTPATIENT
Start: 2023-01-01 | End: 2023-01-01 | Stop reason: HOSPADM

## 2023-01-01 RX ORDER — TORSEMIDE 10 MG/1
10 TABLET ORAL DAILY
Qty: 90 TABLET | Refills: 3 | Status: SHIPPED | OUTPATIENT
Start: 2023-01-01 | End: 2023-01-01

## 2023-01-01 RX ORDER — CEFUROXIME AXETIL 250 MG/1
500 TABLET ORAL EVERY 12 HOURS SCHEDULED
Status: DISCONTINUED | OUTPATIENT
Start: 2023-01-01 | End: 2023-01-01 | Stop reason: HOSPADM

## 2023-01-01 RX ORDER — RISPERIDONE 0.5 MG/1
0.5 TABLET ORAL 2 TIMES DAILY
Qty: 70 TABLET | Refills: 0 | COMMUNITY
Start: 2023-01-01 | End: 2023-01-01

## 2023-01-01 RX ORDER — CEFUROXIME AXETIL 500 MG/1
500 TABLET ORAL EVERY 12 HOURS
Qty: 10 TABLET | Refills: 0 | Status: SHIPPED | OUTPATIENT
Start: 2023-01-01 | End: 2023-01-01

## 2023-01-01 RX ORDER — CEFEPIME HYDROCHLORIDE 2 G/1
2 INJECTION, POWDER, FOR SOLUTION INTRAVENOUS EVERY 24 HOURS
Status: DISCONTINUED | OUTPATIENT
Start: 2023-01-01 | End: 2023-01-01

## 2023-01-01 RX ORDER — GABAPENTIN 300 MG/1
300 CAPSULE ORAL AT BEDTIME
Qty: 120 CAPSULE | Refills: 3 | Status: SHIPPED | OUTPATIENT
Start: 2023-01-01

## 2023-01-01 RX ORDER — QUETIAPINE FUMARATE 25 MG/1
25 TABLET, FILM COATED ORAL 2 TIMES DAILY PRN
Status: DISCONTINUED | OUTPATIENT
Start: 2023-01-01 | End: 2023-01-01 | Stop reason: HOSPADM

## 2023-01-01 RX ORDER — QUETIAPINE FUMARATE 25 MG/1
25 TABLET, FILM COATED ORAL DAILY PRN
Qty: 29 TABLET | Refills: 1 | Status: SHIPPED | OUTPATIENT
Start: 2023-01-01 | End: 2023-01-01

## 2023-01-01 RX ORDER — QUETIAPINE FUMARATE 25 MG/1
TABLET, FILM COATED ORAL
Qty: 100 TABLET | Refills: 1 | Status: SHIPPED | OUTPATIENT
Start: 2023-01-01 | End: 2023-01-01

## 2023-01-01 RX ORDER — TORSEMIDE 10 MG/1
TABLET ORAL
Qty: 90 TABLET | Refills: 1 | Status: SHIPPED | OUTPATIENT
Start: 2023-01-01 | End: 2023-01-01

## 2023-01-01 RX ORDER — MUPIROCIN 20 MG/G
OINTMENT TOPICAL 2 TIMES DAILY
Qty: 30 G | Refills: 2 | Status: SHIPPED | OUTPATIENT
Start: 2023-01-01

## 2023-01-01 RX ORDER — GABAPENTIN 300 MG/1
300 CAPSULE ORAL AT BEDTIME
Status: DISCONTINUED | OUTPATIENT
Start: 2023-01-01 | End: 2023-01-01 | Stop reason: HOSPADM

## 2023-01-01 RX ORDER — AZITHROMYCIN 250 MG/1
250 TABLET, FILM COATED ORAL DAILY
Qty: 3 TABLET | Refills: 0 | Status: SHIPPED | OUTPATIENT
Start: 2023-01-01 | End: 2023-01-01

## 2023-01-01 RX ORDER — QUETIAPINE FUMARATE 25 MG/1
TABLET, FILM COATED ORAL
Qty: 63 TABLET | Refills: 0 | Status: SHIPPED | OUTPATIENT
Start: 2023-01-01 | End: 2023-01-01

## 2023-01-01 RX ORDER — ONDANSETRON 4 MG/1
4 TABLET, ORALLY DISINTEGRATING ORAL EVERY 6 HOURS PRN
Status: DISCONTINUED | OUTPATIENT
Start: 2023-01-01 | End: 2023-01-01 | Stop reason: HOSPADM

## 2023-01-01 RX ORDER — QUETIAPINE FUMARATE 50 MG/1
TABLET, FILM COATED ORAL
Qty: 90 TABLET | Refills: 3 | Status: SHIPPED | OUTPATIENT
Start: 2023-01-01 | End: 2023-01-01

## 2023-01-01 RX ORDER — RISPERIDONE 1 MG/1
0.5 TABLET ORAL DAILY PRN
Qty: 12 TABLET | Refills: 1 | COMMUNITY
Start: 2023-01-01

## 2023-01-01 RX ORDER — OXYMETAZOLINE HYDROCHLORIDE 0.05 G/100ML
SPRAY NASAL
Qty: 15 ML | Refills: 1 | Status: SHIPPED | OUTPATIENT
Start: 2023-01-01

## 2023-01-01 RX ORDER — QUETIAPINE FUMARATE 25 MG/1
75 TABLET, FILM COATED ORAL 3 TIMES DAILY
Qty: 270 TABLET | Refills: 1 | Status: SHIPPED | OUTPATIENT
Start: 2023-01-01 | End: 2023-01-01

## 2023-01-01 RX ORDER — RISPERIDONE 0.25 MG/1
0.5 TABLET ORAL 2 TIMES DAILY
Status: DISCONTINUED | OUTPATIENT
Start: 2023-01-01 | End: 2023-01-01 | Stop reason: HOSPADM

## 2023-01-01 RX ORDER — GUAIFENESIN/DEXTROMETHORPHAN 100-10MG/5
10 SYRUP ORAL 2 TIMES DAILY
Status: DISCONTINUED | OUTPATIENT
Start: 2023-01-01 | End: 2023-01-01 | Stop reason: HOSPADM

## 2023-01-01 RX ORDER — CEFEPIME HYDROCHLORIDE 2 G/1
2 INJECTION, POWDER, FOR SOLUTION INTRAVENOUS ONCE
Status: COMPLETED | OUTPATIENT
Start: 2023-01-01 | End: 2023-01-01

## 2023-01-01 RX ORDER — NITROGLYCERIN 0.4 MG/1
0.4 TABLET SUBLINGUAL EVERY 5 MIN PRN
COMMUNITY
End: 2023-01-01

## 2023-01-01 RX ORDER — TORSEMIDE 10 MG/1
20 TABLET ORAL
Status: DISCONTINUED | OUTPATIENT
Start: 2023-01-01 | End: 2023-01-01 | Stop reason: HOSPADM

## 2023-01-01 RX ORDER — TORSEMIDE 10 MG/1
10 TABLET ORAL DAILY
Qty: 90 TABLET | Refills: 3 | COMMUNITY
Start: 2023-01-01 | End: 2023-01-01

## 2023-01-01 RX ORDER — RISPERIDONE 0.5 MG/1
0.5 TABLET ORAL 2 TIMES DAILY
Qty: 70 TABLET | Refills: 0 | Status: SHIPPED | OUTPATIENT
Start: 2023-01-01 | End: 2023-01-01

## 2023-01-01 RX ORDER — LOPERAMIDE HYDROCHLORIDE 2 MG/1
2 TABLET ORAL 2 TIMES DAILY PRN
Qty: 20 TABLET | Refills: 1 | Status: SHIPPED | OUTPATIENT
Start: 2023-01-01

## 2023-01-01 RX ORDER — AZITHROMYCIN 500 MG/5ML
500 INJECTION, POWDER, LYOPHILIZED, FOR SOLUTION INTRAVENOUS ONCE
Status: COMPLETED | OUTPATIENT
Start: 2023-01-01 | End: 2023-01-01

## 2023-01-01 RX ORDER — ALBUTEROL SULFATE 1.25 MG/3ML
2.5 SOLUTION RESPIRATORY (INHALATION) 4 TIMES DAILY PRN
Status: DISCONTINUED | OUTPATIENT
Start: 2023-01-01 | End: 2023-01-01 | Stop reason: HOSPADM

## 2023-01-01 RX ORDER — RISPERIDONE 0.5 MG/1
0.5 TABLET ORAL 2 TIMES DAILY
Qty: 60 TABLET | Refills: 0 | Status: SHIPPED | OUTPATIENT
Start: 2023-01-01 | End: 2023-01-01

## 2023-01-01 RX ORDER — SENNOSIDES 8.6 MG
TABLET ORAL
Qty: 100 TABLET | Refills: 3 | Status: SHIPPED | OUTPATIENT
Start: 2023-01-01 | End: 2023-01-01

## 2023-01-01 RX ORDER — AMLODIPINE BESYLATE 5 MG/1
10 TABLET ORAL DAILY
Qty: 180 TABLET | Refills: 3 | Status: SHIPPED | OUTPATIENT
Start: 2023-01-01

## 2023-01-01 RX ORDER — RISPERIDONE 0.5 MG/1
TABLET ORAL
Qty: 120 TABLET | Refills: 4 | Status: SHIPPED | OUTPATIENT
Start: 2023-01-01

## 2023-01-01 RX ORDER — AMLODIPINE BESYLATE 10 MG/1
10 TABLET ORAL DAILY
Status: DISCONTINUED | OUTPATIENT
Start: 2023-01-01 | End: 2023-01-01 | Stop reason: HOSPADM

## 2023-01-01 RX ORDER — DONEPEZIL HYDROCHLORIDE 10 MG/1
TABLET, ORALLY DISINTEGRATING ORAL
Qty: 90 TABLET | Refills: 3 | Status: SHIPPED | OUTPATIENT
Start: 2023-01-01

## 2023-01-01 RX ORDER — NITROGLYCERIN 0.4 MG/1
TABLET SUBLINGUAL
Qty: 25 TABLET | Refills: 0 | Status: SHIPPED | OUTPATIENT
Start: 2023-01-01

## 2023-01-01 RX ORDER — ACETAMINOPHEN 650 MG/1
650 SUPPOSITORY RECTAL EVERY 6 HOURS PRN
Status: DISCONTINUED | OUTPATIENT
Start: 2023-01-01 | End: 2023-01-01 | Stop reason: HOSPADM

## 2023-01-01 RX ORDER — SENNOSIDES 8.6 MG
TABLET ORAL
Qty: 100 TABLET | Refills: 3 | Status: SHIPPED | OUTPATIENT
Start: 2023-01-01

## 2023-01-01 RX ORDER — FUROSEMIDE 10 MG/ML
20 INJECTION INTRAMUSCULAR; INTRAVENOUS ONCE
Status: COMPLETED | OUTPATIENT
Start: 2023-01-01 | End: 2023-01-01

## 2023-01-01 RX ORDER — TORSEMIDE 10 MG/1
TABLET ORAL
Qty: 90 TABLET | Refills: 1 | COMMUNITY
Start: 2023-01-01

## 2023-01-01 RX ORDER — ACETAMINOPHEN 325 MG/1
650 TABLET ORAL EVERY 6 HOURS PRN
Status: DISCONTINUED | OUTPATIENT
Start: 2023-01-01 | End: 2023-01-01 | Stop reason: HOSPADM

## 2023-01-01 RX ORDER — CEPHALEXIN 500 MG/1
500 CAPSULE ORAL 3 TIMES DAILY
Qty: 21 CAPSULE | Refills: 0 | Status: SHIPPED | OUTPATIENT
Start: 2023-01-01 | End: 2023-01-01

## 2023-01-01 RX ORDER — RISPERIDONE 0.5 MG/1
0.5 TABLET ORAL 2 TIMES DAILY
Qty: 60 TABLET | Refills: 3 | Status: SHIPPED | OUTPATIENT
Start: 2023-01-01 | End: 2023-01-01

## 2023-01-01 RX ORDER — IPRATROPIUM BROMIDE AND ALBUTEROL SULFATE 2.5; .5 MG/3ML; MG/3ML
3 SOLUTION RESPIRATORY (INHALATION)
Status: DISCONTINUED | OUTPATIENT
Start: 2023-01-01 | End: 2023-01-01 | Stop reason: HOSPADM

## 2023-01-01 RX ORDER — ACETAMINOPHEN 500 MG
TABLET ORAL
Qty: 180 TABLET | Refills: 11 | Status: SHIPPED | OUTPATIENT
Start: 2023-01-01

## 2023-01-01 RX ORDER — QUETIAPINE FUMARATE 50 MG/1
TABLET, FILM COATED ORAL
Qty: 90 TABLET | Refills: 3 | OUTPATIENT
Start: 2023-01-01

## 2023-01-01 RX ORDER — ONDANSETRON 2 MG/ML
4 INJECTION INTRAMUSCULAR; INTRAVENOUS EVERY 6 HOURS PRN
Status: DISCONTINUED | OUTPATIENT
Start: 2023-01-01 | End: 2023-01-01 | Stop reason: HOSPADM

## 2023-01-01 RX ORDER — PRENATAL VIT 91/IRON/FOLIC/DHA 28-975-200
COMBINATION PACKAGE (EA) ORAL 2 TIMES DAILY
Qty: 84 G | Refills: 1 | Status: SHIPPED | OUTPATIENT
Start: 2023-01-01 | End: 2023-01-01

## 2023-01-01 RX ORDER — MENTHOL AND ZINC OXIDE .44; 20.625 G/100G; G/100G
OINTMENT TOPICAL 4 TIMES DAILY PRN
Qty: 113 G | Refills: 11 | Status: SHIPPED | OUTPATIENT
Start: 2023-01-01

## 2023-01-01 RX ORDER — MUPIROCIN 20 MG/G
OINTMENT TOPICAL 2 TIMES DAILY
Qty: 30 G | Refills: 2 | Status: SHIPPED | OUTPATIENT
Start: 2023-01-01 | End: 2023-01-01

## 2023-01-01 RX ORDER — MUPIROCIN 20 MG/G
OINTMENT TOPICAL
Qty: 30 G | Refills: 1 | Status: ON HOLD | COMMUNITY
Start: 2023-01-01 | End: 2023-01-01

## 2023-01-01 RX ORDER — MUPIROCIN 20 MG/G
OINTMENT TOPICAL 2 TIMES DAILY
Qty: 30 G | Refills: 1 | Status: SHIPPED | OUTPATIENT
Start: 2023-01-01 | End: 2023-01-01

## 2023-01-01 RX ORDER — TORSEMIDE 10 MG/1
10 TABLET ORAL
Status: DISCONTINUED | OUTPATIENT
Start: 2023-01-01 | End: 2023-01-01

## 2023-01-01 RX ORDER — DEXAMETHASONE SODIUM PHOSPHATE 10 MG/ML
6 INJECTION, SOLUTION INTRAMUSCULAR; INTRAVENOUS ONCE
Status: COMPLETED | OUTPATIENT
Start: 2023-01-01 | End: 2023-01-01

## 2023-01-01 RX ORDER — LIDOCAINE 40 MG/G
CREAM TOPICAL
Status: DISCONTINUED | OUTPATIENT
Start: 2023-01-01 | End: 2023-01-01 | Stop reason: HOSPADM

## 2023-01-01 RX ORDER — IOPAMIDOL 755 MG/ML
81 INJECTION, SOLUTION INTRAVASCULAR ONCE
Status: COMPLETED | OUTPATIENT
Start: 2023-01-01 | End: 2023-01-01

## 2023-01-01 RX ORDER — LORAZEPAM 0.5 MG/1
0.5 TABLET ORAL 2 TIMES DAILY PRN
Qty: 60 TABLET | Refills: 1 | Status: SHIPPED | OUTPATIENT
Start: 2023-01-01 | End: 2023-01-01

## 2023-01-01 RX ADMIN — RISPERIDONE 0.5 MG: 0.25 TABLET ORAL at 09:53

## 2023-01-01 RX ADMIN — GUAIFENESIN AND DEXTROMETHORPHAN HYDROBROMIDE 10 ML: 10; 100 SYRUP ORAL at 10:33

## 2023-01-01 RX ADMIN — AZITHROMYCIN MONOHYDRATE 500 MG: 500 INJECTION, POWDER, LYOPHILIZED, FOR SOLUTION INTRAVENOUS at 08:33

## 2023-01-01 RX ADMIN — RIVAROXABAN 15 MG: 15 TABLET, FILM COATED ORAL at 17:02

## 2023-01-01 RX ADMIN — TORSEMIDE 10 MG: 10 TABLET ORAL at 08:33

## 2023-01-01 RX ADMIN — GUAIFENESIN AND DEXTROMETHORPHAN HYDROBROMIDE 10 ML: 10; 100 SYRUP ORAL at 09:51

## 2023-01-01 RX ADMIN — RISPERIDONE 0.5 MG: 0.25 TABLET ORAL at 21:10

## 2023-01-01 RX ADMIN — CEFUROXIME AXETIL 500 MG: 250 TABLET, FILM COATED ORAL at 21:10

## 2023-01-01 RX ADMIN — TORSEMIDE 20 MG: 10 TABLET ORAL at 13:57

## 2023-01-01 RX ADMIN — GUAIFENESIN AND DEXTROMETHORPHAN HYDROBROMIDE 10 ML: 10; 100 SYRUP ORAL at 21:10

## 2023-01-01 RX ADMIN — GABAPENTIN 300 MG: 300 CAPSULE ORAL at 21:10

## 2023-01-01 RX ADMIN — IOPAMIDOL 81 ML: 755 INJECTION, SOLUTION INTRAVENOUS at 18:57

## 2023-01-01 RX ADMIN — GABAPENTIN 300 MG: 300 CAPSULE ORAL at 02:33

## 2023-01-01 RX ADMIN — IPRATROPIUM BROMIDE AND ALBUTEROL SULFATE 3 ML: .5; 3 SOLUTION RESPIRATORY (INHALATION) at 19:49

## 2023-01-01 RX ADMIN — CEFEPIME HYDROCHLORIDE 2 G: 2 INJECTION, POWDER, FOR SOLUTION INTRAVENOUS at 19:57

## 2023-01-01 RX ADMIN — AMLODIPINE BESYLATE 10 MG: 10 TABLET ORAL at 09:51

## 2023-01-01 RX ADMIN — FUROSEMIDE 20 MG: 10 INJECTION, SOLUTION INTRAVENOUS at 19:11

## 2023-01-01 RX ADMIN — QUETIAPINE FUMARATE 25 MG: 25 TABLET ORAL at 21:10

## 2023-01-01 RX ADMIN — CEFUROXIME AXETIL 500 MG: 250 TABLET, FILM COATED ORAL at 07:42

## 2023-01-01 RX ADMIN — DEXAMETHASONE SODIUM PHOSPHATE 6 MG: 10 INJECTION, SOLUTION INTRAMUSCULAR; INTRAVENOUS at 19:57

## 2023-01-01 RX ADMIN — CEFUROXIME AXETIL 500 MG: 250 TABLET, FILM COATED ORAL at 08:33

## 2023-01-01 RX ADMIN — AZITHROMYCIN MONOHYDRATE 250 MG: 250 TABLET ORAL at 09:51

## 2023-01-01 RX ADMIN — IPRATROPIUM BROMIDE AND ALBUTEROL SULFATE 3 ML: .5; 3 SOLUTION RESPIRATORY (INHALATION) at 11:17

## 2023-01-01 RX ADMIN — IPRATROPIUM BROMIDE AND ALBUTEROL SULFATE 3 ML: .5; 3 SOLUTION RESPIRATORY (INHALATION) at 06:24

## 2023-01-01 RX ADMIN — IPRATROPIUM BROMIDE AND ALBUTEROL SULFATE 3 ML: .5; 3 SOLUTION RESPIRATORY (INHALATION) at 17:24

## 2023-01-01 RX ADMIN — RISPERIDONE 0.5 MG: 0.25 TABLET ORAL at 02:32

## 2023-01-01 RX ADMIN — RISPERIDONE 0.5 MG: 0.25 TABLET ORAL at 10:33

## 2023-01-01 RX ADMIN — IPRATROPIUM BROMIDE AND ALBUTEROL SULFATE 3 ML: .5; 3 SOLUTION RESPIRATORY (INHALATION) at 15:27

## 2023-01-01 RX ADMIN — ACETAMINOPHEN 650 MG: 325 TABLET, FILM COATED ORAL at 06:54

## 2023-01-01 RX ADMIN — AMLODIPINE BESYLATE 10 MG: 10 TABLET ORAL at 10:33

## 2023-01-01 ASSESSMENT — ENCOUNTER SYMPTOMS
LIGHT-HEADEDNESS: 0
AGITATION: 1
CONFUSION: 1
TREMORS: 0
CONFUSION: 1
BRUISES/BLEEDS EASILY: 1
NUMBNESS: 0
WEAKNESS: 1
AGITATION: 1
TREMORS: 0
LIGHT-HEADEDNESS: 0
HEMATOCHEZIA: 0
CARDIOVASCULAR NEGATIVE: 1
ARTHRALGIAS: 1
ABDOMINAL PAIN: 0
ABDOMINAL PAIN: 0
HEMATURIA: 0
NUMBNESS: 0
PARESTHESIAS: 0
RESPIRATORY NEGATIVE: 1
DIZZINESS: 1
CONSTITUTIONAL NEGATIVE: 1
ANAL BLEEDING: 0
HEADACHES: 0
ARTHRALGIAS: 1
DIZZINESS: 1
WEAKNESS: 1
RECTAL PAIN: 0
BRUISES/BLEEDS EASILY: 1
HEADACHES: 0
PARESTHESIAS: 0
ROS SKIN COMMENTS: OVERGROWN TOENAILS
HEMATOCHEZIA: 0
HEMATURIA: 0
DYSURIA: 0
CONSTITUTIONAL NEGATIVE: 1
DYSURIA: 0
CARDIOVASCULAR NEGATIVE: 1
RESPIRATORY NEGATIVE: 1
RECTAL PAIN: 0
ANAL BLEEDING: 0

## 2023-01-01 ASSESSMENT — ACTIVITIES OF DAILY LIVING (ADL)
ADLS_ACUITY_SCORE: 35
DIFFICULTY_COMMUNICATING: NO
ADLS_ACUITY_SCORE: 42
ADLS_ACUITY_SCORE: 42
WALKING_OR_CLIMBING_STAIRS: STAIR CLIMBING DIFFICULTY, REQUIRES EQUIPMENT;AMBULATION DIFFICULTY, REQUIRES EQUIPMENT;TRANSFERRING DIFFICULTY, REQUIRES EQUIPMENT
DRESSING/BATHING_DIFFICULTY: YES
DRESSING/BATHING: BATHING DIFFICULTY, ASSISTANCE 1 PERSON
FALL_HISTORY_WITHIN_LAST_SIX_MONTHS: NO
ADLS_ACUITY_SCORE: 35
DOING_ERRANDS_INDEPENDENTLY_DIFFICULTY: YES
TRANSFERRING: 0-->ASSISTANCE NEEDED (DEVELOPMENTALLY APPROPRIATE)
BATHING: 1-->ASSISTANCE NEEDED
WEAR_GLASSES_OR_BLIND: YES
ADLS_ACUITY_SCORE: 42
WALKING_OR_CLIMBING_STAIRS_DIFFICULTY: YES
ADLS_ACUITY_SCORE: 42
TOILETING_ISSUES: NO
TRANSFERRING: 1-->ASSISTANCE (EQUIPMENT/PERSON) NEEDED
ADLS_ACUITY_SCORE: 37
ADLS_ACUITY_SCORE: 42
ADLS_ACUITY_SCORE: 41
CHANGE_IN_FUNCTIONAL_STATUS_SINCE_ONSET_OF_CURRENT_ILLNESS/INJURY: NO
ADLS_ACUITY_SCORE: 42
ADLS_ACUITY_SCORE: 41
VISION_MANAGEMENT: GLASSES
ADLS_ACUITY_SCORE: 42
ADLS_ACUITY_SCORE: 42
DRESS: 0-->ASSISTANCE NEEDED (DEVELOPMENTALLY APPROPRIATE)
ADLS_ACUITY_SCORE: 41
ADLS_ACUITY_SCORE: 35
ADLS_ACUITY_SCORE: 42
ADLS_ACUITY_SCORE: 42
ADLS_ACUITY_SCORE: 35
DRESS: 1-->ASSISTANCE (EQUIPMENT/PERSON) NEEDED
CONCENTRATING,_REMEMBERING_OR_MAKING_DECISIONS_DIFFICULTY: YES
ADLS_ACUITY_SCORE: 42
HEARING_DIFFICULTY_OR_DEAF: NO
ADLS_ACUITY_SCORE: 41
ADLS_ACUITY_SCORE: 42
DIFFICULTY_EATING/SWALLOWING: NO
ADLS_ACUITY_SCORE: 41

## 2023-01-03 ENCOUNTER — NURSING HOME VISIT (OUTPATIENT)
Dept: GERIATRICS | Facility: OTHER | Age: 88
End: 2023-01-03
Attending: NURSE PRACTITIONER
Payer: COMMERCIAL

## 2023-01-03 VITALS
HEART RATE: 68 BPM | RESPIRATION RATE: 18 BRPM | SYSTOLIC BLOOD PRESSURE: 139 MMHG | WEIGHT: 199 LBS | DIASTOLIC BLOOD PRESSURE: 73 MMHG | BODY MASS INDEX: 32.12 KG/M2 | TEMPERATURE: 97.6 F | OXYGEN SATURATION: 99 %

## 2023-01-03 DIAGNOSIS — M25.552 HIP PAIN, LEFT: ICD-10-CM

## 2023-01-03 DIAGNOSIS — F03.911 AGITATION DUE TO DEMENTIA (H): ICD-10-CM

## 2023-01-03 DIAGNOSIS — G30.1 LATE ONSET ALZHEIMER'S DISEASE WITH BEHAVIORAL DISTURBANCE (H): ICD-10-CM

## 2023-01-03 DIAGNOSIS — Z79.899 ENCOUNTER FOR MEDICATION REVIEW: Primary | ICD-10-CM

## 2023-01-03 DIAGNOSIS — F02.818 LATE ONSET ALZHEIMER'S DISEASE WITH BEHAVIORAL DISTURBANCE (H): ICD-10-CM

## 2023-01-03 DIAGNOSIS — I48.0 PAROXYSMAL A-FIB (H): ICD-10-CM

## 2023-01-03 DIAGNOSIS — F41.1 GAD (GENERALIZED ANXIETY DISORDER): ICD-10-CM

## 2023-01-03 PROCEDURE — 99349 HOME/RES VST EST MOD MDM 40: CPT | Performed by: NURSE PRACTITIONER

## 2023-01-03 RX ORDER — GABAPENTIN 300 MG/1
300 CAPSULE ORAL 2 TIMES DAILY
Qty: 120 CAPSULE | Refills: 3 | Status: SHIPPED | OUTPATIENT
Start: 2023-01-03 | End: 2023-01-01

## 2023-01-03 NOTE — PROGRESS NOTES
Juan Miguel Delaney is a 89 year old male being seen today for follow up visit visit at [facility].    Code Status: DNR / DNI, Advance Directives: YES-.   Health Care Power of : Extended Emergency Contact Information  Primary Emergency Contact: Criss Delaney   [facility]  Home Phone: [phone]  Relation: Spouse  Secondary Emergency Contact: Kavita Hassan   [facility]  Home Phone: [phone]  Mobile Phone: [phone]  Relation: Daughter     Allergies: Metoprolol, Penicillins, Celecoxib, Ibuprofen, Lisinopril, Naprosyn [naproxen], and Rofecoxib     Chief Complaint / HPI: Follow-up on recent increase in gabapentin from 100 to 300 mg at at bedtime for dual benefits of pain management and moderation of anxiety.  Nursing staff report resident has tolerated medication well no observed adverse effects--resident reports is sleeping well at night--wife verifies.  Resident continues to have issues with left-sided hip pain secondary to osteoarthritis continues to adamantly decline image guided injection.  Clinic visits and any trips outside of assisted living facility trigger anxiety and agitation.  Currently receiving home care PT and OT services and skilled nursing--- resident is making progress with improved mobility  Walker adjustments and gait training has been helpful.  Nursing staff report still continues to persist staff standby assistance showers even with male staff.  Resident unable to provide complete history due to memory loss and advanced dementia--nursing staff and wife provide history.  Nursing staff, resident and wife do not raise any additional concerns today.    Past Medical, Surgical, Family and Social History reviewed: YES.     Medications: Reviewed  Medications - recent changes: Gabapentin increase from 100 to 300 mg at HS    Review of Systems:  General: positive for weakness  CV: positive for AFIB  Musculoskeletal: positive for muscular weakness  Neurologic: positive for memory  problems  Psychiatric: positive for anxiety and agitation  All other systems negative  Toileting:    Continent of Bowel: Yes   Continent of Bladder: Yes  Mobility: walker    Recent Labs: Reviewed      Current Therapies: Homecare PT/OT, Skilled nursing    Exam:  Vital signs reviewed.   GENERAL APPEARANCE: alert and no distress  EYES: Eyes grossly normal to inspection  RESP: lungs clear   CV: AFIB, no peripheral edema   MS: gait is walker depedent  SKIN: warm and ry  NEURO: Alert,mentation intact and speech normal, apparent short term memory loss  PSYCH: mentation appears normal and affect normal/bright    Assessment and Plan:    Very pleasant and gracious gentleman who resides in assisted living apartment with wife--staff report current adjusting doses of Seroquel and recent addition of gabapentin has been effective at moderating anxiety triggered agitation.  Resident continues to resist nursing staff including male staff member assisting with shower.  Has not had a shower in 3 weeks per staff report.  Did speak with resident about trying whirlpool warm spa and resident is agreeable and feels this may also help his left hip osteoarthritis pain.  Continues to decline clinic visit or image guided injection.  We will plan to reach out to Chandrika VENTURA--regarding possible options in addition to his daily scheduled Tylenol, PT and topicals.  Consider possibly trying PRN pain medication for hip pain.  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

## 2023-01-10 ENCOUNTER — NURSING HOME VISIT (OUTPATIENT)
Dept: GERIATRICS | Facility: OTHER | Age: 88
End: 2023-01-10
Attending: NURSE PRACTITIONER
Payer: COMMERCIAL

## 2023-01-10 VITALS
BODY MASS INDEX: 32.12 KG/M2 | WEIGHT: 199 LBS | HEART RATE: 60 BPM | TEMPERATURE: 98.3 F | RESPIRATION RATE: 16 BRPM | DIASTOLIC BLOOD PRESSURE: 56 MMHG | OXYGEN SATURATION: 96 % | SYSTOLIC BLOOD PRESSURE: 110 MMHG

## 2023-01-10 DIAGNOSIS — I50.32 CHRONIC DIASTOLIC HEART FAILURE (H): ICD-10-CM

## 2023-01-10 DIAGNOSIS — G30.1 LATE ONSET ALZHEIMER'S DISEASE WITH BEHAVIORAL DISTURBANCE (H): ICD-10-CM

## 2023-01-10 DIAGNOSIS — I25.10 CORONARY ARTERY DISEASE INVOLVING NATIVE CORONARY ARTERY OF NATIVE HEART WITHOUT ANGINA PECTORIS: ICD-10-CM

## 2023-01-10 DIAGNOSIS — F02.818 LATE ONSET ALZHEIMER'S DISEASE WITH BEHAVIORAL DISTURBANCE (H): ICD-10-CM

## 2023-01-10 DIAGNOSIS — N18.30 STAGE 3 CHRONIC KIDNEY DISEASE, UNSPECIFIED WHETHER STAGE 3A OR 3B CKD (H): ICD-10-CM

## 2023-01-10 DIAGNOSIS — I48.0 PAROXYSMAL A-FIB (H): Primary | ICD-10-CM

## 2023-01-10 PROCEDURE — 99348 HOME/RES VST EST LOW MDM 30: CPT | Performed by: NURSE PRACTITIONER

## 2023-01-10 NOTE — PROGRESS NOTES
Juan Miguel Delaney is a 89 year old male being seen today for follow up visit at [facility].    Code Status: DNR / DNI.   Health Care Power of : Extended Emergency Contact Information  Primary Emergency Contact: Criss Delaney   [facility]  Home Phone: [phone]  Relation: Spouse  Secondary Emergency Contact: Kavita Hassan   [facility]  Home Phone: [phone]  Mobile Phone: [phone]  Relation: Daughter     Allergies: Metoprolol, Penicillins, Celecoxib, Ibuprofen, Lisinopril, Naprosyn [naproxen], and Rofecoxib     Chief Complaint / HPI: Follow-up with resident on recent medication adjustments with addition of gabapentin scheduled, nursing staff report has not been complaining of left hip pain and no episodes of difficult to manage behavior or agitative episodes.  Resident continues to decline male caregiving staff standby assistance with showers.  Last week he had agreed to whirlpool bath however he declines this week.  I did talk with his daughter Chandrika and she is aware.  Resident has been off of NSAID, low-dose aspirin, Xarelto for rectal bleeding for 4 months and has been stable.  Did discuss with Chandrika trying to restart Xarelto only for atrial fibrillation CVA prophylaxis and she is agreeable with trial--would avoid NSAIDs use permanently.  No other concerns raised    Past Medical, Surgical, Family and Social History reviewed: YES.     Medications: reviewed  Medications - recent changes: Increased gabapentin to BID    Review of Systems:  General: positive for weakness  CV: positive for irregular heart beat  Musculoskeletal: positive for arthritis, joint pain and muscular weakness  Neurologic: positive for memory problems  All other systems negative  Toileting:    Continent of Bowel: Yes   Continent of Bladder: Yes  Mobility: walker    Recent Labs: None      Current Therapies: home care PT/OT/SN    Exam:  Vital signs reviewed.   GENERAL APPEARANCE: alert and no distress  CV: AFIB, no  peripheral edema and peripheral pulses strong  MS: no musculoskeletal defects are noted and gait is age appropriate edin walker  SKIN: warm and dry  NEURO:  mentation intact and speech normal, apparent memory loss--not a new finding  PSYCH: mentation appears normal and affect normal/bright    Assessment and Plan:    Very pleasant gentleman who resides in assisted living apartment with wife.  Has tolerated recent increase gabapentin--- denies hip pain and staff report has not offered any complaints--has been sleeping well at night there have been no difficult to manage or agitative episodes.  Family goals of care are for pain and comfort management with focus on quality of life.  Care conference with daughter over phone to discuss discontinuing NSAIDs permanently however re trial with close monitoring maintenance Xarelto for CVA prophylaxis secondary to atrial fibrillation.  Daughter is comfortable with trial--have directed staff to monitor stools and for any GI signs of discomfort  We will follow-up with staff weekly on toleration and monitoring    No further adjustments with gabapentin or behavioral medications indicated at this time--goal to manage behaviors secondary to dementia with least effective dose of medication.  Only issue is residents reluctance to allow staff even male nurse for standby assistance with shower or spa bath  Would like staff to offer basin with warm soapy water with standby cueing for resident to independently provide hygiene.  Nursing staff report skin rash has healed--      (I48.0) Paroxysmal A-fib (H)  (primary encounter diagnosis)    Plan: rivaroxaban ANTICOAGULANT (XARELTO) 15 MG TABS         tablet            (G30.1,  F02.818) Late onset Alzheimer's disease with behavioral disturbance (H)    Plan: rivaroxaban ANTICOAGULANT (XARELTO) 15 MG TABS         tablet            (I25.10) Coronary artery disease involving native coronary artery of native heart without angina pectoris    Plan:  rivaroxaban ANTICOAGULANT (XARELTO) 15 MG TABS         tablet            (I50.32) Chronic diastolic heart failure (H)      (N18.30) Stage 3 chronic kidney disease, unspecified whether stage 3a or 3b CKD (H)

## 2023-01-17 NOTE — PROGRESS NOTES
Juan Miguel Delaney is a 89 year old male being seen today for acute and follow up visit visit at [facility].    Code Status: DNR / DNI.   Health Care Power of : Extended Emergency Contact Information  Primary Emergency Contact: Criss Delaney   [facility]  Home Phone: [phone]  Relation: Spouse  Secondary Emergency Contact: Kavita Hassan   [facility]  Home Phone: [phone]  Mobile Phone: [phone]  Relation: Daughter     Allergies: Metoprolol, Asa [aspirin], Penicillins, Celecoxib, Ibuprofen, Lisinopril, Naprosyn [naproxen], and Rofecoxib     Chief Complaint / HPI: Nursing staff report since gabapentin was increased from at bedtime to twice daily resident has been found sleeping during the day--staff had to wake him up for dinner.  Staff or resident do not report any other side effects from gabapentin----staff report appetite, bowel and bladder habits have been at baseline and do not raise any additional concerns.  History primarily from nursing staff with feedback from wife as resident has short-term memory loss and unable to provide consistent feedback.  Incidentally staff are monitoring for rectal bleeding and GI symptoms and report no concerns at this time.Recently NSAIDS were discontinued and Xarelto on hold.  Recently restarted trial Xarelto for AFIB prophylaxis and remains on PPI with goal to prevent CVA and further complications from AFIB.   My impression is that the NSAIDS were most offensive to the side effects--and does have remote past history of GI disturbance and Hives from some NSAIDS.  Has been challenging to find a safe alternative to manage chronic OA hip pain as he is ambulatory and adamantly declines any further image guided injections.      Past Medical, Surgical, Family and Social History reviewed: YES.     Medications: reviewed  Medications - recent changes: Increase gabapentin from at bedtime to twice daily    Review of Systems:  General: positive for  weakness  Musculoskeletal: positive for arthritis, joint pain and muscular weakness  Neurologic: positive for memory problems  Psychiatric: positive for anxiety  All other systems negative  Toileting:    Continent of Bowel: Yes   Continent of Bladder: Yes  Mobility: walker    Recent Labs: Most recent reviewed        Current Therapies: Home care PT    Exam:  Vital signs reviewed.   GENERAL APPEARANCE: alert and no distress  EYES: Eyes grossly normal to inspection  RESP: lungs clear   MS: no musculoskeletal defects are noted and gait is age appropriate without ataxia  SKIN: warm and dry  NEURO:  mentation intact and speech normal--apparent short-term memory loss  PSYCH: mentation appears normal and affect normal/bright    Assessment and Plan:      Very pleasant gentleman with advanced dementia with anxiety who resides in assisted living apartment with spouse---staff and spouse report napping more during the day, drowsy since gabapentin increased from at bedtime to twice daily.  Staff report there have been no concerns with agitation or difficult to manage behaviors--has been stable on current medications tolerated gabapentin  At bedtime well.  Will discontinue morning dose--and hold at bedtime dose tonight, continue at bedtime dose daily starting tomorrow with close staff monitoring.    Nursing staff and wife report no blood in stool.  Nursing staff will continue to observe and wife will provide feedback as she monitors his bathroom habits closely.  Unfortunately resident does not want any caregiving staff mail a female to provide any standby assistance with showers or bath.        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

## 2023-02-21 NOTE — PROGRESS NOTES
Juan Miguel Delaney is a 89 year old male being seen today for acute and follow up visit visit at [facility].    Code Status: DNR / DNI.   Health Care Power of : Extended Emergency Contact Information  Primary Emergency Contact: Criss Delaney   [facility]  Home Phone: [phone]  Relation: Spouse  Secondary Emergency Contact: Kavita Hassan   [facility]  Home Phone: [phone]  Mobile Phone: [phone]  Relation: Daughter     Allergies: Metoprolol, Asa [aspirin], Penicillins, Celecoxib, Ibuprofen, Lisinopril, Naprosyn [naproxen], and Rofecoxib     Chief Complaint / HPI: Nursing staff requesting a follow-up for progressive weakness with ambulation felt secondary to chronic hip osteoarthritis  Has worked with physical therapy a couple of times--has been using a walker regularly which I feel there has been some benefit.  Unfortunately developed rectal bleeding with NSAID.  Has had a few joint injections in the past did not get any relief with most recent injection--due to progressive dementia does not tolerate leaving assisted living for clinic visits and has declined an image guided injection and/or orthopedic visit up to this point.  Staff report there have not been any behavioral concerns with agitation however his wife Tatyana has been coming to the staff recently frustrated as she feels  Juan Miguel's resistance with daily personal cares including changing clothes, regular showers has become overwhelming for her to manage.  Have tried male caregiving staff to assist with showers several times resident is resistant as he feels he wants to be independent.  Wife who is best historian reports there have been some incidences of diarrhea however not recently---Juan Miguel reports constipation has been his problem for most of his life.  Staff have been giving him daily senna medication and have not held with recent reports of diarrhea.  His wife reports there has been no blood in stools or in underwear and  clothing as his Xarelto was restarted a couple of months ago--- NSAIDs have been discontinued    DON reports she is trying to reach out to POA to have a care conference to discuss possibly moving resident and his wife to memory unit apartment--currently awaiting for phone call response and setting up care conference meeting.  Nursing staff is requesting wheelchair  However when I bring this up to Juan Miguel during our visit he becomes very angry and agitated that he does not want a wheelchair          Past Medical, Surgical, Family and Social History reviewed: YES.     Medications: reviewed  Medications - recent changes:     Review of Systems:  General: positive for weakness  GI: positive for constipation and diarrhea  Musculoskeletal: positive for arthritis, joint pain and muscular weakness  Neurologic: positive for memory problems  All other systems negative  Toileting:    Continent of Bowel: Yes   Continent of Bladder: Yes  Mobility: walker    Recent Labs: most recent reviewed      Current Therapies: none    Exam:  Vital signs reviewed.   GENERAL APPEARANCE: alert and no distress  EYES: Eyes grossly normal to inspection  CV: AFIB  MS: no musculoskeletal defects are noted and gait is steady with walker favoring left hip  SKIN: warm and dry  NEURO: Alert,mentation intact and speech normal-very pleasant socially interactive however short-term memory loss has worsened since last visit  PSYCH: mentation appears normal and affect normal/bright    Assessment and Plan:    Very pleasant and socially interactive resident with reports of diarrhea--wife verifies not recently--wife also verifies there is been no visible blood in underwear or seen in toilet or bathroom.  Resident is limited historian due to severe short-term memory loss.  Juan Miguel becomes very angry and agitated when I mention the word wheelchair--- nursing staff are asking for this--he is still walking with a walker though apparently favoring that left hip.  Discussed  with Juan Miguel and his wife considering another injection or even a clinic visit with orthopedics for options--- has tried PT at least twice  Walker is beneficial to help take the weight off of affected side and steadiness with mobility--- resident, wife, staff do not report any falls--though certainly at risk for falls    Did attempt to reach Chandrika VENTURA to discuss above--unsuccessful at reaching will try again to discuss above and what is realistic at this time.  I have told the staff to hold off on any wheelchairs at this time--- I do feel PT OT for brief mobility assessment and recommendations on the type of wheelchair  If this is needed and agreeable by resident.  Would also benefit from an OT updated cognitive and ADL assessment.    Regarding pain management continues on Tylenol scheduled--did start gabapentin at bedtime--attempted to schedule a second dose however became very groggy and drowsy and not tolerated.  Will revisit pain medication when I speak with Chandrika as I feel Juan Miguel needs to have something available for the osteoarthritis pain when it is flaring which is pretty much when he is walking.    Due for monitoring labs on medications and CKD in March      (G30.1,  F02.818) Late onset Alzheimer's disease with behavioral disturbance (H)  (primary encounter diagnosis)      (Z87.898) History of diarrhea      (I48.0) Paroxysmal A-fib (H)      (N18.30) Stage 3 chronic kidney disease, unspecified whether stage 3a or 3b CKD (H)      (Z74.09) Mobility impaired      (M25.552) Hip pain, left      (M16.12) Primary osteoarthritis of left hip      (Z79.899) Encounter for medication review      (I50.32) Chronic diastolic heart failure (H)    (Z59.3) Living in assisted living

## 2023-02-23 NOTE — TELEPHONE ENCOUNTER
[facility] sent Rx request for the following:   DONEPEZIL 10MG ODT  Last Prescription Date:   3/10/22  Last Fill Qty/Refills:         90, R-3    Last Office Visit:              2/21/23 AL visit   Future Office visit:           None   Thi Torres RN on 2/23/2023 at 12:25 PM

## 2023-03-21 NOTE — PROGRESS NOTES
Juan Miguel Delaney is a 89 year old male being seen today for acute visit at [facility].    Assessment & Plan       ICD-10-CM    1. Paronychia of toe, right  L03.031 cephALEXin (KEFLEX) 500 MG capsule      2. Overgrown toenails  L60.2       3. Tinea pedis of both feet  B35.3 terbinafine (LAMISIL) 1 % external cream      4. Lives in assisted living facility  Z59.3       5. Late onset Alzheimer's disease with behavioral disturbance (H)  G30.1     F02.818       6. Stage 3b chronic kidney disease  N18.32         [facility] staff report concerns of erythematous, ingrown right great toenail and that Juan Miguel is also in need of a toenail trim. He denies any pain, except with direct palpation over medial aspect of right great toenail. No active drainage noted, erythema limited to 1-2 mm surrounding where the nail meets the skin.     Palpation.  There is tenderness to palpation over soft tissue aspect of medial aspect of right great toenail, including paronychia site.  Patient full range of motion in all toes and feet bilaterally. Juan Miguel has normal sensation in his right great toe and foot.      Dermatological: 1-2 mm wide paronychia extending from medial nail bed/fold. No purulent fluid noted in site.  There appears to be no pus underneath the nailbed itself.  There is erythema surrounding paronychia site.  Start cephalexin 500mg TID x 7 days. 36mL/min CrCl, no renal doing adjustment indicated and he has tolerated this medication in the past. Encouraged foot soaks with epsom salts.       Juan Miguel is in need of nail care today. He has Alzheimer's dementia, spinal stenosis, and CHF which make it hard for him to assess his feet and trim his toenails safely.      All toenails filed down to manageable thickness with Dremel tool and angled [place]. Toenails were then trimmed with clippers.  Lotion applied to bilateral lower extremities. Patient tolerated well. Start Lamisil cream BID. Apply in between toes and on nails BID x 14 days. Use one  week past resolution of symptoms.       45 minutes spent on the date of the encounter doing chart review, patient visit, documentation and discussion with family         Return if symptoms worsen or fail to improve.    RONALDO Del Castillo CNP  [facility]     Code Status: DNR / DNI.   Health Care Power of : Extended Emergency Contact Information  Primary Emergency Contact: Criss Delaney   [facility]  Home Phone: [phone]  Relation: Spouse  Secondary Emergency Contact: Kavita Hassan   [facility]  Home Phone: [phone]  Mobile Phone: [phone]  Relation: Daughter     Allergies: Metoprolol, Asa [aspirin], Penicillins, Celecoxib, Ibuprofen, Lisinopril, Naprosyn [naproxen], and Rofecoxib     Past Medical, Surgical, Family and Social History reviewed: YES.     Medications:   Current Outpatient Medications   Medication Sig Dispense Refill     cephALEXin (KEFLEX) 500 MG capsule Take 1 capsule (500 mg) by mouth 3 times daily 21 capsule 0     terbinafine (LAMISIL) 1 % external cream Apply topically 2 times daily for 14 days Use one week past resolution of symptoms. 84 g 1     acetaminophen (ACETAMINOPHEN EXTRA STRENGTH) 500 MG tablet TAKE 2 TABLETS (1,000 MG) BY MOUTH 3 TIMES DAILY 120 tablet 10     amLODIPine (NORVASC) 5 MG tablet Take 2 tablets (10 mg) by mouth daily 180 tablet 4     cyanocobalamin (VITAMIN B-12) 500 MCG tablet Take 1 tablet (500 mcg) by mouth daily 90 tablet 4     donepezil (ARICEPT) 10 MG ODT DISSOLVE 1 TAB BY MOUTH ONCE DAILY 90 tablet 3     gabapentin (NEURONTIN) 300 MG capsule Take 1 capsule (300 mg) by mouth At Bedtime 120 capsule 3     mupirocin (BACTROBAN) 2 % external ointment BID to affected skin on chest until resolved then BID PRN for flares 30 g 1     nitroGLYcerin (NITROSTAT) 0.4 MG sublingual tablet Place 1 tablet (0.4 mg) under the tongue every 5 minutes as needed for chest pain (CALL 911 IF NOT IMPROVED AFTER THREE CONSECUTIVE DOSES) 25  tablet 0     pantoprazole (PROTONIX) 40 MG EC tablet Take 1 tablet (40 mg) by mouth daily 90 tablet 3     QUEtiapine (SEROQUEL) 25 MG tablet Take 2 tablets (50 mg) by mouth 3 times daily 180 tablet 11     QUEtiapine (SEROQUEL) 25 MG tablet Take 25 mg by mouth daily as needed (AGITATION)       rivaroxaban ANTICOAGULANT (XARELTO) 15 MG TABS tablet Take 1 tablet (15 mg) by mouth daily (with lunch) 90 tablet 3     sennosides (SENOKOT) 8.6 MG tablet Take 1 tablet by mouth daily 1 tablet, once daily. HOLD FOR LOOSE STOOLS 60 tablet 4     torsemide (DEMADEX) 10 MG tablet Take 1 tablet (10 mg) by mouth daily - for Edema 90 tablet 4     triamcinolone (KENALOG) 0.1 % external cream Apply topically 2 times daily as needed (itchy skin) 30 g 1       HPI  Review of Systems   Constitutional: Negative.    HENT: Negative.    Respiratory: Negative.    Cardiovascular: Negative.         Irregular heartbeat, a- fib   Gastrointestinal: Negative for abdominal pain, anal bleeding, hematochezia and rectal pain.        History of hemorrhoids, uses TUCKS as needed     Genitourinary: Negative for dysuria and hematuria.   Musculoskeletal: Positive for arthralgias and gait problem.   Skin: Negative for rash.        Overgrown toenails   Neurological: Positive for dizziness and weakness. Negative for tremors, syncope, light-headedness, numbness, headaches and paresthesias.   Hematological: Bruises/bleeds easily.   Psychiatric/Behavioral: Positive for agitation, behavioral problems and confusion.       Toileting:    Continent of Bowel: Yes   Continent of Bladder: Yes  Mobility: walker    Recent Labs: Most recent labs reviewed.     Current Therapies: none    Physical Exam  Vitals and nursing note reviewed.   Cardiovascular:      Rate and Rhythm: Normal rate and regular rhythm.      Pulses:           Dorsalis pedis pulses are 2+ on the right side and 2+ on the left side.   Pulmonary:      Effort: Pulmonary effort is normal.   Musculoskeletal:          General: No tenderness.      Cervical back: Normal range of motion.      Right hip: Normal.      Left hip: Decreased range of motion.   Feet:      Right foot:      Skin integrity: Erythema (right great toe paronychia) present. No skin breakdown.      Toenail Condition: Right toenails are abnormally thick, long and ingrown. Fungal disease present.     Left foot:      Toenail Condition: Left toenails are abnormally thick and long. Fungal disease present.  Skin:     General: Skin is warm and dry.      Capillary Refill: Capillary refill takes less than 2 seconds.   Neurological:      Mental Status: Mental status is at baseline.   Psychiatric:         Mood and Affect: Mood normal.         Behavior: Behavior normal.

## 2023-04-11 NOTE — PROGRESS NOTES
Assessment & Plan     ICD-10-CM    1. Late onset Alzheimer's disease with behavioral disturbance (H)  G30.1 QUEtiapine (SEROQUEL) 25 MG tablet    F02.818       2. Loose stools  R19.5 loperamide (IMODIUM A-D) 2 MG tablet      3. Encounter for medication review  Z79.899             RONALDO Del Castillo CNP on 4/11/2023 at 4:34 PM

## 2023-04-25 NOTE — PROGRESS NOTES
Juan Miguel Delaney is a 89 year old male being seen today for follow up visit at [facility].    Code Status: DNR / DNI, Advance Directives: YES-.   Health Care Power of : Extended Emergency Contact Information  Primary Emergency Contact: Criss Delaney   [facility]  Home Phone: [phone]  Relation: Spouse  Secondary Emergency Contact: SonyaKavita muñoz   [facility]  Home Phone: [phone]  Mobile Phone: [phone]  Relation: Daughter     Allergies: Metoprolol, Asa [aspirin], Penicillins, Celecoxib, Ibuprofen, Lisinopril, Naprosyn [naproxen], and Rofecoxib     Chief Complaint / HPI: Follow-up with resident in face-to-face visit for wheelchair assessment--resident recently moved into secure memory unit, previously living with spouse in assisted living apartment.  Resident has had significant functional decline with unsteady gait, history of falls and therapist feels that a wheelchair would meet his needs for safe mobility to maintain as much independence as possible at this stage in his dementia.  Resident is unable to remember to use a walker consistently due to severe short-term memory loss.  Resident has been having chronic persistent issues with left hip degenerative osteoarthritis which has been affecting mobility over the last couple of years--has trialed at least 2 image guided injections without lasting relief--he is on scheduled Tylenol--- has tried naproxen in the past which has been effective for osteoarthritis pain however has had GI bleed with anemia  From NSAIDs.  Resident does not tolerate narcotic medications--daughter had reported that tramadol had caused severe confusion and delirium in the past.  Resident seems acclimated and comfortable to memory unit--nursing staff report that Juan Miguel has adjusted well to the transition and has been excepting of nursing cares and very redirectable as needed--there have been no agitative or difficult to manage behaviors.  Resident  is unable to provide history due to advanced dementia all historical information provided by nursing staff and therapy.    Past Medical, Surgical, Family and Social History reviewed: YES.     Medications: Reviewed  Medications - recent changes:    Review of Systems:  General: positive for weakness  CV: positive for irregular heart beat and exercise intolerance  : positive for incontinence  Musculoskeletal: positive for arthritis, joint pain and muscular weakness  Neurologic: positive for memory problems  All other systems negative  Toileting:    Continent of Bowel: Yes   Continent of Bladder: No  Mobility: wheelchair    Recent Labs: most recent reviewed      Current Therapies: PT/OT    Exam:  Vital signs reviewed.   GENERAL APPEARANCE:  no distress  EYES: Eyes grossly normal to inspection  CV: A-fib, no peripheral edema   MS: no musculoskeletal defects are noted and wheelchair dependent  SKIN: Warm and dry  NEURO: Mentation generally drowsy, easily alert and arousable with speech and speech normal, severe memory loss however pleasant and conversational  PSYCH:  affect normal/bright    Assessment and Plan:    Face-to-face visit for therapy assessment for wheelchair with adaptive features to meet mobility needs to optimize independence--recently moved into secure memory unit which he seems to be acclimating well.  There have not been any difficult to manage behaviors--resident appears to be comfortable--mentation generally drowsy but arouses easily to voice and alert for mealtimes.  Resident would benefit from wheelchair for safety and optimization of mobility and independence in current dementia residence--significant history of falls secondary to advancing Alzheimer's dementia with declining physical functional capacity.  Of note resident has been excepting of nursing staff cares which he was completely resistant previously--we will review medications after lab results and discuss with Chandrika regarding reduction on  unnecessary medications.    Resident is due for labs CBC, BMP May 23 facility lab day to follow-up on tolerance of current medications and anemia history    We will plan to reach out to Chandrika daughter and POA after lab results to follow-up on progress in secure memory unit and review goals of care which have been focus on comfort and quality of life up to this point.    Over 20 minutes spent in chart review, face-to-face visit, care coordination and consultation with therapy, medication review and ordering of 2 labs      (G30.1,  F02.818) Late onset Alzheimer's disease with behavioral disturbance (H)  (primary encounter diagnosis)      (R26.81) Unsteady gait      (Z91.81) Hx of fall      (Z59.3) Living in assisted living      (I50.32) Chronic diastolic heart failure (H)      (I48.0) Paroxysmal A-fib (H)      (Z79.01) Long term current use of anticoagulant therapy

## 2023-05-04 NOTE — TELEPHONE ENCOUNTER
[id number] sent Rx request for the following:      Requested Prescriptions   Pending Prescriptions Disp Refills     amLODIPine (NORVASC) 5 MG tablet [Pharmacy Med Name: AMLODIPINE 5MG TABLET] 180 tablet 4     Sig: TAKE 2 TABLETS (10 MG) BY MOUTH DAILY       Calcium Channel Blockers Protocol  Failed - 5/4/2023  4:03 PM        Failed - Normal serum creatinine on file in past 12 months     Recent Labs   Lab Test 12/28/22  0915   CR 1.72*        Last Prescription Date:   5/6/22  Last Fill Qty/Refills:         180, R-4    Last Office Visit:              4/25/23 (NH Visit, Lilly Shepard NP)   Future Office visit:           None    Ema Spencer RN .............. 5/4/2023  4:06 PM

## 2023-05-09 NOTE — PROGRESS NOTES
Juan Miguel Delaney is a 89 year old male being seen today for acute and follow up  visit at [facility] unit.    Code Status: DNR / DNI, Advance Directives: YES-.   Health Care Power of : Extended Emergency Contact Information  Primary Emergency Contact: Criss Delaney   [facility]  Home Phone: [phone]  Relation: Spouse  Secondary Emergency Contact: Sonya, Kavita   [facility]  Home Phone: [phone]  Mobile Phone: [phone]  Relation: Daughter     Allergies: Metoprolol, Asa [aspirin], Penicillins, Celecoxib, Ibuprofen, Lisinopril, Naprosyn [naproxen], and Rofecoxib     Chief Complaint / HPI: Memory unit caregiving and nursing staff requesting a follow-up on recent episodes of increased agitation--Juan Miguel recently moved from his assisted living apartment where he was living with his spouse to the memory unit for progressive dementia--car was becoming resistant to nursing staff trying to assist him with self-cares such as showering and other hygiene measures--- nursing staff report that he was very open to moving into the memory unit and they feel he has acclimated well however recently when his wife comes to visit he becomes very upset and frustrated when she leaves and will become vocal and show overt frustration however he does not become aggressive.  Initially the staff are able to redirect and divert his attention by telling him his wife had to get her hair done or some other reason for her leaving and he excepted that but now he becomes very frustrated.  Juan Miguel has been on 50 mg of sertraline 3 times a day for 2 years +, staff have not needed to use the as needed 25 mg Seroquel however they do feel that when he becomes agitated he would not be receptive to as needed dosing and feel it is most beneficial to stay ahead.  Resident unable to provide history due to advanced dementia worsening short-term memory loss--however very pleasant socially interactive with staff and other  residents--was enjoying his meal in the dining room when I visited.  His recollection at this moment is that he is happy and comfortable and does not mention Tatyana his wife--- the nursing staff report that he does not remember to mention Tatyana unless she is present to visit or the staff remind him.  The nursing staff report that Tatyana has been tearful about her  becoming upset with visits and not being able to visit without agitation.    Past Medical, Surgical, Family and Social History reviewed: YES.     Medications: Reviewed  Medications - recent changes: none    Review of Systems:  General: positive for weakness  CV: positive for irregular heart beat  GI: positive for constipation  Musculoskeletal: positive for arthritis and muscular weakness  Neurologic: positive for memory problems and behavior changes  Psychiatric: positive for anxiety and agitation  All other systems negative  Toileting:    Continent of Bowel: Yes   Continent of Bladder: Yes  Mobility: walker and wheelchair    Recent Labs: None      Current Therapies: Home care therapy services    Exam:  Vital signs reviewed.   GENERAL APPEARANCE: healthy, alert and no distress  EYES: Eyes grossly normal to inspection, PERRL and conjunctivae and sclerae normal  RESP: lungs clear to auscultation - no rales, rhonchi or wheezes  ABDOMEN: soft, nontender, no hepatosplenomegaly, no masses and bowel sounds normal  MS: no musculoskeletal defects are noted and gait is age appropriate without ataxia  SKIN: no suspicious lesions or rashes  NEURO: Normal strength and tone, sensory exam grossly normal, mentation intact and speech normal  PSYCH: mentation appears normal and affect normal/bright    Assessment and Plan:      Very pleasant gentleman with advancing dementia--difficult for him to self manage and compensate frustration and emotions.  Overall he appears happy and comfortable in his current surroundings--- the nursing staff report the agitation is  almost always when his wife visits----another staff reports that he has had agitation in the afternoons.   I discussed with the RN and feels that since he has tolerated his current medication well I would add an additional 25 mg to his afternoon dose to target the afternoon and early evening sundowning which adds to the agitation and frustration decompensation.  RN feels that this would be a good start and staff can observe.  I think it would be beneficial right now to have a visiting pause with his wife as he does not remember and was visibly reminded for now  And this can be retried at another time when staff feel he is best able to handle it or they are able to reconfigure the circumstances to smooth out her departure  So it is better tolerated.  Tatyana is not able to live with Juan Miguel at this time as his dementia and demanding behaviors has been stressful and anxiety producing for her and with them living in their own rooms has been beneficial for both of them.    Discussed above with the RN and she will reach out and conference with Tatyana and Chandrika montero and POA on feedback to best manage.  Attempted to reach out to Chandrika daughter and POA about above adjustment however was unsuccessful at reaching her--we will try again at another time    Resident is due for monitoring labs we will plan to obtain CBC and basic metabolic panel next facility lab day to monitor Xarelto tolerance and renal function.  If day-to-day behaviors begin to escalate out-of-control consider adjusting to a different mood stabilizer or adding an early afternoon low-dose Zyprexa.    No other concerns raised we will plan to follow-up on progress in a couple of weeks--sooner PRN      Nursing staff report resident complains of constipation and asks for a fleets enema from time to time he is very private will not allow the staff to look at his stools.  We will order as needed milk of magnesia to have available    Occupational therapist  recommends a standard wheelchair to meet his mobility needs--order sent to [facility]    (G30.9,  F02.80) Alzheimer's disease (H)  (primary encounter diagnosis)    Plan: Wheelchair Order for DME - ONLY FOR DME            (K64.4) External hemorrhoids  Plan: sennosides (SENOKOT) 8.6 MG tablet, magnesium         hydroxide (MILK OF MAGNESIA) 400 MG/5ML         suspension, DISCONTINUED: sennosides (SENOKOT)         8.6 MG tablet            (Z79.899) Encounter for medication review    Plan: QUEtiapine (SEROQUEL) 50 MG tablet            (G30.1,  F02.818) Late onset Alzheimer's disease with behavioral disturbance (H)    Plan: QUEtiapine (SEROQUEL) 50 MG tablet            (I48.0) Paroxysmal A-fib (H)    Plan: Wheelchair Order for DME - ONLY FOR DME            (R63.4) Weight loss      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

## 2023-05-15 NOTE — TELEPHONE ENCOUNTER
Facility RN calling that the resident has become difficult to manage--- wife has not visited this week as this was a trigger and the resident would become frustrated throw his walker found on the walls.  Afternoon Seroquel was increased from 50 to 75 mg--facility RN gave an additional 25 mg as needed and was not effective    Adjusted Seroquel to 75 mg scheduled 3 times daily with 25 mg as needed daily    Insurance company limiting number of Seroquel pills per month by dosage  Adjusted to 50 mg tabs 1-1/2 twice daily  And 25 mg tabs--3 tabs at bedtime and 1 additional tab as needed daily    Will start as needed Ativan for agitation and difficult to control behaviors    We will follow-up tomorrow morning on rounds considering adjusting medication or adding on afternoon Zyprexa 2.5 mg    I asked if daughter was notified--staff reports daughter is not answering her phone and suggests if behaviors are out of control to call the police or send him in to the ED  He may be a good candidate for geriatric behavioral unit to stabilize    Lilly Shepard, APRN CNP   May 15, 2023

## 2023-05-23 NOTE — PROGRESS NOTES
Juan Miguel Delaney is a 89 year old male being seen today for comprehensive, acute and follow up  visit at [facility].    Code Status: DNR / DNI, Advance Directives: YES  Health Care Power of : Extended Emergency Contact Information  Primary Emergency Contact: Criss Delaney   [facility]  Home Phone: [phone]  Relation: Spouse  Secondary Emergency Contact: Kavita Hassan   [facility]  Home Phone: [phone]  Mobile Phone: [phone]  Relation: Daughter     Allergies: Metoprolol, Asa [aspirin], Penicillins, Celecoxib, Ibuprofen, Lisinopril, Naprosyn [naproxen], and Rofecoxib     Chief Complaint / HPI: Follow-up with resident on recent medication adjustment for increase in agitation, anxiety and verbal confrontational behaviors.  Resident previously living with wife in assisted living apartment upstairs, recently moved to secure memory unit about 6 weeks ago and initially had made a good transition however recently with wife's visits have become angry irritable agitated with throwing his walker, pounding on walls, slamming doors raising his voice out of frustration.  Recently staff has had a pause on visits as this has been stressful and traumatic for his wife, as well as resident.  Previously on Seroquel 50 mg 3 times a day with a as needed dose.  Recently started Ativan as needed for agitation, anxiety and difficult to manage behaviors  Staff reports this has been effective 50% of the time.  Last week increased scheduled Seroquel from 50 to 75 mg 3 times daily.  Follow-up today with RN and direct caregiving staff reports that even without triggers resident has become more agitated, physical out of frustration  For the most part behaviors have been somewhat redirectable however some of the newer staff have had difficulty managing and are somewhat intimidated by these episodes though to my understanding no one has been assaulted or harmed.  Did complete some follow-up labs    Past  Medical, Surgical, Family and Social History reviewed: YES.     Medications: Reviewed and reconciled  Medications - recent changes: Increase Seroquel from 50 to 75 mg TID    Review of Systems:  General: positive for weakness  : positive for Renal Insufficiency  Musculoskeletal: positive for arthritis, joint pain and muscular weakness  Neurologic: positive for memory problems and behavior changes  Psych: Positive for agitation  Endocrine: positive for CKDIII  All other systems negative  Toileting:    Continent of Bowel: Yes   Continent of Bladder: Yes  Mobility: walker    Recent Labs:   Recent Results (from the past 240 hour(s))   Basic metabolic panel    Collection Time: 05/23/23  8:23 AM   Result Value Ref Range    Sodium 142 136 - 145 mmol/L    Potassium 4.9 3.4 - 5.3 mmol/L    Chloride 105 98 - 107 mmol/L    Carbon Dioxide (CO2) 24 22 - 29 mmol/L    Anion Gap 13 7 - 15 mmol/L    Urea Nitrogen 34.5 (H) 8.0 - 23.0 mg/dL    Creatinine 1.82 (H) 0.67 - 1.17 mg/dL    Calcium 9.2 8.2 - 9.6 mg/dL    Glucose 83 70 - 99 mg/dL    GFR Estimate 35 (L) >60 mL/min/1.73m2   Vitamin B12    Collection Time: 05/23/23  8:23 AM   Result Value Ref Range    Vitamin B12 1,181 232 - 1,245 pg/mL   TSH with free T4 reflex    Collection Time: 05/23/23  8:23 AM   Result Value Ref Range    TSH 3.60 0.30 - 4.20 uIU/mL   CBC with platelets and differential    Collection Time: 05/23/23  8:23 AM   Result Value Ref Range    WBC Count 6.1 4.0 - 11.0 10e3/uL    RBC Count 4.98 4.40 - 5.90 10e6/uL    Hemoglobin 14.3 13.3 - 17.7 g/dL    Hematocrit 43.5 40.0 - 53.0 %    MCV 87 78 - 100 fL    MCH 28.7 26.5 - 33.0 pg    MCHC 32.9 31.5 - 36.5 g/dL    RDW 14.1 10.0 - 15.0 %    Platelet Count 231 150 - 450 10e3/uL    % Neutrophils 69 %    % Lymphocytes 15 %    % Monocytes 11 %    % Eosinophils 4 %    % Basophils 1 %    % Immature Granulocytes 0 %    NRBCs per 100 WBC 0 <1 /100    Absolute Neutrophils 4.1 1.6 - 8.3 10e3/uL    Absolute Lymphocytes 0.9 0.8 -  5.3 10e3/uL    Absolute Monocytes 0.7 0.0 - 1.3 10e3/uL    Absolute Eosinophils 0.3 0.0 - 0.7 10e3/uL    Absolute Basophils 0.1 0.0 - 0.2 10e3/uL    Absolute Immature Granulocytes 0.0 <=0.4 10e3/uL    Absolute NRBCs 0.0 10e3/uL       Current Therapies: none    Exam:  Vital signs reviewed.   GENERAL APPEARANCE:  alert and no distress  RESP: lungs clear   MS: no musculoskeletal defects are noted and gait is unsteady--walker dependent  SKIN: warm and dry  NEURO: Alert,mentation intact and speech normal, severe memory loss,anxiety, easily agitated  PSYCH: mentation appears normal and anxious appearing    Assessment and Plan:      Increased frequency of agitation episodes secondary to advancing dementia.  Clearly Seroquel dose adjustment failure on 75 mg TID with daily 25 mg PRN.    Ativan reported to be mildly effective 50% improvement at best.    Increased agitation with unknown triggers--very irritable, anxious and angry mood swings. Goals of care have been comfort  And QOL--to remain in current memory living through end of life.    Consulted with psychiatry, agrees with conversion trial from seroquel to Risperidone--due to dosage and duration will need taper off  And will plan to cross taper with seroquel. Daughter agreeable with trial to best manage difficult and physically confrontational behavior  With goals to best moderate and stabilize anxiety and angry mood swings so that staff can best care for him.  There are very few resources in [facility] for psychiatry that will provide tele health consults and co management in secure memory unit.  Await PA authorization for Risperidone. Will need to hold off on Seroquel conversion as resident will not be able to function safely and staff will not be able to care for him on current unstable mood trajectory>    Unfortunately insurance provider requesting PA for Risperidone start which will delay treatment plan and risk possible ED or hospital admission.  Best  overall plan to manage at [facility] unit as clinic, ED and hospital visits escalate problematic unstable mood swings  And physical confrontational episodes.    Labs reviewed, hemogram improved,Renal function decreased along with 7 pound weight loss over last 3 months.  Will plan to adjust Torsemide 10 mg daily to PRN with weight gain parameters and daily weights. Staff feel weights should be attainable.  Will plan to recheck renal function in 4-6 weeks.      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)  (primary encounter diagnosis)    Plan: QUEtiapine (SEROQUEL) 25 MG tablet, risperiDONE        (RISPERDAL) 0.5 MG tablet, LORazepam (ATIVAN)         0.5 MG tablet, QUEtiapine (SEROQUEL) 50 MG         tablet,     (Z79.899) Encounter for medication review    Plan: QUEtiapine (SEROQUEL) 25 MG tablet, risperiDONE        (RISPERDAL) 0.5 MG tablet, LORazepam (ATIVAN)         0.5 MG tablet, QUEtiapine (SEROQUEL) 50 MG         Tablet    (I50.32) Chronic diastolic heart failure (H)    Plan: torsemide (DEMADEX) 10 MG tablet            (I48.0) Paroxysmal A-fib (H)    Plan: torsemide (DEMADEX) 10 MG tablet            (G30.1,  F02.818) Late onset Alzheimer's disease with behavioral disturbance (H)    Plan: QUEtiapine (SEROQUEL) 25 MG tablet, risperiDONE        (RISPERDAL) 0.5 MG tablet, LORazepam (ATIVAN)         0.5 MG tablet,           (N18.32) Stage 3b chronic kidney disease      (R63.4) Weight loss      (I10) Benign essential hypertension    Plan: torsemide (DEMADEX) 10 MG tablet

## 2023-05-24 NOTE — TELEPHONE ENCOUNTER
Information confirmed with Whit at [facility]. Ema Spencer RN .............. 5/24/2023  3:18 PM

## 2023-05-24 NOTE — TELEPHONE ENCOUNTER
"Fax received from [id number], with message regarding:    QUEtiapine (SEROQUEL) 50 MG tablet 90 tablet 3 2023  No   Si tab BID x 1 week, then 1/2 tab BID x 1 week then DC     \"Please clarify second weeks directions- 1/2 tab once daily or BID for 7 days? Thanks so much!\"    Ema Spencer RN .............. 2023  11:19 AM      "

## 2023-05-24 NOTE — TELEPHONE ENCOUNTER
Please call [employer]--ask for Drea    The taper is on hold--awaiting PA for Risperidone    TWD staff yesterday and [facility] aware of continuing as is    Thanks--I am out today and tomorrow    Lilly Shepard, APRN CNP   May 24, 2023

## 2023-05-24 NOTE — TELEPHONE ENCOUNTER
"Called [employer] and spoke with Drea and she was informed of Lilly's response. She is still wanting clarification on the following. She states this is not a rush, as it is on hold.    QUEtiapine (SEROQUEL) 50 MG tablet 90 tablet 3 2023   No   Si tab BID x 1 week, then 1/2 tab BID x 1 week then DC      \"Please clarify second weeks directions- 1/2 tab once daily or BID for 7 days? Thanks so much!\"    Ema Spencer RN .............. 2023  2:05 PM        "

## 2023-05-24 NOTE — TELEPHONE ENCOUNTER
Raimundo Davila    I have talked to fatuma Alanis and Whit who is working today--change in seroquel dose on hold until further notice pending insurance PA approval of Risperidone    Whit promised me she will not send any clarification requests today    Lilly Shepard, APRN CNP   May 24, 2023

## 2023-05-26 NOTE — TELEPHONE ENCOUNTER
Appeal faxed and mailed to Insurance for PA denial with assistance from  Castro Monroy RN    Awaiting final final decision    Lilly Shepard, APRN CNP   May 26, 2023

## 2023-06-05 NOTE — TELEPHONE ENCOUNTER
Notified of below, appeal updated:     risperiDONEDiscontinued 5/26/2023  Discontinued - risperiDONE (RISPERDAL) 0.5 MG tablet  Take 1 tablet (0.5 mg) by mouth 2 times daily Start Friday May 26, Disp-60 tablet, R-3, E-Prescribe  Cross taper--YESSY Pulido RN on 6/5/2023 at 1:06 PM

## 2023-06-07 NOTE — TELEPHONE ENCOUNTER
Nesha    It appears my appeal may have been approved--  Can you speak with Fernando Tian on this--I would start Risperidone  0.5 mg BID and taper off scheduled Seroquel as discussed in my note  Decreasing seroquel dose weekly by 25 mg TID until tapered off  While starting risperidone.  If insurance company needs notification other than ordering recommended dose  Valarie Monroy can help with this--she helped me with the appeal    Mercedes Shepard, APRN CNP   June 6, 2023

## 2023-06-07 NOTE — PROGRESS NOTES
Assessment & Plan     ICD-10-CM    1. Late onset Alzheimer's disease with behavioral disturbance (H)  G30.1 risperiDONE (RISPERDAL) 0.5 MG tablet    F02.818 QUEtiapine (SEROQUEL) 25 MG tablet     QUEtiapine (SEROQUEL) 25 MG tablet      2. Encounter for medication review  Z79.899 QUEtiapine (SEROQUEL) 25 MG tablet     QUEtiapine (SEROQUEL) 25 MG tablet      3. 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 I confirmed with [facility] and they just need a new dated prescription for this along with an updated script for Seroquel taper.       RONALDO Del Castillo [facility] AND [facility]

## 2023-06-13 NOTE — TELEPHONE ENCOUNTER
Routing refill request to provider for review/approval because:    LOV: 6/13/23 [facility] Nesha Walker.    Sary Rogers RN on 6/13/2023 at 4:17 PM

## 2023-06-13 NOTE — PROGRESS NOTES
"Juan Miguel Delaney is a 89 year old male being seen today for acute and follow-up visit at [facility] memory unit.    Assessment & Plan       ICD-10-CM    1. Rash and nonspecific skin eruption  R21 mupirocin (BACTROBAN) 2 % external ointment      2. Skin picking habit  F42.4 mupirocin (BACTROBAN) 2 % external ointment      3. 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 Alzheimer's disease (H)  G30.9     F02.80       5. Living in assisted living  Z59.3         [facility] nursing staff bring forth concerns of red rash to bilateral groin areas.  Staff report Juan Miguel has been refusing showers, toileting, and assistance with brushing his teeth over the last three weeks. Staff report that his last shower was three weeks ago but they were able to get him in yesterday, and that is when they noticed the \"fire engine red\" and bleeding groin areas. Staff also report increase in aggressive and agitated behaviors. Juan Miguel used to be easily directed but now redirection doesn't work and he will slam his walker down on the ground or bang his hands against the table.     He was originally taking Seroquel 75 mg TID with the plan to taper down over two weeks. While initiating this taper, Juan Miguel was supposed to begin taking risperidone 0.5 mg by mouth twice daily, however after review of facility MAR RN had stated that this medication had been \"on hold\". I was the prescriber of both the taper and the initiation of the risperidone, and the order explicitly stated to begin giving BID, with no hold orders. RN was unsure of the miscommunication and why the medication had not been started as prescribed. The medication was delivered from pharmacy the day that it was ordered. Juan Miguel will be getting his first dose of the risperidone this evening.    Given this new information, it is not surprising that he has had an increase in aggressive and agitative behaviors as we were tapering him off the Seroquel and he did not have the risperidone on " "board. Nurse reports that he had been utilizing his PRN lorazepam often that he had received 12 doses since June 1, often in the a.m. hours.     Today on exam, Juan Miguel is seated in his recliner. He asks me with a smile \"why are you here\". I explained that staff were worried about a possible rash in his groin area. He did allow me to briefly examine the area. Both groin folds have a macular, reddened rash with some shiny areas and satellite lesions. There is no active bleeding or scabbing visible. We discussed that the best option would be to apply an ointment ( mupirocin) twice daily to prevent any skin infection. At first, Juan Miguel was not keen on the idea of letting the staff apply the ointment, but we came to a compromise that he could apply the ointment twice daily, as long as staff ensured that he applied it correctly. To that he replied \" well at that point, if they have to be in here watching me, they might as well do it\". I asked if he would be agreeable to having staff apply the mupirocin twice daily and he nodded in agreement. We discussed the importance of weekly showers and good kamryn-care after toileting to prevent any further rash and subsequent infection.     Discussed signs/ symptoms that would warrant urgent/ emergent follow-up.  All questions and concerns addressed this visit.       45 minutes spent by me on the date of the encounter doing chart review, patient visit and documentation         Return if symptoms worsen or fail to improve.    RONALDO Del Castillo CNP  [facility]     Code Status: DNR / DNI.   Health Care Power of : Extended Emergency Contact Information  Primary Emergency Contact: Criss Delaney   [facility]  Home Phone: [phone]  Relation: Spouse  Secondary Emergency Contact: Kavita Hassan   [facility]  Home Phone: [phone]  Mobile Phone: [phone]  Relation: Daughter     Allergies: Metoprolol, Asa [aspirin], Penicillins, Celecoxib, " Ibuprofen, Lisinopril, Naprosyn [naproxen], and Rofecoxib     Past Medical, Surgical, Family and Social History reviewed: YES.     Medications:   Current Outpatient Medications   Medication Sig Dispense Refill     mupirocin (BACTROBAN) 2 % external ointment Apply topically 2 times daily To groin folds. 30 g 1     acetaminophen (ACETAMINOPHEN EXTRA STRENGTH) 500 MG tablet TAKE 2 TABLETS (1,000 MG) BY MOUTH 3 TIMES DAILY 180 tablet 11     amLODIPine (NORVASC) 5 MG tablet TAKE 2 TABLETS (10 MG) BY MOUTH DAILY 180 tablet 3     cyanocobalamin (VITAMIN B-12) 500 MCG tablet Take 1 tablet (500 mcg) by mouth daily 90 tablet 4     donepezil (ARICEPT) 10 MG ODT DISSOLVE 1 TAB BY MOUTH ONCE DAILY 90 tablet 3     gabapentin (NEURONTIN) 300 MG capsule Take 1 capsule (300 mg) by mouth At Bedtime 120 capsule 3     loperamide (IMODIUM A-D) 2 MG tablet Take 1 tablet (2 mg) by mouth 2 times daily as needed for diarrhea 20 tablet 1     LORazepam (ATIVAN) 0.5 MG tablet Take 1 tablet (0.5 mg) by mouth 2 times daily as needed for agitation or anxiety 60 tablet 1     magnesium hydroxide (MILK OF MAGNESIA) 400 MG/5ML suspension Take 30 mLs by mouth daily as needed for constipation 473 mL 3     mupirocin (BACTROBAN) 2 % external ointment BID to affected skin on chest until resolved then BID PRN for flares 30 g 1     nitroGLYcerin (NITROSTAT) 0.4 MG sublingual tablet Place 1 tablet (0.4 mg) under the tongue every 5 minutes as needed for chest pain (CALL 911 IF NOT IMPROVED AFTER THREE CONSECUTIVE DOSES) 25 tablet 0     pantoprazole (PROTONIX) 40 MG EC tablet Take 1 tablet (40 mg) by mouth daily 90 tablet 3     QUEtiapine (SEROQUEL) 25 MG tablet Take 1 tablet (25 mg) by mouth 2 times daily as needed (agitation) 100 tablet 1     QUEtiapine (SEROQUEL) 25 MG tablet Take 2 tablets (50 mg) by mouth 3 times daily for 7 days, THEN 1 tablet (25 mg) 3 times daily for 7 days. Then STOP 63 tablet 0     risperiDONE (RISPERDAL) 0.5 MG tablet Take 1 tablet  (0.5 mg) by mouth 2 times daily 60 tablet 0     rivaroxaban ANTICOAGULANT (XARELTO) 15 MG TABS tablet Take 1 tablet (15 mg) by mouth daily (with lunch) 90 tablet 3     sennosides (SENOKOT) 8.6 MG tablet 1 tab daily PRN daily constipation 100 tablet 3     torsemide (DEMADEX) 10 MG tablet X 2 days PRN for weight gain 3 pounds in 2 days or 5 pounds in week 90 tablet 1     triamcinolone (KENALOG) 0.1 % external cream Apply topically 2 times daily as needed (itchy skin) 30 g 1         Review of Systems   Constitutional: Negative.    HENT: Negative.    Respiratory: Negative.    Cardiovascular: Negative.         Irregular heartbeat, a- fib   Gastrointestinal: Negative for abdominal pain, anal bleeding, hematochezia and rectal pain.        History of hemorrhoids, uses TUCKS as needed     Genitourinary: Negative for dysuria and hematuria.   Musculoskeletal: Positive for arthralgias and gait problem.   Skin: Positive for rash (groin).   Neurological: Positive for dizziness and weakness. Negative for tremors, syncope, light-headedness, numbness, headaches and paresthesias.   Hematological: Bruises/bleeds easily.   Psychiatric/Behavioral: Positive for agitation, behavioral problems and confusion.       Toileting:    Continent of Bowel: Yes   Continent of Bladder: Yes  Mobility: walker    Recent Labs:   Results for orders placed or performed in visit on 06/13/23   Basic metabolic panel     Status: Abnormal   Result Value Ref Range    Sodium 142 136 - 145 mmol/L    Potassium 4.9 3.4 - 5.3 mmol/L    Chloride 106 98 - 107 mmol/L    Carbon Dioxide (CO2) 25 22 - 29 mmol/L    Anion Gap 11 7 - 15 mmol/L    Urea Nitrogen 21.6 8.0 - 23.0 mg/dL    Creatinine 1.43 (H) 0.67 - 1.17 mg/dL    Calcium 9.0 8.2 - 9.6 mg/dL    Glucose 81 70 - 99 mg/dL    GFR Estimate 47 (L) >60 mL/min/1.73m2       Current Therapies: none    Physical Exam  Vitals and nursing note reviewed.   Cardiovascular:      Rate and Rhythm: Normal rate and regular rhythm.    Pulmonary:      Effort: Pulmonary effort is normal.   Musculoskeletal:         General: No tenderness.      Cervical back: Normal range of motion.      Right hip: Normal.      Left hip: Decreased range of motion.   Skin:     General: Skin is warm and dry.      Capillary Refill: Capillary refill takes less than 2 seconds.      Findings: Rash (red, shiny macular rash with satellite lesions present to groin areas bilaterally. No drainage, warmth. ) present.   Neurological:      Mental Status: Mental status is at baseline.   Psychiatric:         Behavior: Behavior is agitated and aggressive.         Cognition and Memory: Memory is impaired. He exhibits impaired recent memory.

## 2023-06-19 PROBLEM — J18.9 PNEUMONIA OF LEFT UPPER LOBE DUE TO INFECTIOUS ORGANISM: Status: ACTIVE | Noted: 2023-01-01

## 2023-06-19 PROBLEM — I50.33 ACUTE ON CHRONIC DIASTOLIC CONGESTIVE HEART FAILURE (H): Status: ACTIVE | Noted: 2023-01-01

## 2023-06-19 PROBLEM — R79.89 ELEVATED TROPONIN: Status: ACTIVE | Noted: 2023-01-01

## 2023-06-19 PROBLEM — J18.9 COMMUNITY ACQUIRED PNEUMONIA: Status: ACTIVE | Noted: 2023-01-01

## 2023-06-19 NOTE — PROGRESS NOTES
Critical and/or Actionable Test Result  Date: June 19, 2023     Time Received:  1810  Lab:Troponin 102  Verbal Result Read Back to Caller:  Yes  Result:  expected No  MD Notification: Yes:   MD Read Back:  Yes:   Nursing Plan:  IV access, additional lab results.    Lakeisha Meehan RN on 6/19/2023 at 6:19 PM

## 2023-06-19 NOTE — ED TRIAGE NOTES
Pt is here today with son for a cough.  Pt lives at [facility].  Pt has had a cough since 6-.   Son has noticed a significant decline in pt's energy in the last month as well.    Denies any known fevers. Asked pt if he is having any pain anywhere and pt stated no.    Does have hx of dementia.  /69   Pulse 71   Temp 97.6  F (36.4  C) (Temporal)   Resp 16   Wt 86.2 kg (190 lb)   SpO2 94%   BMI 30.67 kg/m    Lakeisha Meehan RN on 6/19/2023 at 2:45 PM       Triage Assessment     Row Name 06/19/23 1443       Triage Assessment (Adult)    Airway WDL WDL       Respiratory WDL    Respiratory WDL X;cough    Cough Frequency frequent    Cough Type good       Skin Circulation/Temperature WDL    Skin Circulation/Temperature WDL WDL       Cardiac WDL    Cardiac WDL WDL       Peripheral/Neurovascular WDL    Peripheral Neurovascular WDL WDL       Cognitive/Neuro/Behavioral WDL    Cognitive/Neuro/Behavioral WDL WDL  At baseline       [place] Coma Scale    Best Eye Response 4-->(E4) spontaneous    Best Motor Response 6-->(M6) obeys commands    Best Verbal Response 4-->(V4) confused    Gabe Coma Scale Score 14

## 2023-06-20 PROBLEM — J18.9 PNEUMONIA OF LEFT UPPER LOBE DUE TO INFECTIOUS ORGANISM: Status: RESOLVED | Noted: 2023-01-01 | Resolved: 2023-01-01

## 2023-06-20 NOTE — PHARMACY-ADMISSION MEDICATION HISTORY
Pharmacist Admission Medication History    Admission medication history is complete. The information provided in this note is only as accurate as the sources available at the time of the update.    Medication reconciliation/reorder completed by provider prior to medication history? Yes    Information Source(s): Facility ([facility] medication list/MAR and CareEverywhere/SureScripts via N/A    Pertinent Information: patient had been getting torsemide filled regularly 10 mg daily. However, this was last filled 5/23/23 and note states to change to PRN due to 7 pound weight loss.  Patient started on risperidone 6/13/23 and currently being titrated off quetiapine (last dose scheduled for 6/22/23 per MAR).    Changes made to PTA medication list:    Added: None    Deleted: None    Changed: None    Medication Affordability: unable to assess       Allergies reviewed with patient and updates made in EHR: unable to assess    Medication History Completed By: Deann Shah [facility] 6/20/2023 9:36 AM    Prior to Admission medications    Medication Sig Last Dose Taking? Auth Provider Long Term End Date   acetaminophen (ACETAMINOPHEN EXTRA STRENGTH) 500 MG tablet TAKE 2 TABLETS (1,000 MG) BY MOUTH 3 TIMES DAILY 6/19/2023 at 1200 Yes Lilly Shepard APRN CNP     amLODIPine (NORVASC) 5 MG tablet TAKE 2 TABLETS (10 MG) BY MOUTH DAILY 6/19/2023 at AM Yes Jason Fernando MD Yes    cyanocobalamin (VITAMIN B-12) 500 MCG tablet TAKE 1 TABLET (500 MCG) BY MOUTH DAILY 6/19/2023 at AM Yes Jason Fernando MD     donepezil (ARICEPT) 10 MG ODT DISSOLVE 1 TAB BY MOUTH ONCE DAILY 6/19/2023 at AM Yes Lilly Shepard APRN CNP     loperamide (IMODIUM A-D) 2 MG tablet Take 1 tablet (2 mg) by mouth 2 times daily as needed for diarrhea Past Week at X1 Yes Nesha Walker APRN CNP     LORazepam (ATIVAN) 0.5 MG tablet Take 1 tablet (0.5 mg) by mouth 2 times daily as needed for agitation or anxiety Past Week at USU. 1/DAY Yes Lilly Shepard APRN CNP      magnesium hydroxide (MILK OF MAGNESIA) 400 MG/5ML suspension Take 30 mLs by mouth daily as needed for constipation More than a month at NO CHARTED USE Yes Unknown, Entered By History     nitroGLYcerin (NITROSTAT) 0.4 MG sublingual tablet Place 0.4 mg under the tongue every 5 minutes as needed for chest pain For chest pain place 1 tablet under the tongue every 5 minutes for 3 doses. If symptoms persist 5 minutes after 1st dose call 911. More than a month at NO CHARTED USE/FILLS Yes Unknown, Entered By History No    pantoprazole (PROTONIX) 40 MG EC tablet Take 1 tablet (40 mg) by mouth daily 6/19/2023 at AM Yes Lilly Shepard APRN CNP     QUEtiapine (SEROQUEL) 25 MG tablet Take 1 tablet (25 mg) by mouth 2 times daily as needed (agitation) More than a month at NO CHARTED USE Yes Nesha Walker APRN CNP Yes    QUEtiapine (SEROQUEL) 25 MG tablet Take 2 tablets (50 mg) by mouth 3 times daily for 7 days, THEN 1 tablet (25 mg) 3 times daily for 7 days. Then STOP 6/19/2023 at 1200-LAST DOSE SCHEDULED FOR 6/22, THEN DISCONTINUE Yes Nesha Walker APRN CNP Yes 6/21/23   risperiDONE (RISPERDAL) 0.5 MG tablet Take 1 tablet (0.5 mg) by mouth 2 times daily 6/19/2023 at AM Yes Nesha Walker APRN CNP Yes    rivaroxaban ANTICOAGULANT (XARELTO) 15 MG TABS tablet Take 1 tablet (15 mg) by mouth daily (with lunch) 6/19/2023 at 1200 Yes Lilly Shepard APRN CNP Yes    sennosides (SENOKOT) 8.6 MG tablet 1 tab daily PRN daily constipation Past Month at X1 Yes Lilly Shepard APRN CNP     torsemide (DEMADEX) 10 MG tablet X 2 days PRN for weight gain 3 pounds in 2 days or 5 pounds in week More than a month at NOT ON PRN MED LIST Yes Lilly Shepard APRN CNP Yes    triamcinolone (KENALOG) 0.1 % external cream Apply topically 2 times daily as needed (itchy skin) More than a month at NO CHARTED USE Yes Lilly Shepard APRN CNP     gabapentin (NEURONTIN) 300 MG capsule Take 1 capsule (300 mg) by mouth At Bedtime 6/18/2023 at 1800   Lilly Shepard, RONALDO CNP Yes    mupirocin (BACTROBAN) 2 % external ointment Apply topically 2 times daily To groin folds. 6/18/2023 at PM  Nesha Walker APRN CNP

## 2023-06-20 NOTE — ED PROVIDER NOTES
EMERGENCY DEPARTMENT ENCOUNTER      NAME: Juan Miguel Delaney  AGE: 89 year old male  YOB: 1932  MRN: [id number]  EVALUATION DATE & TIME: 6/19/2023  4:25 PM    PCP: Jason Fernando    ED PROVIDER: Aubrey Xavier PA-C       CHIEF COMPLAINT:  Chief Complaint   Patient presents with     Cough       FINAL IMPRESSION:  1. Pneumonia of left upper lobe due to infectious organism    2. Elevated troponin    3. Stage 3b chronic kidney disease (H)    4. Acute on chronic diastolic congestive heart failure (H)    5. Moderate dementia without behavioral disturbance, psychotic disturbance, mood disturbance, or anxiety, unspecified dementia type (H)        ED COURSE, MEDICAL DECISION MAKING, ASSESSMENT, AND PLAN:      The patient was interviewed and examined.  HPI and physical exam as below.  Differential diagnosis and MDM Key Documentation Elements as below.  Vitals and triage note were reviewed.  BP (!) 158/99   Pulse 80   Temp 99.2  F (37.3  C) (Temporal)   Resp 16   Wt 86.2 kg (190 lb)   SpO2 94%   BMI 30.67 kg/m      Overall Impression/Treatment Plan/Discharge Info/Follow-up/Medical Necessity for Admission:  Juan Miguel Delaney is a pleasant 89 year old male with history of dementia, diastolic congestive heart failure, chronic kidney disease, hypertension, paroxysmal atrial fibrillation currently on Xarelto who presents to the ER today with his son and daughter for evaluation of cough.  Family states that patient's been having cough 6/14/2023 which has been worsening.  Patient denying any pain.  No chest pain, back pain, abdominal pain, or headache.  Denies any fever or chills.  Endorses lower extremity swelling but no lower extremity pain.  Does have remote history of pneumonia in 2002.  Son states that he has something similar last year and patient was given torsemide which resolved his issues    Differential includes but is not limited to congestive heart failure exacerbation, acute PE, ACS/MI, pneumonia,  bronchitis, upper respiratory infection    Patient was afebrile with a low blood pressure.  Patient was not hypoxic.  Patient was not in acute distress.  This examination today revealed patient was coughing during exam.  Patient with more noticeable left-sided wheezing/rhonchi with expiration.  Heart was regular.  No abdominal tenderness.  Patient with bilateral 2+ pitting edema of the lower extremities without tenderness.  Remaining physical exam today was otherwise benign.    EKG showed sinus rhythm with PVCs otherwise no acute ST segment deviation to suggest ACS/MI.  Screen troponin today was at 102 initially.  Repeat screening troponin at the 2-hour bryan today was 101.  Elevation most likely secondary to underlying congestive heart failure exacerbation as patient did not have any chest pain nor did he have any EKG changes.  Screening BNP elevated 4562.  Creatinine stable at 1.78.  BUN 40.2.  Mild leukocytosis 11,200.  Glucose 126.  Anion gap 16.  No left lyte abnormalities.  X-ray today showed concern for possible left upper lobe pneumonia.  CT of the chest for PE study did not show any signs of acute PE but again showed concerns for left upper lobe/lingula pneumonia with mild emphysematous changes    Patient given DuoNeb, IV Decadron, and IV cefepime 2 g for treatment of pneumonia.  Patient given IV Lasix 20 mg for treatment of acute congestive heart failure exacerbation.  Patient tolerated procedures well without complication.      Assessment/plan:  1. Acute congestive heart failure and left upper lobe pneumonia  -Both issues most likely a cause of patient's coughing.  Patient treated with antibiotics for his pneumonia and IV Lasix for treatment for his underlying congestive heart failure exacerbation.  Patient will be admitted to the hospital service for further cares.  Discussed with Dr. [facility] service was could not accept patient.  Patient admitted in stable condition.    2.  Chronic kidney  disease  -Creatinine 1.78, stable.  Continue to monitor.    3.  Elevated troponin  -Most likely secondary to patient's underlying congestive heart failure.  To oral surgeon troponin trending down.  No EKG changes.  Patient not having any symptoms besides coughing.  Less likely ACS/MI.  Also no signs of acute PE with no signs of right heart strain.    Reassessments, Medications, Interventions, & Response to Treatments:  Also reassessments.  Patient remained stable here in the ER.  Patient did not require any oxygen.  Patient not have any chest pain.  Discussed laboratory imaging results with family.  Discussed need for admission    Consultations:  Dr. Pierre-hospitalist service    Decision Rules, Medical Calculators, and Risk Stratification Tools:  None    MDM Key Documentation Elements for Patient's Evaluation:  1. Differential diagnosis to include high risk considerations: As above  2. Escalation to admission/observation considered: Admission/observation considered, will admit patient  3. Discussions and management with other clinicians:    3a. Independent interpretation of testing performed by another health professional:  -No  3b. Discussion of management or test interpretation with another health professional: -Yes  4. Independent interpretation of tests:  Ordering and/or review of 3+ test(s); review of 3+ test result(s) ordered prior to this encounter  5. Discussion of test interpretations with radiology:  No  6. History obtained from source other than patient or assessment requiring an independent historian:  No  7. Review of non-ED/external records:  review of 3+ records  8. Diagnostic tests considered but not ultimately performed/deferred:  -Consider echocardiogram may be done as an inpatient  9. Prescription medications considered but not prescribed:  - Patient was admitted  10. Chronic conditions affecting care:  -Dementia, congestive heart failure  11. Care affected by social determinants of health:   -None    The patient's management involved:   - Laboratory studies  - Imaging studies  - Parenteral controlled substance  - Decision regarding hospitalization    Pertinent Labs & Imaging studies reviewed. (See chart for details)  Results for orders placed or performed during the hospital encounter of 06/19/23   XR Chest 2 Views    Impression    Impression:   Evaluation is somewhat limited secondary to kyphotic positioning the  patient.    Left upper lobe/lingular pneumonia with atelectasis.     Wall thickening involving some of the central bronchial structures.    MAYNOR HARRISON MD         SYSTEM ID:  [id number]   CT Chest Pulmonary Embolism w Contrast    Impression    IMPRESSION:   Good quality CTA demonstrates no evidence of pulmonary embolus or  other acute vascular abnormality.    Mild emphysematous changes with subtle multifocal pneumonia, most  notably within the lingula.      This facility minimizes radiation dose by adjusting the mA and/or kV  according to each patient size.      This CT scan was performed using one or more the following dose  reduction techniques:    -Automated exposure control,  -Adjustment of the mA and/or kV according to patient's size, and/or,  -Use of iterative reconstruction technique.    MAYNOR HARRISON MD         SYSTEM ID:  [id number]   Symptomatic Influenza A/B, RSV, & SARS-CoV2 PCR (COVID-19) Nose    Specimen: Nose; Swab   Result Value Ref Range    Influenza A PCR Negative Negative    Influenza B PCR Negative Negative    RSV PCR Negative Negative    SARS CoV2 PCR Negative Negative   Comprehensive metabolic panel   Result Value Ref Range    Sodium 145 136 - 145 mmol/L    Potassium 4.1 3.4 - 5.3 mmol/L    Chloride 107 98 - 107 mmol/L    Carbon Dioxide (CO2) 22 22 - 29 mmol/L    Anion Gap 16 (H) 7 - 15 mmol/L    Urea Nitrogen 40.2 (H) 8.0 - 23.0 mg/dL    Creatinine 1.78 (H) 0.67 - 1.17 mg/dL    Calcium 9.2 8.2 - 9.6 mg/dL    Glucose 126 (H) 70 - 99 mg/dL    Alkaline Phosphatase 106  40 - 129 U/L    AST 31 0 - 45 U/L    ALT 31 0 - 70 U/L    Protein Total 7.3 6.4 - 8.3 g/dL    Albumin 3.9 3.5 - 5.2 g/dL    Bilirubin Total 0.8 <=1.2 mg/dL    GFR Estimate 36 (L) >60 mL/min/1.73m2   Result Value Ref Range    Troponin T, High Sensitivity 102 (HH) <=22 ng/L   Nt probnp inpatient (BNP)   Result Value Ref Range    N terminal Pro BNP Inpatient 4,562 (H) 0 - 1,800 pg/mL   Result Value Ref Range    Magnesium 2.0 1.7 - 2.3 mg/dL   CBC with platelets and differential   Result Value Ref Range    WBC Count 11.2 (H) 4.0 - 11.0 10e3/uL    RBC Count 4.66 4.40 - 5.90 10e6/uL    Hemoglobin 13.6 13.3 - 17.7 g/dL    Hematocrit 41.2 40.0 - 53.0 %    MCV 88 78 - 100 fL    MCH 29.2 26.5 - 33.0 pg    MCHC 33.0 31.5 - 36.5 g/dL    RDW 15.0 10.0 - 15.0 %    Platelet Count 258 150 - 450 10e3/uL    % Neutrophils 79 %    % Lymphocytes 7 %    % Monocytes 10 %    % Eosinophils 2 %    % Basophils 1 %    % Immature Granulocytes 1 %    NRBCs per 100 WBC 0 <1 /100    Absolute Neutrophils 9.0 (H) 1.6 - 8.3 10e3/uL    Absolute Lymphocytes 0.8 0.8 - 5.3 10e3/uL    Absolute Monocytes 1.1 0.0 - 1.3 10e3/uL    Absolute Eosinophils 0.2 0.0 - 0.7 10e3/uL    Absolute Basophils 0.1 0.0 - 0.2 10e3/uL    Absolute Immature Granulocytes 0.1 <=0.4 10e3/uL    Absolute NRBCs 0.0 10e3/uL   Extra Blue Top Tube   Result Value Ref Range    Hold Specimen x    Extra Serum Separator Tube (SST)   Result Value Ref Range    Hold Specimen x    Extra Green Top (Lithium Heparin) ON ICE   Result Value Ref Range    Hold Specimen x    Extra Blue Top Tube   Result Value Ref Range    Hold Specimen x    Extra Red Top Tube   Result Value Ref Range    Hold Specimen x    Extra Green Top (Lithium Heparin) Tube   Result Value Ref Range    Hold Specimen x    Extra Purple Top Tube   Result Value Ref Range    Hold Specimen x    Extra Heparinized Syringe   Result Value Ref Range    Hold Specimen x    Result Value Ref Range    Troponin T, High Sensitivity 101 (HH) <=22 ng/L      No results found for: ABORH      A shared decision making model was used. Time was taken to answer all questions.  Patient and/or associated parties understood and were agreeable to treatment plan.  Plan and all results were discussed.  Patient was admitted in stable condition      PPE worn during patient evaluation:  Mask: Yes, surgical  Eye Protection: No  Gown: No  Hair cover: No  Face Shield: No  Patient wearing a mask: yes      MEDICATIONS GIVEN IN THE EMERGENCY:  Medications   ipratropium - albuterol 0.5 mg/2.5 mg/3 mL (DUONEB) neb solution 3 mL (3 mLs Nebulization $Given 6/19/23 1724)   iopamidol (ISOVUE-370) solution 81 mL (81 mLs Intravenous $Given 6/19/23 1857)   furosemide (LASIX) injection 20 mg (20 mg Intravenous $Given 6/19/23 1911)   ceFEPIme (MAXIPIME) 2 g vial to attach to  ml bag for ADULTS or 50 ml bag for PEDS (2 g Intravenous $New Bag 6/19/23 1957)   dexamethasone PF (DECADRON) injection 6 mg (6 mg Intravenous $Given 6/19/23 1957)       NEW PRESCRIPTIONS STARTED AT TODAY'S ER VISIT:  New Prescriptions    No medications on file          =================================================================    HPI  Juan Miguel Delaney is a pleasant 89 year old male with history of dementia, diastolic congestive heart failure, chronic kidney disease, hypertension, paroxysmal atrial fibrillation currently on Xarelto who presents to the ER today with his son and daughter for evaluation of cough.  Family states that patient's been having cough 6/14/2023 which has been worsening.  Patient denying any pain.  No chest pain, back pain, abdominal pain, or headache.  Denies any fever or chills.  Endorses lower extremity swelling but no lower extremity pain.  Does have remote history of pneumonia in 2002.  Son states that he has something similar last year and patient was given torsemide which resolved his issues    REVIEW OF SYSTEMS   Review of Systems  As above  Remainder of systems reviewed, unless noted  in HPI all others negative.      PAST MEDICAL HISTORY:  Past Medical History:   Diagnosis Date     Actinic keratosis     2004,5-FU treatment on forehead     Chronic kidney disease, stage III (moderate) (H)     No Comments Provided     Elevated erythrocyte sedimentation rate     2005,weight loss, night sweats due to episode of acute inflammatory illness, etiology unclear     Essential (primary) hypertension     No Comments Provided     Family history of malignant neoplasm of prostate     No Comments Provided     Hyperlipidemia     No Comments Provided     Osteoarthritis     No Comments Provided     Pneumonia     2002,and severe NSAID allergic reaction, hospitalized     Sciatica     No Comments Provided       PAST SURGICAL HISTORY:  Past Surgical History:   Procedure Laterality Date     ARTHROPLASTY KNEE      2006     ARTHROPLASTY KNEE      2008     ARTHROSCOPY SHOULDER      1996,left     ARTHROSCOPY SHOULDER      2001,AC arthrosis & distal clavicle resection     ARTHROSCOPY SHOULDER      2011     COLONOSCOPY      2008,normal     CYSTOSCOPY, TRANSURETHRAL RESECTION (TUR) PROSTATE, COMBINED      No Comments Provided     LAMINECTOMY LUMBAR ONE LEVEL      2015,[place], spinal stenosis     OTHER SURGICAL HISTORY      [id number],SCAN-STRESS TEST,negative to heart rate 138     OTHER SURGICAL HISTORY      [id number],OTHER,Right 5th digit     RELEASE CARPAL TUNNEL      bilateral           CURRENT MEDICATIONS:    Current Outpatient Medications   Medication Instructions     acetaminophen (ACETAMINOPHEN EXTRA STRENGTH) 500 MG tablet TAKE 2 TABLETS (1,000 MG) BY MOUTH 3 TIMES DAILY     amLODIPine (NORVASC) 10 mg, Oral, DAILY     cyanocobalamin (VITAMIN B-12) 500 mcg, Oral, DAILY     donepezil (ARICEPT) 10 MG ODT DISSOLVE 1 TAB BY MOUTH ONCE DAILY     gabapentin (NEURONTIN) 300 mg, Oral, AT BEDTIME     loperamide (IMODIUM A-D) 2 mg, Oral, 2 TIMES DAILY PRN     LORazepam (ATIVAN) 0.5 mg, Oral, 2 TIMES DAILY PRN     magnesium  hydroxide (MILK OF MAGNESIA) 400 MG/5ML suspension 30 mLs, Oral, DAILY PRN     mupirocin (BACTROBAN) 2 % external ointment BID to affected skin on chest until resolved then BID PRN for flares     mupirocin (BACTROBAN) 2 % external ointment Topical, 2 TIMES DAILY, To groin folds.     nitroGLYcerin (NITROSTAT) 0.4 mg, Sublingual, EVERY 5 MIN PRN     pantoprazole (PROTONIX) 40 mg, Oral, DAILY     QUEtiapine (SEROQUEL) 25 MG tablet Take 2 tablets (50 mg) by mouth 3 times daily for 7 days, THEN 1 tablet (25 mg) 3 times daily for 7 days. Then STOP     QUEtiapine (SEROQUEL) 25 mg, Oral, 2 TIMES DAILY PRN     risperiDONE (RISPERDAL) 0.5 mg, Oral, 2 TIMES DAILY     rivaroxaban ANTICOAGULANT (XARELTO) 15 mg, Oral, DAILY WITH LUNCH     sennosides (SENOKOT) 8.6 MG tablet 1 tab daily PRN daily constipation     torsemide (DEMADEX) 10 MG tablet X 2 days PRN for weight gain 3 pounds in 2 days or 5 pounds in week     triamcinolone (KENALOG) 0.1 % external cream Topical, 2 TIMES DAILY PRN       ALLERGIES:  Allergies   Allergen Reactions     Metoprolol Other (See Comments)     Chronic Bradycardia - got very low pulse with even low dose of Metoprolol     Asa [Aspirin] GI Disturbance     Penicillins Hives     Celecoxib Rash     Ibuprofen Rash     All NSAIDS cause rash     Lisinopril Other (See Comments)     Dry throat     Naprosyn [Naproxen] Other (See Comments)     Epistaxis--resolved when DC'd     Rofecoxib Hives     Vioxx       FAMILY HISTORY:  Family History   Problem Relation Age of Onset     Prostate Cancer Father         Cancer-prostate     Hypertension Mother         Hypertension     Other - See Comments Mother         Old age, 99     Prostate Cancer Brother         Cancer-prostate     Unknown/Adopted Brother         Unknown       SOCIAL HISTORY:   Social History     Socioeconomic History     Marital status:    Tobacco Use     Smoking status: Never     Smokeless tobacco: Never   Vaping Use     Vaping status: Never Used    Substance and Sexual Activity     Alcohol use: No     Drug use: No     Sexual activity: Not Currently   Social History Narrative    Patient lives in town.  He is , retired from [employer].  3 children, 1 .       PHYSICAL EXAM    VITAL SIGNS: BP (!) 158/99   Pulse 80   Temp 99.2  F (37.3  C) (Temporal)   Resp 16   Wt 86.2 kg (190 lb)   SpO2 94%   BMI 30.67 kg/m      Patient Vitals for the past 24 hrs:   BP Temp Temp src Pulse Resp SpO2 Weight   23 2141 (!) 158/99 -- -- 80 -- 94 % --   23 1815 -- -- -- 101 -- 91 % --   23 1745 (!) 155/87 -- -- 73 -- 92 % --   23 1730 (!) 139/94 99.2  F (37.3  C) Temporal 85 -- 95 % --   23 1724 (!) 145/92 -- -- -- -- 93 % --   23 1442 119/69 97.6  F (36.4  C) Temporal 71 16 94 % 86.2 kg (190 lb)       Physical Exam  Vitals and nursing note reviewed.   Constitutional:       Appearance: Normal appearance. He is not ill-appearing or diaphoretic.   HENT:      Nose: Nose normal.      Mouth/Throat:      Mouth: Mucous membranes are moist.      Pharynx: Oropharynx is clear.   Cardiovascular:      Rate and Rhythm: Normal rate and regular rhythm.      Pulses: Normal pulses.      Heart sounds: Normal heart sounds.   Pulmonary:      Effort: Pulmonary effort is normal.      Comments: Patient with bilateral left-sided wheezing/rhonchi with expiration  Abdominal:      General: Abdomen is flat. Bowel sounds are normal.      Palpations: Abdomen is soft.   Musculoskeletal:         General: Normal range of motion.      Cervical back: Normal range of motion and neck supple.      Right lower le+ Edema present.      Left lower le+ Edema present.   Skin:     General: Skin is warm.      Capillary Refill: Capillary refill takes less than 2 seconds.   Neurological:      Mental Status: He is alert. Mental status is at baseline.   Psychiatric:         Mood and Affect: Mood normal.          LABS & RADIOLOGY:  All pertinent labs reviewed and  interpreted. Reviewed all pertinent imaging. Please see official radiology report.  Results for orders placed or performed during the hospital encounter of 06/19/23   XR Chest 2 Views    Impression    Impression:   Evaluation is somewhat limited secondary to kyphotic positioning the  patient.    Left upper lobe/lingular pneumonia with atelectasis.     Wall thickening involving some of the central bronchial structures.    MAYNOR HARRISON MD         SYSTEM ID:  [id number]   CT Chest Pulmonary Embolism w Contrast    Impression    IMPRESSION:   Good quality CTA demonstrates no evidence of pulmonary embolus or  other acute vascular abnormality.    Mild emphysematous changes with subtle multifocal pneumonia, most  notably within the lingula.      This facility minimizes radiation dose by adjusting the mA and/or kV  according to each patient size.      This CT scan was performed using one or more the following dose  reduction techniques:    -Automated exposure control,  -Adjustment of the mA and/or kV according to patient's size, and/or,  -Use of iterative reconstruction technique.    MAYNOR HARRISON MD         SYSTEM ID:  [id number]   Symptomatic Influenza A/B, RSV, & SARS-CoV2 PCR (COVID-19) Nose    Specimen: Nose; Swab   Result Value Ref Range    Influenza A PCR Negative Negative    Influenza B PCR Negative Negative    RSV PCR Negative Negative    SARS CoV2 PCR Negative Negative   Comprehensive metabolic panel   Result Value Ref Range    Sodium 145 136 - 145 mmol/L    Potassium 4.1 3.4 - 5.3 mmol/L    Chloride 107 98 - 107 mmol/L    Carbon Dioxide (CO2) 22 22 - 29 mmol/L    Anion Gap 16 (H) 7 - 15 mmol/L    Urea Nitrogen 40.2 (H) 8.0 - 23.0 mg/dL    Creatinine 1.78 (H) 0.67 - 1.17 mg/dL    Calcium 9.2 8.2 - 9.6 mg/dL    Glucose 126 (H) 70 - 99 mg/dL    Alkaline Phosphatase 106 40 - 129 U/L    AST 31 0 - 45 U/L    ALT 31 0 - 70 U/L    Protein Total 7.3 6.4 - 8.3 g/dL    Albumin 3.9 3.5 - 5.2 g/dL    Bilirubin Total 0.8  <=1.2 mg/dL    GFR Estimate 36 (L) >60 mL/min/1.73m2   Result Value Ref Range    Troponin T, High Sensitivity 102 (HH) <=22 ng/L   Nt probnp inpatient (BNP)   Result Value Ref Range    N terminal Pro BNP Inpatient 4,562 (H) 0 - 1,800 pg/mL   Result Value Ref Range    Magnesium 2.0 1.7 - 2.3 mg/dL   CBC with platelets and differential   Result Value Ref Range    WBC Count 11.2 (H) 4.0 - 11.0 10e3/uL    RBC Count 4.66 4.40 - 5.90 10e6/uL    Hemoglobin 13.6 13.3 - 17.7 g/dL    Hematocrit 41.2 40.0 - 53.0 %    MCV 88 78 - 100 fL    MCH 29.2 26.5 - 33.0 pg    MCHC 33.0 31.5 - 36.5 g/dL    RDW 15.0 10.0 - 15.0 %    Platelet Count 258 150 - 450 10e3/uL    % Neutrophils 79 %    % Lymphocytes 7 %    % Monocytes 10 %    % Eosinophils 2 %    % Basophils 1 %    % Immature Granulocytes 1 %    NRBCs per 100 WBC 0 <1 /100    Absolute Neutrophils 9.0 (H) 1.6 - 8.3 10e3/uL    Absolute Lymphocytes 0.8 0.8 - 5.3 10e3/uL    Absolute Monocytes 1.1 0.0 - 1.3 10e3/uL    Absolute Eosinophils 0.2 0.0 - 0.7 10e3/uL    Absolute Basophils 0.1 0.0 - 0.2 10e3/uL    Absolute Immature Granulocytes 0.1 <=0.4 10e3/uL    Absolute NRBCs 0.0 10e3/uL   Extra Blue Top Tube   Result Value Ref Range    Hold Specimen x    Extra Serum Separator Tube (SST)   Result Value Ref Range    Hold Specimen x    Extra Green Top (Lithium Heparin) ON ICE   Result Value Ref Range    Hold Specimen x    Extra Blue Top Tube   Result Value Ref Range    Hold Specimen x    Extra Red Top Tube   Result Value Ref Range    Hold Specimen x    Extra Green Top (Lithium Heparin) Tube   Result Value Ref Range    Hold Specimen x    Extra Purple Top Tube   Result Value Ref Range    Hold Specimen x    Extra Heparinized Syringe   Result Value Ref Range    Hold Specimen x    Result Value Ref Range    Troponin T, High Sensitivity 101 (HH) <=22 ng/L     CT Chest Pulmonary Embolism w Contrast   Final Result   IMPRESSION:    Good quality CTA demonstrates no evidence of pulmonary embolus or    other acute vascular abnormality.      Mild emphysematous changes with subtle multifocal pneumonia, most   notably within the lingula.         This facility minimizes radiation dose by adjusting the mA and/or kV   according to each patient size.         This CT scan was performed using one or more the following dose   reduction techniques:      -Automated exposure control,   -Adjustment of the mA and/or kV according to patient's size, and/or,   -Use of iterative reconstruction technique.      MAYNOR HARRISON MD            SYSTEM ID:  [id number]      XR Chest 2 Views   Final Result   Impression:    Evaluation is somewhat limited secondary to kyphotic positioning the   patient.      Left upper lobe/lingular pneumonia with atelectasis.       Wall thickening involving some of the central bronchial structures.      MAYNOR HARRISON MD            SYSTEM ID:  [id number]            EK EKG available for comparison. EKG reviewed at 1715.  1) Rhythm: Sinus rhythm with premature PVCs  2) Rate: ventricular rate 88 bpm.  3) QRS Axis: No axis deviation  4) P waves/ OK interval: Sinus. Nml EDY. No atrial enlargement  5) QRS complex: Narrow. No BBB. No ventricular hypertrophy. No pathological Q waves.  6) ST Segment: No acute ST segment elevation or depression  7) T waves: No T wave inversions. No peaked or flattened T waves.     I have independently reviewed and interpreted today's EKG, pending cardiologist over read.       I, Roby Xavier PA-C, personally performed the services described in this documentation, and it is both accurate and complete.     Aubrey Xavier PA-C  23 [id number]

## 2023-06-20 NOTE — PROVIDER NOTIFICATION
06/19/23 2332   Valuables   Patient Belongings remains with patient   Patient Belongings Remaining with Patient shoes;clothing;glasses   Did you bring any home meds/supplements to the hospital?  No                    Admission:  I am responsible for any personal items that are not sent to the safe or pharmacy.  [facility] is not responsible for loss, theft or damage of any property in my possession.    Signature:  _________________________________ Date: _______  Time: _____                                              Staff Signature:  ____________________________ Date: ________  Time: _____      2nd Staff person, if patient is unable/unwilling to sign:    Signature: ________________________________ Date: ________  Time: _____     Discharge:  [facility] has returned all of my personal belongings:    Signature: _________________________________ Date: ________  Time: _____                                          Staff Signature:  ____________________________ Date: ________  Time: _____

## 2023-06-20 NOTE — PROGRESS NOTES
[facility]    Hospitalist Progress Note      Assessment & Plan   Juan Miguel Delaney is a 89 year old male who was admitted on 6/19/2023.     Clinically Significant Risk Factors Present on Admission               Principal Problem:    Community acquired pneumonia    Assessment: Mild leukocytosis, ongoing productive wet cough with scant wheezing, benefit from ongoing close monitoring given other comorbidities.    Plan:   -Cefepime 2 Ceftin/azithromycin for day 2 of antibiotics  -Obtain sputum culture if able  -Cultures not obtained and of low utility but will culture if spikes  -Scheduled nebs  -Robitussin as needed    Active Problems:    Stage 3b chronic kidney disease    Assessment: Chronic and stable baseline creatinine around 1.7    Plan:   -Monitor daily      Late onset Alzheimer's disease with behavioral disturbance (H)    Assessment: Chronic and stable behaviors with Risperdal    Plan:   -Continue home regiment  -Low threshold to start encephalopathy order set if behavior is escalating      Paroxysmal A-fib (H)    Assessment: Chronic, rate controlled and stable    Plan:   -New home DOAC      Elevated troponin    Assessment: Stable, no consistent cardiopulmonary symptoms and likely demand from acute infection and CKD.    Plan:   -Further monitoring      Acute on chronic diastolic congestive heart failure (H)    Assessment: Elevated BNP from baseline, bilateral lower extremity edema edema and will benefit from added diuresis    Plan:   -Increase torsemide from 10 to 20 mg p.o. twice daily  -Daily weights    # Drug Induced Coagulation Defect: home medication list includes an anticoagulant medication    # Hypertension: Noted on problem list   # Dementia: noted on problem list           Diet: Combination Diet Low Saturated Fat Na <2400mg Diet, No Caffeine Diet    DVT Prophylaxis: Pneumatic Compression Devices  Puri Catheter: Not present  Code Status: No CPR- Do NOT Intubate           Disposition  "Plan      Expected Discharge Date: 06/20/2023             Entered: Meek Hicks MD 06/20/2023, 9:07 AM       The patient's care was discussed with the Bedside Nurse and Patient.    Meek Hicks MD  Hospitalist Service  [facility]  Contact information available via [facility] Paging/Directory    ______________________________________________________________________    Interval History   CC: Cough    Overnight no acute events and afebrile, patient is limited historian given his dementia but states he feels \"crummy\".  No shortness of breath, coughing frequently, no chest pain he is not sure if his swelling is worse than usual, does not want to eat breakfast, no other new complaints.    -Data reviewed today: I reviewed all new labs and imaging results over the last 24 hours.     Physical Exam   Temp: 98.5  F (36.9  C) Temp src: Tympanic BP: 131/70 Pulse: 87   Resp: 18 SpO2: 95 % O2 Device: None (Room air)    Vitals:    06/19/23 1442 06/20/23 0515   Weight: 86.2 kg (190 lb) 84.8 kg (187 lb)     Vital Signs with Ranges  Temp:  [97.3  F (36.3  C)-99.2  F (37.3  C)] 98.5  F (36.9  C)  Pulse:  [] 87  Resp:  [16-18] 18  BP: (119-158)/(63-99) 131/70  SpO2:  [91 %-97 %] 95 %  I/O last 3 completed shifts:  In: 3 [I.V.:3]  Out: -     Exam:   GENERAL: Talkative, laying in bed, pleasant and appropriate, in no apparent distress.  CARDIOVASCULAR: regular rate and rhythm, no murmurs, rubs, or gallops. Normal S1/S2. No lower extremity edema.   RESPIRATORY: Coarse rhonchi in the right middle lobe with wheezing in the right upper lobe, no other crackles or other localized rhonchi, diffuse air movement in all fields, no accessory muscle use or evidence respiratory distress.    GI: soft, non-tender, non-distended, normoactive bowel sounds.  MUSCULOSKELETAL: warm and well perfused, 2+ dorsalis pedis pulses bilaterally.    SKIN: no pallor, jaundice, or rashes  Neuro: Oriented only to person, not location or year.       "   Medications       amLODIPine  10 mg Oral Daily     azithromycin  500 mg Intravenous Once     cefuroxime  500 mg Oral Q12H STEFANI (08/20)     gabapentin  300 mg Oral At Bedtime     guaiFENesin-dextromethorphan  10 mL Oral BID     ipratropium - albuterol 0.5 mg/2.5 mg/3 mL  3 mL Nebulization 4x daily     risperiDONE  0.5 mg Oral BID     rivaroxaban ANTICOAGULANT  15 mg Oral Daily with supper     sodium chloride (PF)  3 mL Intracatheter Q8H     torsemide  20 mg Oral BID       Data   Recent Labs   Lab 06/19/23  1738   WBC 11.2*   HGB 13.6   MCV 88         POTASSIUM 4.1   CHLORIDE 107   CO2 22   BUN 40.2*   CR 1.78*   ANIONGAP 16*   LORENZO 9.2   *   ALBUMIN 3.9   PROTTOTAL 7.3   BILITOTAL 0.8   ALKPHOS 106   ALT 31   AST 31       Recent Results (from the past 24 hour(s))   XR Chest 2 Views    Narrative    Procedure:XR CHEST 2 VIEWS    Clinical history:Male, 89 years, Cough.  Patient with history of  congestive heart failure.  Rule out consolidation or effusion    Technique: Two views are submitted.    Comparison: 4/29/2022    Findings: The cardiac silhouette is not well assessed secondary to  kyphotic positioning the patient. The pulmonary vasculature is not  well seen.    The lungs demonstrate an area of atelectasis and consolidation at the  left lung base. Some of the central bronchi mild wall thickening. Bony  structures are unremarkable.      Impression    Impression:   Evaluation is somewhat limited secondary to kyphotic positioning the  patient.    Left upper lobe/lingular pneumonia with atelectasis.     Wall thickening involving some of the central bronchial structures.    MAYNOR HARRISON MD         SYSTEM ID:  [id number]   CT Chest Pulmonary Embolism w Contrast    Narrative    CT CHEST PULMONARY EMBOLISM W CONTRAST  6/19/2023 7:01 PM    CLINICAL HISTORY: Male, age 89 years,  cough, hx CHF.  Elevated trop.   R/o PE ,effusion, consolidation;    Comparison:  Chest x-ray 6/19/2023    TECHNIQUE:  CT  Angiogramwas performed of the chest with IV contrast.  Axial; sagittal and coronal MIP images were reviewed. Axial; sagittal  and coronal MIP images were reviewed.    FINDINGS:  Chest CT:    There is excellent opacification of the arterial structures. There is  no evidence of well-defined pulmonary embolus. There is no evidence of  aortic dissection. Heart and great vessels demonstrate no acute  abnormality. Calcifications are seen within the coronary arteries as  well as throughout the arterial structures of the chest.    The lungs demonstrate areas of atelectasis, most notably in the lower  lung zones. Subtle areas of ground glass opacification also seen,  nodular in appearance in both lungs.    There are a few normal-sized lymph nodes within the mediastinum and  hilar regions.    Thyroid gland and the esophagus are unremarkable.      Visualized portions of the upper abdomen demonstrate no acute  abnormality.    Bony structures also demonstrate no acute abnormality.      Impression    IMPRESSION:   Good quality CTA demonstrates no evidence of pulmonary embolus or  other acute vascular abnormality.    Mild emphysematous changes with subtle multifocal pneumonia, most  notably within the lingula.      This facility minimizes radiation dose by adjusting the mA and/or kV  according to each patient size.      This CT scan was performed using one or more the following dose  reduction techniques:    -Automated exposure control,  -Adjustment of the mA and/or kV according to patient's size, and/or,  -Use of iterative reconstruction technique.    MAYNOR HARRISON MD         SYSTEM ID:  [id number]

## 2023-06-20 NOTE — PROGRESS NOTES
[facility] ADMISSION NOTE    Patient admitted to room 302 at approximately 2320 via wheel chair from emergency room. Patient was accompanied by daughter.     Verbal SBAR report received from Amira prior to patient arrival.     Patient ambulated to bed with one assist. Patient alert and oriented X 2. The patient is not having any pain.  . Admission vital signs: Blood pressure 134/64, pulse 90, temperature 97.3  F (36.3  C), temperature source Tympanic, resp. rate 18, weight 86.2 kg (190 lb), SpO2 97 %. Patient and daughter were oriented to plan of care, call light, bed controls, tv, telephone, bathroom and visiting hours.     Risk Assessment    The following safety risks were identified during admission: fall. Yellow risk band applied: YES.     Skin Initial Assessment    This writer admitted this patient and completed a full skin assessment and Huan score in the Adult PCS flowsheet. Appropriate interventions initiated as needed.          Education    Patient has a Crum to Observation order: No  Observation education completed and documented: N/A      Cindy Cast RN

## 2023-06-20 NOTE — PROGRESS NOTES
:     Called and updated Elle at [facility] that patient is anticipated to discharge in 1-2 days.      will continue to follow for discharge planning needs.     Per Elle family usually transports patient.     CASSANDRA Lopez on 6/20/2023 at 9:58 AM

## 2023-06-20 NOTE — PROGRESS NOTES
Pt received all neb tx's as scheduled, remained on RA t/o shift, cooperative but seemed confused at times  No other interventions

## 2023-06-20 NOTE — PLAN OF CARE
Problem: Plan of Care - These are the overarching goals to be used throughout the patient stay.    Goal: Plan of Care Review  Description: The Plan of Care Review/Shift note should be completed every shift.  The Outcome Evaluation is a brief statement about your assessment that the patient is improving, declining, or no change.  This information will be displayed automatically on your shift note.  Outcome: Progressing  Flowsheets (Taken 6/20/2023 0606)  Outcome Evaluation: VSS. Pt A/O to self and knows he is in the hospital. Refused SDCs, gaitbelt and assistance from writer with any bathroom assistance.  Did allow tele to be put on during night. Appears to do better with male staff. LS course with exp wheezes. BLE edema R>L. Has a frequent loose cough. Up with assist of one with walker.  Plan of Care Reviewed With: patient  Overall Patient Progress: no change  Goal: Absence of Hospital-Acquired Illness or Injury  Intervention: Identify and Manage Fall Risk  Recent Flowsheet Documentation  Taken 6/20/2023 0244 by Cindy Cast RN  Safety Promotion/Fall Prevention:   activity supervised   assistive device/personal items within reach   increase visualization of patient   clutter free environment maintained   patient and family education   room near nurse's station   safety round/check completed  Taken 6/19/2023 2326 by Cindy Cast RN  Safety Promotion/Fall Prevention:   activity supervised   assistive device/personal items within reach   increase visualization of patient   clutter free environment maintained   nonskid shoes/slippers when out of bed   patient and family education   room near nurse's station   safety round/check completed  Intervention: Prevent Skin Injury  Recent Flowsheet Documentation  Taken 6/20/2023 0244 by Cindy Cast RN  Body Position: position changed independently  Goal: Readiness for Transition of Care  Intervention: Mutually Develop Transition Plan  Recent Flowsheet  Documentation  Taken 6/19/2023 2334 by Cindy Cast, RN  Equipment Currently Used at Home:   walker, genesis   wheelchair, manual     Problem: Heart Failure Comorbidity  Goal: Maintenance of Heart Failure Symptom Control  Intervention: Maintain Heart Failure Management  Recent Flowsheet Documentation  Taken 6/20/2023 0244 by Cindy Cast RN  Medication Review/Management: medications reviewed  Taken 6/19/2023 2326 by Cindy aCst RN  Medication Review/Management: medications reviewed     Problem: Hypertension Comorbidity  Goal: Blood Pressure in Desired Range  Intervention: Maintain Blood Pressure Management  Recent Flowsheet Documentation  Taken 6/20/2023 0244 by Cindy Cast RN  Medication Review/Management: medications reviewed  Taken 6/19/2023 2326 by Cindy Cast RN  Medication Review/Management: medications reviewed     Problem: Pneumonia  Goal: Effective Oxygenation and Ventilation  Intervention: Promote Airway Secretion Clearance  Recent Flowsheet Documentation  Taken 6/20/2023 0244 by Cindy Cast RN  Cough And Deep Breathing: done independently per patient  Taken 6/19/2023 2326 by Cindy Cast RN  Cough And Deep Breathing: done independently per patient  Intervention: Optimize Oxygenation and Ventilation  Recent Flowsheet Documentation  Taken 6/20/2023 0244 by Cindy Cast RN  Head of Bed (HOB) Positioning: HOB at 20-30 degrees   Goal Outcome Evaluation:      Plan of Care Reviewed With: patient    Overall Patient Progress: no changeOverall Patient Progress: no change    Outcome Evaluation: VSS. Pt A/O to self and knows he is in the hospital. Refused SDCs, gaitbelt and assistance from writer with any bathroom assistance.  Did allow tele to be put on during night. Appears to do better with male staff. LS course with exp wheezes. BLE edema R>L. Has a frequent loose cough. Up with assist of one with walker.

## 2023-06-20 NOTE — H&P
[facility]    History and Physical - Hospitalist Service       Date of Admission:  6/19/2023    Assessment & Plan      Juan Miguel Delaney is a 89 year old male admitted on 6/19/2023 for community acquired pneumonia, acute on chronic CHF, elevated troponin.    Community acquired pneumonia. Admit to medical telemetry. CXR showed left upper lobe pneumonia. The patient has mild leukocytosis (WBC 11.6) and mild tachycardia with HR in the 100s. Will continue IV cefepime and monitor for sepsis.    Possible mild acute on chronic HFpEF. No sign of volume overload per CXR. BNP elevated at 4,562. S/P IV lasix 20mg given in the ER. As patient is not hypoxic at the moment, will not continue IV lasix. Strict I/O, daily weight and resume home diuretic medication.    Elevated troponin. Troponin 102, with recheck came back at 101. Likely demand ischemia as no chest pain reported and EKG negative. Monitor for now.    HTN. BP in the 150s. Monitor and resume home medication.    DVT PPx. Resume home Xarelto.    Code status. DNR/DNI.    Disposition. Home in 2-3 days.    The patient's care was discussed with the bedside nursing staff        Patel Pierre MD  [facility]  Securely message with the Vocera Web Console (learn more here)  Text page via [facility] Paging/Directory      Visit/Communication Style   Virtual (Video) communication was used to evaluate Dion Bullard consented to the use of video communication: yes  Video START time: 2200, 6/19/2023  Video STOP time: 2230, 6/19/2023   Patient's location: [facility]   Provider's location during the visit: [facility] Tele-medicine site        ______________________________________________________________________    Chief Complaint   Coughing.    History is obtained from the patient    History of Present Illness   Juan Miguel Delaney is a 89 year old male with past medical history of dementia, HTN, HFpEF (on home torsemie), afib on  xarelto, presenting to the ER for coughing. Over the past few days, the patient was noticed to have increasing coughing and overall weakness.    In the ER, the patient was having mild tachycardia with HR in the 100s. Labs came back with Cr 1.78, BNP 4,562, troponin 102, recheck at 101. WBC 11.2. CXR showed left upper lobe pneumonia. CT chest was ordered to rule out PE and came back negative. IV lasix, cefepime and decadron were given.     Review of Systems      General: negative for fever, chills, sweats, weakness  Eyes: negative for blurred vision, loss of vision  Ear Nose and Throat: negative for pharyngitis, speech or swallowing difficulties  Respiratory:  negative for sputum production, wheezing, JARRETT, pleuritic pain, sob or cough  Cardiology:  negative for chest pain, palpitations, orthopnea, PND, edema, syncope   Gastrointestinal: negative for abdominal pain, nausea, vomiting, diarrhea, constipation, hematemesis, melena or hematochezia  Genitourinary: negative for frequency, urgency, dysuria, hematuria   Neurological: negative for focal weakness, paresthesia    Past Medical History    I have reviewed this patient's medical history and updated it with pertinent information if needed.   Past Medical History:   Diagnosis Date     Actinic keratosis     2004,5-FU treatment on forehead     Chronic kidney disease, stage III (moderate) (H)     No Comments Provided     Elevated erythrocyte sedimentation rate     2005,weight loss, night sweats due to episode of acute inflammatory illness, etiology unclear     Essential (primary) hypertension     No Comments Provided     Family history of malignant neoplasm of prostate     No Comments Provided     Hyperlipidemia     No Comments Provided     Osteoarthritis     No Comments Provided     Pneumonia     2002,and severe NSAID allergic reaction, hospitalized     Sciatica     No Comments Provided       Past Surgical History   I have reviewed this patient's surgical history and  updated it with pertinent information if needed.  Past Surgical History:   Procedure Laterality Date     ARTHROPLASTY KNEE      2006     ARTHROPLASTY KNEE      2008     ARTHROSCOPY SHOULDER      1996,left     ARTHROSCOPY SHOULDER      2001,AC arthrosis & distal clavicle resection     ARTHROSCOPY SHOULDER      2011     COLONOSCOPY      2008,normal     CYSTOSCOPY, TRANSURETHRAL RESECTION (TUR) PROSTATE, COMBINED      No Comments Provided     LAMINECTOMY LUMBAR ONE LEVEL      2015,[place], spinal stenosis     OTHER SURGICAL HISTORY      2004,[id number],SCAN-STRESS TEST,negative to heart rate 138     OTHER SURGICAL HISTORY      2013,[id number],OTHER,Right 5th digit     RELEASE CARPAL TUNNEL      bilateral       Social History   I have reviewed this patient's social history and updated it with pertinent information if needed.  Social History     Tobacco Use     Smoking status: Never     Smokeless tobacco: Never   Vaping Use     Vaping status: Never Used   Substance Use Topics     Alcohol use: No     Drug use: No       Family History   I have reviewed this patient's family history and updated it with pertinent information if needed.  Family History   Problem Relation Age of Onset     Prostate Cancer Father         Cancer-prostate     Hypertension Mother         Hypertension     Other - See Comments Mother         Old age, 99     Prostate Cancer Brother         Cancer-prostate     Unknown/Adopted Brother         Unknown       Prior to Admission Medications   Prior to Admission Medications   Prescriptions Last Dose Informant Patient Reported? Taking?   LORazepam (ATIVAN) 0.5 MG tablet   No No   Sig: Take 1 tablet (0.5 mg) by mouth 2 times daily as needed for agitation or anxiety   QUEtiapine (SEROQUEL) 25 MG tablet   No No   Sig: Take 1 tablet (25 mg) by mouth 2 times daily as needed (agitation)   QUEtiapine (SEROQUEL) 25 MG tablet   No No   Sig: Take 2 tablets (50 mg) by mouth 3 times daily for 7 days, THEN 1 tablet (25 mg) 3 times  daily for 7 days. Then STOP   acetaminophen (ACETAMINOPHEN EXTRA STRENGTH) 500 MG tablet   No No   Sig: TAKE 2 TABLETS (1,000 MG) BY MOUTH 3 TIMES DAILY   amLODIPine (NORVASC) 5 MG tablet   No No   Sig: TAKE 2 TABLETS (10 MG) BY MOUTH DAILY   cyanocobalamin (VITAMIN B-12) 500 MCG tablet   No No   Sig: TAKE 1 TABLET (500 MCG) BY MOUTH DAILY   donepezil (ARICEPT) 10 MG ODT   No No   Sig: DISSOLVE 1 TAB BY MOUTH ONCE DAILY   gabapentin (NEURONTIN) 300 MG capsule   No No   Sig: Take 1 capsule (300 mg) by mouth At Bedtime   loperamide (IMODIUM A-D) 2 MG tablet   No No   Sig: Take 1 tablet (2 mg) by mouth 2 times daily as needed for diarrhea   magnesium hydroxide (MILK OF MAGNESIA) 400 MG/5ML suspension   No No   Sig: Take 30 mLs by mouth daily as needed for constipation   mupirocin (BACTROBAN) 2 % external ointment   Yes No   Sig: BID to affected skin on chest until resolved then BID PRN for flares   mupirocin (BACTROBAN) 2 % external ointment   No No   Sig: Apply topically 2 times daily To groin folds.   nitroGLYcerin (NITROSTAT) 0.4 MG sublingual tablet   No No   Sig: Place 1 tablet (0.4 mg) under the tongue every 5 minutes as needed for chest pain (CALL 911 IF NOT IMPROVED AFTER THREE CONSECUTIVE DOSES)   pantoprazole (PROTONIX) 40 MG EC tablet   No No   Sig: Take 1 tablet (40 mg) by mouth daily   risperiDONE (RISPERDAL) 0.5 MG tablet   No No   Sig: Take 1 tablet (0.5 mg) by mouth 2 times daily   rivaroxaban ANTICOAGULANT (XARELTO) 15 MG TABS tablet   No No   Sig: Take 1 tablet (15 mg) by mouth daily (with lunch)   sennosides (SENOKOT) 8.6 MG tablet   No No   Si tab daily PRN daily constipation   torsemide (DEMADEX) 10 MG tablet   No No   Sig: X 2 days PRN for weight gain 3 pounds in 2 days or 5 pounds in week   triamcinolone (KENALOG) 0.1 % external cream   No No   Sig: Apply topically 2 times daily as needed (itchy skin)      Facility-Administered Medications: None     Allergies   Allergies   Allergen Reactions      Metoprolol Other (See Comments)     Chronic Bradycardia - got very low pulse with even low dose of Metoprolol     Asa [Aspirin] GI Disturbance     Penicillins Hives     Celecoxib Rash     Ibuprofen Rash     All NSAIDS cause rash     Lisinopril Other (See Comments)     Dry throat     Naprosyn [Naproxen] Other (See Comments)     Epistaxis--resolved when DC'd     Rofecoxib Hives     Vioxx       Physical Exam   Vital Signs: Temp: 99.2  F (37.3  C) Temp src: Temporal BP: (!) 155/87 Pulse: 101   Resp: 16 SpO2: 91 % O2 Device: None (Room air)    Weight: 190 lbs 0 oz      GENERAL: The patient is not in any acute distressed. Awake and alert.  HEENT: Nonicteric sclerae, PERRLA, EOMI. Oropharynx clear. Moist mucous membranes. Conjunctivae appear well perfused.  HEART: Tachycardia and regular rhythm without murmurs. No lower extremities edema.  LUNGS: Clear to auscultation bilaterally. No wheezing, crackles or rhonchi  ABDOMEN: Soft, positive bowel sounds, nontender.  SKIN: No rash, no excessive bruising, petechiae, or purpura.  NEUROLOGIC: AxO x 3.  Cranial nerves II-XII intact without motor/sensory deficit.      Data     Recent Labs   Lab 06/19/23  1738 06/13/23  0804   WBC 11.2*  --    HGB 13.6  --    MCV 88  --      --     142   POTASSIUM 4.1 4.9   CHLORIDE 107 106   CO2 22 25   BUN 40.2* 21.6   CR 1.78* 1.43*   ANIONGAP 16* 11   LORENZO 9.2 9.0   * 81   ALBUMIN 3.9  --    PROTTOTAL 7.3  --    BILITOTAL 0.8  --    ALKPHOS 106  --    ALT 31  --    AST 31  --          Recent Results (from the past 24 hour(s))   XR Chest 2 Views    Narrative    Procedure:XR CHEST 2 VIEWS    Clinical history:Male, 89 years, Cough.  Patient with history of  congestive heart failure.  Rule out consolidation or effusion    Technique: Two views are submitted.    Comparison: 4/29/2022    Findings: The cardiac silhouette is not well assessed secondary to  kyphotic positioning the patient. The pulmonary vasculature is not  well  seen.    The lungs demonstrate an area of atelectasis and consolidation at the  left lung base. Some of the central bronchi mild wall thickening. Bony  structures are unremarkable.      Impression    Impression:   Evaluation is somewhat limited secondary to kyphotic positioning the  patient.    Left upper lobe/lingular pneumonia with atelectasis.     Wall thickening involving some of the central bronchial structures.    MAYNOR HARRISON MD         SYSTEM ID:  [id number]   CT Chest Pulmonary Embolism w Contrast    Narrative    CT CHEST PULMONARY EMBOLISM W CONTRAST  6/19/2023 7:01 PM    CLINICAL HISTORY: Male, age 89 years,  cough, hx CHF.  Elevated trop.   R/o PE ,effusion, consolidation;    Comparison:  Chest x-ray 6/19/2023    TECHNIQUE:  CT Angiogramwas performed of the chest with IV contrast.  Axial; sagittal and coronal MIP images were reviewed. Axial; sagittal  and coronal MIP images were reviewed.    FINDINGS:  Chest CT:    There is excellent opacification of the arterial structures. There is  no evidence of well-defined pulmonary embolus. There is no evidence of  aortic dissection. Heart and great vessels demonstrate no acute  abnormality. Calcifications are seen within the coronary arteries as  well as throughout the arterial structures of the chest.    The lungs demonstrate areas of atelectasis, most notably in the lower  lung zones. Subtle areas of ground glass opacification also seen,  nodular in appearance in both lungs.    There are a few normal-sized lymph nodes within the mediastinum and  hilar regions.    Thyroid gland and the esophagus are unremarkable.      Visualized portions of the upper abdomen demonstrate no acute  abnormality.    Bony structures also demonstrate no acute abnormality.      Impression    IMPRESSION:   Good quality CTA demonstrates no evidence of pulmonary embolus or  other acute vascular abnormality.    Mild emphysematous changes with subtle multifocal pneumonia, most  notably  within the lingula.      This facility minimizes radiation dose by adjusting the mA and/or kV  according to each patient size.      This CT scan was performed using one or more the following dose  reduction techniques:    -Automated exposure control,  -Adjustment of the mA and/or kV according to patient's size, and/or,  -Use of iterative reconstruction technique.    MAYNOR HARRISON MD         SYSTEM ID:  [id number]

## 2023-06-21 NOTE — PROGRESS NOTES
SAFETY CHECKLIST  ID Bands and Risk clasps correct and in place (DNR, Fall risk, Allergy, Latex, Limb):  Yes  All Lines Reconciled and labeled correctly: Yes  Whiteboard updated:Yes  Environmental interventions: Yes  Verify Tele #: [id number]

## 2023-06-21 NOTE — PLAN OF CARE
"Goal Outcome Evaluation:  Pt admitted 6/19 for community acquired pneumonia. Pt is alert. Orientated to self. VSS. C/O pain to left leg. Declines intervention. O2 on room air. LS dim upper lobes, expiratory wheezes in bases. Frequent productive, congested cough. Pt is impulsive. Does not use call light, instead sets off bed alarm. SBA. Pt does better with male caregivers. Frequently refuses cares/hygiene assistance from females. Incontinent of bladder.       Plan of Care Reviewed With: patient    Overall Patient Progress: no change       Problem: Plan of Care - These are the overarching goals to be used throughout the patient stay.    Goal: Plan of Care Review  Description: The Plan of Care Review/Shift note should be completed every shift.  The Outcome Evaluation is a brief statement about your assessment that the patient is improving, declining, or no change.  This information will be displayed automatically on your shift note.  Outcome: Progressing  Flowsheets (Taken 6/21/2023 0321)  Plan of Care Reviewed With: patient  Overall Patient Progress: no change  Goal: Patient-Specific Goal (Individualized)  Description: You can add care plan individualizations to a care plan. Examples of Individualization might be:  \"Parent requests to be called daily at 9am for status\", \"I have a hard time hearing out of my right ear\", or \"Do not touch me to wake me up as it startles me\".  Outcome: Progressing  Goal: Absence of Hospital-Acquired Illness or Injury  Outcome: Progressing  Intervention: Identify and Manage Fall Risk  Recent Flowsheet Documentation  Taken 6/20/2023 2106 by Yumiko Ramirez, RN  Safety Promotion/Fall Prevention:    activity supervised    safety round/check completed    supervised activity  Intervention: Prevent and Manage VTE (Venous Thromboembolism) Risk  Recent Flowsheet Documentation  Taken 6/20/2023 2106 by Yumiko Ramirez, RN  VTE Prevention/Management:    SCDs (sequential compression devices) off    " patient refused intervention  Goal: Optimal Comfort and Wellbeing  Outcome: Progressing  Goal: Readiness for Transition of Care  Outcome: Progressing

## 2023-06-21 NOTE — PHARMACY - DISCHARGE MEDICATION RECONCILIATION
[facility]  Part of [address] [address]    June 21, 2023    Dear Pharmacist,    Your customer, Juan Miguel Delaney, born on 6/21/1932, was recently discharged from [facility].  We have updated his medication list and want to alert you to the following:       Review of your medicines        START taking        Dose / Directions   azithromycin 250 MG tablet  Commonly known as: ZITHROMAX  Indication: Community Acquired Pneumonia      Dose: 250 mg  Take 1 tablet (250 mg) by mouth daily for 3 days  Quantity: 3 tablet  Refills: 0     cefuroxime 500 MG tablet  Commonly known as: CEFTIN  Indication: Community Acquired Pneumonia      Dose: 500 mg  Take 1 tablet (500 mg) by mouth every 12 hours for 5 days  Quantity: 10 tablet  Refills: 0            CONTINUE these medicines which have NOT CHANGED        Dose / Directions   acetaminophen 500 MG tablet  Commonly known as: Acetaminophen Extra Strength  Used for: Arthritis pain, Primary osteoarthritis of left hip      TAKE 2 TABLETS (1,000 MG) BY MOUTH 3 TIMES DAILY  Quantity: 180 tablet  Refills: 11     amLODIPine 5 MG tablet  Commonly known as: NORVASC  Used for: Benign essential hypertension      Dose: 10 mg  TAKE 2 TABLETS (10 MG) BY MOUTH DAILY  Quantity: 180 tablet  Refills: 3     cyanocobalamin 500 MCG tablet  Commonly known as: VITAMIN B-12  Used for: Vitamin B12 deficiency (non anemic)      Dose: 500 mcg  TAKE 1 TABLET (500 MCG) BY MOUTH DAILY  Quantity: 90 tablet  Refills: 4     donepezil 10 MG ODT  Commonly known as: ARICEPT  Used for: Late onset Alzheimer's disease with behavioral disturbance (H)      DISSOLVE 1 TAB BY MOUTH ONCE DAILY  Quantity: 90 tablet  Refills: 3     gabapentin 300 MG capsule  Commonly known as: NEURONTIN  Used for: Late onset Alzheimer's disease with behavioral disturbance (H), ELIZABETH (generalized anxiety disorder), Hip pain, left, Agitation due to dementia (H)      Dose:  300 mg  Take 1 capsule (300 mg) by mouth At Bedtime  Quantity: 120 capsule  Refills: 3     loperamide 2 MG tablet  Commonly known as: IMODIUM A-D  Used for: Loose stools      Dose: 2 mg  Take 1 tablet (2 mg) by mouth 2 times daily as needed for diarrhea  Quantity: 20 tablet  Refills: 1     LORazepam 0.5 MG tablet  Commonly known as: ATIVAN  Used for: Encounter for medication review, Agitation due to dementia (H), Late onset Alzheimer's disease with behavioral disturbance (H)      Dose: 0.5 mg  Take 1 tablet (0.5 mg) by mouth 2 times daily as needed for agitation or anxiety  Quantity: 60 tablet  Refills: 1     magnesium hydroxide 400 MG/5ML suspension  Commonly known as: MILK OF MAGNESIA      Dose: 30 mL  Take 30 mLs by mouth daily as needed for constipation  Refills: 0     mupirocin 2 % external ointment  Commonly known as: BACTROBAN  Used for: Rash and nonspecific skin eruption, Skin picking habit      Apply topically 2 times daily To groin folds.  Quantity: 30 g  Refills: 1     nitroGLYcerin 0.4 MG sublingual tablet  Commonly known as: NITROSTAT      Dose: 0.4 mg  Place 0.4 mg under the tongue every 5 minutes as needed for chest pain For chest pain place 1 tablet under the tongue every 5 minutes for 3 doses. If symptoms persist 5 minutes after 1st dose call 911.  Refills: 0     pantoprazole 40 MG EC tablet  Commonly known as: PROTONIX  Used for: Rectal bleeding, Medication side effects, Paroxysmal A-fib (H)      Dose: 40 mg  Take 1 tablet (40 mg) by mouth daily  Quantity: 90 tablet  Refills: 3     * QUEtiapine 25 MG tablet  Commonly known as: SEROquel  Used for: Late onset Alzheimer's disease with behavioral disturbance (H), Encounter for medication review, Agitation due to dementia (H)      Dose: 25 mg  Take 1 tablet (25 mg) by mouth 2 times daily as needed (agitation)  Quantity: 100 tablet  Refills: 1     * QUEtiapine 25 MG tablet  Commonly known as: SEROquel  Used for: Late onset Alzheimer's disease with  behavioral disturbance (H), Encounter for medication review, Agitation due to dementia (H)      Start taking on: June 7, 2023  Take 2 tablets (50 mg) by mouth 3 times daily for 7 days, THEN 1 tablet (25 mg) 3 times daily for 7 days. Then STOP  Quantity: 63 tablet  Refills: 0     risperiDONE 0.5 MG tablet  Commonly known as: risperDAL  Used for: Late onset Alzheimer's disease with behavioral disturbance (H)      Dose: 0.5 mg  Take 1 tablet (0.5 mg) by mouth 2 times daily  Quantity: 60 tablet  Refills: 0     rivaroxaban ANTICOAGULANT 15 MG Tabs tablet  Commonly known as: XARELTO  Used for: Late onset Alzheimer's disease with behavioral disturbance (H), Paroxysmal A-fib (H), Coronary artery disease involving native coronary artery of native heart without angina pectoris      Dose: 15 mg  Take 1 tablet (15 mg) by mouth daily (with lunch)  Quantity: 90 tablet  Refills: 3     sennosides 8.6 MG tablet  Commonly known as: SENOKOT  Used for: External hemorrhoids      1 tab daily PRN daily constipation  Quantity: 100 tablet  Refills: 3     torsemide 10 MG tablet  Commonly known as: DEMADEX  Used for: Chronic diastolic heart failure (H), Paroxysmal A-fib (H), Benign essential hypertension      X 2 days PRN for weight gain 3 pounds in 2 days or 5 pounds in week  Quantity: 90 tablet  Refills: 1     triamcinolone 0.1 % external cream  Commonly known as: KENALOG  Used for: Rash and nonspecific skin eruption      Apply topically 2 times daily as needed (itchy skin)  Quantity: 30 g  Refills: 1           * This list has 2 medication(s) that are the same as other medications prescribed for you. Read the directions carefully, and ask your doctor or other care provider to review them with you.                   Where to get your medicines        These medications were sent to [id number] - [place] - [address]  [address], [facility] [address]      Phone: [phone] azithromycin 250  MG tablet  cefuroxime 500 MG tablet         We also reviewed Juan Miguel Delaney's allergy list and updated it as needed:  Allergies: Metoprolol, Asa [aspirin], Penicillins, Celecoxib, Ibuprofen, Lisinopril, Naprosyn [naproxen], and Rofecoxib    Thank you for continuing to care for Juan Miguel Delaney.  We look forward to working together with you in the future.    Sincerely,  Marisela Montero [facility]

## 2023-06-21 NOTE — DISCHARGE SUMMARY
"[facility]    Discharge Summary  Hospitalist    Date of Admission:  6/19/2023  Date of Discharge:  6/21/2023  Discharging Provider: Meek Hicks MD  Date of Service (when I saw the patient): 06/21/23    Discharge Diagnoses   Principal Problem:    Community acquired pneumonia (6/19/2023)  Active Problems:    Stage 3b chronic kidney disease (5/25/2015)    Late onset Alzheimer's disease with behavioral disturbance (H) (4/19/2018)    Paroxysmal A-fib (H) (5/6/2022)    Elevated troponin (6/19/2023)    chronic diastolic congestive heart failure (H) (6/19/2023)      History of Present Illness   Juan Miguel Delaney is an 89 year old male who presented with the above.  Patient was admitted by Dr. Pierre and per H&P, \"89 year old male with past medical history of dementia, HTN, HFpEF (on home torsemie), afib on xarelto, presenting to the ER for coughing. Over the past few days, the patient was noticed to have increasing coughing and overall weakness.     In the ER, the patient was having mild tachycardia with HR in the 100s. Labs came back with Cr 1.78, BNP 4,562, troponin 102, recheck at 101. WBC 11.2. CXR showed left upper lobe pneumonia. CT chest was ordered to rule out PE and came back negative. IV lasix, cefepime and decadron were given.\"     Hospital Course   Juan Miguel Delaney was admitted on 6/19/2023.  The following problems were addressed during his hospitalization:    Community acquired pneumonia: Improved.  Initial mild leukocytosis, productive cough with scant wheezing, he was started on ceftriaxone/azithromycin and scheduled nebulizers and rapidly improved.  He had no supplemental oxygen requirement during his stay, remained afebrile and will complete a 7-day course of Ceftin and 5-day course of azithromycin.  Sputum culture pending and will need to be follow-up as an outpatient.  We will otherwise have routine post hospital follow-up as scheduled.       Stage 3b chronic kidney disease: Chronic and " stable baseline creatinine around 1.7, he did receive extra torsemide 20 mg p.o. twice daily on 6/20given slight worsening lower extremity edema.  With this additional torsemide he had slight worsening of his kidney function overnight.  He he will not be able to tolerate any additional diuresis and will continue with as needed torsemide going forward.       Late onset Alzheimer's disease with behavioral disturbance (H): Chronic and stable behaviors with Risperdal and no changes to his outpatient regimen.  [facility] at discharge.       Paroxysmal A-fib (H): Chronic stable and rate controlled, he will continue his home DOAC.       Elevated troponin: Stable, no consistent cardiopulmonary symptoms and likely demand from acute infection and CKD.     chronic diastolic congestive heart failure (H): No evidence of exacerbation during his stay, initially elevated BNP from baseline, bilateral lower extremity edema edema and attempted slight increase in added diuresis with subsequent JR.  No changes in diuretic regimen going forward.    Meek Hicks MD    Significant Results and Procedures   none    Pending Results   These results will be followed up by pcp  Unresulted Labs Ordered in the Past 30 Days of this Admission     Date and Time Order Name Status Description    6/20/2023  9:08 AM Respiratory Aerobic Bacterial Culture with Gram Stain Preliminary           Code Status   DNR / DNI       Primary Care Physician   Jason Fernando    Physical Exam   Temp: 97.1  F (36.2  C) Temp src: Tympanic BP: 127/58 Pulse: 71   Resp: 18 SpO2: 93 % O2 Device: None (Room air)    Vitals:    06/19/23 1442 06/20/23 0515 06/21/23 0406   Weight: 86.2 kg (190 lb) 84.8 kg (187 lb) 84.1 kg (185 lb 4.8 oz)     Vital Signs with Ranges  Temp:  [97.1  F (36.2  C)-98.5  F (36.9  C)] 97.1  F (36.2  C)  Pulse:  [] 71  Resp:  [16-18] 18  BP: (110-135)/(58-71) 127/58  SpO2:  [93 %-96 %] 93 %  I/O last 3  completed shifts:  In: 480 [P.O.:480]  Out: -     Exam on day of discharge:   GENERAL: Talkative, sitting up in the recliner, pleasant and appropriate, in no apparent distress.  CARDIOVASCULAR: regular rate and rhythm, no murmurs, rubs, or gallops. Normal S1/S2. No lower extremity edema.   RESPIRATORY: Now clear to auscultation bilaterally, no wheezes, no crackles and no further localized rhonchi to the right middle lobe.    GI: soft, non-tender, non-distended, normoactive bowel sounds.  MUSCULOSKELETAL: warm and well perfused, 2+ dorsalis pedis pulses bilaterally.    SKIN: no pallor, jaundice, or rashes    Discharge Disposition   Discharged to assisted living  Condition at discharge: Stable    Consultations This Hospital Stay   SOCIAL WORK IP CONSULT    Time Spent on this Encounter   I, Meek Hicks MD, personally saw the patient today and spent greater than 30 minutes discharging this patient.    Discharge Orders      Reason for your hospital stay    Bacterial pneumonia     Follow-up and recommended labs and tests     Alicia Huff on 6/27 @ 0920 for routine post hospital follow-up     Activity    Your activity upon discharge: activity as tolerated     When to contact your care team    Call your primary doctor if you have any of the following: temperature greater than 101,  increased shortness of breath, increased drainage, increased swelling, or increased pain.     Discharge Instructions    - Take the antibiotics ceftriaxone and azithromycin until they are gone to clear up your pneumonia  - There are no other changes to your medications at this time     Monitor and record    weight every day and call weight to primary care provider if you gain more than 3 pounds in 1 day and use your water pill torsemide as previously prescribed for additional weight gain due to fluid retention.     Diet    Follow this diet upon discharge: Orders Placed This Encounter      Combination Diet Low Na <2000mg Diet     Discharge  Medications   Current Discharge Medication List      START taking these medications    Details   azithromycin (ZITHROMAX) 250 MG tablet Take 1 tablet (250 mg) by mouth daily for 3 days  Qty: 3 tablet, Refills: 0    Associated Diagnoses: Community acquired pneumonia of right middle lobe of lung      cefuroxime (CEFTIN) 500 MG tablet Take 1 tablet (500 mg) by mouth every 12 hours for 5 days  Qty: 10 tablet, Refills: 0    Associated Diagnoses: Community acquired pneumonia of right middle lobe of lung         CONTINUE these medications which have NOT CHANGED    Details   acetaminophen (ACETAMINOPHEN EXTRA STRENGTH) 500 MG tablet TAKE 2 TABLETS (1,000 MG) BY MOUTH 3 TIMES DAILY  Qty: 180 tablet, Refills: 11    Associated Diagnoses: Arthritis pain; Primary osteoarthritis of left hip      amLODIPine (NORVASC) 5 MG tablet TAKE 2 TABLETS (10 MG) BY MOUTH DAILY  Qty: 180 tablet, Refills: 3    Associated Diagnoses: Benign essential hypertension      cyanocobalamin (VITAMIN B-12) 500 MCG tablet TAKE 1 TABLET (500 MCG) BY MOUTH DAILY  Qty: 90 tablet, Refills: 4    Associated Diagnoses: Vitamin B12 deficiency (non anemic)      donepezil (ARICEPT) 10 MG ODT DISSOLVE 1 TAB BY MOUTH ONCE DAILY  Qty: 90 tablet, Refills: 3    Associated Diagnoses: Late onset Alzheimer's disease with behavioral disturbance (H)      loperamide (IMODIUM A-D) 2 MG tablet Take 1 tablet (2 mg) by mouth 2 times daily as needed for diarrhea  Qty: 20 tablet, Refills: 1    Comments: Call provider for >3 stools in 24 hours.  Associated Diagnoses: Loose stools      LORazepam (ATIVAN) 0.5 MG tablet Take 1 tablet (0.5 mg) by mouth 2 times daily as needed for agitation or anxiety  Qty: 60 tablet, Refills: 1    Associated Diagnoses: Encounter for medication review; Agitation due to dementia (H); Late onset Alzheimer's disease with behavioral disturbance (H)      magnesium hydroxide (MILK OF MAGNESIA) 400 MG/5ML suspension Take 30 mLs by mouth daily as needed for  constipation      nitroGLYcerin (NITROSTAT) 0.4 MG sublingual tablet Place 0.4 mg under the tongue every 5 minutes as needed for chest pain For chest pain place 1 tablet under the tongue every 5 minutes for 3 doses. If symptoms persist 5 minutes after 1st dose call 911.      pantoprazole (PROTONIX) 40 MG EC tablet Take 1 tablet (40 mg) by mouth daily  Qty: 90 tablet, Refills: 3    Associated Diagnoses: Rectal bleeding; Medication side effects; Paroxysmal A-fib (H)      !! QUEtiapine (SEROQUEL) 25 MG tablet Take 1 tablet (25 mg) by mouth 2 times daily as needed (agitation)  Qty: 100 tablet, Refills: 1    Associated Diagnoses: Late onset Alzheimer's disease with behavioral disturbance (H); Encounter for medication review; Agitation due to dementia (H)      !! QUEtiapine (SEROQUEL) 25 MG tablet Take 2 tablets (50 mg) by mouth 3 times daily for 7 days, THEN 1 tablet (25 mg) 3 times daily for 7 days. Then STOP  Qty: 63 tablet, Refills: 0    Associated Diagnoses: Late onset Alzheimer's disease with behavioral disturbance (H); Encounter for medication review; Agitation due to dementia (H)      risperiDONE (RISPERDAL) 0.5 MG tablet Take 1 tablet (0.5 mg) by mouth 2 times daily  Qty: 60 tablet, Refills: 0    Associated Diagnoses: Late onset Alzheimer's disease with behavioral disturbance (H)      rivaroxaban ANTICOAGULANT (XARELTO) 15 MG TABS tablet Take 1 tablet (15 mg) by mouth daily (with lunch)  Qty: 90 tablet, Refills: 3    Associated Diagnoses: Late onset Alzheimer's disease with behavioral disturbance (H); Paroxysmal A-fib (H); Coronary artery disease involving native coronary artery of native heart without angina pectoris      sennosides (SENOKOT) 8.6 MG tablet 1 tab daily PRN daily constipation  Qty: 100 tablet, Refills: 3    Comments: Please disregard previous--keep as PRN only  Associated Diagnoses: External hemorrhoids      torsemide (DEMADEX) 10 MG tablet X 2 days PRN for weight gain 3 pounds in 2 days or 5  pounds in week  Qty: 90 tablet, Refills: 1    Associated Diagnoses: Chronic diastolic heart failure (H); Paroxysmal A-fib (H); Benign essential hypertension      triamcinolone (KENALOG) 0.1 % external cream Apply topically 2 times daily as needed (itchy skin)  Qty: 30 g, Refills: 1    Associated Diagnoses: Rash and nonspecific skin eruption      gabapentin (NEURONTIN) 300 MG capsule Take 1 capsule (300 mg) by mouth At Bedtime  Qty: 120 capsule, Refills: 3    Comments: Dose reduction--discontinue AM dose and continue HS dose-JKC  Associated Diagnoses: Late onset Alzheimer's disease with behavioral disturbance (H); ELIZABETH (generalized anxiety disorder); Hip pain, left; Agitation due to dementia (H)      mupirocin (BACTROBAN) 2 % external ointment Apply topically 2 times daily To groin folds.  Qty: 30 g, Refills: 1    Associated Diagnoses: Rash and nonspecific skin eruption; Skin picking habit       !! - Potential duplicate medications found. Please discuss with provider.        Allergies   Allergies   Allergen Reactions     Metoprolol Other (See Comments)     Chronic Bradycardia - got very low pulse with even low dose of Metoprolol     Asa [Aspirin] GI Disturbance     Penicillins Hives     Celecoxib Rash     Ibuprofen Rash     All NSAIDS cause rash     Lisinopril Other (See Comments)     Dry throat     Naprosyn [Naproxen] Other (See Comments)     Epistaxis--resolved when DC'd     Rofecoxib Hives     Vioxx     Data   Most Recent 3 CBC's:  Recent Labs   Lab Test 06/21/23  0554 06/19/23  1738 05/23/23  0823   WBC 12.9* 11.2* 6.1   HGB 12.8* 13.6 14.3   MCV 88 88 87    258 231      Most Recent 3 BMP's:  Recent Labs   Lab Test 06/21/23  0554 06/19/23  1738 06/13/23  0804    145 142   POTASSIUM 4.0 4.1 4.9   CHLORIDE 108* 107 106   CO2 20* 22 25   BUN 55.1* 40.2* 21.6   CR 2.31* 1.78* 1.43*   ANIONGAP 17* 16* 11   LORENZO 9.2 9.2 9.0   * 126* 81     Most Recent 2 LFT's:  Recent Labs   Lab Test 06/21/23  0554  06/19/23  1738   AST 42 31   ALT 42 31   ALKPHOS 99 106   BILITOTAL 0.4 0.8     Most Recent INR's and Anticoagulation Dosing History:  Anticoagulation Dose History        Latest Ref Rng & Units 4/18/2018   Recent Dosing and Labs   INR 0 - 1.3 1.02      Most Recent 3 Troponin's:  Recent Labs   Lab Test 01/22/19  1212 01/22/19  0705 01/22/19  0300   TROPI 0.054* 0.044* 0.052*     Most Recent Cholesterol Panel:  Recent Labs   Lab Test 01/22/19  0705   CHOL 187   *   HDL 39   TRIG 161*     Most Recent 6 Bacteria Isolates From Any Culture (See EPIC Reports for Culture Details):No lab results found.  Most Recent TSH, T4 and A1c Labs:  Recent Labs   Lab Test 05/23/23  0823   TSH 3.60     Results for orders placed or performed during the hospital encounter of 06/19/23   XR Chest 2 Views    Narrative    Procedure:XR CHEST 2 VIEWS    Clinical history:Male, 89 years, Cough.  Patient with history of  congestive heart failure.  Rule out consolidation or effusion    Technique: Two views are submitted.    Comparison: 4/29/2022    Findings: The cardiac silhouette is not well assessed secondary to  kyphotic positioning the patient. The pulmonary vasculature is not  well seen.    The lungs demonstrate an area of atelectasis and consolidation at the  left lung base. Some of the central bronchi mild wall thickening. Bony  structures are unremarkable.      Impression    Impression:   Evaluation is somewhat limited secondary to kyphotic positioning the  patient.    Left upper lobe/lingular pneumonia with atelectasis.     Wall thickening involving some of the central bronchial structures.    MAYNOR HARRISON MD         SYSTEM ID:  [id number]   CT Chest Pulmonary Embolism w Contrast    Narrative    CT CHEST PULMONARY EMBOLISM W CONTRAST  6/19/2023 7:01 PM    CLINICAL HISTORY: Male, age 89 years,  cough, hx CHF.  Elevated trop.   R/o PE ,effusion, consolidation;    Comparison:  Chest x-ray 6/19/2023    TECHNIQUE:  CT Angiogramwas  performed of the chest with IV contrast.  Axial; sagittal and coronal MIP images were reviewed. Axial; sagittal  and coronal MIP images were reviewed.    FINDINGS:  Chest CT:    There is excellent opacification of the arterial structures. There is  no evidence of well-defined pulmonary embolus. There is no evidence of  aortic dissection. Heart and great vessels demonstrate no acute  abnormality. Calcifications are seen within the coronary arteries as  well as throughout the arterial structures of the chest.    The lungs demonstrate areas of atelectasis, most notably in the lower  lung zones. Subtle areas of ground glass opacification also seen,  nodular in appearance in both lungs.    There are a few normal-sized lymph nodes within the mediastinum and  hilar regions.    Thyroid gland and the esophagus are unremarkable.      Visualized portions of the upper abdomen demonstrate no acute  abnormality.    Bony structures also demonstrate no acute abnormality.      Impression    IMPRESSION:   Good quality CTA demonstrates no evidence of pulmonary embolus or  other acute vascular abnormality.    Mild emphysematous changes with subtle multifocal pneumonia, most  notably within the lingula.      This facility minimizes radiation dose by adjusting the mA and/or kV  according to each patient size.      This CT scan was performed using one or more the following dose  reduction techniques:    -Automated exposure control,  -Adjustment of the mA and/or kV according to patient's size, and/or,  -Use of iterative reconstruction technique.    MAYNOR HARRISON MD         SYSTEM ID:  [id number]

## 2023-06-21 NOTE — PROGRESS NOTES
:    Spoke with patients daughterKavita, about transportation for discharge. Daughter will come  patient at 11 to go back to [facility].    Updated Elle at [facility] about patients discharge.     KERRY NICHOLE, Social Work Intern, on 6/21/2023 at 9:20 AM    Discharge summary was sent to [facility].     No further needs from  at this time.    KERRY NICHOLE, Social Work Intern, on 6/21/2023 at 9:32 AM    CASSANDRA Zepeda on 6/21/2023 at 10:16 AM

## 2023-06-21 NOTE — PLAN OF CARE
Patient is pleasant. Vitals are stable. Lung sounds are diminished throughout with expiratory wheezes in the bases. Plan is to discharge back to [facility] this morning. No new concerns at this time.     Goal Outcome Evaluation:      Plan of Care Reviewed With: patient    Overall Patient Progress: improvingOverall Patient Progress: improving

## 2023-06-21 NOTE — PHARMACY - DISCHARGE MEDICATION RECONCILIATION
Pharmacy: Discharge Counseling and Medication Reconciliation    Juan Miguel Delaney  [address]  [place] [address]  [phone] (home)   89 year old male  PCP:Jason Fernando    Allergies   Allergen Reactions     Metoprolol Other (See Comments)     Chronic Bradycardia - got very low pulse with even low dose of Metoprolol     Asa [Aspirin] GI Disturbance     Penicillins Hives     Celecoxib Rash     Ibuprofen Rash     All NSAIDS cause rash     Lisinopril Other (See Comments)     Dry throat     Naprosyn [Naproxen] Other (See Comments)     Epistaxis--resolved when DC'd     Rofecoxib Hives     Vioxx       Discharge Counseling:    Did not meet with patient at time of discharge as they will have all medications managed for them by nursing staff at assisted living / nursing home facility.     Discharge Medication Reconciliation:    Marisela Montero RPH has reviewed the patient's discharge medication orders and has compared them to the inpatient medication administration record and to what the patient was taking prior to admission- any discrepancies have been resolved.     It has been determined that the patient has an adequate supply of medications available or which can be obtained from the patient's preferred pharmacy, which has been confirmed as: [facility] [An updated medication list will be faxed to the patient's pharmacy.]    Thank you for the consult.    Marisela Montero RPH ....................  6/21/2023   7:53 AM

## 2023-06-22 NOTE — TELEPHONE ENCOUNTER
Transitional Care Management Phone Call    Summary of hospitalization:  [facility] discharge summary reviewed    DISCHARGE DIAGNOSIS: Principal Problem:    Community acquired pneumonia (6/19/2023)  Active Problems:    Stage 3b chronic kidney disease (5/25/2015)    Late onset Alzheimer's disease with behavioral disturbance (H) (4/19/2018)    Paroxysmal A-fib (H) (5/6/2022)    Elevated troponin (6/19/2023)    Acute on chronic diastolic congestive heart failure (H) (6/19/2023)       DATE OF DISCHARGE: 6/21/23    Diagnostic Tests/Treatments reviewed.  Follow up needed: none    Post Discharge Medication Reconciliation: discharge medications reconciled and changed, per note/orders      Medications reviewed by: by myself    Problems taking medications regularly:  None    Problems adhering to non-medication therapy:  None    Other Healthcare Providers Involved in Patient's Care:         None    Update since discharge: improved.     Plan of care communicated with: patient and caregiver    Just a friendly reminder that you appointment is:  Next 5 appointments (look out 90 days)    Jun 27, 2023  9:20 AM  Office Visit with Thi Huff PA-C  [facility] ([facility] ) [address]  [place] [address] [phone]            We encourage you to keep this appointment.    Please remember to bring all of your pills in their bottles (including any vitamins or over the counter pills) with you to your appointment.     The patient indicates understanding of these issues and agrees with the plan of care.   Yes    Was the patient contacted within the 2 business days or other approved timeframe?  Yes    Was the Medication reconciliation and management done since the patient was discharged? Yes     Valarie Zurita RN  6/22/2023 9:42 AM

## 2023-06-27 NOTE — PROGRESS NOTES
Hospital Follow-up Visit:    Hospital/Nursing Home/IP Rehab Facility: [facility]  Date of Admission: 06/19/2023  Date of Discharge: 06/21/2023  Reason(s) for Admission: Principal Problem:    Community acquired pneumonia (6/19/2023)  Active Problems:    Stage 3b chronic kidney disease (5/25/2015)    Late onset Alzheimer's disease with behavioral disturbance (H) (4/19/2018)    Paroxysmal A-fib (H) (5/6/2022)    Elevated troponin (6/19/2023)    Acute on chronic diastolic congestive heart failure (H) (6/19/2023)       Was your hospitalization related to COVID-19? No   Problems taking medications regularly:  None  Medication changes since discharge: Ceftin BID x 5 days  Problems adhering to non-medication therapy:  Agitation and difficult to manage behaviors secondary to Dementia--recently tapered off Seroquel  And transitioned to Risperidone BID right before Hospitlization    Summary of hospitalization:  [facility] discharge summary reviewed  Diagnostic Tests/Treatments reviewed.  Follow up needed: labs and clinical followup at Secure memory Unit home to minimize trauma  Other Healthcare Providers Involved in Patient s Care:         Care Coordination and primary care/Elder care at Assisted living [facility] memory unit home  Update since discharge: improved.  Additional issues: [facility] staff reporting increased frequency nose bleeds, Secure memory unit staff reporting intermittent episodes of agitative and difficult to manage behaviors staff report there is no particular known trigger--episodes can happen at different times of the day.  Plan of care communicated with patient, family, caregiver and other healthcare provider  Telephone conference with Chandrika, daughter and LORENZO            Lab Results   Component Value Date    WBC 7.0 06/27/2023    HGB 12.4 (L) 06/27/2023    HCT 38.7 (L) 06/27/2023     06/27/2023    CHOL 187 01/22/2019    TRIG 161 (H) 01/22/2019    HDL 39 01/22/2019    ALT  42 06/21/2023    AST 42 06/21/2023     06/27/2023    BUN 25.0 (H) 06/27/2023    CO2 21 (L) 06/27/2023    TSH 3.60 05/23/2023    INR 1.02 04/18/2018       Resident condition improved from hospitalization no evidence of apparent cough or respiratory symptoms, VSS, no adventitious breath sounds, behavior very manageable with soft approach during visit.      (Z09) Hospital discharge follow-up  (primary encounter diagnosis)      (I50.32) Chronic diastolic heart failure (H) stable on current dose of torsemide staff have not needed to adjust, weight same as 1 month ago--however has had a recent weight loss of 7 pounds over the last 3 months    Plan: torsemide (DEMADEX) 10 MG tablet            (I48.0) Paroxysmal A-fib (H)    Plan: torsemide (DEMADEX) 10 MG tablet           (I10) Benign essential hypertension    Plan: torsemide (DEMADEX) 10 MG tablet            (G30.1,  F02.818) Late onset Alzheimer's disease with behavioral disturbance (H)  Daughter POA is agreeable with adjustment in risperidone with the goal to maintain comfort, quality of life and minimize agitative aggressive episodes and resistance to cares with nursing staff--- family would like resident to remain in secure memory unit through end-of-life.  Has tolerated risperidone twice daily--staff started 1 week before hospitalization frequency of agitative episodes appear to have decreased in frequency however still having episodes staff unable to determine triggers possibly unmet needs however resident is unable to communicate needs clearly and becomes very frustrated which often leads to agitative episodes.  Since it has only been 2 weeks on risperidone will add additional 0.5 mg daily dose as needed and if resident continues to tolerate medication well consider 3 times daily dosing.    Labs were reviewed and improved from recent hospitalization.    Consider discontinuing Xarelto due to staff report recent onset of nosebleeds sometimes 3 times a day at  this point they have been able to get them to stop, labs are stable at this time  Will discuss risk versus benefits with daughter--- I actually have held Xarelto in the past for 3 months due to GI blood loss secondary to NSAIDs however family goals of care are to remain in current unit through end-of-life  And avoid ED episodes for nosebleeds and other issues and hospitalizations.  We will plan to have rescue treatment for staff to offer for nosebleed episodes such as Afrin nasal spray with pressure twice daily as needed.    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

## 2023-06-28 NOTE — TELEPHONE ENCOUNTER
Needing clarification on risperidone. Needing to know if pt is going to be taking 2 tablet a day. Reference number is [id number]

## 2023-06-28 NOTE — TELEPHONE ENCOUNTER
Insurance needs clarification on Risperidone directions. Is it 2 tablets daily and 1 additional tablet daily as needed? Or 2 tablets daily? Or 1 tablet daily as needed?     Wendi Lopez CMA on 6/28/2023 at 12:31 PM

## 2023-06-28 NOTE — TELEPHONE ENCOUNTER
Called [facility]--pharmacist was able to get 1 mg Risperidone approved for PRN--staff will use 1/2 tab or 0.5 daily as needed and will followup on progress next week    RONALDO Lorenz CNP   June 28, 2023

## 2023-06-29 NOTE — TELEPHONE ENCOUNTER
Telephone conference with daughter Chandrika VENTURA regarding questions she had about medications and to discuss holding Xarelto due to staff reporting frequency of nosebleeds sometimes 3 times a day --uncertain when this started possibly a couple of months ago    Chandrika is very agreeable with stopping the Xarelto--I have held it in the past for GI blood loss with Xarelto and Naprosyn  Right now risks outweigh benefits with increased frequency of nosebleeds and risks of ED admissions--- family goals of care to keep comfortable quality of life as possible and secure memory unit through end-of-life would like to avoid any hospitalizations if possible    We also discussed recent change from Seroquel to risperidone--staff reports Seroquel has been tapered off completely currently on risperidone 0.5 mg twice daily--- according to hospitalization notes there were no behaviors that were problematic or difficult to manage  Staff did report to me on Tuesday during rounds that he has had some intermittent episodes different times of the day--possibly related to his PTSD from being in the [place] and progressive dementia and memory loss.  Started risperidone as needed daily 0.5 mg  Insurance did not want to cover 0.5 mg tabs in addition to his daily scheduled twice a day  But were excepting of 1 mg tabs cut in half to be used    We will have Afrin nasal spray 12-hour 2 sprays twice daily as needed that staff can use for nosebleeds---  We will follow-up in a couple of weeks on rounds on progress either by phone conference or in person if Chandrika is able to come to the facility    RONALDO Lorenz CNP   June 29, 2023

## 2023-07-09 NOTE — PROGRESS NOTES
Juan Miguel Delaney is a 89 year old male being seen today for follow up visit visit at [facility].    Code Status: DNR / DNI, Advance Directives: YES-.   Health Care Power of : Extended Emergency Contact Information  Primary Emergency Contact: Criss Delaney   [facility]  Home Phone: [phone]  Relation: Spouse  Secondary Emergency Contact: Kavita Hassan   [facility]  Home Phone: [phone]  Mobile Phone: [phone]  Relation: Daughter     Allergies: Metoprolol, Asa [aspirin], Penicillins, Xarelto [rivaroxaban], Celecoxib, Ibuprofen, Lisinopril, Naprosyn [naproxen], and Rofecoxib     Chief Complaint / HPI: Follow-up on recent medication adjustments for increase in episodes and frequency of nosebleeds and follow-up on recent conversion from Seroquel scheduled which was tapered off and started on risperidone 0.5 mg twice daily with addition of 0.5 mg daily as needed for aggressive/agitative and other difficult to manage behaviors.  Facility nursing staff report that her nosebleeds have stopped since discontinuing Xarelto.  The nursing staff have used the as needed risperidone about 3 times in the past week and the RN D0N reports has been helpful and there have not been any difficult to manage episodes of behavior in fact Juan Miguel was able to have a visit with his wife who lives upstairs in assisted living without becoming anxious agitated or upset.    Of note resident's insurance would not cover additional 0.5 mg tablets of risperidone to have available for PRN--there must be a monthly limit but insurance would cover 1 mg tablets which staff can use half a tablet a day PRN.    Reviewing weights it appears he has had about a 2 to 3 pound weight loss in the past week.  Staff will continue to monitor weights  At least 3 times a week for CHF condition.    Resident is unable to provide history due to advanced dementia--all observations and feedback provided by nursing staff  Nursing  staff do not raise any concerns at this time    Past Medical, Surgical, Family and Social History reviewed: YES.     Medications: Reviewed and reconciled  Medications - recent changes: discontinue Xarelto, Afrin nasal spray PRN BID nose bleeds, Risperidone 0.5 mg po daily PRN agitation/aggressive behavior    Review of Systems:   ROS: 10 point ROS neg other than the symptoms noted above in the HPI.  Toileting:    Continent of Bowel: Yes   Continent of Bladder: Yes  Mobility: walker    Recent Labs: reviewed 6/27      Current Therapies: none    Exam:  Vital signs reviewed.  GENERAL APPEARANCE:  alert and no distress  EYES: Eyes grossly normal to inspection  MS: no musculoskeletal defects are noted and gait is unsteady with walker  SKIN: warm and dry  NEURO: Alert, mentation intact and speech normal--oriented to self only  PSYCH: affect normal/bright    Assessment and Plan:      Medication review and management follow-up on recent adjustments as listed above in HPI--- nursing staff report that mood has been more calm, easily redirectable  And there have not been any episodes of aggression, agitation or difficult to manage behaviors--- was actually able to have a visit with his wife  Who he has not seen in a month due to severe aggressive and agitative episodes during visits and upon her departure.  Facility RN feels no changes need to be made in current twice daily risperidone scheduled and as needed dose which was added last week  Response to medication has been generally positive which aligns with family goals of care for comfort and quality of life through end-of-life to remain in current secure memory unit through end-of-life.    I am being told by facility nursing staff that nosebleeds resolved with discontinuing Xarelto--- staff do have orders for twice daily as needed Afrin spray     In reviewing nursing medication administration of as needed's appears lorazepam is mostly used for anxiety, agitation and  aggressive behaviors and effective.  I have had discussions with Chandrika daughter and POA about risks and benefits with medications and she is aware and agreeable.  The goal is to use the least dose that is effective after other nonmedication interventions have been initially utilized.  I have found a gentle soft approach with Juan Miguel has been effective during my visits and he has been very agreeable with me looking at his skin, rectal exams for hemorrhoids etc.--though I realize my visits are limited snapshots of his day.  Staff report he is resting well at night    Has had about a 3 pound weight loss in the past week according to staff reports--- I will plan to reach out to Kavita's daughter and POA with an update on progress, medications, weight loss  Which the staff will monitor as he is on at least 3 times a week weights for his CHF as this may be a prognostic trend.  Overall he appears calm, comfortable, socially interactive with staff and some of the residents and optimized at current stage of dementia in alignment with family goals for care and QOL.  I will plan to review progress with staff next week on rounds and to review and change PRN medications to reduce polypharmacy.    Over 30 minutes spent in chart review, review of facility MAR, medication review and management      (Z79.899) Encounter for medication review  (primary encounter diagnosis)      (R63.4) Weight loss      (Z66) DNR (do not resuscitate)      (G30.1,  F02.818) Late onset Alzheimer's disease with behavioral disturbance (H)      (Z87.898) History of epistaxis      (T45.515D) Anticoagulant causing adverse effect in therapeutic use, subsequent encounter      (F03.918) Aggressive behavior due to dementia (H)      (Z59.3) Living in assisted living

## 2023-07-11 NOTE — PROGRESS NOTES
Juan Miguel Delaney is a 89 year old male being seen today for acute and follow up visit at [facility] unit.    Code Status: DNR / DNI, Advance Directives: YES-.   Health Care Power of : Extended Emergency Contact Information  Primary Emergency Contact: Criss Delaney   [facility]  Home Phone: [phone]  Relation: Spouse  Secondary Emergency Contact: Kavita Hassan   [facility]  Home Phone: [phone]  Mobile Phone: [phone]  Relation: Daughter     Allergies: Metoprolol, Asa [aspirin], Penicillins, Xarelto [rivaroxaban], Celecoxib, Ibuprofen, Lisinopril, Naprosyn [naproxen], and Rofecoxib     Chief Complaint / HPI: Follow-up on medication review for recent adjustment conversion from scheduled Seroquel which was not effective for managing episodes of agitation and aggressive behaviors in secure memory unit and difficult to manage behaviors.  Had started on scheduled risperidone about a week before recent hospitalization 0.25 mg twice daily staff report has been effective I did add on a 0.5 mg from a 1 mg tablet as resident's insurance would not cover more 0.5 mg tablets per month for staff to have on hand for PRN with agitative and aggressive episodes.  I also addressed as needed medications as there was a leftover Seroquel as needed as well as Ativan as needed and recently added on risperidone as needed  For confusion.  Seroquel as needed has been discontinued and staff are able to use Ativan or risperidone as needed once a day for difficult to manage, aggressive and agitated behaviors--- facility RN reports resident has been doing well over the last 2 weeks since conversion to Seroquel--does not appear to be overly sedate has tolerated the medication well and much easier to redirect--- resident did have a visit with his wife last week and he was able to handle this well without any agitative episodes.  Resident is unable to provide any history due to advanced dementia and memory  loss--- I had noted last week he has been losing weight over the last 3 months about 6 pounds  Requested weight check today which was stable from last week.  Staff report there have been no nosebleeds since Xarelto was discontinued--staff does have Afrin as needed available however they have not needed to use this since Xarelto DC'd    Staff do not raise any other concerns    Past Medical, Surgical, Family and Social History reviewed: YES.     Medications: Reviewed and reconciled  Medications - recent changes:     Review of Systems:  General: positive for weakness  Musculoskeletal: positive for arthritis and muscular weakness  Neurologic: positive for memory problems and behavior changes  All other systems negative  Toileting:    Continent of Bowel: Yes   Continent of Bladder: Yes  Mobility: walker    Recent Labs: reviewed      Current Therapies: none    Exam:  Vital signs reviewed.   GENERAL APPEARANCE: alert and no distress  EYES: Eyes grossly normal to inspection  ABDOMEN: soft, nontender  MS: no musculoskeletal defects are noted and gait is unsteady with walker  SKIN: warm and dry  NEURO: Alert, mentation intact and speech normal--oriented to self--baseline confusion  PSYCH:  affect normal/bright    Assessment and Plan:      Very pleasant gentleman with advancing dementia with secondary mood disorder with episodes of aggression and agitation with frustration  Risperidone scheduled 0.5 mg twice daily at this time seems to be accomplishing goals of care in alignment with family for comfort and quality of life through end-of-life.    Resident appears overall acclimated and with soft approach calm and easily redirectable    Discontinued as needed Seroquel to reduce confusion with PRNs    Staff have Ativan and risperidone once daily if needed--appears they have used PRNs may be 2 to 3 days out of the week at the most and there have not been any difficult to manage episodes  Nosebleeds have resolved with discontinuing  anticoagulant    Has had some weight loss 6 pounds in the last 3 months weight is stable within the past week will monitor for weight loss and other prognostic indicators with progression of end-stage dementia    Followup PRN     Over 20 minutes spent in face-to-face visit, chart review, reviewing medications and medication management    (Z79.899) Encounter for medication review  (primary encounter diagnosis)      (G30.1,  F02.818) Late onset Alzheimer's disease with behavioral disturbance (H)      (Z66) DNR (do not resuscitate)      (I50.33) Acute on chronic diastolic congestive heart failure (H)      (T45.515D) Anticoagulant causing adverse effect in therapeutic use, subsequent encounter      (F03.918) Aggressive behavior due to dementia (H)

## 2023-07-18 NOTE — TELEPHONE ENCOUNTER
[id number] sent Rx request for the following:      Requested Prescriptions   Pending Prescriptions Disp Refills     risperiDONE (RISPERDAL) 0.5 MG tablet [Pharmacy Med Name: RISPERIDONE 0.5MG TABLET] 46 tablet 0     Sig: TAKE 1 TAB BY MOUTH TWICE ADAY       Antipsychotic Medications Failed - 7/13/2023 12:44 PM        Failed - Blood pressure under 140/90 in past 12 months     BP Readings from Last 3 Encounters:   06/27/23 (!) 150/82   06/21/23 128/62   06/13/23 (!) 145/76           Failed - Lipid panel on file within the past 12 months     Recent Labs   Lab Test 01/22/19  0705   CHOL 187   TRIG 161*   HDL 39   *   NHDL 148*        Last Prescription Date:   6/28/23  Last Fill Qty/Refills:         70, R-0    Last Office Visit:              7/11/23 (NH Visit, Lilly Shepard)  Future Office visit:           None    Ema Spencer RN .............. 7/18/2023  2:32 PM

## 2023-08-17 NOTE — TELEPHONE ENCOUNTER
[id number] sent Rx request for the following:      nitroGLYcerin (NITROSTAT) 0.4 MG sublingual tablet     No   Sig - Route: Place 0.4 mg under the tongue every 5 minutes as needed for chest pain For chest pain place 1 tablet under the tongue every 5 minutes for 3 doses. If symptoms persist 5 minutes after 1st dose call 911. - Sublingual   Class: Historical     Last Office Visit:              7/11/23 (NH Visit, Lilly Shepard)   Future Office visit:           None    Ema Spencer RN .............. 8/17/2023  3:00 PM

## 2023-10-23 NOTE — PATIENT INSTRUCTIONS
For informational purposes only. Not to replace the advice of your health care provider. Copyright   2022 [facility], [place]. All rights reserved. Clinically reviewed by Gloria Munoz, PharmD, BCACP. De Correspondent [id number] - REV 06/23.  COVID-19 Outpatient Treatments  Your care team can help you find the best treatments for COVID-19. Talk to a health care provider or refer to the FDA medicine fact sheets below.  Paxlovid (nirmatrelvir and ritonavir): https://www.paxlovid.OpenFin/resources  Molnupiravir (Lagevrio): https://www.fda.gov/media/364143/download  Important: We can only prescribe Paxlovid or Molnupiravir when it can be started within 5 days of first having symptoms.  Paxlovid (nimatrelvir and ritonavir)  How it works  Two medicines (nirmatrelvir and ritonavir) are taken together. They stop the virus from growing. Less amount of virus is easier for your body to fight.  Benefits  Lowers risk of a hospital stay or death from COVID-19.  How to take  Medicine comes in a daily container with both medicine tablets. Take by mouth twice daily (once in the morning, once at night) for 5 days.  The number of tablets to take varies by patient.  Don't chew or break capsules. Swallow whole.  When to take  Take it as soon as possible and within 5 days of your first symptoms.  Who can take it  Patients must be 12 years or older weigh at least 88 pounds. Paxlovid is the preferred treatment for pregnant patients.  Possible side effects  Can cause altered sense of taste, diarrhea (loose, watery stools), high blood pressure, muscle aches.  Medicine conflicts  Some medicines may conflict with Paxlovid and may cause serious side effects.  Tell your care team about all the medicines you take, including prescription and over-the-counter medicines, vitamins, and herbal supplements.  Your care team will review your medicines to make sure that you can safely take Paxlovid.  Cautions  Paxlovid is not advised for patients with severe  kidney or liver disease. If you have kidney or liver problems, the dose may need to be changed.  If you're pregnant or breastfeeding, talk to your care team about your options.  If you take hormonal birth control (such as the Pill), then you or your partner should also use a non-hormonal form of birth control (such as a condom). Keep doing this for 1 menstrual cycle after your last dose of Paxlovid.  Molnupiravir (lagevrio)  How it works  Stops the virus from growing. Less amount of virus is easier for your body to fight.  Benefits  Lowers risk of a hospital stay or death from COVID-19.  How to take  Take 4 capsules by mouth every 12 hours (4 in the morning and 4 at night) for 5 days. Don't chew or break capsules. Swallow whole.  When to take  Take as soon as possible and within 5 days of your first symptoms.  Who can take it  Patients must be 18 years or older.   Possible side effects  Diarrhea (loose, watery stools), nausea (feeling sick to your stomach), dizziness, headaches.  Medicine conflicts  Right now, there are no known conflicts with other drugs. But tell your care team about all medicines you take.  Cautions  This medicine is not advised for patients who are pregnant.  If you are someone who could become pregnant, use trusted birth control until 4 days after your last dose of molnupiravir.  If your partner could become pregnant, you should use trusted birth control until 3 months after your last dose of molnupiravir.      Coping with Life After COVID-19  Being in the hospital because of COVID-19 is scary. Going home can be scary, too. You may face changes to your life, the way you work or what you can eat. It s hard to adjust to change, and it s normal to feel afraid, frustrated or even angry. These feelings usually go away over time. If your feelings don t start to get better, it s called  adjustment disorder.      What signs should I look out for?  Adjustment disorder can happen to anyone in a time of  stress. It makes it hard to cope with daily life. You may feel lonely or fight with loved ones, even if you re glad to be home. Watch for these signs:  Fear or worry  Hard time focusing  Sadness or anger  Trouble talking to family or friends  Feeling like you don t fit in or isolating yourself  Problems with sleep   Drinking alcohol or taking drugs to cope    What can I do?  You can help yourself get better. Feeling you have control helps you move forward. You may wonder if you ll be able to do things you did before. Be patient. Do your best to make the most of every day. Try to build relationships, be as active as you can, eat right and keep a sense of humor. Avoid smoking and drinking too much alcohol. Call your family doctor or clinic if you re not sure what to do. They can guide you to care or other services.    When should I get help?  Think about getting counseling if your sadness or frustration gets worse. Together with a trained counselor, you can talk about your problems adjusting to life after your hospital stay. You can come up with new ways to handle changes that give you more control. Your family doctor or care team can help you find a counselor.     Get help if you re thinking about hurting yourself. If you need help right away, call 911 or go to the nearest emergency room. You can also try the Crisis Text Line.    Crisis Text Line: 697-611 (http://www.crisistextline.org)  The Crisis Text Line serves anyone, in any crisis. It gives free, 24/7 support. Here's how it works:  Text HOME to 716440 from anywhere in the [place], anytime, about any type of crisis.  A live, trained Crisis Counselor will text you back quickly.  The volunteer Crisis Counselor can help you move from a  hot moment  to a  cool moment.  They can also help you work out a safety plan.

## 2023-10-23 NOTE — PROGRESS NOTES
Correspondence received from [facility] nurse that Juan Miguel tested positive for Covid-19 on 10/22/23 and family is requesting antiviral therapy. He is within the window where he would benefit from treatment with an antiviral medication.       Review of Juan Miguel's most recent labs and current medications, he would be a candidate for paxlovid, utilizing the renal dosage. Last GFR was 49 mL/min on 6/27/23. He does not have any significant medication interactions that would prevent him from taking this medication.     1. Chronic diastolic heart failure (H)      2. Stage 3b chronic kidney disease    - nirmatrelvir and ritonavir (PAXLOVID) 150 mg/100 mg therapy pack; Take 2 tablets by mouth 2 times daily for 5 days (Take one tablet of Nirmatelvir and 1 tablet of Ritonavir twice daily for 5 days)  Dispense: 20 tablet; Refill: 0    3. Infection due to 2019 novel coronavirus    - nirmatrelvir and ritonavir (PAXLOVID) 150 mg/100 mg therapy pack; Take 2 tablets by mouth 2 times daily for 5 days (Take one tablet of Nirmatelvir and 1 tablet of Ritonavir twice daily for 5 days)  Dispense: 20 tablet; Refill: 0    4. Clinical diagnosis of COVID-19    - nirmatrelvir and ritonavir (PAXLOVID) 150 mg/100 mg therapy pack; Take 2 tablets by mouth 2 times daily for 5 days (Take one tablet of Nirmatelvir and 1 tablet of Ritonavir twice daily for 5 days)  Dispense: 20 tablet; Refill: 0

## 2023-10-24 NOTE — PROGRESS NOTES
Juan Miguel Delaney is a 89 year old male being seen today for comprehensive, acute, and follow up visit at [facility].    Code Status: DNR / DNI, Advance Directives: YES  Health Care Power of : Extended Emergency Contact Information  Primary Emergency Contact: Criss Delaney   [facility]  Home Phone: [phone]  Relation: Spouse  Secondary Emergency Contact: Kavita Hassan   [facility]  Home Phone: [phone]  Mobile Phone: [phone]  Relation: Daughter     Allergies: Metoprolol, Asa [aspirin], Penicillins, Xarelto [rivaroxaban], Celecoxib, Ibuprofen, Lisinopril, Naprosyn [naproxen], and Rofecoxib     Chief Complaint / HPI: Follow-up on assisted living resident with advanced stage Alzheimer's with behavioral disturbance features such as history of aggression and agitation  That recently developed COVID-19 and exposed--current outbreak of 4 memory unit residents and 2 staff.  Started Paxlovid last night.  Yesterday received phone calls from memory unit RN with concerns regarding level of lethargy, increased edema secondary to chronic diastolic heart failure, history of angina, chronic kidney disease and atrial fibrillation.  Previously on DOAC however on two separate occasions developed rectal bleeding--was discontinued for a few months, restarted however again developed frequent and difficult to control nosebleeds, DOAC was discontinued and nosebleeds resolved.   PRN started yesterday and today--- weight reflects loss of possibly 6 pounds--which I think is probably a little bit exaggerated however staff feel that edema improved today and Juan Miguel  Is taking fluids, has been more alert and awake today and conversational--mentation has been at baseline.  He has remained afebrile--nonproductive cough.  Their main concern is stasis dermatitis with several erythemic areas with superficial blisters from edema.  They have kept compression stockings off as they were tired and bunched areas  were causing and ulcerations. Usually Juan Miguel prefers to sit with his legs in dependent position most of the day--however has been sleeping supine in bed at night and that has been beneficial with the edema.  Memory unit DON feels a goals of care discussion with Chandrika daughter and legal advanced healthcare directive agent is in order due to the unpredictability of his condition and significant comorbidities and risks.    Past Medical, Surgical, Family and Social History reviewed: YES.     Medications: Reviewed and reconciled  Medications - recent changes: PRN torsemide given yesterday and today,Paxlovid    Review of Systems:  General: positive for weight loss, fatigue, weakness  Skin: positive for rash  CV: positive for irregular heart beat, lower extremity edema, and exercise intolerance  GI: positive for incontinence  : positive for incontinence  Musculoskeletal: positive for arthritis, joint pain, and muscular weakness  Neurologic: positive for speech problems, memory problems, and behavior changes  All other systems negative  Toileting:    Continent of Bowel: No   Continent of Bladder: No  Mobility: walker and wheelchair    Recent Labs: COVID Positive      Current Therapies: none    Exam:  Vital signs reviewed.   GENERAL APPEARANCE: alert and no acute distress  EYES: Eyes grossly normal to inspection  RESP: lungs sounds distant,few fine basilar crackles,No wheezes or rhonchi heard--productive cough clear sputum  CV: regular rate, 2-3+ equal diffuse peripheral edema   ABDOMEN: soft, nontender  MS: no musculoskeletal defects are noted and gait is unsteady requires 1-2 staff assist  SKIN: lower lower legs stasis dermatitis from pretibial bilateral malleolus areas--has several scattered subdermal areas of fluid, posterior bilateral calves erythemic from stockings friction  Open blisters about 2-3 cm sized areas--no evidence of purulence, marginalized or secondary infection--appears equal distributed from friction,  dependent position and inflammation  NEURO: mentation drowsy but easily arouses  to voice and speech normal--not new findings  PSYCH: affect normal    Assessment and Plan:    Memory unit resident with advanced stage dementia with secondary affective behavioral disorder with aggression, agitation--significant comorbidities CHF with atrial fibrillation, stage III chronic kidney disease, Hypertension, venous insufficiency with stasis and stasis dermatitis--recently developed and exposed to COVID outbreak in memory unit--started on Paxlovid last night seems to be tolerating medication no apparent nausea--staff report more alert and conversational today, taking fluids well---    Torsemide has been effective--PRN dose given yesterday and today--we will give another 40 mg dose tomorrow morning--staff will continue to monitor daily weights.    Regarding edema and stasis dermatitis--agree with keeping stockings off at this time as they are causing more irritation and friction and not helpful currently--resident has been known not to tolerate dressings and treatments and would not tolerate leg wraps.  Will trial mupirocin in ointment base to lower leg stasis dermatitis areas to protect skin reduce colonization BID along with elevation and avoiding any direct friction.  I do not see any areas of cellulitis at this time--spoke with facility RN and she will monitor for any signs of unilateral marginalization or evidence of purulence and will call  Would be very agreeable with calling in cephalosporin in that case.    Nursing staff will continue to encourage POs, leg elevation and positioning to mobilize secretions--nursing staff feel cough is much looser--saturating well no dyspnea  Lung sounds do not indicate any focal pneumonia, fine few crackles in bases bilaterally likely reflective of CHF and volume    Phone call to Chandrika for update on above visit as requested today--left message will return call later regarding current  status consider goals of care and care conference    Over 80 minutes spent in chart review, medication review and management, review of recent labs, coordination of face-to-face visit and goals of care conference as requested    Followup next week facility rounding        (U07.1) Lab test positive for detection of COVID-19 virus  (primary encounter diagnosis)      (G30.1,  F02.818) Late onset Alzheimer's disease with behavioral disturbance (H)      (N18.32) Stage 3b chronic kidney disease      (I50.32) Chronic diastolic heart failure (H)      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

## 2023-10-25 NOTE — TELEPHONE ENCOUNTER
Goals of care discussion with Chandrika, daughter and legal healthcare directive agent.    Family adamant they do not want any ED or hospitalizations.  This has been traumatic for Juan Miguel and family members and want to avoid.  They want Juan Miguel to be cared for in his assisted living memory unit apartment and kept comfortable through end of life.    Chandrika is very interested in having a Hospice informational meeting next week to answer questions about the services and get an overview of the program. She is open to meet at [facility] next week and facility [facility]BA[facility]OHLI will reach out to hospice to schedule for next week. She would like me to attend.  She was given the names of the 3 Hospice agencies that serve this area  And when she has made a decision with the agency she will contact Elle Veloz who will schedule the care conference--if determined eligible at that time may proceed with admission.  His current condition is not at active end of life stage--but we discussed this is unpredictable and due to his multiple serious chronic health issues.      Lilly Shepard, APRN CNP   October 25, 2023

## 2023-10-31 NOTE — PROGRESS NOTES
Juan Miguel Delaney is a 89 year old male being seen today for acute and follow up visit visit at [facility].    Code Status: DNR / DNI, Advance Directives: YES  Health Care Power of : Extended Emergency Contact Information  Primary Emergency Contact: Criss Delaney   [facility]  Home Phone: [phone]  Relation: Spouse  Secondary Emergency Contact: Kavita Hassan   [facility]  Home Phone: [phone]  Mobile Phone: [phone]  Relation: Daughter     Allergies: Metoprolol, Asa [aspirin], Penicillins, Xarelto [rivaroxaban], Celecoxib, Ibuprofen, Lisinopril, Naprosyn [naproxen], and Rofecoxib     Chief Complaint / HPI: Follow-up with resident with advanced stage dementia recently transition from assisted living apartment with spouse to [facility] unit due to psychosis secondary to advanced dementia with episodes of anxiety, aggression and agitation about 5 months ago.  He has transitioned well and has been stable on his current medications and staff report there have been no episodes of agitation aggression or difficult to manage episodes.  Recently exposed and diagnosed with COVID completed 5 days of Paxlovid.  Recent exacerbation CHF with lower extremity edema with stasis dermatitis and secondary ulcers bilateral lower extremities which were weeping and quite erythemic last week--resident does not tolerate most treatments applied directly to his skin such as leg wraps so I opted to try the mupirocin 2% ointment that we have used in the past for his superficial skin infections from picking behaviors--applied to affected areas on both lower legs to provide barrier protection and decolonize--- however staff report to me today he has only allowed them to apply this a couple of times in the past week--- I have asked the staff to keep his compression stockings off for the past week as they were tight and causing more friction irritation and contributing to blisters.  Juan Miguel does like  to sit in dependent position most of the day and sometimes staff have to persuade him to elevate his legs in his recliner.  Staff report and I concur fine bibasilar crackles, no rhonchi or wheezes and has been saturating well--cough is productive of clear mucus  Has remained afebrile--however staff report appetite has diminished by at least 50% from previous baseline--has been skipping some meals  In reviewing his weights he was 200 pounds a year ago--it appears he has probably lost about 7 to 8 pounds in the last couple of months.  Staff feel that he has been steadily but slowly declining over the last couple of months and I agree.  Recently had goals of care discussion with his daughter Chandrika and she has spoken with hospice and would like to proceed with admission which I feel is very appropriate as family does not want any further hospitalizations as this have been a major source of trauma in the past and would like him to remain in his current memory unit through end-of-life with focus on comfort and quality of life.    Staff report any new identified skin ulcer on his right buttocks however unfortunately today myself and to other staff were unable to convince him to allow us to get a visual exam.        Past Medical, Surgical, Family and Social History reviewed: YES.     Medications: reviewed  Medications - recent changes: Paxlovid    Review of Systems:  General: positive for weight loss, fatigue, weakness  Skin: positive for rash, scaling, blisters  Resp: positive for cough, sputum, dyspnea, and positive COVID  CV: positive for irregular heart beat, dyspnea on exertion, lower extremity edema, exercise intolerance, and CHF  GI: positive for Incontinence  : positive for incontinence  Musculoskeletal: positive for arthritis, joint pain, and muscular weakness  Neurologic: positive for memory problems and behavior changes  Psychiatric: positive for anxiety and agitation  All other systems negative  Toileting:     Continent of Bowel: No   Continent of Bladder: No  Mobility: walker and wheelchair    Recent Labs: COVID pos      Current Therapies: none  Exam:  Vital signs reviewed.   GENERAL APPEARANCE:  alert and no acute distress  EYES: Eyes grossly normal to inspection  RESP: Bilateral fine basilar crackles--no rhonchi or expiratory wheezes--productive cough clear sputum  CV: A-fib, 2+ equal pitting peripheral edema  ABDOMEN: soft, nontender  SKIN: Dry, bilateral lower extremity stasis dermatitis distribution pattern pretibial bilateral malleoli are dorsum of feet skin is very dry taught scaly--multiple blistered areas mostly posterior calves dry and healing stage  NEURO: mentation drowsy arouses easily to voice and speech normal--baseline confusion--baseline findings  PSYCH: affect normal--however easily agitated    Assessment and Plan:      E prognosis 49 to 62% 6-month mortality score   PPS aprox 40%      Very polite gentleman with advanced stage dementia, exacerbating diastolic heart failure--has been responding reasonably well to torsemide PRN  Legs have improved from last week 3-4+ edema--this week 2+ edema and stasis superficial ulcers are drying and healing--remains erythemic stasis dermatitis pretibial and bilateral malleoli are areas and dorsum of feet.  He did allow me to apply mupirocin 2% ointment to lower legs affected stasis ulcer areas.  We will plan to treat with 1 week course of cefdinir for dual benefits secondary skin infection of stasis ulcers and early pneumonitis bilateral base fine crackles.  We will plan to treat with additional dose of torsemide 10 mg tomorrow morning.    Requested staff to report back to me assessment on sacrum ulcer with toileting as he was not able to participate with visual exam today without becoming extremely agitated and angry.    I did speak with Gloria Watson, RN admission director [facility] who reports he needs medical eligibility and plans to reach out  to daughter Chandrika and admit to hospice services tomorrow.    Followup PRN rounding    (I50.33) Acute on chronic diastolic congestive heart failure (H)  (primary encounter diagnosis)    Plan: cefdinir (OMNICEF) 300 MG capsule            (I87.2) Stasis dermatitis of both legs    Plan: mupirocin (BACTROBAN) 2 % external ointment            (I83.019,  I83.029,  L97.919,  L97.929) Venous stasis ulcers of both lower extremities (H)    Plan: mupirocin (BACTROBAN) 2 % external ointment,         cefdinir (OMNICEF) 300 MG capsule            (Z91.199) Non-compliant patient  Plan: mupirocin (BACTROBAN) 2 % external ointment,         cefdinir (OMNICEF) 300 MG capsule            (F42.4) Skin picking habit    Plan: mupirocin (BACTROBAN) 2 % external ointment,         cefdinir (OMNICEF) 300 MG capsule            (U07.1) Infection due to 2019 novel coronavirus    Plan: cefdinir (OMNICEF) 300 MG capsule            (R09.89) Respiratory crackles    Plan: cefdinir (OMNICEF) 300 MG capsule            (R60.0) Edema of both lower legs    Plan: cefdinir (OMNICEF) 300 MG capsule            (G30.1,  F02.818) Late onset Alzheimer's disease with behavioral disturbance (H)      (Z79.899) Encounter for medication review      (N18.32) Stage 3b chronic kidney disease      (Z78.9) DNI (do not intubate)      (I48.0) Paroxysmal A-fib (H)      (Z59.3) Living in assisted living      (N39.46) Mixed incontinence    Plan: menthol-zinc oxide (CALMOSEPTINE) 0.44-20.625 %        OINT ointment            (L89.150) Pressure injury of sacral region, unstageable (H)

## 2024-01-01 ENCOUNTER — MEDICAL CORRESPONDENCE (OUTPATIENT)
Dept: HEALTH INFORMATION MANAGEMENT | Facility: OTHER | Age: 89
End: 2024-01-01
Payer: COMMERCIAL

## 2024-07-22 NOTE — ED PROVIDER NOTES
History     Chief Complaint   Patient presents with     Abdominal Pain     The history is provided by the patient, a relative and the EMS personnel.     Juan Miguel Delaney is a 89 year old male here by ambulance. Here in the ED he says that he has no pain.     Per EMS: He was complaining to them of 10 of 10 abdominal pain. The pain was reported to them to be on and off. No N/V/D.  Their EKG showed lateral ST depression in leads V5 and V6 and they gave 324 mg aspirin en route.     I spoke with his daughter, who has not seen him this evening but was called by staff at [facility] here in [facility].  As they were talking on the phone she could hear him crying out in pain. She spoke with her mom, who lives with him, and she said that Juan Miguel has been having coughing and sniffling. His daughter hypothesized that he has abdominal pain from coughing.       He has a history of heart disease including HTN, dCHF (on amlodipine, metoprolol, nitroglycerin PRN), dementia (on Aricept, Seroquel PRN), stage 3 CKD    Allergies:  Allergies   Allergen Reactions     Penicillins Hives     Celecoxib Rash     Ibuprofen Rash     All NSAIDS cause rash     Lisinopril Other (See Comments)     Dry throat     Naprosyn [Naproxen] Other (See Comments)     Epistaxis--resolved when DC'd     Rofecoxib Hives     Vioxx       Problem List:    Patient Active Problem List    Diagnosis Date Noted     Grade II diastolic dysfunction 08/26/2021     Priority: Medium     Late onset Alzheimer's disease with behavioral disturbance (H) 04/19/2018     Priority: Medium     Trigger finger, left ring finger 02/23/2018     Priority: Medium     Mixed hyperlipidemia 02/08/2018     Priority: Medium     Neck pain, chronic 02/08/2018     Priority: Medium     Osteoarthrosis 02/08/2018     Priority: Medium     Chronic left sacroiliac pain 10/19/2017     Priority: Medium     Chronic insomnia 08/08/2017     Priority: Medium     S/P lumbar laminectomy 05/29/2015      Priority: Medium     Stage 3b chronic kidney disease 05/25/2015     Priority: Medium     Spinal stenosis of lumbar region with neurogenic claudication 04/24/2015     Priority: Medium     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 11/09/2012     Priority: Medium     Benign essential hypertension 10/24/2011     Priority: Medium        Past Medical History:    Past Medical History:   Diagnosis Date     Actinic keratosis      Chronic kidney disease, stage III (moderate)      Elevated erythrocyte sedimentation rate      Essential (primary) hypertension      Family history of malignant neoplasm of prostate      Hyperlipidemia      Osteoarthritis      Pneumonia      Sciatica        Past Surgical History:    Past Surgical History:   Procedure Laterality Date     ARTHROPLASTY KNEE      2006     ARTHROPLASTY KNEE      2008     ARTHROSCOPY SHOULDER      1996,left     ARTHROSCOPY SHOULDER      2001,AC arthrosis & distal clavicle resection     ARTHROSCOPY SHOULDER      2011     COLONOSCOPY      2008,normal     CYSTOSCOPY, TRANSURETHRAL RESECTION (TUR) PROSTATE, COMBINED      No Comments Provided     LAMINECTOMY LUMBAR ONE LEVEL      2015,[place], spinal stenosis     OTHER SURGICAL HISTORY      2004,[id number],SCAN-STRESS TEST,negative to heart rate 138     OTHER SURGICAL HISTORY      2013,[id number],OTHER,Right 5th digit     RELEASE CARPAL TUNNEL      bilateral       Family History:    Family History   Problem Relation Age of Onset     Prostate Cancer Father         Cancer-prostate     Hypertension Mother         Hypertension     Other - See Comments Mother         Old age, 99     Prostate Cancer Brother         Cancer-prostate     Unknown/Adopted Brother         Unknown       Social History:  Marital Status:   [2]  Social History     Tobacco Use     Smoking status: Never Smoker     Smokeless tobacco: Never Used   Substance Use Topics     Alcohol use: No     Alcohol/week: 0.0 standard drinks     Drug use: No        Medications:     amLODIPine (NORVASC) 5 MG tablet  cyanocobalamin (VITAMIN B-12) 500 MCG tablet  donepezil (ARICEPT) 10 MG ODT  metoprolol tartrate (LOPRESSOR) 25 MG tablet  naproxen sodium (ANAPROX) 220 MG tablet  nitroGLYcerin (NITROSTAT) 0.4 MG sublingual tablet  QUEtiapine (SEROQUEL) 25 MG tablet  QUEtiapine (SEROQUEL) 25 MG tablet  SM PAIN RELIEVER EX  MG tablet          Review of Systems   Respiratory: Positive for cough.    Gastrointestinal: Positive for abdominal pain. Negative for diarrhea, nausea and vomiting.   All other systems reviewed and are negative.      Physical Exam   BP: (!) 146/106  Pulse: 86  Temp: 98.2  F (36.8  C)  Resp: 17  Weight: 87.5 kg (193 lb)  SpO2: 96 %      Physical Exam  Vitals and nursing note reviewed.   Constitutional:       General: He is not in acute distress.     Appearance: He is well-developed. He is obese. He is not ill-appearing.   Cardiovascular:      Rate and Rhythm: Normal rate and regular rhythm.      Heart sounds: Normal heart sounds. No murmur heard.  Pulmonary:      Effort: Pulmonary effort is normal. No respiratory distress.      Breath sounds: Normal breath sounds. No stridor. No wheezing, rhonchi or rales.   Chest:      Chest wall: No tenderness.   Abdominal:      General: Abdomen is protuberant. Bowel sounds are normal.      Palpations: Abdomen is soft.      Tenderness: There is no abdominal tenderness. There is no guarding or rebound.   Musculoskeletal:      Right lower le+ Pitting Edema present.      Left lower le+ Pitting Edema present.   Skin:     General: Skin is warm and dry.   Neurological:      General: No focal deficit present.      Mental Status: He is alert and oriented to person, place, and time.   Psychiatric:         Mood and Affect: Mood normal.         Behavior: Behavior normal.            EKG Interpretation:      Interpreted by Jarocho Mcclellan MD  Time reviewed: 12:47 AM    Symptoms at time of EKG: None   Rhythm: atrial fibrillation -  controlled  Rate: Normal  Axis: Normal  Ectopy: none  Conduction: normal  ST Segments/ T Waves: Non-specific ST-T wave changes  Q Waves: none  Comparison to prior: No old EKG available    Clinical Impression: atrial fib with normal rate      Results for orders placed or performed during the hospital encounter of 04/29/22 (from the past 24 hour(s))   CBC with platelets differential    Narrative    The following orders were created for panel order CBC with platelets differential.  Procedure                               Abnormality         Status                     ---------                               -----------         ------                     CBC with platelets and d...[[id number]]                      Final result                 Please view results for these tests on the individual orders.   Basic metabolic panel   Result Value Ref Range    Sodium 138 134 - 144 mmol/L    Potassium 4.3 3.5 - 5.1 mmol/L    Chloride 106 98 - 107 mmol/L    Carbon Dioxide (CO2) 24 21 - 31 mmol/L    Anion Gap 8 3 - 14 mmol/L    Urea Nitrogen 29 (H) 7 - 25 mg/dL    Creatinine 1.55 (H) 0.70 - 1.30 mg/dL    Calcium 9.1 8.6 - 10.3 mg/dL    Glucose 109 (H) 70 - 105 mg/dL    GFR Estimate 43 (L) >60 mL/min/1.73m2   Troponin I   Result Value Ref Range    Troponin I 51.8 (H) 0.0 - 34.0 pg/mL   CBC with platelets and differential   Result Value Ref Range    WBC Count 8.7 4.0 - 11.0 10e3/uL    RBC Count 4.84 4.40 - 5.90 10e6/uL    Hemoglobin 14.2 13.3 - 17.7 g/dL    Hematocrit 42.6 40.0 - 53.0 %    MCV 88 78 - 100 fL    MCH 29.3 26.5 - 33.0 pg    MCHC 33.3 31.5 - 36.5 g/dL    RDW 13.2 10.0 - 15.0 %    Platelet Count 240 150 - 450 10e3/uL    % Neutrophils 73 %    % Lymphocytes 10 %    % Monocytes 13 %    % Eosinophils 2 %    % Basophils 1 %    % Immature Granulocytes 1 %    NRBCs per 100 WBC 0 <1 /100    Absolute Neutrophils 6.5 1.6 - 8.3 10e3/uL    Absolute Lymphocytes 0.9 0.8 - 5.3 10e3/uL    Absolute Monocytes 1.2 0.0 - 1.3 10e3/uL     Absolute Eosinophils 0.2 0.0 - 0.7 10e3/uL    Absolute Basophils 0.1 0.0 - 0.2 10e3/uL    Absolute Immature Granulocytes 0.0 <=0.4 10e3/uL    Absolute NRBCs 0.0 10e3/uL   Symptomatic; Unknown Influenza A/B & SARS-CoV2 (COVID-19) Virus PCR Multiplex Nose    Specimen: Nose; Swab   Result Value Ref Range    Influenza A PCR Negative Negative    Influenza B PCR Negative Negative    RSV PCR Negative Negative    SARS CoV2 PCR Negative Negative    Narrative    Testing was performed using the Xpert Xpress CoV2/Flu/RSV Assay on the [employer] Instrument. This test should be ordered for the detection of SARS-CoV-2 and influenza viruses in individuals who meet clinical and/or epidemiological criteria. Test performance is unknown in asymptomatic patients. This test is for in vitro diagnostic use under the FDA EUA for laboratories certified under CLIA to perform high or moderate complexity testing. This test has not been FDA cleared or approved. A negative result does not rule out the presence of PCR inhibitors in the specimen or target RNA in concentration below the limit of detection for the assay. If only one viral target is positive but coinfection with multiple targets is suspected, the sample should be re-tested with another FDA cleared, approved, or authorized test, if coinfection would change clinical management. This test was validated by the [facility] [employer]. These laboratories are certified under the Clinical  Laboratory Improvement Amendments of 1988 (CLIA-88) as qualified to perform high complexity laboratory testing.   NT pro BNP   Result Value Ref Range    N terminal Pro BNP Inpatient 230 (H) 0 - 100 pg/mL   UA with Microscopic reflex to Culture    Specimen: Urine, Clean Catch   Result Value Ref Range    Color Urine Yellow Colorless, Straw, Light Yellow, Yellow    Appearance Urine Clear Clear    Glucose Urine Negative Negative mg/dL    Bilirubin Urine Negative Negative    Ketones Urine Negative  Negative mg/dL    Specific Gravity Urine 1.025 1.005 - 1.030    Blood Urine Negative Negative    pH Urine 6.0 5.0 - 9.0    Protein Albumin Urine Negative Negative mg/dL    Urobilinogen Urine Normal Normal, 2.0 mg/dL    Nitrite Urine Negative Negative    Leukocyte Esterase Urine Negative Negative    RBC Urine 2 <=2 /HPF    WBC Urine 5 <=5 /HPF    Narrative    Urine Culture not indicated       Medications   furosemide (LASIX) injection 20 mg (20 mg Intravenous Given 4/29/22 0208)       Assessments & Plan (with Medical Decision Making)  Juan Miguel Delaney is a 89 year old male here by ambulance. Here in the ED he says that he has no pain. Per EMS: He was complaining to them of 10 of 10 abdominal pain. The pain was reported to them to be on and off. No N/V/D.  Their EKG showed lateral ST depression in leads V5 and V6 and they gave 324 mg aspirin en route.  I spoke with his daughter, who has not seen him this evening but was called by staff at [facility] here in [facility].  As they were talking on the phone she could hear him crying out in pain. She spoke with her mom, who lives with him, and she said that Juan Miguel has been having coughing and sniffling. His daughter hypothesized that he has abdominal pain from coughing.  VS in the ED BP (!) 144/79   Pulse 58   Temp 98.2  F (36.8  C) (Tympanic)   Resp 16   Wt 87.5 kg (193 lb)   SpO2 96%   BMI 30.23 kg/m    Exam shows no abdominal tenderness with normal bowel sounds. Heart and lungs sound normal. He has some pitting edema of bilateral lower extremities.  EKG shows atrial fib with normal rate.  Labs show CBC normal, BMP with Cr 1.55 (stable), troponin 51.8 (he has persistently elevated troponin), 4 Plex negative.  Chest xray shows central congestion. He is not on a diuretic. We gave him 20 mg Lasix in the IV. By 2:58 AM he has been up to the restroom three times and I think we have done a good job of diuresis.  We will get him home with instructions to follow up  with Dr Fernando.  I did send a note to Dr Fernando about this visit.     I have reviewed the nursing notes.    I have reviewed the findings, diagnosis, plan and need for follow up with the patient.    Final diagnoses:   Acute on chronic congestive heart failure, unspecified heart failure type (H)       4/29/2022   [facility] MD  04/29/22 0307     22-Jul-2024 01:20

## (undated) RX ORDER — CLOPIDOGREL BISULFATE 75 MG/1
TABLET ORAL
Status: DISPENSED
Start: 2021-08-19

## (undated) RX ORDER — TRIAMCINOLONE ACETONIDE 40 MG/ML
INJECTION, SUSPENSION INTRA-ARTICULAR; INTRAMUSCULAR
Status: DISPENSED
Start: 2018-03-07

## (undated) RX ORDER — LIDOCAINE HYDROCHLORIDE 10 MG/ML
INJECTION, SOLUTION EPIDURAL; INFILTRATION; INTRACAUDAL; PERINEURAL
Status: DISPENSED
Start: 2018-07-23

## (undated) RX ORDER — TRIAMCINOLONE ACETONIDE 40 MG/ML
INJECTION, SUSPENSION INTRA-ARTICULAR; INTRAMUSCULAR
Status: DISPENSED
Start: 2018-07-23

## (undated) RX ORDER — BUPIVACAINE HYDROCHLORIDE 5 MG/ML
INJECTION, SOLUTION EPIDURAL; INTRACAUDAL
Status: DISPENSED
Start: 2021-01-11

## (undated) RX ORDER — LIDOCAINE HYDROCHLORIDE 10 MG/ML
INJECTION, SOLUTION INFILTRATION; PERINEURAL
Status: DISPENSED
Start: 2018-03-07

## (undated) RX ORDER — ATORVASTATIN CALCIUM 40 MG/1
TABLET, FILM COATED ORAL
Status: DISPENSED
Start: 2019-01-21

## (undated) RX ORDER — LORAZEPAM 0.5 MG/1
TABLET ORAL
Status: DISPENSED
Start: 2021-04-30

## (undated) RX ORDER — ASPIRIN 325 MG
TABLET ORAL
Status: DISPENSED
Start: 2019-01-21

## (undated) RX ORDER — BUPIVACAINE HYDROCHLORIDE 5 MG/ML
INJECTION, SOLUTION EPIDURAL; INTRACAUDAL
Status: DISPENSED
Start: 2021-09-13

## (undated) RX ORDER — FUROSEMIDE 10 MG/ML
INJECTION INTRAMUSCULAR; INTRAVENOUS
Status: DISPENSED
Start: 2022-04-29

## (undated) RX ORDER — REGADENOSON 0.08 MG/ML
INJECTION, SOLUTION INTRAVENOUS
Status: DISPENSED
Start: 2019-01-25

## (undated) RX ORDER — LANOLIN ALCOHOL/MO/W.PET/CERES
CREAM (GRAM) TOPICAL
Status: DISPENSED
Start: 2018-04-18

## (undated) RX ORDER — TRIAMCINOLONE ACETONIDE 40 MG/ML
INJECTION, SUSPENSION INTRA-ARTICULAR; INTRAMUSCULAR
Status: DISPENSED
Start: 2021-01-11

## (undated) RX ORDER — LIDOCAINE HYDROCHLORIDE 10 MG/ML
INJECTION, SOLUTION INFILTRATION; PERINEURAL
Status: DISPENSED
Start: 2021-05-27

## (undated) RX ORDER — TRIAMCINOLONE ACETONIDE 40 MG/ML
INJECTION, SUSPENSION INTRA-ARTICULAR; INTRAMUSCULAR
Status: DISPENSED
Start: 2021-09-13

## (undated) RX ORDER — TRIAMCINOLONE ACETONIDE 40 MG/ML
INJECTION, SUSPENSION INTRA-ARTICULAR; INTRAMUSCULAR
Status: DISPENSED
Start: 2022-08-08

## (undated) RX ORDER — ASPIRIN 81 MG/1
TABLET, CHEWABLE ORAL
Status: DISPENSED
Start: 2019-01-21

## (undated) RX ORDER — LIDOCAINE HYDROCHLORIDE 10 MG/ML
INJECTION, SOLUTION INFILTRATION; PERINEURAL
Status: DISPENSED
Start: 2022-08-08

## (undated) RX ORDER — FUROSEMIDE 10 MG/ML
INJECTION INTRAMUSCULAR; INTRAVENOUS
Status: DISPENSED
Start: 2023-01-01

## (undated) RX ORDER — BUPIVACAINE HYDROCHLORIDE 5 MG/ML
INJECTION, SOLUTION EPIDURAL; INTRACAUDAL
Status: DISPENSED
Start: 2022-08-08

## (undated) RX ORDER — LIDOCAINE HYDROCHLORIDE 10 MG/ML
INJECTION, SOLUTION INFILTRATION; PERINEURAL
Status: DISPENSED
Start: 2021-09-13

## (undated) RX ORDER — METOPROLOL TARTRATE 25 MG/1
TABLET, FILM COATED ORAL
Status: DISPENSED
Start: 2021-08-20

## (undated) RX ORDER — CYANOCOBALAMIN 1000 UG/ML
INJECTION, SOLUTION INTRAMUSCULAR; SUBCUTANEOUS
Status: DISPENSED
Start: 2018-04-19

## (undated) RX ORDER — LIDOCAINE HYDROCHLORIDE 10 MG/ML
INJECTION, SOLUTION INFILTRATION; PERINEURAL
Status: DISPENSED
Start: 2021-01-11

## (undated) RX ORDER — BUPIVACAINE HYDROCHLORIDE 5 MG/ML
INJECTION, SOLUTION EPIDURAL; INTRACAUDAL
Status: DISPENSED
Start: 2018-03-07

## (undated) RX ORDER — IPRATROPIUM BROMIDE AND ALBUTEROL SULFATE 2.5; .5 MG/3ML; MG/3ML
SOLUTION RESPIRATORY (INHALATION)
Status: DISPENSED
Start: 2023-01-01

## (undated) RX ORDER — HYDRALAZINE HYDROCHLORIDE 25 MG/1
TABLET, FILM COATED ORAL
Status: DISPENSED
Start: 2019-01-21

## (undated) RX ORDER — TRIAMCINOLONE ACETONIDE 40 MG/ML
INJECTION, SUSPENSION INTRA-ARTICULAR; INTRAMUSCULAR
Status: DISPENSED
Start: 2021-05-27

## (undated) RX ORDER — BUPIVACAINE HYDROCHLORIDE 5 MG/ML
INJECTION, SOLUTION EPIDURAL; INTRACAUDAL
Status: DISPENSED
Start: 2021-05-27

## (undated) RX ORDER — LIDOCAINE HYDROCHLORIDE 10 MG/ML
INJECTION, SOLUTION INFILTRATION; PERINEURAL
Status: DISPENSED
Start: 2018-02-28